# Patient Record
Sex: FEMALE | Race: WHITE | NOT HISPANIC OR LATINO | Employment: OTHER | URBAN - METROPOLITAN AREA
[De-identification: names, ages, dates, MRNs, and addresses within clinical notes are randomized per-mention and may not be internally consistent; named-entity substitution may affect disease eponyms.]

---

## 2017-01-05 ENCOUNTER — RADIANT APPOINTMENT (OUTPATIENT)
Dept: BONE DENSITY | Facility: CLINIC | Age: 77
End: 2017-01-05
Attending: INTERNAL MEDICINE
Payer: COMMERCIAL

## 2017-01-05 DIAGNOSIS — M89.9 OSTEOPATHIA: ICD-10-CM

## 2017-01-05 DIAGNOSIS — M85.80 OSTEOPENIA: ICD-10-CM

## 2017-01-05 DIAGNOSIS — Z78.0 ASYMPTOMATIC MENOPAUSAL STATE: ICD-10-CM

## 2017-01-05 PROCEDURE — 77085 DXA BONE DENSITY AXL VRT FX: CPT | Performed by: INTERNAL MEDICINE

## 2017-01-18 ENCOUNTER — TRANSFERRED RECORDS (OUTPATIENT)
Dept: HEALTH INFORMATION MANAGEMENT | Facility: CLINIC | Age: 77
End: 2017-01-18

## 2017-01-21 NOTE — PROGRESS NOTES
Quick Note:    Dear Ginger,   Your recent Dexa scan results were reviewed and thinning of the bones - osteopenia. Please continue with current treatment ie Calcium and Vitamin D, weight bearing exercise , and follow up plans as discussed.  A repeat DEXA is recommended in 2 years.    We will discuss other treatment options at your next office visit.    Stay healthy!  Dr. [facility]    ______

## 2017-01-23 ENCOUNTER — OFFICE VISIT (OUTPATIENT)
Dept: CARDIOLOGY | Facility: CLINIC | Age: 77
End: 2017-01-23
Attending: INTERNAL MEDICINE
Payer: COMMERCIAL

## 2017-01-23 VITALS
DIASTOLIC BLOOD PRESSURE: 62 MMHG | HEIGHT: 63 IN | SYSTOLIC BLOOD PRESSURE: 132 MMHG | WEIGHT: 167.8 LBS | BODY MASS INDEX: 29.73 KG/M2 | HEART RATE: 70 BPM

## 2017-01-23 DIAGNOSIS — I70.1 RENAL ARTERY STENOSIS (H): ICD-10-CM

## 2017-01-23 DIAGNOSIS — I10 BENIGN ESSENTIAL HYPERTENSION: Primary | ICD-10-CM

## 2017-01-23 DIAGNOSIS — E78.5 HYPERLIPIDEMIA LDL GOAL <100: ICD-10-CM

## 2017-01-23 PROCEDURE — 99215 OFFICE O/P EST HI 40 MIN: CPT | Performed by: INTERNAL MEDICINE

## 2017-01-23 RX ORDER — CLONIDINE HYDROCHLORIDE 0.1 MG/1
0.1 TABLET ORAL 2 TIMES DAILY
COMMUNITY
End: 2017-04-14

## 2017-01-23 NOTE — Clinical Note
2017      Vaibhav Maldonado MD [facility]    [address], [address]   [place]  [address]       RE: Ginger Abreu   MRN: [id number]   : 1940       Dear Dr. Maldonado:      I  had the pleasure to follow up with your patient, Ms. Ginger Abreu, in the Cardiovascular Medicine Clinic.  As you recall, she is a 76-year-old female with a longstanding history of hypertension.  She has a tremendous amount of allergies and adverse reactions to conventional hypertensive medications; please see list for additional details.      The patient has had fluctuating and difficult to control blood pressure more recently.  She has a blood pressure diary which has shown fluctuation in her blood pressures, oftentimes it can go to the 150s to 160s without any warning.  She has been on a multidrug regimen and is on second and third line agents as a result of her adverse reactions.  She has never had any chest pain, PND or orthopnea.  Her kidney function is stable.  She reports no lower extremity edema.  Due to her difficult to control blood pressure more recently, a CTA of her renal arteries was ordered.  It was commented upon that she has single renal arteries to both kidneys, the proximal portion of the left renal artery is heavily calcified with a significant noncalcified plaque seen in the proximal portion.  The right renal artery is somewhat obscured by heavy calcific plaque; however, the reader commented that there is also a noncalcified plaque at this level suggesting a moderate or moderate to severe area of narrowing.  She does not have any additional significant findings in her iliofemoral arteries.  Due to the nature of her difficult to control blood pressure, she is sent to the Peripheral Vascular Clinic for evaluation for renal angiography and PTA and stenting if needed.      PAST MEDICAL HISTORY:   1.  A CT as noted above with the suggestion of severe bilateral  renal artery stenosis.   2.  Hypertension, longstanding for many decades with more recent difficult to control.   3.  Hyperlipidemia.   4.  Obesity.   5.  History of pulmonary embolism, remotely.   6.  History of deep venous thrombosis.   7.  Chronic kidney disease.   8.  Diabetes mellitus.      ALLERGIES:  Please see extensive list as noted.      SOCIAL HISTORY:  The patient is currently retired, he used to work at [employer].  He is a nonsmoker, rare alcohol intake.      FAMILY HISTORY:  Negative for premature coronary artery disease.      REVIEW OF SYSTEMS:  A 10-point review of systems negative otherwise as specified in the HPI.      PHYSICAL EXAMINATION:   VITAL SIGNS:  Blood pressure is 132/62, heart rate is 70, weight 167 pounds.   GENERAL:  The patient is alert, oriented, in no apparent distress.   HEENT:  Oropharynx clear, no sinus tenderness.   NECK:  Unable to assess JVP due to fullness of neck, supple.   CARDIOVASCULAR:  Distant heart tones, normal S1, S2, no murmurs, gallops or rubs.   LUNGS:  Coarse but clear.   ABDOMEN:  Soft, nontender, nondistended.   EXTREMITIES:  No edema, warm to touch.      ASSESSMENT:   1.  Hypertension with labile blood pressure recordings at home as well as office-based numbers.   2.  Recent CTA with a suggestion of moderate-to-severe bilateral renal artery stenosis.   3.  Longstanding hypertension with multiple allergies.  Please see chart for additional details.   4.  Hyperlipidemia.   5.  Diabetes mellitus.   6.  Chronic renal insufficiency.      RECOMMENDATIONS:  We have gone over her recent CTA and blood pressure recordings.  We have discussed the nature of potential procedure, namely peripheral angiography and PTCA and stenting of the renal arteries if needed.  She, after a lengthy discussion, is agreeable to have this performed.  We have discussed the risks, benefits and potential complications and she is willing to proceed.  The next peripheral day that we have as an  outpatient is 02/21.  We will schedule her for this at 1:00 p.m.  We will give her gentle hydration prior to the procedure and carefully assess her renal arteries.      It was a pleasure seeing this patient on your behalf.  If you have any additional questions or concerns, please do not hesitate to contact me.      Sincerely,      Mariah Elizalde MD      cc:   Steven Fonseca MD  [facility] ([facility]3]   [address] [facility] [id number]

## 2017-01-23 NOTE — PROGRESS NOTES
2017      Vaibhav Maldonado MD [facility] [place]    [address], [address]   [place]  [address]       RE: Ginger Abreu   MRN: [id number]   : 1940       Dear Dr. Maldonado:      I  had the pleasure to follow up with your patient, Ms. Ginger Abreu, in the Cardiovascular Medicine Clinic.  As you recall, she is a 76-year-old female with a longstanding history of hypertension.  She has a tremendous amount of allergies and adverse reactions to conventional hypertensive medications; please see list for additional details.      The patient has had fluctuating and difficult to control blood pressure more recently.  She has a blood pressure diary which has shown fluctuation in her blood pressures, oftentimes it can go to the 150s to 160s without any warning.  She has been on a multidrug regimen and is on second and third line agents as a result of her adverse reactions.  She has never had any chest pain, PND or orthopnea.  Her kidney function is stable.  She reports no lower extremity edema.  Due to her difficult to control blood pressure more recently, a CTA of her renal arteries was ordered.  It was commented upon that she has single renal arteries to both kidneys, the proximal portion of the left renal artery is heavily calcified with a significant noncalcified plaque seen in the proximal portion.  The right renal artery is somewhat obscured by heavy calcific plaque; however, the reader commented that there is also a noncalcified plaque at this level suggesting a moderate or moderate to severe area of narrowing.  She does not have any additional significant findings in her iliofemoral arteries.  Due to the nature of her difficult to control blood pressure, she is sent to the Peripheral Vascular Clinic for evaluation for renal angiography and PTA and stenting if needed.      PAST MEDICAL HISTORY:   1.  A CT as noted above with the suggestion of severe bilateral  renal artery stenosis.   2.  Hypertension, longstanding for many decades with more recent difficult to control.   3.  Hyperlipidemia.   4.  Obesity.   5.  History of pulmonary embolism, remotely.   6.  History of deep venous thrombosis.   7.  Chronic kidney disease.   8.  Diabetes mellitus.      ALLERGIES:  Please see extensive list as noted.      SOCIAL HISTORY:  The patient is currently retired, he used to work at [employer].  He is a nonsmoker, rare alcohol intake.      FAMILY HISTORY:  Negative for premature coronary artery disease.      REVIEW OF SYSTEMS:  A 10-point review of systems negative otherwise as specified in the HPI.      PHYSICAL EXAMINATION:   VITAL SIGNS:  Blood pressure is 132/62, heart rate is 70, weight 167 pounds.   GENERAL:  The patient is alert, oriented, in no apparent distress.   HEENT:  Oropharynx clear, no sinus tenderness.   NECK:  Unable to assess JVP due to fullness of neck, supple.   CARDIOVASCULAR:  Distant heart tones, normal S1, S2, no murmurs, gallops or rubs.   LUNGS:  Coarse but clear.   ABDOMEN:  Soft, nontender, nondistended.   EXTREMITIES:  No edema, warm to touch.      ASSESSMENT:   1.  Hypertension with labile blood pressure recordings at home as well as office-based numbers.   2.  Recent CTA with a suggestion of moderate-to-severe bilateral renal artery stenosis.   3.  Longstanding hypertension with multiple allergies.  Please see chart for additional details.   4.  Hyperlipidemia.   5.  Diabetes mellitus.   6.  Chronic renal insufficiency.      RECOMMENDATIONS:  We have gone over her recent CTA and blood pressure recordings.  We have discussed the nature of potential procedure, namely peripheral angiography and PTCA and stenting of the renal arteries if needed.  She, after a lengthy discussion, is agreeable to have this performed.  We have discussed the risks, benefits and potential complications and she is willing to proceed.  The next peripheral day that we have as an  outpatient is .  We will schedule her for this at 1:00 p.m.  We will give her gentle hydration prior to the procedure and carefully assess her renal arteries.      It was a pleasure seeing this patient on your behalf.  If you have any additional questions or concerns, please do not hesitate to contact me.      Sincerely,      MD ERNESTO Reno MD             D: 2017 13:17   T: 2017 14:02   MT: MONA      Name:     SARAY MÉNDEZ   MRN:      [id number]        Account:      [id number]   :      1940           Service Date: 2017      Document: [id number]

## 2017-01-24 NOTE — PROGRESS NOTES
Quick Note:    Dear Ginger,   Your recent Dexa scan results were reviewed and thinning of the bones - osteopenia. Treatment with a bone building agent like Fosamax in addition to vitamin D and calcium supplementation is recommended. Please let me know how you would like to proceed. Please continue with current treatment ie Calcium and Vitamin D, weight bearing exercise, and follow up plans as discussed.  A repeat DEXA is recommended in 2 years.      Stay healthy!  Dr. [facility]    ______

## 2017-01-27 DIAGNOSIS — E78.5 HYPERLIPIDEMIA LDL GOAL <100: Primary | ICD-10-CM

## 2017-01-27 NOTE — TELEPHONE ENCOUNTER
fenofibrate 160 MG tablet       Last Written Prescription Date: 09/13/16  Last Fill Quantity: 90 tab, # refills: 2    Last Office Visit with [facility], [facility] or [facility] prescribing provider:  12/20/16   Future Office Visit:    Next 5 appointments (look out 90 days)     Feb 03, 2017  2:45 PM   Return Visit with Vaibhav Maldonado MD   [facility] [place] ([facility])    [address]  [facility] [address]   [phone]            Feb 13, 2017  9:15 AM   Lab visit with [facility]   [facility]    [address] [address]   [phone]            Feb 20, 2017  1:30 PM   MyChart Long with Steven Fonseca MD   [facility] ([facility])    [address] [address]   [phone]            Feb 28, 2017 10:10 AM   Return Visit with RAMY Pacheco CNP   [facility] [place] ([facility])    [address]  [facility] [address]   [phone]            Apr 14, 2017  1:30 PM   PHYSICAL with Steven Fonseca MD   [facility] ([facility])    [address] [address]   [phone]                  CHOLESTEROL   Date Value Ref Range Status   12/15/2016 159 <200 mg/dL Final     HDL CHOLESTEROL   Date Value Ref Range Status   12/15/2016 55 >49 mg/dL Final     LDL CHOLESTEROL CALCULATED   Date Value Ref Range Status   12/15/2016 72 <100 mg/dL Final     Comment:     Desirable:       <100 mg/dl     TRIGLYCERIDES   Date Value Ref Range Status   12/15/2016 160* <150 mg/dL Final     Comment:     Borderline high:  150-199 mg/dl   High:             200-499 mg/dl   Very high:       >499 mg/dl   Fasting specimen       CHOLESTEROL/HDL RATIO   Date Value Ref Range Status   04/03/2015 4.2 0.0  - 5.0 Final     ALT   Date Value Ref Range Status   12/15/2016 28 0 - 50 U/L Final

## 2017-01-29 DIAGNOSIS — I10 BENIGN ESSENTIAL HYPERTENSION: Primary | ICD-10-CM

## 2017-01-30 RX ORDER — FENOFIBRATE 160 MG/1
160 TABLET ORAL DAILY
Qty: 90 TABLET | Refills: 2 | Status: SHIPPED | OUTPATIENT
Start: 2017-01-30 | End: 2017-04-14

## 2017-01-30 NOTE — TELEPHONE ENCOUNTER
olmesartan (BENICAR) 40 MG tablet      Last Written Prescription Date: 11/28/16  Last Fill Quantity: 30 tab, # refills: 1  Last Office Visit with [facility], [facility] or [facility] prescribing provider: 01/23/2017   Next 5 appointments (look out 90 days)     Feb 03, 2017  2:45 PM   Return Visit with Vaibhav Maldonado MD   [facility] [place] ([facility])    [address]  [facility] [address]   [phone]            Feb 13, 2017  9:15 AM   Lab visit with [facility]   [facility] ([facility])    [address] [address]   [phone]            Feb 20, 2017  1:30 PM   MyChart Long with Steven Fonseca MD   [facility] ([facility])    [address] [address]   [phone]            Feb 28, 2017 10:10 AM   Return Visit with RAMY Pacheco CNP   [facility] [place] ([facility])    [address]  [facility] [address]   [phone]            Apr 14, 2017  1:30 PM   PHYSICAL with Steven Fonseca MD   [facility] ([facility])    [address] [address]   [phone]                   POTASSIUM   Date Value Ref Range Status   12/15/2016 4.3 3.4 - 5.3 mmol/L Final     CREATININE   Date Value Ref Range Status   12/15/2016 1.12* 0.52 - 1.04 mg/dL Final     BP Readings from Last 3 Encounters:   01/23/17 132/62   12/20/16 122/70   12/14/16 126/70

## 2017-01-31 RX ORDER — OLMESARTAN MEDOXOMIL 40 MG/1
40 TABLET ORAL DAILY
Qty: 30 TABLET | Refills: 1 | Status: SHIPPED | OUTPATIENT
Start: 2017-01-31 | End: 2017-02-20

## 2017-01-31 NOTE — TELEPHONE ENCOUNTER
Routing refill request to provider for review/approval because:  Drug interaction warning/allergy

## 2017-02-02 ENCOUNTER — TELEPHONE (OUTPATIENT)
Dept: CARDIOLOGY | Facility: CLINIC | Age: 77
End: 2017-02-02

## 2017-02-02 NOTE — TELEPHONE ENCOUNTER
Called pt with recommendations from Dr. Maldonado. Apt for tomorrow cancelled. Pt agrees with plan. [place] RN

## 2017-02-02 NOTE — TELEPHONE ENCOUNTER
Called pt re: upcoming apt with Dr. Maldonado. Pt advised she does not need to keep apt to see Dr. Maldonado as she saw Dr. Elizalde to review renal CT results & is scheduled for peripheral angio later this month. Pt states she did want to discuss her terazosin medication. Pt states she had a terrible bout of diarrhea lasting for weeks, she lost 7 lbs and finally resolved after she stopped the terazosin. Pt states her SBP is running 130-140. Will message Dr. Maldonado to review. [place] RN

## 2017-02-02 NOTE — TELEPHONE ENCOUNTER
I would stay off the terazosin and see what BP does post renal artery intervention which I believe is coming up later this month. Thx

## 2017-02-13 DIAGNOSIS — N18.30 CHRONIC KIDNEY DISEASE, STAGE III (MODERATE) (H): ICD-10-CM

## 2017-02-13 DIAGNOSIS — I15.0 RENOVASCULAR HYPERTENSION: ICD-10-CM

## 2017-02-13 DIAGNOSIS — E55.9 VITAMIN D DEFICIENCY: ICD-10-CM

## 2017-02-13 LAB
ANION GAP SERPL CALCULATED.3IONS-SCNC: 7 MMOL/L (ref 3–14)
BUN SERPL-MCNC: 29 MG/DL (ref 7–30)
CALCIUM SERPL-MCNC: 9.3 MG/DL (ref 8.5–10.1)
CHLORIDE SERPL-SCNC: 106 MMOL/L (ref 94–109)
CO2 SERPL-SCNC: 28 MMOL/L (ref 20–32)
CREAT SERPL-MCNC: 1.19 MG/DL (ref 0.52–1.04)
DEPRECATED CALCIDIOL+CALCIFEROL SERPL-MC: 48 UG/L (ref 20–75)
GFR SERPL CREATININE-BSD FRML MDRD: 44 ML/MIN/1.7M2
GLUCOSE SERPL-MCNC: 122 MG/DL (ref 70–99)
POTASSIUM SERPL-SCNC: 4 MMOL/L (ref 3.4–5.3)
PTH-INTACT SERPL-MCNC: 39 PG/ML (ref 12–72)
SODIUM SERPL-SCNC: 141 MMOL/L (ref 133–144)

## 2017-02-13 PROCEDURE — 36415 COLL VENOUS BLD VENIPUNCTURE: CPT | Performed by: INTERNAL MEDICINE

## 2017-02-13 PROCEDURE — 83970 ASSAY OF PARATHORMONE: CPT | Performed by: INTERNAL MEDICINE

## 2017-02-13 PROCEDURE — 82306 VITAMIN D 25 HYDROXY: CPT | Performed by: INTERNAL MEDICINE

## 2017-02-13 PROCEDURE — 80048 BASIC METABOLIC PNL TOTAL CA: CPT | Performed by: INTERNAL MEDICINE

## 2017-02-17 ENCOUNTER — TELEPHONE (OUTPATIENT)
Dept: CARDIOLOGY | Facility: CLINIC | Age: 77
End: 2017-02-17

## 2017-02-17 NOTE — TELEPHONE ENCOUNTER
Patient calling to discuss plan for coronary angiogram - patient concerned at she is scheduled for a later procedure and she is a diabetic.. The patient wondering if she would be able to get an earlier time slot..   In review of the schedules - I was able to get the patient moved to the morning on 2/27/2017 to the first case in the morning.  There were no other questions or concerns at this time. Lena Keene

## 2017-02-20 ENCOUNTER — OFFICE VISIT (OUTPATIENT)
Dept: INTERNAL MEDICINE | Facility: CLINIC | Age: 77
End: 2017-02-20
Payer: COMMERCIAL

## 2017-02-20 VITALS
WEIGHT: 167 LBS | HEIGHT: 63 IN | BODY MASS INDEX: 29.59 KG/M2 | OXYGEN SATURATION: 98 % | RESPIRATION RATE: 16 BRPM | HEART RATE: 61 BPM | SYSTOLIC BLOOD PRESSURE: 112 MMHG | DIASTOLIC BLOOD PRESSURE: 60 MMHG | TEMPERATURE: 98.2 F

## 2017-02-20 DIAGNOSIS — E11.9 TYPE 2 DIABETES MELLITUS WITHOUT COMPLICATION, WITHOUT LONG-TERM CURRENT USE OF INSULIN (H): ICD-10-CM

## 2017-02-20 DIAGNOSIS — N18.30 CHRONIC KIDNEY DISEASE, STAGE III (MODERATE) (H): ICD-10-CM

## 2017-02-20 DIAGNOSIS — F41.9 ANXIETY: ICD-10-CM

## 2017-02-20 DIAGNOSIS — M48.061 SPINAL STENOSIS, LUMBAR REGION, WITHOUT NEUROGENIC CLAUDICATION: ICD-10-CM

## 2017-02-20 DIAGNOSIS — M85.80 OSTEOPENIA: ICD-10-CM

## 2017-02-20 DIAGNOSIS — E55.9 VITAMIN D DEFICIENCY: ICD-10-CM

## 2017-02-20 DIAGNOSIS — E78.5 HYPERLIPIDEMIA WITH TARGET LDL LESS THAN 100: ICD-10-CM

## 2017-02-20 DIAGNOSIS — I10 BENIGN ESSENTIAL HYPERTENSION: Primary | ICD-10-CM

## 2017-02-20 LAB
CREAT UR-MCNC: 138 MG/DL
MAGNESIUM SERPL-MCNC: 2 MG/DL (ref 1.6–2.3)
MICROALBUMIN UR-MCNC: 13 MG/L
MICROALBUMIN/CREAT UR: 9.35 MG/G CR (ref 0–25)
PHOSPHATE SERPL-MCNC: 2.9 MG/DL (ref 2.5–4.5)

## 2017-02-20 PROCEDURE — 82043 UR ALBUMIN QUANTITATIVE: CPT | Performed by: INTERNAL MEDICINE

## 2017-02-20 PROCEDURE — 83735 ASSAY OF MAGNESIUM: CPT | Performed by: INTERNAL MEDICINE

## 2017-02-20 PROCEDURE — 36415 COLL VENOUS BLD VENIPUNCTURE: CPT | Performed by: INTERNAL MEDICINE

## 2017-02-20 PROCEDURE — 84100 ASSAY OF PHOSPHORUS: CPT | Performed by: INTERNAL MEDICINE

## 2017-02-20 PROCEDURE — 99215 OFFICE O/P EST HI 40 MIN: CPT | Performed by: INTERNAL MEDICINE

## 2017-02-20 RX ORDER — ALPRAZOLAM 0.25 MG
0.25 TABLET ORAL
Qty: 30 TABLET | Refills: 0 | Status: SHIPPED | OUTPATIENT
Start: 2017-02-20 | End: 2017-05-04

## 2017-02-20 RX ORDER — OLMESARTAN MEDOXOMIL 40 MG/1
40 TABLET ORAL DAILY
Qty: 30 TABLET | Refills: 1 | Status: SHIPPED | OUTPATIENT
Start: 2017-02-20 | End: 2018-02-02

## 2017-02-20 RX ORDER — GLIMEPIRIDE 2 MG/1
2 TABLET ORAL
Qty: 90 TABLET | Refills: 0 | Status: SHIPPED | OUTPATIENT
Start: 2017-02-20 | End: 2017-04-14

## 2017-02-20 RX ORDER — OLMESARTAN MEDOXOMIL 40 MG/1
40 TABLET ORAL DAILY
Qty: 30 TABLET | Refills: 1
Start: 2017-02-20 | End: 2017-02-20

## 2017-02-20 NOTE — PATIENT INSTRUCTIONS
** FOLLOW UP PLAN**:    PLEASE SCHEDULE  FASTING LABS WITH OFFICE VISIT 3 MONTHS FROM TODAY TO FOLLOW UP ON  Diabetes, high blood pressure, cholesterol, to evaluate kidney function after angiogram and to review other test results     BE SURE TO SCHEDULE YOUR LAB DRAW APPOINTMENT FOR AT LEAST 1 WEEK BEFORE YOUR NEXT VISIT    I would recommend that you start Prolia which is a subcutaneous injection used to treat osteopenia as based on your bone density scan results there has been some significant worsening of your bone density  Please see information regarding Prolia and its side effect profile below      YOU MAY CONTACT THE CLINIC IF ANY QUESTIONS OR CONCERNS -[phone] OR [facility]           PROLIA  Risk Evaluation and Mitigation Strategy Best Practice Advisory    In May 2015, the FDA required an update to the PROLIA  (denosumab) REMS (Risk Evaluation and Mitigation Strategy) to inform both providers and patients about potential serious risks related to PROLIA .    These potential serious risks include:    Hypocalcemia    Osteonecrosis of the jaw    Atypical femoral fractures    Serious Infections    Dermatologic reactions    To ensure compliance with these requirements [employer] has developed a workflow for those patients who will receive PROLIA  at clinic.  This workflow includes insurance verification, patient scheduling, patient education, and documentation. Registered Nurses in the clinic should have completed eLearning related to the REMS and the clinic workflow.  Refer to them to initiate this process.  This workflow does not need to be executed if the patient is to receive PROLIA  injection at [facility].       The  has put together a Patient Education Toolkit.  Copies of these handouts may be available your clinic. Patients must receive the Patient Brochure and Medication Guide when receiving injection at clinic.  These will be attached to the injection ordered from  [employer] to the clinic. Links to handouts:  Patient Counseling Chart for Healthcare Providers  Patient Brochure  Prescribing Information  Medication Guide    If you are having trouble accessing the above links, these documents can be found at: http://[email or web address]    The role of healthcare provider includes reviewing patient education materials with the patient, advising patients to seek medical attention if they have signs or symptoms of one of the serious risks, and provide patients a copy of the Patient Brochure and Medication Guide when receiving their injection.      Due to the risk of hypocalcemia the Prescribing Information for PROLIA  recommends that calcium and mineral levels (magnesium and phosphorus) be checked within 14 days of injection for those predisposed to hypocalcemia.

## 2017-02-20 NOTE — PROGRESS NOTES
SUBJECTIVE:                                                    Ginger Abreu is a 76 year old female who presents to clinic today for the following health issues:    Diabetes Follow-up    Patient is checking blood sugars: once daily.  Results are as follows:         am - 100-125    Diabetic concerns: None     Symptoms of hypoglycemia (low blood sugar): shaky, weak, blurred vision     Paresthesias (numbness or burning in feet) or sores: Yes not sure if from back surgery     Date of last diabetic eye exam: Nov 2016     Hypertension Follow-up      Outpatient blood pressures are being checked at home.  Results are 130/80.    Low Salt Diet: not monitoring salt       Amount of exercise or physical activity: 1 day/week for an average of 15-30 minutes    Problems taking medications regularly: No    Medication side effects: none  Diet: regular (no restrictions)    Other significant issues as outlined and addressed in the plan section of this note.  Ginger's most recent test results are as noted and addressed in the plan section of this note, and are significant for findings consistent with:   STABLE CKD - likely as a result of renal artery stenosis    Which could certainly account for a lot of the symptoms that she has been experiencing with regards to difficulty with blood pressure control.    Of note Ginger has a renal angiogram set up for a week from today.    Problem list and histories reviewed & adjusted, as indicated.  Additional history: as documented    Labs reviewed in [facility]  Problem list, Medication list, Allergies, and Medical/Social/Surgical histories reviewed in [facility] and updated as appropriate.    ROS:  14 point ROS negative except as above. She does have some pain in her legs which is chronic and as a result of lumbar stenosis and previous back surgery for which she requires a roller walker. She is requesting for a DME prescription today.      OBJECTIVE:                                                   "  /60 (BP Location: Right arm, Patient Position: Chair, Cuff Size: Adult Regular)  Pulse 61  Temp 98.2  F (36.8  C) (Oral)  Resp 16  Ht 5' 3\" (1.6 m)  Wt 167 lb (75.8 kg)  SpO2 98%  BMI 29.58 kg/m2  Body mass index is 29.58 kg/(m^2).  GENERAL: healthy, alert and no distress  EYES: Eyes grossly normal to inspection, PERRL and conjunctivae and sclerae normal  NECK: no adenopathy, no asymmetry, masses, or scars and thyroid normal to palpation  RESP: lungs clear to auscultation - no rales, rhonchi or wheezes  CV: regular rate and rhythm, normal S1 S2, no S3 or S4, no murmur, click or rub, no peripheral edema and peripheral pulses strong  ABDOMEN: soft, nontender, no hepatosplenomegaly, no masses and bowel sounds normal  MS: no gross musculoskeletal defects noted, no edema  NEURO: Normal strength and tone, mentation intact and speech normal  PSYCH: mentation appears normal, affect normal/bright    Diagnostic Test Results:  Results for orders placed or performed in visit on 02/13/17   Vitamin D Deficiency   Result Value Ref Range    Vitamin D Deficiency screening 48 20 - 75 ug/L   Basic metabolic panel   Result Value Ref Range    Sodium 141 133 - 144 mmol/L    Potassium 4.0 3.4 - 5.3 mmol/L    Chloride 106 94 - 109 mmol/L    Carbon Dioxide 28 20 - 32 mmol/L    Anion Gap 7 3 - 14 mmol/L    Glucose 122 (H) 70 - 99 mg/dL    Urea Nitrogen 29 7 - 30 mg/dL    Creatinine 1.19 (H) 0.52 - 1.04 mg/dL    GFR Estimate 44 (L) >60 mL/min/1.7m2    GFR Estimate If Black 53 (L) >60 mL/min/1.7m2    Calcium 9.3 8.5 - 10.1 mg/dL   Parathyroid Hormone Intact   Result Value Ref Range    Parathyroid Hormone Intact 39 12 - 72 pg/mL        ASSESSMENT/PLAN:                                                    Hyperlipidemia; controlled   Plan:  No changes in the patient's current treatment plan    Hypertension; controlled   Associated with the following complications:    CKD and Vascular Disease   Plan:  No changes in the patient's current " treatment plan    Chronic Kidney Disease Stage 3 , stable     Associated with the following complications: None     Plan:  Current treatment plan is appropriate, no change in therapy              DIAGNOSIS/PLAN:     ICD-10-CM    1. Benign essential hypertension I10 olmesartan (BENICAR) 40 MG tablet     DISCONTINUED: olmesartan (BENICAR) 40 MG tablet   2. Type 2 diabetes mellitus without complication, without long-term current use of insulin (H) E11.9 glimepiride (AMARYL) 2 MG tablet   3. Vitamin D deficiency E55.9 cholecalciferol (VITAMIN D3) 24439 UNITS capsule   4. Anxiety F41.9 ALPRAZolam (XANAX) 0.25 MG tablet    USES SPARINGLY DURING STRESSFUL SITUATIONS   5. Chronic kidney disease, stage III (moderate) N18.3    6. Spinal stenosis, lumbar region, without neurogenic claudication M48.06 order for DME    With significant lower extremity weakness which is stable       SIGNIFICANT ISSUES RE The primary encounter diagnosis was Benign essential hypertension. Diagnoses of Type 2 diabetes mellitus without complication, without long-term current use of insulin (H), Vitamin D deficiency, Anxiety, Chronic kidney disease, stage III (moderate), and Spinal stenosis, lumbar region, without neurogenic claudication were also pertinent to this visit. AS NOTED AND ADDRESSED ABOVE   MEDS AND AND LABS AS ORDERED TO ADDRESS PREVIOUS AND CURRENT ABNORMAL INDICES    SEE PATIENT INSTRUCTION SECTION FOR FOLLOW UP PLAN    Ginger IS TO CONTINUE OTHER TREATMENT REGIMEN/PLANS EXCEPT AS INDICATED    COMPLIANCE WITH MEDICATIONS DIET AND EXERCISE PLANS ENCOURAGED    DISCONTINUED MEDS:  Medications Discontinued During This Encounter   Medication Reason     nitrofurantoin (MACRODANTIN) 50 MG capsule      cholecalciferol (VITAMIN D3) 62631 UNITS capsule Reorder     olmesartan (BENICAR) 40 MG tablet Reorder     terazosin (HYTRIN) 2 MG capsule Discontinued by another Health Care Provider     fluticasone-salmeterol (ADVAIR DISKUS) 250-50 MCG/DOSE  diskus inhaler      Doxylamine Succinate, Sleep, (SLEEP AID PO)      olmesartan (BENICAR) 40 MG tablet Reorder     glimepiride (AMARYL) 2 MG tablet Reorder     ALPRAZolam (XANAX) 0.25 MG tablet Reorder       CURRENT MED LIST WITH CHANGES AS NOTED BELOW:  Current Outpatient Prescriptions   Medication Sig Dispense Refill     cholecalciferol (VITAMIN D3) 86962 UNITS capsule Take 1 capsule (50,000 Units) by mouth every 14 days ONE CAP 3 X A MONTH ON THE 1ST, 10TH AND 20TH OF EACH MONTH, FOR VITAMIN D DEFICIENCY MUST TAKE WITH WITH FAT CONTAINING MEAL 40 capsule 0     olmesartan (BENICAR) 40 MG tablet Take 1 tablet (40 mg) by mouth daily INDICATION:TO LOWER BLOOD PRESSURE AND TO HELP PRESERVE KIDNEY FUNCTION 30 tablet 1     glimepiride (AMARYL) 2 MG tablet Take 1 tablet (2 mg) by mouth every morning (before breakfast) INDICATION: TO TREAT DIABETES 90 tablet 0     ALPRAZolam (XANAX) 0.25 MG tablet Take 1 tablet (0.25 mg) by mouth nightly as needed (FOR SEVERE ANXIETY ONLY) 30 tablet 0     order for DME Equipment being ordered: Wheeled walker with basket and seat attachment 1 Units 0     fenofibrate 160 MG tablet Take 1 tablet (160 mg) by mouth daily INDICATION: TO LOWER CHOLESTEROL 90 tablet 2     cloNIDine (CATAPRES) 0.1 MG tablet Take 0.1 mg by mouth 2 times daily       butalbital-acetaminophen-caffeine (FIORICET/ESGIC) -40 MG per tablet Take 1 tablet by mouth every 6 hours as needed for pain 30 tablet 0     triamterene-hydrochlorothiazide (MAXZIDE-25) 37.5-25 MG per tablet Take 1 tablet by mouth every morning INDICATION:TO LOWER BLOOD PRESSURE AND CONTROL EDEMA 30 tablet PRN     nitroglycerin (NITROSTAT) 0.4 MG SL tablet Place 1 tablet (0.4 mg) under the tongue every 5 minutes as needed for chest pain 25 tablet 0     ascorbic acid (VITAMIN C) 1000 MG TABS Take 1 tablet (1,000 mg) by mouth daily VITAMIN C REPLACEMENT 100 tablet 3     pravastatin (PRAVACHOL) 80 MG tablet Take 0.5 tablets (40 mg) by mouth every  evening INDICATION: TO LOWER CHOLESTEROL AND TO HELP  PREVENT HEART DISEASE 45 tablet PRN     cyabnocobalamin (VITAMIN B-12) 2500 MCG sublingual tablet Take 2,500 mcg by mouth daily INDICATION: FOR VITAMIN B12 SUPPLEMENTATION - TO IMPROVE MEMORY, BALANCE, SLEEP AND MOOD, DIRECTIONS: PLEASE PLACE UNDER THE TONGUE TO DISSOLVE 250 tablet 3     fluocinonide (LIDEX) 0.05 % external solution Apply to scalp QD-BID as needed 60 mL 3     esomeprazole (NEXIUM) 40 MG capsule Take 1 capsule (40 mg) by mouth every morning (before breakfast) TAKE  30'-60' BEFORE 1ST MEAL 180 capsule 2     furosemide (LASIX) 20 MG tablet Take 1 tablet (20 mg) by mouth as needed INDICATION: TO HELP LOWER BLOOD PRESSURE AND TO TREAT EDEMA 30 tablet PRN     calcium-vitamin D-vitamin K (VIACTIV) 500-500-40 MG-UNT-MCG CHEW Take 1 tablet by mouth 2 times daily (with meals) INDICATION: FOR BONE AND BREAST HEALTH 100 tablet PRN     melatonin 5 MG CAPS Take by mouth At Bedtime       BETA BLOCKER NOT PRESCRIBED, INTENTIONAL, Beta Blocker not prescribed intentionally due to Intolerance  0     albuterol (PROAIR HFA, PROVENTIL HFA, VENTOLIN HFA) 108 (90 BASE) MCG/ACT inhaler Inhale 2 puffs into the lungs every 6 hours INDICATION: RESCUE INHALER FOR SHORTNESS OF BREATH, CHEST TIGHTNESS AND COUGH 3 Inhaler PRN     ASPIRIN NOT PRESCRIBED, INTENTIONAL, continuous prn. Antiplatelet medication not prescribed intentionally due to Current anticoagulant therapy (warfarin/enoxaparin) 0 each 0     Lactobacillus (ACIDOPHILUS PO) Take  by mouth.       [DISCONTINUED] olmesartan (BENICAR) 40 MG tablet Take 1 tablet (40 mg) by mouth daily INDICATION:TO LOWER BLOOD PRESSURE AND TO HELP PRESERVE KIDNEY FUNCTION 30 tablet 1     [DISCONTINUED] olmesartan (BENICAR) 40 MG tablet Take 1 tablet (40 mg) by mouth daily 30 tablet 1     [DISCONTINUED] glimepiride (AMARYL) 2 MG tablet Take 1 tablet (2 mg) by mouth every morning (before breakfast) INDICATION: TO TREAT DIABETES 90 tablet 0      [DISCONTINUED] ALPRAZolam (XANAX) 0.25 MG tablet Take 1 tablet (0.25 mg) by mouth nightly as needed (FOR SEVERE ANXIETY ONLY) 10 tablet 0     [DISCONTINUED] fluticasone (FLOVENT HFA) 110 MCG/ACT inhaler Inhale 2 puffs into the lungs 2 times daily rinse mouth after each use 3 Inhaler 3         Office visit time > 40 mins, greater than 50% of which was spent obtaining history, reviewing medications, counseling re compliance with treatment plan, need for Prolia injection to treat severe osteopenia, discussion of treatment, follow up plans, and coordination of care. Continue weightbearing exercises and calcium and vitamin D supplementation is recommended.      Patient Instructions     ** FOLLOW UP PLAN**:    PLEASE SCHEDULE  FASTING LABS WITH OFFICE VISIT 3 MONTHS FROM TODAY TO FOLLOW UP ON  Diabetes, high blood pressure, cholesterol, to evaluate kidney function after angiogram and to review other test results     BE SURE TO SCHEDULE YOUR LAB DRAW APPOINTMENT FOR AT LEAST 1 WEEK BEFORE YOUR NEXT VISIT    I would recommend that you start Prolia which is a subcutaneous injection used to treat osteopenia as based on your bone density scan results there has been some significant worsening of your bone density  Please see information regarding Prolia and its side effect profile below      YOU MAY CONTACT THE CLINIC IF ANY QUESTIONS OR CONCERNS -[phone] OR VIA flatev           PROLIA  Risk Evaluation and Mitigation Strategy Best Practice Advisory    In May 2015, the FDA required an update to the PROLIA  (denosumab) REMS (Risk Evaluation and Mitigation Strategy) to inform both providers and patients about potential serious risks related to PROLIA .    These potential serious risks include:    Hypocalcemia    Osteonecrosis of the jaw    Atypical femoral fractures    Serious Infections    Dermatologic reactions    To ensure compliance with these requirements Leonor has developed a workflow for those patients who  will receive PROLIA  at clinic.  This workflow includes insurance verification, patient scheduling, patient education, and documentation. Registered Nurses in the clinic should have completed eLearning related to the REMS and the clinic workflow.  Refer to them to initiate this process.  This workflow does not need to be executed if the patient is to receive PROLIA  injection at [facility].       The  has put together a Patient Education Toolkit.  Copies of these handouts may be available your clinic. Patients must receive the Patient Brochure and Medication Guide when receiving injection at clinic.  These will be attached to the injection ordered from [employer] to the clinic. Links to handouts:  Patient Counseling Chart for Healthcare Providers  Patient Brochure  Prescribing Information  Medication Guide    If you are having trouble accessing the above links, these documents can be found at: [email or web address]    The role of healthcare provider includes reviewing patient education materials with the patient, advising patients to seek medical attention if they have signs or symptoms of one of the serious risks, and provide patients a copy of the Patient Brochure and Medication Guide when receiving their injection.      Due to the risk of hypocalcemia the Prescribing Information for PROLIA  recommends that calcium and mineral levels (magnesium and phosphorus) be checked within 14 days of injection for those predisposed to hypocalcemia.          Steven Fonseca MD  [facility]          DIAGNOSIS/PLAN:   No diagnosis found.    SIGNIFICANT ISSUES RE There were no encounter diagnoses. AS NOTED AND ADDRESSED ABOVE   MEDS AND AND LABS AS ORDERED TO ADDRESS PREVIOUS AND CURRENT ABNORMAL INDICES    SEE PATIENT INSTRUCTION SECTION FOR FOLLOW UP PLAN    Ginger IS TO CONTINUE OTHER TREATMENT  REGIMEN/PLANS EXCEPT AS INDICATED    COMPLIANCE WITH MEDICATIONS DIET AND EXERCISE PLANS ENCOURAGED    DISCONTINUED MEDS:  Medications Discontinued During This Encounter   Medication Reason     nitrofurantoin (MACRODANTIN) 50 MG capsule        CURRENT MED LIST WITH CHANGES AS NOTED BELOW:  Current Outpatient Prescriptions   Medication Sig Dispense Refill     olmesartan (BENICAR) 40 MG tablet Take 1 tablet (40 mg) by mouth daily 30 tablet 1     fenofibrate 160 MG tablet Take 1 tablet (160 mg) by mouth daily INDICATION: TO LOWER CHOLESTEROL 90 tablet 2     cloNIDine (CATAPRES) 0.1 MG tablet Take 0.1 mg by mouth 2 times daily       butalbital-acetaminophen-caffeine (FIORICET/ESGIC) -40 MG per tablet Take 1 tablet by mouth every 6 hours as needed for pain 30 tablet 0     cholecalciferol (VITAMIN D3) 26078 UNITS capsule Take 1 capsule (50,000 Units) by mouth every 14 days SIG: TAKE 1 CAP TWICE A WEEK FOR 2 WEEKS, THEN ONE CAP EVERY OTHER WEEK, INDICATION: TO TREAT VITAMIN D DEFICIENCY (1ST AND 15TH OF EACH MONTH), MUST TAKE WITH WITH FAT CONTAINING MEAL 40 capsule 0     terazosin (HYTRIN) 2 MG capsule Take 1 capsule (2 mg) by mouth At Bedtime INDICATION: TO TREAT HYPERTENSION 30 capsule 11     glimepiride (AMARYL) 2 MG tablet Take 1 tablet (2 mg) by mouth every morning (before breakfast) INDICATION: TO TREAT DIABETES 90 tablet 0     triamterene-hydrochlorothiazide (MAXZIDE-25) 37.5-25 MG per tablet Take 1 tablet by mouth every morning INDICATION:TO LOWER BLOOD PRESSURE AND CONTROL EDEMA 30 tablet PRN     ALPRAZolam (XANAX) 0.25 MG tablet Take 1 tablet (0.25 mg) by mouth nightly as needed (FOR SEVERE ANXIETY ONLY) 10 tablet 0     nitroglycerin (NITROSTAT) 0.4 MG SL tablet Place 1 tablet (0.4 mg) under the tongue every 5 minutes as needed for chest pain 25 tablet 0     fluticasone-salmeterol (ADVAIR DISKUS) 250-50 MCG/DOSE diskus inhaler Inhale 1 puff into the lungs 2 times daily 60 Inhaler prn     ascorbic acid  (VITAMIN C) 1000 MG TABS Take 1 tablet (1,000 mg) by mouth daily VITAMIN C REPLACEMENT 100 tablet 3     pravastatin (PRAVACHOL) 80 MG tablet Take 0.5 tablets (40 mg) by mouth every evening INDICATION: TO LOWER CHOLESTEROL AND TO HELP  PREVENT HEART DISEASE 45 tablet PRN     cyabnocobalamin (VITAMIN B-12) 2500 MCG sublingual tablet Take 2,500 mcg by mouth daily INDICATION: FOR VITAMIN B12 SUPPLEMENTATION - TO IMPROVE MEMORY, BALANCE, SLEEP AND MOOD, DIRECTIONS: PLEASE PLACE UNDER THE TONGUE TO DISSOLVE 250 tablet 3     fluocinonide (LIDEX) 0.05 % external solution Apply to scalp QD-BID as needed 60 mL 3     esomeprazole (NEXIUM) 40 MG capsule Take 1 capsule (40 mg) by mouth every morning (before breakfast) TAKE  30'-60' BEFORE 1ST MEAL 180 capsule 2     furosemide (LASIX) 20 MG tablet Take 1 tablet (20 mg) by mouth as needed INDICATION: TO HELP LOWER BLOOD PRESSURE AND TO TREAT EDEMA 30 tablet PRN     calcium-vitamin D-vitamin K (VIACTIV) 500-500-40 MG-UNT-MCG CHEW Take 1 tablet by mouth 2 times daily (with meals) INDICATION: FOR BONE AND BREAST HEALTH 100 tablet PRN     melatonin 5 MG CAPS Take by mouth At Bedtime       BETA BLOCKER NOT PRESCRIBED, INTENTIONAL, Beta Blocker not prescribed intentionally due to Intolerance  0     albuterol (PROAIR HFA, PROVENTIL HFA, VENTOLIN HFA) 108 (90 BASE) MCG/ACT inhaler Inhale 2 puffs into the lungs every 6 hours INDICATION: RESCUE INHALER FOR SHORTNESS OF BREATH, CHEST TIGHTNESS AND COUGH 3 Inhaler PRN     [DISCONTINUED] fluticasone (FLOVENT HFA) 110 MCG/ACT inhaler Inhale 2 puffs into the lungs 2 times daily rinse mouth after each use 3 Inhaler 3     Doxylamine Succinate, Sleep, (SLEEP AID PO) Take  by mouth At Bedtime.       ASPIRIN NOT PRESCRIBED, INTENTIONAL, continuous prn. Antiplatelet medication not prescribed intentionally due to Current anticoagulant therapy (warfarin/enoxaparin) 0 each 0     Lactobacillus (ACIDOPHILUS PO) Take  by mouth.           Office visit time >  40 mins, greater than 50% of which was spent obtaining history, reviewing medications, counseling re compliance with treatment plan, discussion of treatment, follow up plans, and coordination of care.

## 2017-02-20 NOTE — MR AVS SNAPSHOT
After Visit Summary   2/20/2017    Ginger Abreu    MRN: [id number]           Patient Information     Date Of Birth          1940        Visit Information        Provider Department      2/20/2017 1:30 PM Steven Fonseca MD [facility]        Today's Diagnoses     Benign essential hypertension    -  1    Type 2 diabetes mellitus without complication, without long-term current use of insulin (H)        Vitamin D deficiency        Anxiety        Chronic kidney disease, stage III (moderate)        Spinal stenosis, lumbar region, without neurogenic claudication        Osteopenia        Hyperlipidemia with target LDL less than 100          Care Instructions      ** FOLLOW UP PLAN**:    PLEASE SCHEDULE  FASTING LABS WITH OFFICE VISIT 3 MONTHS FROM TODAY TO FOLLOW UP ON  Diabetes, high blood pressure, cholesterol, to evaluate kidney function after angiogram and to review other test results     BE SURE TO SCHEDULE YOUR LAB DRAW APPOINTMENT FOR AT LEAST 1 WEEK BEFORE YOUR NEXT VISIT    I would recommend that you start Prolia which is a subcutaneous injection used to treat osteopenia as based on your bone density scan results there has been some significant worsening of your bone density  Please see information regarding Prolia and its side effect profile below      YOU MAY CONTACT THE CLINIC IF ANY QUESTIONS OR CONCERNS -[phone] OR VIA [facility]           PROLIA  Risk Evaluation and Mitigation Strategy Best Practice Advisory    In May 2015, the FDA required an update to the PROLIA  (denosumab) REMS (Risk Evaluation and Mitigation Strategy) to inform both providers and patients about potential serious risks related to PROLIA .    These potential serious risks include:    Hypocalcemia    Osteonecrosis of the jaw    Atypical femoral fractures    Serious Infections    Dermatologic reactions    To ensure compliance with these requirements [place] has developed a workflow for  those patients who will receive PROLIA  at clinic.  This workflow includes insurance verification, patient scheduling, patient education, and documentation. Registered Nurses in the clinic should have completed eLearning related to the REMS and the clinic workflow.  Refer to them to initiate this process.  This workflow does not need to be executed if the patient is to receive PROLIA  injection at [facility].       The  has put together a Patient Education Toolkit.  Copies of these handouts may be available your clinic. Patients must receive the Patient Brochure and Medication Guide when receiving injection at clinic.  These will be attached to the injection ordered from [employer] to the clinic. Links to handouts:  Patient Counseling Chart for Healthcare Providers  Patient Brochure  Prescribing Information  Medication Guide    If you are having trouble accessing the above links, these documents can be found at: http://[email or web address]    The role of healthcare provider includes reviewing patient education materials with the patient, advising patients to seek medical attention if they have signs or symptoms of one of the serious risks, and provide patients a copy of the Patient Brochure and Medication Guide when receiving their injection.      Due to the risk of hypocalcemia the Prescribing Information for PROLIA  recommends that calcium and mineral levels (magnesium and phosphorus) be checked within 14 days of injection for those predisposed to hypocalcemia.            Follow-ups after your visit        Your next 10 appointments already scheduled     Feb 27, 2017  8:30 AM CST   Peripheral Angiogram with SHCVR2   [facility] ([facility])    [address]  [place] [address]   [phone]            Mar 06, 2017  1:50 PM CST   Return Visit with Kassandra Schulz  RAMY Nichols CNP   [facility] [place] ([facility])    [id number]  [place] [address] [phone]            Apr 07, 2017  8:30 AM CDT   [facility]   [facility]    [address] [id number] [phone]           Patient must bring picture ID.  Patient should be prepared to give a urine specimen  Please do not eat 10-12 hours before your appointment if you are coming in fasting for labs on lipids, cholesterol, or glucose (sugar).  Pregnant women should follow their Care Team instructions. Water with medications is okay. Do not drink coffee or other fluids.   If you have concerns about taking  your medications, please ask at office or if scheduling via [employer], send a message by clicking on Secure Messaging, Message Your Care Team.            Apr 14, 2017  1:30 PM CDT   PHYSICAL with Steven Fonseca MD   [facility]    [address] [place] [id number] [phone]              Future tests that were ordered for you today     Open Future Orders        Priority Expected Expires Ordered    Vitamin D Deficiency Routine 4/20/2017 8/20/2017 2/20/2017    CBC with platelets Routine 4/20/2017 8/20/2017 2/20/2017    Comprehensive metabolic panel Routine 4/20/2017 8/20/2017 2/20/2017    Lipid panel reflex to direct LDL Routine 4/20/2017 8/20/2017 2/20/2017    Hemoglobin A1c Routine 4/20/2017 8/20/2017 2/20/2017            Who to contact     If you have questions or need follow up information about today's clinic visit or your schedule please contact [facility] directly at [phone].  Normal or non-critical lab and imaging results will be communicated to you by MyChart, letter or phone within 4 business days after the clinic has received the results. If you do  "not hear from us within 7 days, please contact the clinic through [employer] or phone. If you have a critical or abnormal lab result, we will notify you by phone as soon as possible.  Submit refill requests through [employer] or call your pharmacy and they will forward the refill request to us. Please allow 3 business days for your refill to be completed.          Additional Information About Your Visit        [employer] Information     [employer] gives you secure access to your electronic health record. If you see a primary care provider, you can also send messages to your care team and make appointments. If you have questions, please call your primary care clinic.  If you do not have a primary care provider, please call [phone] and they will assist you.        Care EveryWhere ID     This is your Care EveryWhere ID. This could be used by other organizations to access your [facility] medical records  [id number]        Your Vitals Were     Pulse Temperature Respirations Height Pulse Oximetry BMI (Body Mass Index)    61 98.2  F (36.8  C) (Oral) 16 5' 3\" (1.6 m) 98% 29.58 kg/m2       Blood Pressure from Last 3 Encounters:   02/20/17 112/60   01/23/17 132/62   12/20/16 122/70    Weight from Last 3 Encounters:   02/20/17 167 lb (75.8 kg)   01/23/17 167 lb 12.8 oz (76.1 kg)   12/20/16 174 lb 4.8 oz (79.1 kg)              We Performed the Following     Magnesium     Microalbumin quantitative random urine     Phosphorus          Today's Medication Changes          These changes are accurate as of: 2/20/17  2:47 PM.  If you have any questions, ask your nurse or doctor.               Start taking these medicines.        Dose/Directions    denosumab 60 MG/ML Soln injection   Commonly known as:  PROLIA   Used for:  Osteopenia   Started by:  Steven Fonseca MD        Dose:  60 mg   Inject 1 mL (60 mg) Subcutaneous once for 1 dose   Quantity:  1 mL   Refills:  0       olmesartan 40 MG tablet   Commonly known as:  BENICAR   Used for: "  Benign essential hypertension   Started by:  Steven Fonseca MD        Dose:  40 mg   Take 1 tablet (40 mg) by mouth daily INDICATION:TO LOWER BLOOD PRESSURE AND TO HELP PRESERVE KIDNEY FUNCTION   Quantity:  30 tablet   Refills:  1       order for DME   Used for:  Spinal stenosis, lumbar region, without neurogenic claudication   Started by:  Steven Fonseca MD        Equipment being ordered: Wheeled walker with basket and seat attachment   Quantity:  1 Units   Refills:  0         These medicines have changed or have updated prescriptions.        Dose/Directions    cholecalciferol 48372 UNITS capsule   Commonly known as:  VITAMIN D3   This may have changed:  additional instructions   Used for:  Vitamin D deficiency   Changed by:  Steven Fonseca MD        Dose:  1 capsule   Take 1 capsule (50,000 Units) by mouth every 14 days ONE CAP 3 X A MONTH ON THE 1ST, 10TH AND 20TH OF EACH MONTH, FOR VITAMIN D DEFICIENCY MUST TAKE WITH WITH FAT CONTAINING MEAL   Quantity:  40 capsule   Refills:  0         Stop taking these medicines if you haven't already. Please contact your care team if you have questions.     fluticasone-salmeterol 250-50 MCG/DOSE diskus inhaler   Commonly known as:  ADVAIR DISKUS   Stopped by:  Steven Fonseca MD           nitrofurantoin 50 MG capsule   Commonly known as:  MACRODANTIN   Stopped by:  Steven Fonseca MD           SLEEP AID PO   Stopped by:  Steven Fonseca MD                Where to get your medicines      These medications were sent to [facility] [address] [place] [id number]     Phone:  [phone]     glimepiride 2 MG tablet    olmesartan 40 MG tablet         Some of these will need a paper prescription and others can be bought over the counter.  Ask your nurse if you have questions.     Bring a paper prescription for each of these medications     ALPRAZolam 0.25 MG tablet    denosumab 60 MG/ML  Soln injection    order for DME       You don't need a prescription for these medications     cholecalciferol 85948 UNITS capsule                Primary Care Provider Office Phone # Fax #    Steven Fonseca [phone]       [facility] [address]  [facility] [address]        Thank you!     Thank you for choosing [facility]  for your care. Our goal is always to provide you with excellent care. Hearing back from our patients is one way we can continue to improve our services. Please take a few minutes to complete the written survey that you may receive in the mail after your visit with us. Thank you!             Your Updated Medication List - Protect others around you: Learn how to safely use, store and throw away your medicines at www.disposemymeds.org.          This list is accurate as of: 2/20/17  2:47 PM.  Always use your most recent med list.                   Brand Name Dispense Instructions for use    ACIDOPHILUS PO      Take  by mouth.       albuterol 108 (90 BASE) MCG/ACT Inhaler    PROAIR HFA/PROVENTIL HFA/VENTOLIN HFA    3 Inhaler    Inhale 2 puffs into the lungs every 6 hours INDICATION: RESCUE INHALER FOR SHORTNESS OF BREATH, CHEST TIGHTNESS AND COUGH       ALPRAZolam 0.25 MG tablet    XANAX    30 tablet    Take 1 tablet (0.25 mg) by mouth nightly as needed (FOR SEVERE ANXIETY ONLY)       ascorbic acid 1000 MG Tabs    vitamin C    100 tablet    Take 1 tablet (1,000 mg) by mouth daily VITAMIN C REPLACEMENT       ASPIRIN NOT PRESCRIBED    INTENTIONAL    0 each    continuous prn. Antiplatelet medication not prescribed intentionally due to Current anticoagulant therapy (warfarin/enoxaparin)       BETA BLOCKER NOT PRESCRIBED (INTENTIONAL)      Beta Blocker not prescribed intentionally due to Intolerance       butalbital-acetaminophen-caffeine -40 MG per tablet    FIORICET/ESGIC    30 tablet    Take 1 tablet by mouth every 6 hours as needed for  pain       calcium-vitamin D-vitamin K 500-500-40 MG-UNT-MCG Chew    VIACTIV    100 tablet    Take 1 tablet by mouth 2 times daily (with meals) INDICATION: FOR BONE AND BREAST HEALTH       cholecalciferol 38006 UNITS capsule    VITAMIN D3    40 capsule    Take 1 capsule (50,000 Units) by mouth every 14 days ONE CAP 3 X A MONTH ON THE 1ST, 10TH AND 20TH OF EACH MONTH, FOR VITAMIN D DEFICIENCY MUST TAKE WITH WITH FAT CONTAINING MEAL       cloNIDine 0.1 MG tablet    CATAPRES     Take 0.1 mg by mouth 2 times daily       cyabnocobalamin 2500 MCG sublingual tablet    VITAMIN B-12    250 tablet    Take 2,500 mcg by mouth daily INDICATION: FOR VITAMIN B12 SUPPLEMENTATION - TO IMPROVE MEMORY, BALANCE, SLEEP AND MOOD, DIRECTIONS: PLEASE PLACE UNDER THE TONGUE TO DISSOLVE       denosumab 60 MG/ML Soln injection    PROLIA    1 mL    Inject 1 mL (60 mg) Subcutaneous once for 1 dose       esomeprazole 40 MG CR capsule    nexIUM    180 capsule    Take 1 capsule (40 mg) by mouth every morning (before breakfast) TAKE  30'-60' BEFORE 1ST MEAL       fenofibrate 160 MG tablet     90 tablet    Take 1 tablet (160 mg) by mouth daily INDICATION: TO LOWER CHOLESTEROL       fluocinonide 0.05 % solution    LIDEX    60 mL    Apply to scalp QD-BID as needed       furosemide 20 MG tablet    LASIX    30 tablet    Take 1 tablet (20 mg) by mouth as needed INDICATION: TO HELP LOWER BLOOD PRESSURE AND TO TREAT EDEMA       glimepiride 2 MG tablet    AMARYL    90 tablet    Take 1 tablet (2 mg) by mouth every morning (before breakfast) INDICATION: TO TREAT DIABETES       melatonin 5 MG Caps      Take by mouth At Bedtime       nitroglycerin 0.4 MG sublingual tablet    NITROSTAT    25 tablet    Place 1 tablet (0.4 mg) under the tongue every 5 minutes as needed for chest pain       olmesartan 40 MG tablet    BENICAR    30 tablet    Take 1 tablet (40 mg) by mouth daily INDICATION:TO LOWER BLOOD PRESSURE AND TO HELP PRESERVE KIDNEY FUNCTION       order for  DME     1 Units    Equipment being ordered: Wheeled walker with basket and seat attachment       pravastatin 80 MG tablet    PRAVACHOL    45 tablet    Take 0.5 tablets (40 mg) by mouth every evening INDICATION: TO LOWER CHOLESTEROL AND TO HELP  PREVENT HEART DISEASE       triamterene-hydrochlorothiazide 37.5-25 MG per tablet    MAXZIDE-25    30 tablet    Take 1 tablet by mouth every morning INDICATION:TO LOWER BLOOD PRESSURE AND CONTROL EDEMA

## 2017-02-20 NOTE — TELEPHONE ENCOUNTER
Reviewed with Dr. Elizalde the change in schedule..  The patient was last seen 1/23/2017 - therefore switching the procedure to 2/27/17 puts us outside the 30 day rupal..  Reviewed this is Dr. Elizalde who informed me that he would see the patient before the procedure and update his note from 1/23/2017. . There were no other questions or concerns at this time. [place] Keene

## 2017-02-21 NOTE — PROGRESS NOTES
Dear Ginger,   Your recent test results were reviewed and are within acceptable limits. Please continue with other treatment, and follow up plans as discussed and do not hesitate to contact me with any questions or concerns.  Stay healthy!    Regards,  Dr. Marian Solo

## 2017-02-23 ENCOUNTER — TELEPHONE (OUTPATIENT)
Dept: INTERNAL MEDICINE | Facility: CLINIC | Age: 77
End: 2017-02-23

## 2017-02-23 NOTE — TELEPHONE ENCOUNTER
Prior authorization    Medication name prolia  Insurance medica  Insurance ID number [id number]  Prior authorization faxed through cover my meds

## 2017-02-24 DIAGNOSIS — I10 HTN (HYPERTENSION): Primary | ICD-10-CM

## 2017-02-24 RX ORDER — LIDOCAINE 40 MG/G
CREAM TOPICAL
Status: CANCELLED | OUTPATIENT
Start: 2017-02-24

## 2017-02-24 RX ORDER — POTASSIUM CHLORIDE 1500 MG/1
20 TABLET, EXTENDED RELEASE ORAL
Status: CANCELLED | OUTPATIENT
Start: 2017-02-24

## 2017-02-24 RX ORDER — SODIUM CHLORIDE 9 MG/ML
INJECTION, SOLUTION INTRAVENOUS CONTINUOUS
Status: CANCELLED | OUTPATIENT
Start: 2017-02-24

## 2017-02-24 NOTE — NURSING NOTE
Spoke with the patient regarding Monday's peripheral angiogram,  Confirmed arrival time, transportation and 24 hour care following the procedure.  No history of contrast dye allergies  The patient is going to take 325 mg ASA, the night before and the morning of.  The patient does not take any coumadin, pradaxa, xaralto, eliquis or savaysa.  The patient is not taking Metformin (glucophage, fortamet, glumetza, riomet).  The patient is taking glimepiride - and reports she is going to hold this the morning of the procedure.   The patient does not take insulin.  The patient does take lasix and triamterene-hydrochlorothiazide and will hold these the morning of the procedure.  The patient agreed to be NPO after midnight and will take morning medications with small sips of water.  Orders placed in epic. L[place] Keene

## 2017-02-27 ENCOUNTER — HOSPITAL ENCOUNTER (OUTPATIENT)
Facility: CLINIC | Age: 77
Discharge: HOME | End: 2017-02-27
Attending: INTERNAL MEDICINE | Admitting: INTERNAL MEDICINE
Payer: MEDICARE

## 2017-02-27 ENCOUNTER — APPOINTMENT (OUTPATIENT)
Dept: CARDIOLOGY | Facility: CLINIC | Age: 77
End: 2017-02-27
Attending: INTERNAL MEDICINE
Payer: MEDICARE

## 2017-02-27 VITALS
SYSTOLIC BLOOD PRESSURE: 157 MMHG | RESPIRATION RATE: 16 BRPM | WEIGHT: 165.8 LBS | OXYGEN SATURATION: 96 % | HEART RATE: 62 BPM | TEMPERATURE: 98.3 F | HEIGHT: 63 IN | BODY MASS INDEX: 29.38 KG/M2 | DIASTOLIC BLOOD PRESSURE: 70 MMHG

## 2017-02-27 DIAGNOSIS — I10 BENIGN ESSENTIAL HYPERTENSION: ICD-10-CM

## 2017-02-27 LAB
ANION GAP SERPL CALCULATED.3IONS-SCNC: 7 MMOL/L (ref 3–14)
BUN SERPL-MCNC: 31 MG/DL (ref 7–30)
CALCIUM SERPL-MCNC: 9.2 MG/DL (ref 8.5–10.1)
CHLORIDE SERPL-SCNC: 108 MMOL/L (ref 94–109)
CO2 SERPL-SCNC: 27 MMOL/L (ref 20–32)
CREAT SERPL-MCNC: 1.25 MG/DL (ref 0.52–1.04)
ERYTHROCYTE [DISTWIDTH] IN BLOOD BY AUTOMATED COUNT: 13.4 % (ref 10–15)
GFR SERPL CREATININE-BSD FRML MDRD: 42 ML/MIN/1.7M2
GLUCOSE SERPL-MCNC: 132 MG/DL (ref 70–99)
HCT VFR BLD AUTO: 34.8 % (ref 35–47)
HGB BLD-MCNC: 11.6 G/DL (ref 11.7–15.7)
INR PPP: 0.92 (ref 0.86–1.14)
KCT BLD-ACNC: 164 SEC (ref 105–167)
KCT BLD-ACNC: 224 SEC (ref 105–167)
KCT BLD-ACNC: 235 SEC (ref 105–167)
MCH RBC QN AUTO: 31.3 PG (ref 26.5–33)
MCHC RBC AUTO-ENTMCNC: 33.3 G/DL (ref 31.5–36.5)
MCV RBC AUTO: 94 FL (ref 78–100)
PLATELET # BLD AUTO: 184 10E9/L (ref 150–450)
POTASSIUM SERPL-SCNC: 4 MMOL/L (ref 3.4–5.3)
RBC # BLD AUTO: 3.71 10E12/L (ref 3.8–5.2)
SODIUM SERPL-SCNC: 142 MMOL/L (ref 133–144)
WBC # BLD AUTO: 6.4 10E9/L (ref 4–11)

## 2017-02-27 PROCEDURE — 85610 PROTHROMBIN TIME: CPT | Performed by: INTERNAL MEDICINE

## 2017-02-27 PROCEDURE — 27210787 ZZH MANIFOLD CR2

## 2017-02-27 PROCEDURE — 27210895 ZZH CATH CR8

## 2017-02-27 PROCEDURE — C1769 GUIDE WIRE: HCPCS

## 2017-02-27 PROCEDURE — 75625 CONTRAST EXAM ABDOMINL AORTA: CPT

## 2017-02-27 PROCEDURE — 85027 COMPLETE CBC AUTOMATED: CPT | Performed by: INTERNAL MEDICINE

## 2017-02-27 PROCEDURE — 27210998 ZZH ACCESS HEART CATH CR6

## 2017-02-27 PROCEDURE — A9270 NON-COVERED ITEM OR SERVICE: HCPCS | Mod: GY | Performed by: INTERNAL MEDICINE

## 2017-02-27 PROCEDURE — 37236 OPEN/PERQ PLACE STENT 1ST: CPT

## 2017-02-27 PROCEDURE — 27210742 ZZH CATH CR1

## 2017-02-27 PROCEDURE — 85347 COAGULATION TIME ACTIVATED: CPT

## 2017-02-27 PROCEDURE — 36415 COLL VENOUS BLD VENIPUNCTURE: CPT

## 2017-02-27 PROCEDURE — 27210759 ZZH DEVICE INFLATION CR6

## 2017-02-27 PROCEDURE — 25000125 ZZHC RX 250: Performed by: INTERNAL MEDICINE

## 2017-02-27 PROCEDURE — C1725 CATH, TRANSLUMIN NON-LASER: HCPCS

## 2017-02-27 PROCEDURE — 25000132 ZZH RX MED GY IP 250 OP 250 PS 637: Mod: GY | Performed by: INTERNAL MEDICINE

## 2017-02-27 PROCEDURE — 40000065 ZZH STATISTIC EKG NON-CHARGEABLE

## 2017-02-27 PROCEDURE — 37246 TRLUML BALO ANGIOP 1ST ART: CPT

## 2017-02-27 PROCEDURE — 27210946 ZZH KIT HC TOTES DISP CR8

## 2017-02-27 PROCEDURE — 37236 OPEN/PERQ PLACE STENT 1ST: CPT | Performed by: INTERNAL MEDICINE

## 2017-02-27 PROCEDURE — 75625 CONTRAST EXAM ABDOMINL AORTA: CPT | Mod: 26 | Performed by: INTERNAL MEDICINE

## 2017-02-27 PROCEDURE — 93010 ELECTROCARDIOGRAM REPORT: CPT | Performed by: INTERNAL MEDICINE

## 2017-02-27 PROCEDURE — 99152 MOD SED SAME PHYS/QHP 5/>YRS: CPT | Performed by: INTERNAL MEDICINE

## 2017-02-27 PROCEDURE — C1876 STENT, NON-COA/NON-COV W/DEL: HCPCS

## 2017-02-27 PROCEDURE — 93005 ELECTROCARDIOGRAM TRACING: CPT

## 2017-02-27 PROCEDURE — 99152 MOD SED SAME PHYS/QHP 5/>YRS: CPT

## 2017-02-27 PROCEDURE — 27210910 ZZH NEEDLE CR6

## 2017-02-27 PROCEDURE — 80048 BASIC METABOLIC PNL TOTAL CA: CPT | Performed by: INTERNAL MEDICINE

## 2017-02-27 PROCEDURE — 99153 MOD SED SAME PHYS/QHP EA: CPT | Performed by: INTERNAL MEDICINE

## 2017-02-27 PROCEDURE — 27211089 ZZH KIT ACIST INJECTOR CR3

## 2017-02-27 PROCEDURE — 99153 MOD SED SAME PHYS/QHP EA: CPT

## 2017-02-27 PROCEDURE — 40000853 ZZH STATISTIC ANGIOGRAM, STENT, VERTEBRO PLASTY

## 2017-02-27 PROCEDURE — 25000128 H RX IP 250 OP 636: Performed by: INTERNAL MEDICINE

## 2017-02-27 RX ORDER — FUROSEMIDE 10 MG/ML
20-100 INJECTION INTRAMUSCULAR; INTRAVENOUS
Status: DISCONTINUED | OUTPATIENT
Start: 2017-02-27 | End: 2017-02-27 | Stop reason: HOSPADM

## 2017-02-27 RX ORDER — NITROGLYCERIN 5 MG/ML
100-500 VIAL (ML) INTRAVENOUS
Status: DISCONTINUED | OUTPATIENT
Start: 2017-02-27 | End: 2017-02-27 | Stop reason: HOSPADM

## 2017-02-27 RX ORDER — DEXTROSE MONOHYDRATE 25 G/50ML
12.5-5 INJECTION, SOLUTION INTRAVENOUS EVERY 30 MIN PRN
Status: DISCONTINUED | OUTPATIENT
Start: 2017-02-27 | End: 2017-02-27 | Stop reason: HOSPADM

## 2017-02-27 RX ORDER — EPTIFIBATIDE 2 MG/ML
180 INJECTION, SOLUTION INTRAVENOUS EVERY 10 MIN PRN
Status: DISCONTINUED | OUTPATIENT
Start: 2017-02-27 | End: 2017-02-27 | Stop reason: HOSPADM

## 2017-02-27 RX ORDER — SODIUM CHLORIDE 9 MG/ML
INJECTION, SOLUTION INTRAVENOUS CONTINUOUS
Status: DISCONTINUED | OUTPATIENT
Start: 2017-02-27 | End: 2017-02-27 | Stop reason: HOSPADM

## 2017-02-27 RX ORDER — PROTAMINE SULFATE 10 MG/ML
1-5 INJECTION, SOLUTION INTRAVENOUS
Status: DISCONTINUED | OUTPATIENT
Start: 2017-02-27 | End: 2017-02-27 | Stop reason: HOSPADM

## 2017-02-27 RX ORDER — PROTAMINE SULFATE 10 MG/ML
25-100 INJECTION, SOLUTION INTRAVENOUS EVERY 5 MIN PRN
Status: DISCONTINUED | OUTPATIENT
Start: 2017-02-27 | End: 2017-02-27 | Stop reason: HOSPADM

## 2017-02-27 RX ORDER — NITROGLYCERIN 0.4 MG/1
0.4 TABLET SUBLINGUAL EVERY 5 MIN PRN
Status: DISCONTINUED | OUTPATIENT
Start: 2017-02-27 | End: 2017-02-27 | Stop reason: HOSPADM

## 2017-02-27 RX ORDER — ASPIRIN 81 MG/1
81 TABLET ORAL DAILY
Status: DISCONTINUED | OUTPATIENT
Start: 2017-02-27 | End: 2017-02-27 | Stop reason: HOSPADM

## 2017-02-27 RX ORDER — EPTIFIBATIDE 2 MG/ML
2 INJECTION, SOLUTION INTRAVENOUS CONTINUOUS PRN
Status: DISCONTINUED | OUTPATIENT
Start: 2017-02-27 | End: 2017-02-27 | Stop reason: HOSPADM

## 2017-02-27 RX ORDER — HEPARIN SODIUM 1000 [USP'U]/ML
1000-10000 INJECTION, SOLUTION INTRAVENOUS; SUBCUTANEOUS EVERY 5 MIN PRN
Status: DISCONTINUED | OUTPATIENT
Start: 2017-02-27 | End: 2017-02-27 | Stop reason: HOSPADM

## 2017-02-27 RX ORDER — DOBUTAMINE HYDROCHLORIDE 200 MG/100ML
2-20 INJECTION INTRAVENOUS CONTINUOUS PRN
Status: DISCONTINUED | OUTPATIENT
Start: 2017-02-27 | End: 2017-02-27 | Stop reason: HOSPADM

## 2017-02-27 RX ORDER — VERAPAMIL HYDROCHLORIDE 2.5 MG/ML
1-2.5 INJECTION, SOLUTION INTRAVENOUS
Status: DISCONTINUED | OUTPATIENT
Start: 2017-02-27 | End: 2017-02-27 | Stop reason: HOSPADM

## 2017-02-27 RX ORDER — METHYLPREDNISOLONE SODIUM SUCCINATE 125 MG/2ML
125 INJECTION, POWDER, LYOPHILIZED, FOR SOLUTION INTRAMUSCULAR; INTRAVENOUS
Status: DISCONTINUED | OUTPATIENT
Start: 2017-02-27 | End: 2017-02-27 | Stop reason: HOSPADM

## 2017-02-27 RX ORDER — SODIUM NITROPRUSSIDE 25 MG/ML
100-200 INJECTION INTRAVENOUS
Status: DISCONTINUED | OUTPATIENT
Start: 2017-02-27 | End: 2017-02-27 | Stop reason: HOSPADM

## 2017-02-27 RX ORDER — NIFEDIPINE 10 MG/1
10 CAPSULE ORAL
Status: DISCONTINUED | OUTPATIENT
Start: 2017-02-27 | End: 2017-02-27 | Stop reason: HOSPADM

## 2017-02-27 RX ORDER — ONDANSETRON 2 MG/ML
4 INJECTION INTRAMUSCULAR; INTRAVENOUS EVERY 4 HOURS PRN
Status: DISCONTINUED | OUTPATIENT
Start: 2017-02-27 | End: 2017-02-27 | Stop reason: HOSPADM

## 2017-02-27 RX ORDER — FENTANYL CITRATE 50 UG/ML
25-50 INJECTION, SOLUTION INTRAMUSCULAR; INTRAVENOUS
Status: DISCONTINUED | OUTPATIENT
Start: 2017-02-27 | End: 2017-02-27 | Stop reason: HOSPADM

## 2017-02-27 RX ORDER — BUPIVACAINE HYDROCHLORIDE 2.5 MG/ML
1-10 INJECTION, SOLUTION EPIDURAL; INFILTRATION; INTRACAUDAL
Status: DISCONTINUED | OUTPATIENT
Start: 2017-02-27 | End: 2017-02-27 | Stop reason: HOSPADM

## 2017-02-27 RX ORDER — POTASSIUM CHLORIDE 29.8 MG/ML
20 INJECTION INTRAVENOUS
Status: DISCONTINUED | OUTPATIENT
Start: 2017-02-27 | End: 2017-02-27 | Stop reason: HOSPADM

## 2017-02-27 RX ORDER — PHENYLEPHRINE HCL IN 0.9% NACL 1 MG/10 ML
20-100 SYRINGE (ML) INTRAVENOUS
Status: DISCONTINUED | OUTPATIENT
Start: 2017-02-27 | End: 2017-02-27 | Stop reason: HOSPADM

## 2017-02-27 RX ORDER — NITROGLYCERIN 20 MG/100ML
.07-2 INJECTION INTRAVENOUS CONTINUOUS PRN
Status: DISCONTINUED | OUTPATIENT
Start: 2017-02-27 | End: 2017-02-27 | Stop reason: HOSPADM

## 2017-02-27 RX ORDER — POTASSIUM CHLORIDE 1500 MG/1
20 TABLET, EXTENDED RELEASE ORAL
Status: DISCONTINUED | OUTPATIENT
Start: 2017-02-27 | End: 2017-02-27 | Stop reason: HOSPADM

## 2017-02-27 RX ORDER — LIDOCAINE 40 MG/G
CREAM TOPICAL
Status: DISCONTINUED | OUTPATIENT
Start: 2017-02-27 | End: 2017-02-27 | Stop reason: HOSPADM

## 2017-02-27 RX ORDER — FLUMAZENIL 0.1 MG/ML
0.2 INJECTION, SOLUTION INTRAVENOUS
Status: DISCONTINUED | OUTPATIENT
Start: 2017-02-27 | End: 2017-02-27 | Stop reason: HOSPADM

## 2017-02-27 RX ORDER — MORPHINE SULFATE 2 MG/ML
1-2 INJECTION, SOLUTION INTRAMUSCULAR; INTRAVENOUS EVERY 5 MIN PRN
Status: DISCONTINUED | OUTPATIENT
Start: 2017-02-27 | End: 2017-02-27 | Stop reason: HOSPADM

## 2017-02-27 RX ORDER — CLOPIDOGREL BISULFATE 75 MG/1
75 TABLET ORAL
Status: DISCONTINUED | OUTPATIENT
Start: 2017-02-27 | End: 2017-02-27 | Stop reason: HOSPADM

## 2017-02-27 RX ORDER — LIDOCAINE HYDROCHLORIDE 10 MG/ML
30 INJECTION, SOLUTION EPIDURAL; INFILTRATION; INTRACAUDAL; PERINEURAL
Status: DISCONTINUED | OUTPATIENT
Start: 2017-02-27 | End: 2017-02-27 | Stop reason: HOSPADM

## 2017-02-27 RX ORDER — IOPAMIDOL 755 MG/ML
100 INJECTION, SOLUTION INTRAVASCULAR ONCE
Status: DISCONTINUED | OUTPATIENT
Start: 2017-02-27 | End: 2017-02-27 | Stop reason: HOSPADM

## 2017-02-27 RX ORDER — LIDOCAINE HYDROCHLORIDE 10 MG/ML
1-10 INJECTION, SOLUTION EPIDURAL; INFILTRATION; INTRACAUDAL; PERINEURAL
Status: COMPLETED | OUTPATIENT
Start: 2017-02-27 | End: 2017-02-27

## 2017-02-27 RX ORDER — SODIUM CHLORIDE 9 MG/ML
INJECTION, SOLUTION INTRAVENOUS CONTINUOUS
Status: ACTIVE | OUTPATIENT
Start: 2017-02-27 | End: 2017-02-27

## 2017-02-27 RX ORDER — NICARDIPINE HYDROCHLORIDE 2.5 MG/ML
100 INJECTION INTRAVENOUS
Status: DISCONTINUED | OUTPATIENT
Start: 2017-02-27 | End: 2017-02-27 | Stop reason: HOSPADM

## 2017-02-27 RX ORDER — CLOPIDOGREL BISULFATE 75 MG/1
75 TABLET ORAL DAILY
Qty: 30 TABLET | Refills: 0 | Status: SHIPPED | OUTPATIENT
Start: 2017-02-28 | End: 2017-04-03

## 2017-02-27 RX ORDER — HYDRALAZINE HYDROCHLORIDE 20 MG/ML
10-20 INJECTION INTRAMUSCULAR; INTRAVENOUS
Status: DISCONTINUED | OUTPATIENT
Start: 2017-02-27 | End: 2017-02-27 | Stop reason: HOSPADM

## 2017-02-27 RX ORDER — CLOPIDOGREL BISULFATE 75 MG/1
75 TABLET ORAL ONCE
Status: DISCONTINUED | OUTPATIENT
Start: 2017-02-28 | End: 2017-02-27 | Stop reason: HOSPADM

## 2017-02-27 RX ORDER — NALOXONE HYDROCHLORIDE 0.4 MG/ML
.1-.4 INJECTION, SOLUTION INTRAMUSCULAR; INTRAVENOUS; SUBCUTANEOUS
Status: DISCONTINUED | OUTPATIENT
Start: 2017-02-27 | End: 2017-02-27 | Stop reason: HOSPADM

## 2017-02-27 RX ORDER — NALOXONE HYDROCHLORIDE 0.4 MG/ML
.2-.4 INJECTION, SOLUTION INTRAMUSCULAR; INTRAVENOUS; SUBCUTANEOUS
Status: DISCONTINUED | OUTPATIENT
Start: 2017-02-27 | End: 2017-02-27 | Stop reason: HOSPADM

## 2017-02-27 RX ORDER — PROMETHAZINE HYDROCHLORIDE 25 MG/ML
6.25-25 INJECTION, SOLUTION INTRAMUSCULAR; INTRAVENOUS EVERY 4 HOURS PRN
Status: DISCONTINUED | OUTPATIENT
Start: 2017-02-27 | End: 2017-02-27 | Stop reason: HOSPADM

## 2017-02-27 RX ORDER — ASPIRIN 325 MG
325 TABLET ORAL
Status: DISCONTINUED | OUTPATIENT
Start: 2017-02-27 | End: 2017-02-27 | Stop reason: HOSPADM

## 2017-02-27 RX ORDER — ASPIRIN 81 MG/1
81-324 TABLET, CHEWABLE ORAL
Status: DISCONTINUED | OUTPATIENT
Start: 2017-02-27 | End: 2017-02-27 | Stop reason: HOSPADM

## 2017-02-27 RX ORDER — DOPAMINE HYDROCHLORIDE 160 MG/100ML
2-20 INJECTION, SOLUTION INTRAVENOUS CONTINUOUS PRN
Status: DISCONTINUED | OUTPATIENT
Start: 2017-02-27 | End: 2017-02-27 | Stop reason: HOSPADM

## 2017-02-27 RX ORDER — NALOXONE HYDROCHLORIDE 0.4 MG/ML
0.4 INJECTION, SOLUTION INTRAMUSCULAR; INTRAVENOUS; SUBCUTANEOUS EVERY 5 MIN PRN
Status: DISCONTINUED | OUTPATIENT
Start: 2017-02-27 | End: 2017-02-27 | Stop reason: HOSPADM

## 2017-02-27 RX ORDER — PRASUGREL 10 MG/1
10-60 TABLET, FILM COATED ORAL
Status: DISCONTINUED | OUTPATIENT
Start: 2017-02-27 | End: 2017-02-27 | Stop reason: HOSPADM

## 2017-02-27 RX ORDER — LORAZEPAM 2 MG/ML
.5-2 INJECTION INTRAMUSCULAR EVERY 4 HOURS PRN
Status: DISCONTINUED | OUTPATIENT
Start: 2017-02-27 | End: 2017-02-27 | Stop reason: HOSPADM

## 2017-02-27 RX ORDER — ADENOSINE 3 MG/ML
12-12000 INJECTION, SOLUTION INTRAVENOUS
Status: DISCONTINUED | OUTPATIENT
Start: 2017-02-27 | End: 2017-02-27 | Stop reason: HOSPADM

## 2017-02-27 RX ORDER — CLOPIDOGREL BISULFATE 75 MG/1
300-600 TABLET ORAL
Status: COMPLETED | OUTPATIENT
Start: 2017-02-27 | End: 2017-02-27

## 2017-02-27 RX ORDER — DIPHENHYDRAMINE HYDROCHLORIDE 50 MG/ML
25-50 INJECTION INTRAMUSCULAR; INTRAVENOUS
Status: DISCONTINUED | OUTPATIENT
Start: 2017-02-27 | End: 2017-02-27 | Stop reason: HOSPADM

## 2017-02-27 RX ORDER — HYDROCODONE BITARTRATE AND ACETAMINOPHEN 5; 325 MG/1; MG/1
1-2 TABLET ORAL EVERY 4 HOURS PRN
Status: DISCONTINUED | OUTPATIENT
Start: 2017-02-27 | End: 2017-02-27 | Stop reason: HOSPADM

## 2017-02-27 RX ORDER — EPTIFIBATIDE 2 MG/ML
1 INJECTION, SOLUTION INTRAVENOUS CONTINUOUS PRN
Status: DISCONTINUED | OUTPATIENT
Start: 2017-02-27 | End: 2017-02-27 | Stop reason: HOSPADM

## 2017-02-27 RX ORDER — ENALAPRILAT 1.25 MG/ML
1.25-2.5 INJECTION INTRAVENOUS
Status: DISCONTINUED | OUTPATIENT
Start: 2017-02-27 | End: 2017-02-27 | Stop reason: HOSPADM

## 2017-02-27 RX ORDER — ACETAMINOPHEN 325 MG/1
325-650 TABLET ORAL EVERY 4 HOURS PRN
Status: DISCONTINUED | OUTPATIENT
Start: 2017-02-27 | End: 2017-02-27 | Stop reason: HOSPADM

## 2017-02-27 RX ORDER — POTASSIUM CHLORIDE 7.45 MG/ML
10 INJECTION INTRAVENOUS
Status: DISCONTINUED | OUTPATIENT
Start: 2017-02-27 | End: 2017-02-27 | Stop reason: HOSPADM

## 2017-02-27 RX ORDER — NITROGLYCERIN 5 MG/ML
100-200 VIAL (ML) INTRAVENOUS
Status: DISCONTINUED | OUTPATIENT
Start: 2017-02-27 | End: 2017-02-27 | Stop reason: HOSPADM

## 2017-02-27 RX ADMIN — MIDAZOLAM HYDROCHLORIDE 1 MG: 1 INJECTION, SOLUTION INTRAMUSCULAR; INTRAVENOUS at 12:00

## 2017-02-27 RX ADMIN — Medication 6000 UNITS: at 08:56

## 2017-02-27 RX ADMIN — FENTANYL CITRATE 50 MCG: 50 INJECTION, SOLUTION INTRAMUSCULAR; INTRAVENOUS at 08:44

## 2017-02-27 RX ADMIN — FENTANYL CITRATE 50 MCG: 50 INJECTION, SOLUTION INTRAMUSCULAR; INTRAVENOUS at 12:00

## 2017-02-27 RX ADMIN — FENTANYL CITRATE 25 MCG: 50 INJECTION, SOLUTION INTRAMUSCULAR; INTRAVENOUS at 08:57

## 2017-02-27 RX ADMIN — MIDAZOLAM HYDROCHLORIDE 1 MG: 1 INJECTION, SOLUTION INTRAMUSCULAR; INTRAVENOUS at 08:44

## 2017-02-27 RX ADMIN — SODIUM CHLORIDE: 9 INJECTION, SOLUTION INTRAVENOUS at 12:00

## 2017-02-27 RX ADMIN — LIDOCAINE HYDROCHLORIDE 80 MG: 10 INJECTION, SOLUTION EPIDURAL; INFILTRATION; INTRACAUDAL; PERINEURAL at 08:49

## 2017-02-27 RX ADMIN — MIDAZOLAM HYDROCHLORIDE 0.5 MG: 1 INJECTION, SOLUTION INTRAMUSCULAR; INTRAVENOUS at 08:58

## 2017-02-27 RX ADMIN — CLOPIDOGREL BISULFATE 600 MG: 75 TABLET ORAL at 09:16

## 2017-02-27 RX ADMIN — Medication 2000 UNITS: at 09:07

## 2017-02-27 NOTE — PROGRESS NOTES
Patient had a stent placed to the left renal artery by Dr Elizalde. Returned to [facility] for [facility]. ACT drawn, sheath was pulled, right groin site monitored. Remains clean, dry, intact, pulse to right foot remains as upon admission. Vital signs and O2 sats have remained stable throughout recovery period. Patient tolerated regular diet and oral fluids, IV fluids infusing. Patient has voided on bedpan several times. Plavix Rx filled and given to patient.

## 2017-02-27 NOTE — PROGRESS NOTES
1530 Gauze drsg CDI to right groin puncture site. No oozing or hematoma noted. Area soft & flat. Pt denies pain. Activity restrictions while on bedrest reinforced with pt. Verbal understanding received from pt. No family present at this time. Pt's  will pick her up at discharge.  1705 Discharge teaching & instructions given to pt w/ verbal understanding received. All questions & concerns addressed. Plavix RX given to pt by day nurse.  1750 OOB - steady on feet. Ambulated in halls to bathroom with good suma. Vdg adeq. No change in puncture site assessment with activity.  1815 Ready for discharge. Awaiting husbands arrival.  1830 Pt discharged per w/c to private vehicle. All personal belongings sent w/ pt.

## 2017-02-27 NOTE — IP AVS SNAPSHOT
[place] [address]    [place] [address]    Phone:  [phone]                                       After Visit Summary   2/27/2017    Ginger Abreu    MRN: [id number]           After Visit Summary Signature Page     I have received my discharge instructions, and my questions have been answered. I have discussed any challenges I see with this plan with the nurse or doctor.    ..........................................................................................................................................  Patient/Patient Representative Signature      ..........................................................................................................................................  Patient Representative Print Name and Relationship to Patient    ..................................................               ................................................  Date                                            Time    ..........................................................................................................................................  Reviewed by Signature/Title    ...................................................              ..............................................  Date                                                            Time

## 2017-02-27 NOTE — IP AVS SNAPSHOT
MRN:[id number]                      After Visit Summary   2/27/2017    Ginger Abreu    MRN: [id number]           Visit Information        Department      2/27/2017  6:32 AM [facility]          Review of your medicines      UNREVIEWED medicines. Ask your doctor about these medicines        Dose / Directions    ACIDOPHILUS PO        Take  by mouth.   Refills:  0       albuterol 108 (90 BASE) MCG/ACT Inhaler   Commonly known as:  PROAIR HFA/PROVENTIL HFA/VENTOLIN HFA   Used for:  Mild persistent asthma        Dose:  2 puff   Inhale 2 puffs into the lungs every 6 hours INDICATION: RESCUE INHALER FOR SHORTNESS OF BREATH, CHEST TIGHTNESS AND COUGH   Quantity:  3 Inhaler   Refills:  PRN       ALPRAZolam 0.25 MG tablet   Commonly known as:  XANAX   Used for:  Anxiety        Dose:  0.25 mg   Take 1 tablet (0.25 mg) by mouth nightly as needed (FOR SEVERE ANXIETY ONLY)   Quantity:  30 tablet   Refills:  0       ascorbic acid 1000 MG Tabs   Commonly known as:  vitamin C   Used for:  Scurvy        Dose:  1000 mg   Take 1 tablet (1,000 mg) by mouth daily VITAMIN C REPLACEMENT   Quantity:  100 tablet   Refills:  3       ASPIRIN NOT PRESCRIBED   Commonly known as:  INTENTIONAL        continuous prn. Antiplatelet medication not prescribed intentionally due to Current anticoagulant therapy (warfarin/enoxaparin)   Quantity:  0 each   Refills:  0       BETA BLOCKER NOT PRESCRIBED (INTENTIONAL)   Used for:  Type 2 diabetes, HbA1c goal < 7% (H), Hypertension goal BP (blood pressure) < 130/80        Beta Blocker not prescribed intentionally due to Intolerance   Refills:  0       butalbital-acetaminophen-caffeine -40 MG per tablet   Commonly known as:  FIORICET/ESGIC   Used for:  Migraine with aura and without status migrainosus, not intractable        Dose:  1 tablet   Take 1 tablet by mouth every 6 hours as needed for pain   Quantity:  30 tablet   Refills:  0       calcium-vitamin  D-vitamin K 500-500-40 MG-UNT-MCG Chew   Commonly known as:  VIACTIV   Used for:  Vitamin D deficiency        Dose:  1 tablet   Take 1 tablet by mouth 2 times daily (with meals) INDICATION: FOR BONE AND BREAST HEALTH   Quantity:  100 tablet   Refills:  PRN       cholecalciferol 91659 UNITS capsule   Commonly known as:  VITAMIN D3   Used for:  Vitamin D deficiency        Dose:  1 capsule   Take 1 capsule (50,000 Units) by mouth every 14 days ONE CAP 3 X A MONTH ON THE 1ST, 10TH AND 20TH OF EACH MONTH, FOR VITAMIN D DEFICIENCY MUST TAKE WITH WITH FAT CONTAINING MEAL   Quantity:  40 capsule   Refills:  0       cloNIDine 0.1 MG tablet   Commonly known as:  CATAPRES        Dose:  0.1 mg   Take 0.1 mg by mouth 2 times daily   Refills:  0       cyabnocobalamin 2500 MCG sublingual tablet   Commonly known as:  VITAMIN B-12   Used for:  Vitamin B12 deficiency (non anemic)        Dose:  2500 mcg   Take 2,500 mcg by mouth daily INDICATION: FOR VITAMIN B12 SUPPLEMENTATION - TO IMPROVE MEMORY, BALANCE, SLEEP AND MOOD, DIRECTIONS: PLEASE PLACE UNDER THE TONGUE TO DISSOLVE   Quantity:  250 tablet   Refills:  3       denosumab 60 MG/ML Soln injection   Commonly known as:  PROLIA   Used for:  Osteopenia        Dose:  60 mg   Inject 1 mL (60 mg) Subcutaneous once for 1 dose   Quantity:  1 mL   Refills:  0       esomeprazole 40 MG CR capsule   Commonly known as:  nexIUM   Used for:  Gastroesophageal reflux disease without esophagitis        Dose:  40 mg   Take 1 capsule (40 mg) by mouth every morning (before breakfast) TAKE  30'-60' BEFORE 1ST MEAL   Quantity:  180 capsule   Refills:  2       fenofibrate 160 MG tablet   Used for:  Hyperlipidemia LDL goal <100        Dose:  160 mg   Take 1 tablet (160 mg) by mouth daily INDICATION: TO LOWER CHOLESTEROL   Quantity:  90 tablet   Refills:  2       fluocinonide 0.05 % solution   Commonly known as:  LIDEX   Used for:  Scalp itch        Apply to scalp QD-BID as needed   Quantity:  60 mL    Refills:  3       furosemide 20 MG tablet   Commonly known as:  LASIX   Used for:  Benign essential hypertension        Dose:  20 mg   Take 1 tablet (20 mg) by mouth as needed INDICATION: TO HELP LOWER BLOOD PRESSURE AND TO TREAT EDEMA   Quantity:  30 tablet   Refills:  PRN       glimepiride 2 MG tablet   Commonly known as:  AMARYL   Used for:  Type 2 diabetes mellitus without complication, without long-term current use of insulin (H)        Dose:  2 mg   Take 1 tablet (2 mg) by mouth every morning (before breakfast) INDICATION: TO TREAT DIABETES   Quantity:  90 tablet   Refills:  0       melatonin 5 MG Caps        Take by mouth At Bedtime   Refills:  0       nitroglycerin 0.4 MG sublingual tablet   Commonly known as:  NITROSTAT   Used for:  Exertional dyspnea        Dose:  0.4 mg   Place 1 tablet (0.4 mg) under the tongue every 5 minutes as needed for chest pain   Quantity:  25 tablet   Refills:  0       olmesartan 40 MG tablet   Commonly known as:  BENICAR   Used for:  Benign essential hypertension        Dose:  40 mg   Take 1 tablet (40 mg) by mouth daily INDICATION:TO LOWER BLOOD PRESSURE AND TO HELP PRESERVE KIDNEY FUNCTION   Quantity:  30 tablet   Refills:  1       pravastatin 80 MG tablet   Commonly known as:  PRAVACHOL   Used for:  Hyperlipidemia LDL goal <100        Dose:  40 mg   Take 0.5 tablets (40 mg) by mouth every evening INDICATION: TO LOWER CHOLESTEROL AND TO HELP  PREVENT HEART DISEASE   Quantity:  45 tablet   Refills:  PRN       triamterene-hydrochlorothiazide 37.5-25 MG per tablet   Commonly known as:  MAXZIDE-25   Used for:  Benign essential hypertension        Dose:  1 tablet   Take 1 tablet by mouth every morning INDICATION:TO LOWER BLOOD PRESSURE AND CONTROL EDEMA   Quantity:  30 tablet   Refills:  PRN         START taking        Dose / Directions    clopidogrel 75 MG tablet   Commonly known as:  PLAVIX   Used for:  Benign essential hypertension        Dose:  75 mg   Start taking on:   2/28/2017   Take 1 tablet (75 mg) by mouth daily   Quantity:  30 tablet   Refills:  0         CONTINUE these medicines which have NOT CHANGED        Dose / Directions    order for DME   Used for:  Spinal stenosis, lumbar region, without neurogenic claudication        Equipment being ordered: Wheeled walker with basket and seat attachment   Quantity:  1 Units   Refills:  0            Where to get your medicines      Some of these will need a paper prescription and others can be bought over the counter. Ask your nurse if you have questions.     Bring a paper prescription for each of these medications     clopidogrel 75 MG tablet               Prescriptions were sent or printed at these locations (1 Prescription)                   Other Prescriptions                Printed at Department/Unit printer (1 of 1)         clopidogrel (PLAVIX) 75 MG tablet                 Protect others around you: Learn how to safely use, store and throw away your medicines at www.disposemymeds.org.         Follow-ups after your visit        Your next 10 appointments already scheduled     Mar 06, 2017  1:50 PM CST   Return Visit with RAMY Pacheco CNP   [facility] [place] ([facility])    [address] [place] [id number] [phone]            Apr 07, 2017  8:30 AM CDT   [facility]    [address] [address] [phone]           Patient must bring picture ID.  Patient should be prepared to give a urine specimen  Please do not eat 10-12 hours before your appointment if you are coming in fasting for labs on lipids, cholesterol, or glucose (sugar).  Pregnant women should follow their Care Team instructions. Water with medications is okay. Do not drink coffee or other fluids.   If you have concerns about taking  your medications, please ask at office or if  scheduling via AOL, send a message by clicking on Secure Messaging, Message Your Care Team.            Apr 07, 2017  9:00 AM CDT   Return Visit with Karla Novoa PA-C   [facility]    [address] [place] [address]   [phone]            Apr 14, 2017  1:30 PM CDT   PHYSICAL with Steven Fonseca MD   [facility]    [address] [place] [address]   [phone]            May 15, 2017  9:15 AM CDT   Lab visit with [facility]   [facility]    [address] [place] [address]   [phone]           Please do not eat 10-12 hours before your appointment if you are coming in fasting for labs on lipids, cholesterol, or glucose (sugar). Does not apply to pregnant women.  Water with medications is okay. Do not drink coffee or other fluids.  If you have concerns about taking  your medications, please send a message by clicking on Secure Messaging, Message Your Care Team.            May 22, 2017  1:30 PM CDT   MyChart Long with Steven Fonseca MD   [facility]    [address] [place] [address]   [phone]               Care Instructions        After Care Instructions     Discharge Instructions - Follow up with [facility] Nurse Practitioner        Follow up with [facility] Nurse Practitioner at [facility] of patient preference in 7-10 days.            Discharge Instructions - IF on Metformin (Glucophage or Glucovance) or Metformin containing medications       IF on Metformin (Glucophage or Glucovance) or Metformin containing medications , schedule a Basic Metabolic Panel at [facility] or Primary Clinic in 48 - 72 hours post procedure and PRIOR TO resuming the Metformin or Metformin containing  medications.  Hold Metformin (Glucophage or Glucovance) or Metformin containing medications until after the Basic Metabolic Panel on the 2nd or 3rd day following the procedure.  May resume after blood draw is complete.                  Further instructions from your care team       Renal Angiogram Discharge Instructions     After you go home:      Have an adult stay with you until tomorrow.    Drink extra fluids for 2 days.    You may resume your normal diet.    No smoking       For 24 hours - due to the sedation you received:    Relax and take it easy.    Do NOT make any important or legal decisions.    Do NOT drive or operate machines at home or at work.    Do NOT drink alcohol.    Care of Groin Puncture Site:      For the first 24 hrs - check the puncture site every 1-2 hours while awake.    For 2 days, when you cough, sneeze, laugh or move your bowels, hold your hand over the puncture site and press firmly.    Remove the bandaid after 24 hours. If there is minor oozing, apply another bandaid and remove it after 12 hours.    It is normal to have a small bruise or pea size lump at the site.    You may shower tomorrow. Do NOT take a bath, or use a hot tub or pool for at least 3 days. Do NOT scrub the site. Do not use lotion or powder near the puncture site.     Activity:            For 2 days:    No stooping or squatting    Do NOT do any heavy activity such as exercise, lifting, or straining.     No housework, yard work or any activity that make you sweat    Do NOT lift more than 10 pounds    Bleeding:      If you start bleeding from the site in your groin, lie down flat and press firmly on/above the site for 10 minutes.     Once bleeding stops, lay flat for 2 hours.     [facility] as soon as you can.       Call 911 right away if you have heavy bleeding or bleeding that does not stop.      Medicines:      You are taking antiplatelet medications - Plavix - do not stop taking it until you talk to  "your provider.       Take your medications, including blood thinners, unless your provider tells you not to.      If you have stopped any other medicines, check with your provider about when to restart them.    Follow Up Appointments:      Follow up with [facility] Nurse Practitioner at [facility] of patient preference in 7-10 days.      Call the clinic if:      You have increased pain or a large or growing hard lump around the site.    The site is red, swollen, hot or tender.    Blood or fluid is draining from the site.    You have chills or a fever greater than 101 F (38 C).    Your leg turns feels numb, cool or changes color.    You have hives, a rash or unusual itching.    New pain in the back or belly that you cannot control with Tylenol.    Any questions or concerns.    Other Instructions:      If you received a stent - carry your stent card with you at all times.      If you have questions or your original symptoms do not improve, call:         [facility] [place]:    [phone] [facility] (7 days a week)             Additional Information About Your Visit        [employer] Information     [employer] gives you secure access to your electronic health record. If you see a primary care provider, you can also send messages to your care team and make appointments. If you have questions, please call your primary care clinic.  If you do not have a primary care provider, please call [phone] and they will assist you.        Care EveryWhere ID     This is your Care EveryWhere ID. This could be used by other organizations to access your [facility] medical records  [id number]        Your Vitals Were     Blood Pressure Pulse Temperature Respirations Height Weight    157/70 (BP Location: Left arm) 62 98.3  F (36.8  C) (Oral) 16 1.6 m (5' 3\") 75.2 kg (165 lb 12.8 oz)    Pulse Oximetry BMI (Body Mass Index)                96% 29.37 kg/m2           Primary Care Provider Office Phone # Fax #    " Steven Fonseca [phone]      Thank you!     Thank you for choosing [facility] for your care. Our goal is always to provide you with excellent care. Hearing back from our patients is one way we can continue to improve our services. Please take a few minutes to complete the written survey that you may receive in the mail after you visit with us. Thank you!             Medication List: This is a list of all your medications and when to take them. Check marks below indicate your daily home schedule. Keep this list as a reference.      Medications           Morning Afternoon Evening Bedtime As Needed    ACIDOPHILUS PO   Take  by mouth.                                albuterol 108 (90 BASE) MCG/ACT Inhaler   Commonly known as:  PROAIR HFA/PROVENTIL HFA/VENTOLIN HFA   Inhale 2 puffs into the lungs every 6 hours INDICATION: RESCUE INHALER FOR SHORTNESS OF BREATH, CHEST TIGHTNESS AND COUGH                                ALPRAZolam 0.25 MG tablet   Commonly known as:  XANAX   Take 1 tablet (0.25 mg) by mouth nightly as needed (FOR SEVERE ANXIETY ONLY)                                ascorbic acid 1000 MG Tabs   Commonly known as:  vitamin C   Take 1 tablet (1,000 mg) by mouth daily VITAMIN C REPLACEMENT                                ASPIRIN NOT PRESCRIBED   Commonly known as:  INTENTIONAL   continuous prn. Antiplatelet medication not prescribed intentionally due to Current anticoagulant therapy (warfarin/enoxaparin)                                BETA BLOCKER NOT PRESCRIBED (INTENTIONAL)   Beta Blocker not prescribed intentionally due to Intolerance                                butalbital-acetaminophen-caffeine -40 MG per tablet   Commonly known as:  FIORICET/ESGIC   Take 1 tablet by mouth every 6 hours as needed for pain                                calcium-vitamin D-vitamin K 500-500-40 MG-UNT-MCG Chew   Commonly known as:  VIACTIV   Take 1 tablet by mouth 2 times daily (with meals)  INDICATION: FOR BONE AND BREAST HEALTH                                cholecalciferol 74350 UNITS capsule   Commonly known as:  VITAMIN D3   Take 1 capsule (50,000 Units) by mouth every 14 days ONE CAP 3 X A MONTH ON THE 1ST, 10TH AND 20TH OF EACH MONTH, FOR VITAMIN D DEFICIENCY MUST TAKE WITH WITH FAT CONTAINING MEAL                                cloNIDine 0.1 MG tablet   Commonly known as:  CATAPRES   Take 0.1 mg by mouth 2 times daily                                clopidogrel 75 MG tablet   Commonly known as:  PLAVIX   Take 1 tablet (75 mg) by mouth daily   Start taking on:  2/28/2017   Last time this was given:  600 mg on 2/27/2017  9:16 AM                                cyabnocobalamin 2500 MCG sublingual tablet   Commonly known as:  VITAMIN B-12   Take 2,500 mcg by mouth daily INDICATION: FOR VITAMIN B12 SUPPLEMENTATION - TO IMPROVE MEMORY, BALANCE, SLEEP AND MOOD, DIRECTIONS: PLEASE PLACE UNDER THE TONGUE TO DISSOLVE                                denosumab 60 MG/ML Soln injection   Commonly known as:  PROLIA   Inject 1 mL (60 mg) Subcutaneous once for 1 dose                                esomeprazole 40 MG CR capsule   Commonly known as:  nexIUM   Take 1 capsule (40 mg) by mouth every morning (before breakfast) TAKE  30'-60' BEFORE 1ST MEAL                                fenofibrate 160 MG tablet   Take 1 tablet (160 mg) by mouth daily INDICATION: TO LOWER CHOLESTEROL                                fluocinonide 0.05 % solution   Commonly known as:  LIDEX   Apply to scalp QD-BID as needed                                furosemide 20 MG tablet   Commonly known as:  LASIX   Take 1 tablet (20 mg) by mouth as needed INDICATION: TO HELP LOWER BLOOD PRESSURE AND TO TREAT EDEMA                                glimepiride 2 MG tablet   Commonly known as:  AMARYL   Take 1 tablet (2 mg) by mouth every morning (before breakfast) INDICATION: TO TREAT DIABETES                                melatonin 5 MG Caps   Take by  mouth At Bedtime                                nitroglycerin 0.4 MG sublingual tablet   Commonly known as:  NITROSTAT   Place 1 tablet (0.4 mg) under the tongue every 5 minutes as needed for chest pain                                olmesartan 40 MG tablet   Commonly known as:  BENICAR   Take 1 tablet (40 mg) by mouth daily INDICATION:TO LOWER BLOOD PRESSURE AND TO HELP PRESERVE KIDNEY FUNCTION                                order for DME   Equipment being ordered: Wheeled walker with basket and seat attachment                                pravastatin 80 MG tablet   Commonly known as:  PRAVACHOL   Take 0.5 tablets (40 mg) by mouth every evening INDICATION: TO LOWER CHOLESTEROL AND TO HELP  PREVENT HEART DISEASE                                triamterene-hydrochlorothiazide 37.5-25 MG per tablet   Commonly known as:  MAXZIDE-25   Take 1 tablet by mouth every morning INDICATION:TO LOWER BLOOD PRESSURE AND CONTROL EDEMA

## 2017-02-27 NOTE — DISCHARGE INSTRUCTIONS
Renal Angiogram Discharge Instructions     After you go home:      Have an adult stay with you until tomorrow.    Drink extra fluids for 2 days.    You may resume your normal diet.    No smoking       For 24 hours - due to the sedation you received:    Relax and take it easy.    Do NOT make any important or legal decisions.    Do NOT drive or operate machines at home or at work.    Do NOT drink alcohol.    Care of Groin Puncture Site:      For the first 24 hrs - check the puncture site every 1-2 hours while awake.    For 2 days, when you cough, sneeze, laugh or move your bowels, hold your hand over the puncture site and press firmly.    Remove the bandaid after 24 hours. If there is minor oozing, apply another bandaid and remove it after 12 hours.    It is normal to have a small bruise or pea size lump at the site.    You may shower tomorrow. Do NOT take a bath, or use a hot tub or pool for at least 3 days. Do NOT scrub the site. Do not use lotion or powder near the puncture site.     Activity:            For 2 days:    No stooping or squatting    Do NOT do any heavy activity such as exercise, lifting, or straining.     No housework, yard work or any activity that make you sweat    Do NOT lift more than 10 pounds    Bleeding:      If you start bleeding from the site in your groin, lie down flat and press firmly on/above the site for 10 minutes.     Once bleeding stops, lay flat for 2 hours.     [facility] the [facility] as soon as you can.       Call 911 right away if you have heavy bleeding or bleeding that does not stop.      Medicines:      You are taking antiplatelet medications - Plavix - do not stop taking it until you talk to your provider.       Take your medications, including blood thinners, unless your provider tells you not to.      If you have stopped any other medicines, check with your provider about when to restart them.    Follow Up Appointments:      Follow up with [facility] Heart Nurse  Practitioner at [facility] of patient preference in 7-10 days.      Call the clinic if:      You have increased pain or a large or growing hard lump around the site.    The site is red, swollen, hot or tender.    Blood or fluid is draining from the site.    You have chills or a fever greater than 101 F (38 C).    Your leg turns feels numb, cool or changes color.    You have hives, a rash or unusual itching.    New pain in the back or belly that you cannot control with Tylenol.    Any questions or concerns.    Other Instructions:      If you received a stent - carry your stent card with you at all times.      If you have questions or your original symptoms do not improve, call:         [facility] at [place]:    [phone] [facility] (7 days a week)

## 2017-03-01 LAB — INTERPRETATION ECG - MUSE: NORMAL

## 2017-03-06 ENCOUNTER — OFFICE VISIT (OUTPATIENT)
Dept: CARDIOLOGY | Facility: CLINIC | Age: 77
End: 2017-03-06
Attending: INTERNAL MEDICINE
Payer: COMMERCIAL

## 2017-03-06 VITALS
HEIGHT: 63 IN | SYSTOLIC BLOOD PRESSURE: 138 MMHG | DIASTOLIC BLOOD PRESSURE: 70 MMHG | WEIGHT: 165 LBS | HEART RATE: 70 BPM | BODY MASS INDEX: 29.23 KG/M2

## 2017-03-06 DIAGNOSIS — E78.5 HYPERLIPIDEMIA LDL GOAL <100: ICD-10-CM

## 2017-03-06 DIAGNOSIS — I10 BENIGN ESSENTIAL HYPERTENSION: ICD-10-CM

## 2017-03-06 DIAGNOSIS — R06.09 EXERTIONAL DYSPNEA: ICD-10-CM

## 2017-03-06 LAB
ANION GAP SERPL CALCULATED.3IONS-SCNC: 11.9 MMOL/L (ref 6–17)
BUN SERPL-MCNC: 34 MG/DL (ref 7–30)
CALCIUM SERPL-MCNC: 9.6 MG/DL (ref 8.5–10.5)
CHLORIDE SERPL-SCNC: 106 MMOL/L (ref 98–107)
CO2 SERPL-SCNC: 26 MMOL/L (ref 23–29)
CREAT SERPL-MCNC: 1.52 MG/DL (ref 0.7–1.3)
GFR SERPL CREATININE-BSD FRML MDRD: 33 ML/MIN/1.7M2
GLUCOSE SERPL-MCNC: 84 MG/DL (ref 70–105)
POTASSIUM SERPL-SCNC: 3.9 MMOL/L (ref 3.5–5.1)
SODIUM SERPL-SCNC: 140 MMOL/L (ref 136–145)

## 2017-03-06 PROCEDURE — 80048 BASIC METABOLIC PNL TOTAL CA: CPT | Performed by: NURSE PRACTITIONER

## 2017-03-06 PROCEDURE — 99214 OFFICE O/P EST MOD 30 MIN: CPT | Performed by: NURSE PRACTITIONER

## 2017-03-06 PROCEDURE — 36415 COLL VENOUS BLD VENIPUNCTURE: CPT | Performed by: NURSE PRACTITIONER

## 2017-03-06 RX ORDER — NIFEDIPINE 30 MG
30 TABLET, EXTENDED RELEASE ORAL DAILY
Qty: 30 TABLET | Refills: 11 | Status: SHIPPED | OUTPATIENT
Start: 2017-03-06 | End: 2017-04-14

## 2017-03-06 NOTE — LETTER
3/6/2017    Steven Fonseca MD  [facility]   [address]  [facility] [address]    RE: Ginger Abreu       Dear Colleague,    I had the pleasure of seeing Ginger Abreu in the [facility].    Cardiology Clinic Progress Note  Ginger Abreu MRN# [id number]   YOB: 1940 Age: 76 year old     Reason for visit: Follow up renal artery stenting and labile hypertension           Assessment and Plan:     1. Hypertension, labile    No significant changes in her blood pressures since renal artery stenting    Continues to have a BP spike in the afternoon up to 160 mmHg    Continue Benicar 40 mg daily, clonidine 0.1 mg daily with a p.r.n. 0.1 mg, Maxzide 37.5-25 milligrams daily, Lasix 20 mg daily    Restart nifedipine 30 mg daily    Return to clinic in 1 month    Follow up with Dr. Maldonado in 3 months    2. Bilateral renal artery stenosis, left greater than right    Status post PTA and stenting to the left renal artery for severe stenosis    Left renal artery ultrasound in 1 month and return to clinic for office visit    3.         CKD, Stage III    BMP today showed creatine was 1.52, BUN 34 and GFR 33 post angiography    Repeat BMP in 1 month with return office visit         History of Presenting Illness:    Ginger Abreu is a very pleasant 76 year old patient of Dr. Edwin Ram who saw Dr. Elizalde for renal artery stenosis. She presents today for a follow up post left renal artery stenting for severe stenosis.  She has a past medical history is significant for long-standing hypertension with multiple allergies and adverse reactions listed to antihypertensive medications.  She also has a history of hyperlipidemia, diabetes and chronic renal insufficiency.    The patient was referred to Dr. Elizalde after she underwent a CTA of her renal arteries in the setting of long-standing difficult to control hypertension.  The CTA showed single renal artery to  both kidneys, the proximal portion of the left renal artery was heavily calcified with significant noncalcified plaque in the proximal portion.  The right renal artery was somewhat obscured by heavy calcific plaque; however it was commented on that there was also noncalcified plaque at that level suggesting moderate to moderate to severe narrowing.    She underwent an abdominal aortogram with bilateral renal angiography with successful PTA and stent of the ostial to proximal left renal artery with the placement of a 6.0 x 15 mm Herculink stent on 2/27/2017 with Dr. Elizalde.  She was discharged home on dual antiplatelet therapy with aspirin 81 mg daily and Plavix 75 mg daily for one month. She had a basic metabolic panel the day of the procedure showing a sodium of 142, potassium 4.0, BUN 31, creatinine 1.25 and GFR 42.    Today in clinic her blood pressure was 138/70.  She states that her blood pressure values at home have been unchanged since the procedure.  Her systolic blood pressure traditionally runs between 130 and  140 mmHg. It can spike to 160 mmHg in the afternoon at that time she takes an extra 0.1 mg of clonidine.  She was previously on nifedipine 30 mg daily and states she had excellent blood pressure control on this medication.  She did discontinue it as she thought it was causing her diarrhea.  She now believes that the diarrhea was caused by an antibiotic that she was on for recurrent UTIs. She chest pain, shortness of breath, PND, orthopnea, presyncope, edema, heart racing, or palpitations.          This note was completed in part using Dragon voice recognition software. Although reviewed after completion, some word and grammatical errors may occur       Review of Systems:   Review of Systems:  Skin:  Negative     Eyes:  Negative    ENT:  Negative    Respiratory:  Negative    Cardiovascular:  Negative    Gastroenterology: Positive for heartburn (pt. taking zantac)  Genitourinary:  not assessed   "  Musculoskeletal:  Positive for back pain;nocturnal cramping  Neurologic:  Positive for headaches;migraine headaches;numbness or tingling of feet  Psychiatric:  Negative    Heme/Lymph/Imm:  Positive for allergies  Endocrine:  Positive for diabetes              Physical Exam:     Vitals: /70  Pulse 70  Ht 1.6 m (5' 3\")  Wt 74.8 kg (165 lb)  BMI 29.23 kg/m2  Constitutional:  cooperative, alert and oriented, well developed, well nourished, in no acute distress overweight      Skin:  warm and dry to the touch, no apparent skin lesions or masses noted        Head:  normocephalic, no masses or lesions        Eyes:  pupils equal and round        ENT:  no pallor or cyanosis, dentition good        Neck:           Chest:  clear to auscultation        Cardiac: regular rhythm;normal S1 and S2   S4;S3              Abdomen:  abdomen soft        Vascular:                                   right femoral access site clean dry and intact with small lump at the site and mild ecchymosis    Extremities and Back:  no edema        Neurological:  no gross motor deficits;affect appropriate                 Medications:     Current Outpatient Prescriptions   Medication Sig Dispense Refill     NIFEdipine ER (ADALAT CC) 30 MG TB24 Take 1 tablet (30 mg) by mouth daily 30 tablet 11     clopidogrel (PLAVIX) 75 MG tablet Take 1 tablet (75 mg) by mouth daily 30 tablet 0     cholecalciferol (VITAMIN D3) 80425 UNITS capsule Take 1 capsule (50,000 Units) by mouth every 14 days ONE CAP 3 X A MONTH ON THE 1ST, 10TH AND 20TH OF EACH MONTH, FOR VITAMIN D DEFICIENCY MUST TAKE WITH WITH FAT CONTAINING MEAL 40 capsule 0     olmesartan (BENICAR) 40 MG tablet Take 1 tablet (40 mg) by mouth daily INDICATION:TO LOWER BLOOD PRESSURE AND TO HELP PRESERVE KIDNEY FUNCTION 30 tablet 1     glimepiride (AMARYL) 2 MG tablet Take 1 tablet (2 mg) by mouth every morning (before breakfast) INDICATION: TO TREAT DIABETES 90 tablet 0     order for DME Equipment being " ordered: Wheeled walker with basket and seat attachment 1 Units 0     fenofibrate 160 MG tablet Take 1 tablet (160 mg) by mouth daily INDICATION: TO LOWER CHOLESTEROL 90 tablet 2     cloNIDine (CATAPRES) 0.1 MG tablet Take 0.1 mg by mouth 2 times daily       butalbital-acetaminophen-caffeine (FIORICET/ESGIC) -40 MG per tablet Take 1 tablet by mouth every 6 hours as needed for pain 30 tablet 0     triamterene-hydrochlorothiazide (MAXZIDE-25) 37.5-25 MG per tablet Take 1 tablet by mouth every morning INDICATION:TO LOWER BLOOD PRESSURE AND CONTROL EDEMA 30 tablet PRN     nitroglycerin (NITROSTAT) 0.4 MG SL tablet Place 1 tablet (0.4 mg) under the tongue every 5 minutes as needed for chest pain 25 tablet 0     pravastatin (PRAVACHOL) 80 MG tablet Take 0.5 tablets (40 mg) by mouth every evening INDICATION: TO LOWER CHOLESTEROL AND TO HELP  PREVENT HEART DISEASE 45 tablet PRN     cyabnocobalamin (VITAMIN B-12) 2500 MCG sublingual tablet Take 2,500 mcg by mouth daily INDICATION: FOR VITAMIN B12 SUPPLEMENTATION - TO IMPROVE MEMORY, BALANCE, SLEEP AND MOOD, DIRECTIONS: PLEASE PLACE UNDER THE TONGUE TO DISSOLVE 250 tablet 3     fluocinonide (LIDEX) 0.05 % external solution Apply to scalp QD-BID as needed 60 mL 3     esomeprazole (NEXIUM) 40 MG capsule Take 1 capsule (40 mg) by mouth every morning (before breakfast) TAKE  30'-60' BEFORE 1ST MEAL 180 capsule 2     furosemide (LASIX) 20 MG tablet Take 1 tablet (20 mg) by mouth as needed INDICATION: TO HELP LOWER BLOOD PRESSURE AND TO TREAT EDEMA 30 tablet PRN     calcium-vitamin D-vitamin K (VIACTIV) 500-500-40 MG-UNT-MCG CHEW Take 1 tablet by mouth 2 times daily (with meals) INDICATION: FOR BONE AND BREAST HEALTH 100 tablet PRN     melatonin 5 MG CAPS Take by mouth At Bedtime       Lactobacillus (ACIDOPHILUS PO) Take  by mouth.       ALPRAZolam (XANAX) 0.25 MG tablet Take 1 tablet (0.25 mg) by mouth nightly as needed (FOR SEVERE ANXIETY ONLY) 30 tablet 0     denosumab  (PROLIA) 60 MG/ML SOLN injection Inject 1 mL (60 mg) Subcutaneous once for 1 dose 1 mL 0     ascorbic acid (VITAMIN C) 1000 MG TABS Take 1 tablet (1,000 mg) by mouth daily VITAMIN C REPLACEMENT 100 tablet 3     BETA BLOCKER NOT PRESCRIBED, INTENTIONAL, Beta Blocker not prescribed intentionally due to Intolerance (Patient not taking: Reported on 3/6/2017)  0     albuterol (PROAIR HFA, PROVENTIL HFA, VENTOLIN HFA) 108 (90 BASE) MCG/ACT inhaler Inhale 2 puffs into the lungs every 6 hours INDICATION: RESCUE INHALER FOR SHORTNESS OF BREATH, CHEST TIGHTNESS AND COUGH 3 Inhaler PRN     [DISCONTINUED] fluticasone (FLOVENT HFA) 110 MCG/ACT inhaler Inhale 2 puffs into the lungs 2 times daily rinse mouth after each use 3 Inhaler 3     ASPIRIN NOT PRESCRIBED, INTENTIONAL, continuous prn Reported on 3/6/2017 0 each 0           Family History   Problem Relation Age of Onset     HEART DISEASE Father 25      in a fire      HEART DISEASE Paternal Grandfather        Social History     Social History     Marital status:      Spouse name: N/A     Number of children: N/A     Years of education: N/A     Occupational History     Not on file.     Social History Main Topics     Smoking status: Never Smoker     Smokeless tobacco: Never Used     Alcohol use No      Comment: very rare     Drug use: No     Sexual activity: Yes     Other Topics Concern     Caffeine Concern No     soda 1 time per day     Special Diet No     Exercise No     limited due to back pain      Social History Narrative            Past Medical History:     Past Medical History   Diagnosis Date     Acute peptic ulcer, unspecified site, without mention of hemorrhage, perforation, or obstruction      Allergic rhinitis, cause unspecified      Anxiety      Basal cell carcinoma      Chest pain      DVT (deep venous thrombosis) (H)      Esophageal reflux      Generalized osteoarthrosis, unspecified site      Herpes zoster      Hyperlipidaemia      Lumbago      Migraine  with aura, without mention of intractable migraine without mention of status migrainosus      Mild persistent asthma      MITRAL VALVE PROLAPSE      MVP (mitral valve prolapse)      Myocarditis (H)      Osteoarthritis      OSTEOPENIA      Osteopenia      Other and unspecified hyperlipidemia      Other specified disorders of bladder      Palpitations      PE (pulmonary embolism)      Pneumonia, organism unspecified      Pulmonary embolism (H)      Shortness of breath      Spinal stenosis      Spinal stenosis, lumbar region, without neurogenic claudication      Squamous cell carcinoma (H)      Thyroid nodule      Thyroid nodule      Type II or unspecified type diabetes mellitus without mention of complication, not stated as uncontrolled      Unspecified essential hypertension      Unspecified vitamin D deficiency               Past Surgical History:     Past Surgical History   Procedure Laterality Date     C nonspecific procedure  approx 1970's     hyst/bso     C nonspecific procedure  approx 1970's     gb     C nonspecific procedure  approx 1980's     cystocele repair     C nonspecific procedure  approx 1980's     endoscopic sinus      C nonspecific procedure       epidurals x 7     C nonspecific procedure       nl colonoscopy 1/2006     Nonspecific procedure  2011     Bilateral sphenoidotomy with removal of polypoid tissue.     Right frontal sinusotomy.   Bilateral total ethmoidectomy.  Bilateral maxillary antrostomy with removal of polypoid tissue.       C nonspecific procedure  2011     L2-L3 foramenotomies     C nonspecific procedure  2012     lumbar fusion     Orthopedic surgery       lumbar fusion              Allergies:   Codeine; Atenolol; Diltiazem; Labetalol; Lipitor [atorvastatin calcium]; Nifedipine; Sulfa drugs; Zocor [simvastatin]; Advair diskus; Aleve [naproxen]; Amlodipine besylate; Bystolic [nebivolol hcl]; Calcium carbonate; Crestor [rosuvastatin calcium]; Diovan [valsartan]; Hydralazine;  Hydrochlorothiazide; Imdur [isosorbide mononitrate]; Kcl; Lisinopril; Lovastatin; Metformin; Metoprolol; Niacin; Omnicef; Pravachol [hmg-coa-r inhibitors]; Prilosec otc [omeprazole magnesium]; and Zetia [ezetimibe]       Data:   All laboratory data reviewed:    LAST CHOLESTEROL:  Lab Results   Component Value Date    CHOL 159 12/15/2016     Lab Results   Component Value Date    HDL 55 12/15/2016     Lab Results   Component Value Date    LDL 72 12/15/2016     Lab Results   Component Value Date    TRIG 160 12/15/2016     Lab Results   Component Value Date    CHOLHDLRATIO 4.2 04/03/2015       LAST BMP:  Lab Results   Component Value Date     02/27/2017      Lab Results   Component Value Date    POTASSIUM 4.0 02/27/2017     Lab Results   Component Value Date    CHLORIDE 108 02/27/2017     Lab Results   Component Value Date    ROGE 9.2 02/27/2017     Lab Results   Component Value Date    CO2 27 02/27/2017     Lab Results   Component Value Date    BUN 31 02/27/2017    BUN 28.2 12/12/2012     Lab Results   Component Value Date    CR 1.25 02/27/2017     Lab Results   Component Value Date     02/27/2017       LAST CBC:  Lab Results   Component Value Date    WBC 6.4 02/27/2017     Lab Results   Component Value Date    RBC 3.71 02/27/2017     Lab Results   Component Value Date    HGB 11.6 02/27/2017     Lab Results   Component Value Date    HCT 34.8 02/27/2017     Lab Results   Component Value Date    MCV 94 02/27/2017     Lab Results   Component Value Date    MCH 31.3 02/27/2017     Lab Results   Component Value Date    MCHC 33.3 02/27/2017     Lab Results   Component Value Date    RDW 13.4 02/27/2017     Lab Results   Component Value Date     02/27/2017     Thank you for allowing me to participate in the care of your patient.    Sincerely,     RAMY RUIZ [facility]

## 2017-03-06 NOTE — PROGRESS NOTES
Cardiology Clinic Progress Note  Ginger Abreu MRN# [id number]   YOB: 1940 Age: 76 year old     Reason for visit: Follow up renal artery stenting and labile hypertension           Assessment and Plan:     1. Hypertension, labile    No significant changes in her blood pressures since renal artery stenting    Continues to have a BP spike in the afternoon up to 160 mmHg    Continue Benicar 40 mg daily, clonidine 0.1 mg daily with a p.r.n. 0.1 mg, Maxzide 37.5-25 milligrams daily, Lasix 20 mg daily    Restart nifedipine 30 mg daily    Return to clinic in 1 month    Follow up with Dr. Maldonado in 3 months    2. Bilateral renal artery stenosis, left greater than right    Status post PTA and stenting to the left renal artery for severe stenosis    Left renal artery ultrasound in 1 month and return to clinic for office visit    3.         CKD, Stage III    BMP today showed creatine was 1.52, BUN 34 and GFR 33 post angiography    Repeat BMP in 1 month with return office visit         History of Presenting Illness:    Ginger Abreu is a very pleasant 76 year old patient of Dr. Edwin Ram who saw Dr. Elizalde for renal artery stenosis. She presents today for a follow up post left renal artery stenting for severe stenosis.  She has a past medical history is significant for long-standing hypertension with multiple allergies and adverse reactions listed to antihypertensive medications.  She also has a history of hyperlipidemia, diabetes and chronic renal insufficiency.    The patient was referred to Dr. Elizalde after she underwent a CTA of her renal arteries in the setting of long-standing difficult to control hypertension.  The CTA showed single renal artery to both kidneys, the proximal portion of the left renal artery was heavily calcified with significant noncalcified plaque in the proximal portion.  The right renal artery was somewhat obscured by heavy calcific plaque; however it was commented on  that there was also noncalcified plaque at that level suggesting moderate to moderate to severe narrowing.    She underwent an abdominal aortogram with bilateral renal angiography with successful PTA and stent of the ostial to proximal left renal artery with the placement of a 6.0 x 15 mm Herculink stent on 2/27/2017 with Dr. Elizalde.  She was discharged home on dual antiplatelet therapy with aspirin 81 mg daily and Plavix 75 mg daily for one month. She had a basic metabolic panel the day of the procedure showing a sodium of 142, potassium 4.0, BUN 31, creatinine 1.25 and GFR 42.    Today in clinic her blood pressure was 138/70.  She states that her blood pressure values at home have been unchanged since the procedure.  Her systolic blood pressure traditionally runs between 130 and  140 mmHg. It can spike to 160 mmHg in the afternoon at that time she takes an extra 0.1 mg of clonidine.  She was previously on nifedipine 30 mg daily and states she had excellent blood pressure control on this medication.  She did discontinue it as she thought it was causing her diarrhea.  She now believes that the diarrhea was caused by an antibiotic that she was on for recurrent UTIs. She chest pain, shortness of breath, PND, orthopnea, presyncope, edema, heart racing, or palpitations.          This note was completed in part using Dragon voice recognition software. Although reviewed after completion, some word and grammatical errors may occur       Review of Systems:   Review of Systems:  Skin:  Negative     Eyes:  Negative    ENT:  Negative    Respiratory:  Negative    Cardiovascular:  Negative    Gastroenterology: Positive for heartburn (pt. taking zantac)  Genitourinary:  not assessed    Musculoskeletal:  Positive for back pain;nocturnal cramping  Neurologic:  Positive for headaches;migraine headaches;numbness or tingling of feet  Psychiatric:  Negative    Heme/Lymph/Imm:  Positive for allergies  Endocrine:  Positive for diabetes    "           Physical Exam:     Vitals: /70  Pulse 70  Ht 1.6 m (5' 3\")  Wt 74.8 kg (165 lb)  BMI 29.23 kg/m2  Constitutional:  cooperative, alert and oriented, well developed, well nourished, in no acute distress overweight      Skin:  warm and dry to the touch, no apparent skin lesions or masses noted        Head:  normocephalic, no masses or lesions        Eyes:  pupils equal and round        ENT:  no pallor or cyanosis, dentition good        Neck:           Chest:  clear to auscultation        Cardiac: regular rhythm;normal S1 and S2   S4;S3              Abdomen:  abdomen soft        Vascular:                                   right femoral access site clean dry and intact with small lump at the site and mild ecchymosis    Extremities and Back:  no edema        Neurological:  no gross motor deficits;affect appropriate                 Medications:     Current Outpatient Prescriptions   Medication Sig Dispense Refill     NIFEdipine ER (ADALAT CC) 30 MG TB24 Take 1 tablet (30 mg) by mouth daily 30 tablet 11     clopidogrel (PLAVIX) 75 MG tablet Take 1 tablet (75 mg) by mouth daily 30 tablet 0     cholecalciferol (VITAMIN D3) 00343 UNITS capsule Take 1 capsule (50,000 Units) by mouth every 14 days ONE CAP 3 X A MONTH ON THE 1ST, 10TH AND 20TH OF EACH MONTH, FOR VITAMIN D DEFICIENCY MUST TAKE WITH WITH FAT CONTAINING MEAL 40 capsule 0     olmesartan (BENICAR) 40 MG tablet Take 1 tablet (40 mg) by mouth daily INDICATION:TO LOWER BLOOD PRESSURE AND TO HELP PRESERVE KIDNEY FUNCTION 30 tablet 1     glimepiride (AMARYL) 2 MG tablet Take 1 tablet (2 mg) by mouth every morning (before breakfast) INDICATION: TO TREAT DIABETES 90 tablet 0     order for DME Equipment being ordered: Wheeled walker with basket and seat attachment 1 Units 0     fenofibrate 160 MG tablet Take 1 tablet (160 mg) by mouth daily INDICATION: TO LOWER CHOLESTEROL 90 tablet 2     cloNIDine (CATAPRES) 0.1 MG tablet Take 0.1 mg by mouth 2 times daily   "     butalbital-acetaminophen-caffeine (FIORICET/ESGIC) -40 MG per tablet Take 1 tablet by mouth every 6 hours as needed for pain 30 tablet 0     triamterene-hydrochlorothiazide (MAXZIDE-25) 37.5-25 MG per tablet Take 1 tablet by mouth every morning INDICATION:TO LOWER BLOOD PRESSURE AND CONTROL EDEMA 30 tablet PRN     nitroglycerin (NITROSTAT) 0.4 MG SL tablet Place 1 tablet (0.4 mg) under the tongue every 5 minutes as needed for chest pain 25 tablet 0     pravastatin (PRAVACHOL) 80 MG tablet Take 0.5 tablets (40 mg) by mouth every evening INDICATION: TO LOWER CHOLESTEROL AND TO HELP  PREVENT HEART DISEASE 45 tablet PRN     cyabnocobalamin (VITAMIN B-12) 2500 MCG sublingual tablet Take 2,500 mcg by mouth daily INDICATION: FOR VITAMIN B12 SUPPLEMENTATION - TO IMPROVE MEMORY, BALANCE, SLEEP AND MOOD, DIRECTIONS: PLEASE PLACE UNDER THE TONGUE TO DISSOLVE 250 tablet 3     fluocinonide (LIDEX) 0.05 % external solution Apply to scalp QD-BID as needed 60 mL 3     esomeprazole (NEXIUM) 40 MG capsule Take 1 capsule (40 mg) by mouth every morning (before breakfast) TAKE  30'-60' BEFORE 1ST MEAL 180 capsule 2     furosemide (LASIX) 20 MG tablet Take 1 tablet (20 mg) by mouth as needed INDICATION: TO HELP LOWER BLOOD PRESSURE AND TO TREAT EDEMA 30 tablet PRN     calcium-vitamin D-vitamin K (VIACTIV) 500-500-40 MG-UNT-MCG CHEW Take 1 tablet by mouth 2 times daily (with meals) INDICATION: FOR BONE AND BREAST HEALTH 100 tablet PRN     melatonin 5 MG CAPS Take by mouth At Bedtime       Lactobacillus (ACIDOPHILUS PO) Take  by mouth.       ALPRAZolam (XANAX) 0.25 MG tablet Take 1 tablet (0.25 mg) by mouth nightly as needed (FOR SEVERE ANXIETY ONLY) 30 tablet 0     denosumab (PROLIA) 60 MG/ML SOLN injection Inject 1 mL (60 mg) Subcutaneous once for 1 dose 1 mL 0     ascorbic acid (VITAMIN C) 1000 MG TABS Take 1 tablet (1,000 mg) by mouth daily VITAMIN C REPLACEMENT 100 tablet 3     BETA BLOCKER NOT PRESCRIBED, INTENTIONAL, Beta  Blocker not prescribed intentionally due to Intolerance (Patient not taking: Reported on 3/6/2017)  0     albuterol (PROAIR HFA, PROVENTIL HFA, VENTOLIN HFA) 108 (90 BASE) MCG/ACT inhaler Inhale 2 puffs into the lungs every 6 hours INDICATION: RESCUE INHALER FOR SHORTNESS OF BREATH, CHEST TIGHTNESS AND COUGH 3 Inhaler PRN     [DISCONTINUED] fluticasone (FLOVENT HFA) 110 MCG/ACT inhaler Inhale 2 puffs into the lungs 2 times daily rinse mouth after each use 3 Inhaler 3     ASPIRIN NOT PRESCRIBED, INTENTIONAL, continuous prn Reported on 3/6/2017 0 each 0           Family History   Problem Relation Age of Onset     HEART DISEASE Father 25      in a fire      HEART DISEASE Paternal Grandfather        Social History     Social History     Marital status:      Spouse name: N/A     Number of children: N/A     Years of education: N/A     Occupational History     Not on file.     Social History Main Topics     Smoking status: Never Smoker     Smokeless tobacco: Never Used     Alcohol use No      Comment: very rare     Drug use: No     Sexual activity: Yes     Other Topics Concern     Caffeine Concern No     soda 1 time per day     Special Diet No     Exercise No     limited due to back pain      Social History Narrative            Past Medical History:     Past Medical History   Diagnosis Date     Acute peptic ulcer, unspecified site, without mention of hemorrhage, perforation, or obstruction      Allergic rhinitis, cause unspecified      Anxiety      Basal cell carcinoma      Chest pain      DVT (deep venous thrombosis) (H)      Esophageal reflux      Generalized osteoarthrosis, unspecified site      Herpes zoster      Hyperlipidaemia      Lumbago      Migraine with aura, without mention of intractable migraine without mention of status migrainosus      Mild persistent asthma      MITRAL VALVE PROLAPSE      MVP (mitral valve prolapse)      Myocarditis (H)      Osteoarthritis      OSTEOPENIA      Osteopenia       Other and unspecified hyperlipidemia      Other specified disorders of bladder      Palpitations      PE (pulmonary embolism)      Pneumonia, organism unspecified      Pulmonary embolism (H)      Shortness of breath      Spinal stenosis      Spinal stenosis, lumbar region, without neurogenic claudication      Squamous cell carcinoma (H)      Thyroid nodule      Thyroid nodule      Type II or unspecified type diabetes mellitus without mention of complication, not stated as uncontrolled      Unspecified essential hypertension      Unspecified vitamin D deficiency               Past Surgical History:     Past Surgical History   Procedure Laterality Date     C nonspecific procedure  approx 1970's     hyst/bso     C nonspecific procedure  approx 1970's     gb     C nonspecific procedure  approx 1980's     cystocele repair     C nonspecific procedure  approx 1980's     endoscopic sinus      C nonspecific procedure       epidurals x 7     C nonspecific procedure       nl colonoscopy 1/2006     Nonspecific procedure  2011     Bilateral sphenoidotomy with removal of polypoid tissue.     Right frontal sinusotomy.   Bilateral total ethmoidectomy.  Bilateral maxillary antrostomy with removal of polypoid tissue.       C nonspecific procedure  2011     L2-L3 foramenotomies     C nonspecific procedure  2012     lumbar fusion     Orthopedic surgery       lumbar fusion              Allergies:   Codeine; Atenolol; Diltiazem; Labetalol; Lipitor [atorvastatin calcium]; Nifedipine; Sulfa drugs; Zocor [simvastatin]; Advair diskus; Aleve [naproxen]; Amlodipine besylate; Bystolic [nebivolol hcl]; Calcium carbonate; Crestor [rosuvastatin calcium]; Diovan [valsartan]; Hydralazine; Hydrochlorothiazide; Imdur [isosorbide mononitrate]; Kcl; Lisinopril; Lovastatin; Metformin; Metoprolol; Niacin; Omnicef; Pravachol [hmg-coa-r inhibitors]; Prilosec otc [omeprazole magnesium]; and Zetia [ezetimibe]       Data:   All laboratory data  reviewed:    LAST CHOLESTEROL:  Lab Results   Component Value Date    CHOL 159 12/15/2016     Lab Results   Component Value Date    HDL 55 12/15/2016     Lab Results   Component Value Date    LDL 72 12/15/2016     Lab Results   Component Value Date    TRIG 160 12/15/2016     Lab Results   Component Value Date    CHOLHDLRATIO 4.2 04/03/2015       LAST BMP:  Lab Results   Component Value Date     02/27/2017      Lab Results   Component Value Date    POTASSIUM 4.0 02/27/2017     Lab Results   Component Value Date    CHLORIDE 108 02/27/2017     Lab Results   Component Value Date    ROGE 9.2 02/27/2017     Lab Results   Component Value Date    CO2 27 02/27/2017     Lab Results   Component Value Date    BUN 31 02/27/2017    BUN 28.2 12/12/2012     Lab Results   Component Value Date    CR 1.25 02/27/2017     Lab Results   Component Value Date     02/27/2017       LAST CBC:  Lab Results   Component Value Date    WBC 6.4 02/27/2017     Lab Results   Component Value Date    RBC 3.71 02/27/2017     Lab Results   Component Value Date    HGB 11.6 02/27/2017     Lab Results   Component Value Date    HCT 34.8 02/27/2017     Lab Results   Component Value Date    MCV 94 02/27/2017     Lab Results   Component Value Date    MCH 31.3 02/27/2017     Lab Results   Component Value Date    MCHC 33.3 02/27/2017     Lab Results   Component Value Date    RDW 13.4 02/27/2017     Lab Results   Component Value Date     02/27/2017         RAMY RUIZ, CNP

## 2017-03-06 NOTE — PATIENT INSTRUCTIONS
Lab work today  Restart Nifedipine at 30 mg daily  Follow up with Dr. Edwin Ram in 3 months or sooner if needed for BP management

## 2017-03-06 NOTE — MR AVS SNAPSHOT
After Visit Summary   3/6/2017    Ginger Abreu    MRN: [id number]           Patient Information     Date Of Birth          1940        Visit Information        Provider Department      3/6/2017 1:50 PM Kassandra Nichols APRN CNP [facility] [place]        Today's Diagnoses     Benign essential hypertension        Hyperlipidemia LDL goal <100        Exertional dyspnea          Care Instructions    Lab work today  Restart Nifedipine at 30 mg daily  Follow up with Dr. Edwin Ram in 3 months or sooner if needed for BP management          Follow-ups after your visit        Additional Services     Follow-Up with Cardiologist                 Your next 10 appointments already scheduled     Apr 07, 2017  8:30 AM CDT   LAB with [facility]   [facility]    [address] [address]   [phone]           Patient must bring picture ID.  Patient should be prepared to give a urine specimen  Please do not eat 10-12 hours before your appointment if you are coming in fasting for labs on lipids, cholesterol, or glucose (sugar).  Pregnant women should follow their Care Team instructions. Water with medications is okay. Do not drink coffee or other fluids.   If you have concerns about taking  your medications, please ask at office or if scheduling via Weekend-a-gogot, send a message by clicking on Secure Messaging, Message Your Care Team.            Apr 07, 2017  9:00 AM CDT   [facility] Karla [facility]-C   [facility]    [address] [address]   [phone]            Apr 14, 2017  1:30 PM CDT   PHYSICAL with Steven Fonseca MD   [facility]    [address] [address]   [phone]            May 15, 2017  9:15 AM  CDT   Lab visit with [facility]   [facility]    [address] [id number] [phone]           Please do not eat 10-12 hours before your appointment if you are coming in fasting for labs on lipids, cholesterol, or glucose (sugar). Does not apply to pregnant women.  Water with medications is okay. Do not drink coffee or other fluids.  If you have concerns about taking  your medications, please send a message by clicking on Secure Messaging, Message Your Care Team.            May 22, 2017  1:30 PM CDT   MyChart Long with Steven Fonseca MD   [facility]    [address] [id number] [phone]              Future tests that were ordered for you today     Open Future Orders        Priority Expected Expires Ordered    Basic metabolic panel Routine 3/6/2017 3/1/2018 3/6/2017    Follow-Up with Cardiologist Routine 6/4/2017 3/6/2018 3/6/2017            Who to contact     If you have questions or need follow up information about today's clinic visit or your schedule please contact [facility] AT [place] directly at [phone].  Normal or non-critical lab and imaging results will be communicated to you by MyChart, letter or phone within 4 business days after the clinic has received the results. If you do not hear from us within 7 days, please contact the clinic through [employer] In[employer] or phone. If you have a critical or abnormal lab result, we will notify you by phone as soon as possible.  Submit refill requests through Tasted Menu or call your pharmacy and they will forward the refill request to us. Please allow 3 business days for your refill to be completed.          Additional Information About Your Visit        [employer] Monitoring Information     Tasted Menu gives you secure access to your electronic health record. If you see a primary care  "provider, you can also send messages to your care team and make appointments. If you have questions, please call your primary care clinic.  If you do not have a primary care provider, please call [phone] and they will assist you.        Care EveryWhere ID     This is your Care EveryWhere ID. This could be used by other organizations to access your [place] medical records  [id number]        Your Vitals Were     Pulse Height BMI (Body Mass Index)             70 1.6 m (5' 3\") 29.23 kg/m2          Blood Pressure from Last 3 Encounters:   03/06/17 138/70   02/27/17 157/70   02/20/17 112/60    Weight from Last 3 Encounters:   03/06/17 74.8 kg (165 lb)   02/27/17 75.2 kg (165 lb 12.8 oz)   02/20/17 75.8 kg (167 lb)              We Performed the Following     Follow-Up with Cardiac Advanced Practice Provider          Today's Medication Changes          These changes are accurate as of: 3/6/17  2:14 PM.  If you have any questions, ask your nurse or doctor.               Start taking these medicines.        Dose/Directions    NIFEdipine ER 30 MG Tb24   Commonly known as:  ADALAT CC   Used for:  Benign essential hypertension   Started by:  Kassandra Nichols APRN CNP        Dose:  30 mg   Take 1 tablet (30 mg) by mouth daily   Quantity:  30 tablet   Refills:  11            Where to get your medicines      These medications were sent to [facility] [address] [place] [address]     Phone:  [phone]     NIFEdipine ER 30 MG Tb24                Primary Care Provider Office Phone # Fax #    Steven Fonseca [phone]       [facility] [address] [facility] [address]        Thank you!     Thank you for choosing [facility] AT [place]  for your care. Our goal is always to provide you with excellent care. Hearing back from our patients is one way we can continue to improve our services. " Please take a few minutes to complete the written survey that you may receive in the mail after your visit with us. Thank you!             Your Updated Medication List - Protect others around you: Learn how to safely use, store and throw away your medicines at www.disposemymeds.org.          This list is accurate as of: 3/6/17  2:14 PM.  Always use your most recent med list.                   Brand Name Dispense Instructions for use    ACIDOPHILUS PO      Take  by mouth.       albuterol 108 (90 BASE) MCG/ACT Inhaler    PROAIR HFA/PROVENTIL HFA/VENTOLIN HFA    3 Inhaler    Inhale 2 puffs into the lungs every 6 hours INDICATION: RESCUE INHALER FOR SHORTNESS OF BREATH, CHEST TIGHTNESS AND COUGH       ALPRAZolam 0.25 MG tablet    XANAX    30 tablet    Take 1 tablet (0.25 mg) by mouth nightly as needed (FOR SEVERE ANXIETY ONLY)       ascorbic acid 1000 MG Tabs    vitamin C    100 tablet    Take 1 tablet (1,000 mg) by mouth daily VITAMIN C REPLACEMENT       ASPIRIN NOT PRESCRIBED    INTENTIONAL    0 each    continuous prn Reported on 3/6/2017       BETA BLOCKER NOT PRESCRIBED (INTENTIONAL)      Beta Blocker not prescribed intentionally due to Intolerance       butalbital-acetaminophen-caffeine -40 MG per tablet    FIORICET/ESGIC    30 tablet    Take 1 tablet by mouth every 6 hours as needed for pain       calcium-vitamin D-vitamin K 500-500-40 MG-UNT-MCG Chew    VIACTIV    100 tablet    Take 1 tablet by mouth 2 times daily (with meals) INDICATION: FOR BONE AND BREAST HEALTH       cholecalciferol 59200 UNITS capsule    VITAMIN D3    40 capsule    Take 1 capsule (50,000 Units) by mouth every 14 days ONE CAP 3 X A MONTH ON THE 1ST, 10TH AND 20TH OF EACH MONTH, FOR VITAMIN D DEFICIENCY MUST TAKE WITH WITH FAT CONTAINING MEAL       cloNIDine 0.1 MG tablet    CATAPRES     Take 0.1 mg by mouth 2 times daily       clopidogrel 75 MG tablet    PLAVIX    30 tablet    Take 1 tablet (75 mg) by mouth daily       cyabnocobalamin  2500 MCG sublingual tablet    VITAMIN B-12    250 tablet    Take 2,500 mcg by mouth daily INDICATION: FOR VITAMIN B12 SUPPLEMENTATION - TO IMPROVE MEMORY, BALANCE, SLEEP AND MOOD, DIRECTIONS: PLEASE PLACE UNDER THE TONGUE TO DISSOLVE       denosumab 60 MG/ML Soln injection    PROLIA    1 mL    Inject 1 mL (60 mg) Subcutaneous once for 1 dose       esomeprazole 40 MG CR capsule    nexIUM    180 capsule    Take 1 capsule (40 mg) by mouth every morning (before breakfast) TAKE  30'-60' BEFORE 1ST MEAL       fenofibrate 160 MG tablet     90 tablet    Take 1 tablet (160 mg) by mouth daily INDICATION: TO LOWER CHOLESTEROL       fluocinonide 0.05 % solution    LIDEX    60 mL    Apply to scalp QD-BID as needed       furosemide 20 MG tablet    LASIX    30 tablet    Take 1 tablet (20 mg) by mouth as needed INDICATION: TO HELP LOWER BLOOD PRESSURE AND TO TREAT EDEMA       glimepiride 2 MG tablet    AMARYL    90 tablet    Take 1 tablet (2 mg) by mouth every morning (before breakfast) INDICATION: TO TREAT DIABETES       melatonin 5 MG Caps      Take by mouth At Bedtime       NIFEdipine ER 30 MG Tb24    ADALAT CC    30 tablet    Take 1 tablet (30 mg) by mouth daily       nitroglycerin 0.4 MG sublingual tablet    NITROSTAT    25 tablet    Place 1 tablet (0.4 mg) under the tongue every 5 minutes as needed for chest pain       olmesartan 40 MG tablet    BENICAR    30 tablet    Take 1 tablet (40 mg) by mouth daily INDICATION:TO LOWER BLOOD PRESSURE AND TO HELP PRESERVE KIDNEY FUNCTION       order for DME     1 Units    Equipment being ordered: Wheeled walker with basket and seat attachment       pravastatin 80 MG tablet    PRAVACHOL    45 tablet    Take 0.5 tablets (40 mg) by mouth every evening INDICATION: TO LOWER CHOLESTEROL AND TO HELP  PREVENT HEART DISEASE       triamterene-hydrochlorothiazide 37.5-25 MG per tablet    MAXZIDE-25    30 tablet    Take 1 tablet by mouth every morning INDICATION:TO LOWER BLOOD PRESSURE AND CONTROL  EDEMA

## 2017-03-07 ENCOUNTER — TELEPHONE (OUTPATIENT)
Dept: CARDIOLOGY | Facility: CLINIC | Age: 77
End: 2017-03-07

## 2017-03-07 DIAGNOSIS — I10 ESSENTIAL HYPERTENSION, BENIGN: Primary | ICD-10-CM

## 2017-03-07 NOTE — TELEPHONE ENCOUNTER
"Called patient to inform her Kassandra reviewed the BMP \"Decrease in kidney function post the angiogram and renal artery stent likely due to the contrast. Please have her get a left renal artery US for stent surveillance, repeat the BMP and see me in the office in 1 month. Thanks Kassandra\"  Spoke with patient.  She stated she understood and had no questions.  Orders placed and transferred to scheduling to make apts.   "

## 2017-03-07 NOTE — TELEPHONE ENCOUNTER
PA denied.  Reason given:      Medication is not covered with DX        To appeal will need letter faxed too:      [phone]

## 2017-03-14 ENCOUNTER — HOSPITAL ENCOUNTER (OUTPATIENT)
Dept: ULTRASOUND IMAGING | Facility: CLINIC | Age: 77
Discharge: HOME | End: 2017-03-14
Attending: NURSE PRACTITIONER | Admitting: NURSE PRACTITIONER
Payer: MEDICARE

## 2017-03-14 DIAGNOSIS — I10 ESSENTIAL HYPERTENSION, BENIGN: ICD-10-CM

## 2017-03-14 PROCEDURE — 76770 US EXAM ABDO BACK WALL COMP: CPT | Mod: 26 | Performed by: INTERNAL MEDICINE

## 2017-03-14 PROCEDURE — 93975 VASCULAR STUDY: CPT | Mod: TC

## 2017-03-14 PROCEDURE — 93975 VASCULAR STUDY: CPT | Mod: 26 | Performed by: INTERNAL MEDICINE

## 2017-03-29 NOTE — TELEPHONE ENCOUNTER
These are acceptable dx's. It does not tell us what drugs are covered for M85.80. Would you like us to call pt to have her ask her ins company to see what medications are covered for dx code?

## 2017-04-03 ENCOUNTER — OFFICE VISIT (OUTPATIENT)
Dept: CARDIOLOGY | Facility: CLINIC | Age: 77
End: 2017-04-03
Attending: NURSE PRACTITIONER
Payer: COMMERCIAL

## 2017-04-03 VITALS
DIASTOLIC BLOOD PRESSURE: 68 MMHG | WEIGHT: 167 LBS | HEIGHT: 63 IN | HEART RATE: 68 BPM | BODY MASS INDEX: 29.59 KG/M2 | SYSTOLIC BLOOD PRESSURE: 138 MMHG

## 2017-04-03 DIAGNOSIS — I10 ESSENTIAL HYPERTENSION, BENIGN: ICD-10-CM

## 2017-04-03 DIAGNOSIS — I10 BENIGN ESSENTIAL HYPERTENSION: ICD-10-CM

## 2017-04-03 LAB
ANION GAP SERPL CALCULATED.3IONS-SCNC: 12.1 MMOL/L (ref 6–17)
BUN SERPL-MCNC: 32 MG/DL (ref 7–30)
CALCIUM SERPL-MCNC: 9.8 MG/DL (ref 8.5–10.5)
CHLORIDE SERPL-SCNC: 107 MMOL/L (ref 98–107)
CO2 SERPL-SCNC: 27 MMOL/L (ref 23–29)
CREAT SERPL-MCNC: 1.25 MG/DL (ref 0.7–1.3)
GFR SERPL CREATININE-BSD FRML MDRD: 42 ML/MIN/1.7M2
GLUCOSE SERPL-MCNC: 82 MG/DL (ref 70–105)
POTASSIUM SERPL-SCNC: 4.1 MMOL/L (ref 3.5–5.1)
SODIUM SERPL-SCNC: 142 MMOL/L (ref 136–145)

## 2017-04-03 PROCEDURE — 99213 OFFICE O/P EST LOW 20 MIN: CPT | Performed by: NURSE PRACTITIONER

## 2017-04-03 PROCEDURE — 80048 BASIC METABOLIC PNL TOTAL CA: CPT | Performed by: NURSE PRACTITIONER

## 2017-04-03 PROCEDURE — 36415 COLL VENOUS BLD VENIPUNCTURE: CPT | Performed by: NURSE PRACTITIONER

## 2017-04-03 NOTE — LETTER
4/3/2017    Steven Fonseca MD  [facility]   [address]  [facility] [address]    RE: Ginger Abreu       Dear Colleague,    I had the pleasure of seeing Ginger Abreu in the [facility].    Cardiology Clinic Progress Note  Ginger Abreu MRN# [id number]   YOB: 1940 Age: 76 year old     Reason for visit: Follow up renal artery stenting and labile hypertension           Assessment and Plan:     1. Hypertension, labile    No further BP spikes in the afternoon since starting on Nifedipine 30 mg daily    SBP is stable at 130-140 mmHg    Continue Nifedipine 30 mg daily, Benicar 40 mg daily, clonidine 0.1 mg daily with a p.r.n. 0.1 mg, Maxzide 37.5-25 milligrams daily, Lasix 20 mg daily    Follow up with Dr. Maldonado in 2 months    2. Bilateral renal artery stenosis, left greater than right    Status post PTA and stenting to the left renal artery for severe stenosis    Patent stent on renal artery ultrasound    OK to discontinue Plavix    3.   CKD, Stage III    BMP today showed creatine was 1.25, BUN 32 and GFR 42         History of Presenting Illness:    Ginger Abreu is a very pleasant 76 year old patient of Dr. Edwin Ram who saw Dr. Elizalde for renal artery stenosis. She presents today for a follow up post left renal artery stenting for severe stenosis and to review are renal artery ultrasound.  She has a past medical history is significant for long-standing hypertension with multiple allergies and adverse reactions listed to antihypertensive medications.  She also has a history of hyperlipidemia, diabetes and chronic renal insufficiency.    The patient was referred to Dr. Elizalde after she underwent a CTA of her renal arteries in the setting of long-standing difficult to control hypertension.  The CTA showed single renal artery to both kidneys, the proximal portion of the left renal artery was heavily calcified with significant  "noncalcified plaque in the proximal portion.  The right renal artery was somewhat obscured by heavy calcific plaque; however it was commented on that there was also noncalcified plaque at that level suggesting moderate to moderate to severe narrowing.    She underwent an abdominal aortogram with bilateral renal angiography with successful PTA and stent of the ostial to proximal left renal artery with the placement of a 6.0 x 15 mm Herculink stent on 2/27/2017 with Dr. Elizalde.  She was discharged home on dual antiplatelet therapy with aspirin 81 mg daily and Plavix 75 mg daily for one month.     Today in clinic her blood pressure was 138/68.  She states that since restarting the nifedipine that her systolic blood pressure runs between 130 and 140 mmHg. She no longer has the afternoon BP spikes up to 160 mmHg. She has not had recurrent diarrhea. She continues to deny chest pain, shortness of breath, PND, orthopnea, presyncope, edema, heart racing, or palpitations.          This note was completed in part using Dragon voice recognition software. Although reviewed after completion, some word and grammatical errors may occur       Review of Systems:   Review of Systems:  Skin:  Negative     Eyes:  Negative    ENT:  Negative    Respiratory:  Negative    Cardiovascular:    Positive for;fatigue  Gastroenterology: Negative  (pt. taking zantac)  Genitourinary:  Positive for urinary frequency  Musculoskeletal:  Positive for back pain;nocturnal cramping  Neurologic:  Positive for headaches;migraine headaches;numbness or tingling of feet  Psychiatric:  Positive for sleep disturbances;anxiety  Heme/Lymph/Imm:  Positive for allergies;easy bruising  Endocrine:  Positive for diabetes              Physical Exam:     Vitals: /68  Pulse 68  Ht 1.6 m (5' 2.99\")  Wt 75.8 kg (167 lb)  BMI 29.59 kg/m2  Constitutional:  cooperative, alert and oriented, well developed, well nourished, in no acute distress overweight      Skin:  " warm and dry to the touch, no apparent skin lesions or masses noted        Head:  normocephalic, no masses or lesions        Eyes:  pupils equal and round        ENT:  no pallor or cyanosis, dentition good        Neck:           Chest:  clear to auscultation        Cardiac: regular rhythm;normal S1 and S2   S4;S3              Abdomen:  abdomen soft        Extremities and Back:  no edema        Neurological:  no gross motor deficits;affect appropriate                 Medications:     Current Outpatient Prescriptions   Medication Sig Dispense Refill     NIFEdipine ER (ADALAT CC) 30 MG TB24 Take 1 tablet (30 mg) by mouth daily 30 tablet 11     cholecalciferol (VITAMIN D3) 87630 UNITS capsule Take 1 capsule (50,000 Units) by mouth every 14 days ONE CAP 3 X A MONTH ON THE 1ST, 10TH AND 20TH OF EACH MONTH, FOR VITAMIN D DEFICIENCY MUST TAKE WITH WITH FAT CONTAINING MEAL 40 capsule 0     olmesartan (BENICAR) 40 MG tablet Take 1 tablet (40 mg) by mouth daily INDICATION:TO LOWER BLOOD PRESSURE AND TO HELP PRESERVE KIDNEY FUNCTION 30 tablet 1     glimepiride (AMARYL) 2 MG tablet Take 1 tablet (2 mg) by mouth every morning (before breakfast) INDICATION: TO TREAT DIABETES 90 tablet 0     ALPRAZolam (XANAX) 0.25 MG tablet Take 1 tablet (0.25 mg) by mouth nightly as needed (FOR SEVERE ANXIETY ONLY) 30 tablet 0     order for DME Equipment being ordered: Wheeled walker with basket and seat attachment 1 Units 0     fenofibrate 160 MG tablet Take 1 tablet (160 mg) by mouth daily INDICATION: TO LOWER CHOLESTEROL 90 tablet 2     cloNIDine (CATAPRES) 0.1 MG tablet Take 0.1 mg by mouth 2 times daily       butalbital-acetaminophen-caffeine (FIORICET/ESGIC) -40 MG per tablet Take 1 tablet by mouth every 6 hours as needed for pain 30 tablet 0     triamterene-hydrochlorothiazide (MAXZIDE-25) 37.5-25 MG per tablet Take 1 tablet by mouth every morning INDICATION:TO LOWER BLOOD PRESSURE AND CONTROL EDEMA 30 tablet PRN     nitroglycerin  (NITROSTAT) 0.4 MG SL tablet Place 1 tablet (0.4 mg) under the tongue every 5 minutes as needed for chest pain 25 tablet 0     ascorbic acid (VITAMIN C) 1000 MG TABS Take 1 tablet (1,000 mg) by mouth daily VITAMIN C REPLACEMENT 100 tablet 3     pravastatin (PRAVACHOL) 80 MG tablet Take 0.5 tablets (40 mg) by mouth every evening INDICATION: TO LOWER CHOLESTEROL AND TO HELP  PREVENT HEART DISEASE 45 tablet PRN     cyabnocobalamin (VITAMIN B-12) 2500 MCG sublingual tablet Take 2,500 mcg by mouth daily INDICATION: FOR VITAMIN B12 SUPPLEMENTATION - TO IMPROVE MEMORY, BALANCE, SLEEP AND MOOD, DIRECTIONS: PLEASE PLACE UNDER THE TONGUE TO DISSOLVE 250 tablet 3     fluocinonide (LIDEX) 0.05 % external solution Apply to scalp QD-BID as needed 60 mL 3     esomeprazole (NEXIUM) 40 MG capsule Take 1 capsule (40 mg) by mouth every morning (before breakfast) TAKE  30'-60' BEFORE 1ST MEAL 180 capsule 2     furosemide (LASIX) 20 MG tablet Take 1 tablet (20 mg) by mouth as needed INDICATION: TO HELP LOWER BLOOD PRESSURE AND TO TREAT EDEMA 30 tablet PRN     calcium-vitamin D-vitamin K (VIACTIV) 500-500-40 MG-UNT-MCG CHEW Take 1 tablet by mouth 2 times daily (with meals) INDICATION: FOR BONE AND BREAST HEALTH 100 tablet PRN     melatonin 5 MG CAPS Take by mouth At Bedtime       BETA BLOCKER NOT PRESCRIBED, INTENTIONAL, Beta Blocker not prescribed intentionally due to Intolerance  0     ASPIRIN NOT PRESCRIBED, INTENTIONAL, continuous prn Reported on 3/6/2017 0 each 0     Lactobacillus (ACIDOPHILUS PO) Take  by mouth.       denosumab (PROLIA) 60 MG/ML SOLN injection Inject 1 mL (60 mg) Subcutaneous once for 1 dose 1 mL 0     albuterol (PROAIR HFA, PROVENTIL HFA, VENTOLIN HFA) 108 (90 BASE) MCG/ACT inhaler Inhale 2 puffs into the lungs every 6 hours INDICATION: RESCUE INHALER FOR SHORTNESS OF BREATH, CHEST TIGHTNESS AND COUGH 3 Inhaler PRN     [DISCONTINUED] fluticasone (FLOVENT HFA) 110 MCG/ACT inhaler Inhale 2 puffs into the lungs 2  times daily rinse mouth after each use 3 Inhaler 3           Family History   Problem Relation Age of Onset     HEART DISEASE Father 25      in a fire      HEART DISEASE Paternal Grandfather        Social History     Social History     Marital status:      Spouse name: N/A     Number of children: N/A     Years of education: N/A     Occupational History     Not on file.     Social History Main Topics     Smoking status: Never Smoker     Smokeless tobacco: Never Used     Alcohol use No      Comment: very rare     Drug use: No     Sexual activity: Yes     Other Topics Concern     Caffeine Concern No     soda 1 time per day     Special Diet No     Exercise No     limited due to back pain      Social History Narrative            Past Medical History:     Past Medical History:   Diagnosis Date     Acute peptic ulcer, unspecified site, without mention of hemorrhage, perforation, or obstruction      Allergic rhinitis, cause unspecified      Anxiety      Basal cell carcinoma      Chest pain      DVT (deep venous thrombosis) (H)      Esophageal reflux      Generalized osteoarthrosis, unspecified site      Herpes zoster      Hyperlipidaemia      Lumbago      Migraine with aura, without mention of intractable migraine without mention of status migrainosus      Mild persistent asthma      MITRAL VALVE PROLAPSE      MVP (mitral valve prolapse)      Myocarditis (H)      Osteoarthritis      OSTEOPENIA      Osteopenia      Other and unspecified hyperlipidemia      Other specified disorders of bladder      Palpitations      PE (pulmonary embolism)      Pneumonia, organism unspecified      Pulmonary embolism (H)      Shortness of breath      Spinal stenosis      Spinal stenosis, lumbar region, without neurogenic claudication      Squamous cell carcinoma (H)      Thyroid nodule      Thyroid nodule      Type II or unspecified type diabetes mellitus without mention of complication, not stated as uncontrolled      Unspecified  essential hypertension      Unspecified vitamin D deficiency               Past Surgical History:     Past Surgical History:   Procedure Laterality Date     C NONSPECIFIC PROCEDURE  approx 1970's    hyst/bso     C NONSPECIFIC PROCEDURE  approx 1970's    gb     C NONSPECIFIC PROCEDURE  approx 1980's    cystocele repair     C NONSPECIFIC PROCEDURE  approx 1980's    endoscopic sinus      C NONSPECIFIC PROCEDURE      epidurals x 7     C NONSPECIFIC PROCEDURE      nl colonoscopy 1/2006     C NONSPECIFIC PROCEDURE  2011    L2-L3 foramenotomies     C NONSPECIFIC PROCEDURE  2012    lumbar fusion     NONSPECIFIC PROCEDURE  2011    Bilateral sphenoidotomy with removal of polypoid tissue.     Right frontal sinusotomy.   Bilateral total ethmoidectomy.  Bilateral maxillary antrostomy with removal of polypoid tissue.       ORTHOPEDIC SURGERY      lumbar fusion              Allergies:   Codeine; Atenolol; Diltiazem; Labetalol; Lipitor [atorvastatin calcium]; Nifedipine; Sulfa drugs; Zocor [simvastatin]; Advair diskus; Aleve [naproxen]; Amlodipine besylate; Bystolic [nebivolol hcl]; Calcium carbonate; Crestor [rosuvastatin calcium]; Diovan [valsartan]; Hydralazine; Hydrochlorothiazide; Imdur [isosorbide mononitrate]; Kcl; Lisinopril; Lovastatin; Metformin; Metoprolol; Niacin; Omnicef; Pravachol [hmg-coa-r inhibitors]; Prilosec otc [omeprazole magnesium]; and Zetia [ezetimibe]       Data:   All laboratory data reviewed:    Thank you for allowing me to participate in the care of your patient.    Sincerely,     RAMY RUIZ [facility]

## 2017-04-03 NOTE — PATIENT INSTRUCTIONS
Potassium and kidney function are stable  Continue current medications  Follow up with Dr. Maldonado this summer

## 2017-04-03 NOTE — PROGRESS NOTES
Cardiology Clinic Progress Note  Ginger Abreu MRN# [id number]   YOB: 1940 Age: 76 year old     Reason for visit: Follow up renal artery stenting and labile hypertension           Assessment and Plan:     1. Hypertension, labile    No further BP spikes in the afternoon since starting on Nifedipine 30 mg daily    SBP is stable at 130-140 mmHg    Continue Nifedipine 30 mg daily, Benicar 40 mg daily, clonidine 0.1 mg daily with a p.r.n. 0.1 mg, Maxzide 37.5-25 milligrams daily, Lasix 20 mg daily    Follow up with Dr. Maldonado in 2 months    2. Bilateral renal artery stenosis, left greater than right    Status post PTA and stenting to the left renal artery for severe stenosis    Patent stent on renal artery ultrasound    OK to discontinue Plavix    3.   CKD, Stage III    BMP today showed creatine was 1.25, BUN 32 and GFR 42         History of Presenting Illness:    Ginger Abreu is a very pleasant 76 year old patient of Dr. Edwin Ram who saw Dr. Elizalde for renal artery stenosis. She presents today for a follow up post left renal artery stenting for severe stenosis and to review are renal artery ultrasound.  She has a past medical history is significant for long-standing hypertension with multiple allergies and adverse reactions listed to antihypertensive medications.  She also has a history of hyperlipidemia, diabetes and chronic renal insufficiency.    The patient was referred to Dr. Elizalde after she underwent a CTA of her renal arteries in the setting of long-standing difficult to control hypertension.  The CTA showed single renal artery to both kidneys, the proximal portion of the left renal artery was heavily calcified with significant noncalcified plaque in the proximal portion.  The right renal artery was somewhat obscured by heavy calcific plaque; however it was commented on that there was also noncalcified plaque at that level suggesting moderate to moderate to severe  "narrowing.    She underwent an abdominal aortogram with bilateral renal angiography with successful PTA and stent of the ostial to proximal left renal artery with the placement of a 6.0 x 15 mm Herculink stent on 2/27/2017 with Dr. Elizalde.  She was discharged home on dual antiplatelet therapy with aspirin 81 mg daily and Plavix 75 mg daily for one month.     Today in clinic her blood pressure was 138/68.  She states that since restarting the nifedipine that her systolic blood pressure runs between 130 and 140 mmHg. She no longer has the afternoon BP spikes up to 160 mmHg. She has not had recurrent diarrhea. She continues to deny chest pain, shortness of breath, PND, orthopnea, presyncope, edema, heart racing, or palpitations.          This note was completed in part using Dragon voice recognition software. Although reviewed after completion, some word and grammatical errors may occur       Review of Systems:   Review of Systems:  Skin:  Negative     Eyes:  Negative    ENT:  Negative    Respiratory:  Negative    Cardiovascular:    Positive for;fatigue  Gastroenterology: Negative  (pt. taking zantac)  Genitourinary:  Positive for urinary frequency  Musculoskeletal:  Positive for back pain;nocturnal cramping  Neurologic:  Positive for headaches;migraine headaches;numbness or tingling of feet  Psychiatric:  Positive for sleep disturbances;anxiety  Heme/Lymph/Imm:  Positive for allergies;easy bruising  Endocrine:  Positive for diabetes              Physical Exam:     Vitals: /68  Pulse 68  Ht 1.6 m (5' 2.99\")  Wt 75.8 kg (167 lb)  BMI 29.59 kg/m2  Constitutional:  cooperative, alert and oriented, well developed, well nourished, in no acute distress overweight      Skin:  warm and dry to the touch, no apparent skin lesions or masses noted        Head:  normocephalic, no masses or lesions        Eyes:  pupils equal and round        ENT:  no pallor or cyanosis, dentition good        Neck:           Chest:  clear " to auscultation        Cardiac: regular rhythm;normal S1 and S2   S4;S3              Abdomen:  abdomen soft        Extremities and Back:  no edema        Neurological:  no gross motor deficits;affect appropriate                 Medications:     Current Outpatient Prescriptions   Medication Sig Dispense Refill     NIFEdipine ER (ADALAT CC) 30 MG TB24 Take 1 tablet (30 mg) by mouth daily 30 tablet 11     cholecalciferol (VITAMIN D3) 38881 UNITS capsule Take 1 capsule (50,000 Units) by mouth every 14 days ONE CAP 3 X A MONTH ON THE 1ST, 10TH AND 20TH OF EACH MONTH, FOR VITAMIN D DEFICIENCY MUST TAKE WITH WITH FAT CONTAINING MEAL 40 capsule 0     olmesartan (BENICAR) 40 MG tablet Take 1 tablet (40 mg) by mouth daily INDICATION:TO LOWER BLOOD PRESSURE AND TO HELP PRESERVE KIDNEY FUNCTION 30 tablet 1     glimepiride (AMARYL) 2 MG tablet Take 1 tablet (2 mg) by mouth every morning (before breakfast) INDICATION: TO TREAT DIABETES 90 tablet 0     ALPRAZolam (XANAX) 0.25 MG tablet Take 1 tablet (0.25 mg) by mouth nightly as needed (FOR SEVERE ANXIETY ONLY) 30 tablet 0     order for DME Equipment being ordered: Wheeled walker with basket and seat attachment 1 Units 0     fenofibrate 160 MG tablet Take 1 tablet (160 mg) by mouth daily INDICATION: TO LOWER CHOLESTEROL 90 tablet 2     cloNIDine (CATAPRES) 0.1 MG tablet Take 0.1 mg by mouth 2 times daily       butalbital-acetaminophen-caffeine (FIORICET/ESGIC) -40 MG per tablet Take 1 tablet by mouth every 6 hours as needed for pain 30 tablet 0     triamterene-hydrochlorothiazide (MAXZIDE-25) 37.5-25 MG per tablet Take 1 tablet by mouth every morning INDICATION:TO LOWER BLOOD PRESSURE AND CONTROL EDEMA 30 tablet PRN     nitroglycerin (NITROSTAT) 0.4 MG SL tablet Place 1 tablet (0.4 mg) under the tongue every 5 minutes as needed for chest pain 25 tablet 0     ascorbic acid (VITAMIN C) 1000 MG TABS Take 1 tablet (1,000 mg) by mouth daily VITAMIN C REPLACEMENT 100 tablet 3      pravastatin (PRAVACHOL) 80 MG tablet Take 0.5 tablets (40 mg) by mouth every evening INDICATION: TO LOWER CHOLESTEROL AND TO HELP  PREVENT HEART DISEASE 45 tablet PRN     cyabnocobalamin (VITAMIN B-12) 2500 MCG sublingual tablet Take 2,500 mcg by mouth daily INDICATION: FOR VITAMIN B12 SUPPLEMENTATION - TO IMPROVE MEMORY, BALANCE, SLEEP AND MOOD, DIRECTIONS: PLEASE PLACE UNDER THE TONGUE TO DISSOLVE 250 tablet 3     fluocinonide (LIDEX) 0.05 % external solution Apply to scalp QD-BID as needed 60 mL 3     esomeprazole (NEXIUM) 40 MG capsule Take 1 capsule (40 mg) by mouth every morning (before breakfast) TAKE  30'-60' BEFORE 1ST MEAL 180 capsule 2     furosemide (LASIX) 20 MG tablet Take 1 tablet (20 mg) by mouth as needed INDICATION: TO HELP LOWER BLOOD PRESSURE AND TO TREAT EDEMA 30 tablet PRN     calcium-vitamin D-vitamin K (VIACTIV) 500-500-40 MG-UNT-MCG CHEW Take 1 tablet by mouth 2 times daily (with meals) INDICATION: FOR BONE AND BREAST HEALTH 100 tablet PRN     melatonin 5 MG CAPS Take by mouth At Bedtime       BETA BLOCKER NOT PRESCRIBED, INTENTIONAL, Beta Blocker not prescribed intentionally due to Intolerance  0     ASPIRIN NOT PRESCRIBED, INTENTIONAL, continuous prn Reported on 3/6/2017 0 each 0     Lactobacillus (ACIDOPHILUS PO) Take  by mouth.       denosumab (PROLIA) 60 MG/ML SOLN injection Inject 1 mL (60 mg) Subcutaneous once for 1 dose 1 mL 0     albuterol (PROAIR HFA, PROVENTIL HFA, VENTOLIN HFA) 108 (90 BASE) MCG/ACT inhaler Inhale 2 puffs into the lungs every 6 hours INDICATION: RESCUE INHALER FOR SHORTNESS OF BREATH, CHEST TIGHTNESS AND COUGH 3 Inhaler PRN     [DISCONTINUED] fluticasone (FLOVENT HFA) 110 MCG/ACT inhaler Inhale 2 puffs into the lungs 2 times daily rinse mouth after each use 3 Inhaler 3           Family History   Problem Relation Age of Onset     HEART DISEASE Father 25      in a fire      HEART DISEASE Paternal Grandfather        Social History     Social History     Marital  status:      Spouse name: N/A     Number of children: N/A     Years of education: N/A     Occupational History     Not on file.     Social History Main Topics     Smoking status: Never Smoker     Smokeless tobacco: Never Used     Alcohol use No      Comment: very rare     Drug use: No     Sexual activity: Yes     Other Topics Concern     Caffeine Concern No     soda 1 time per day     Special Diet No     Exercise No     limited due to back pain      Social History Narrative            Past Medical History:     Past Medical History:   Diagnosis Date     Acute peptic ulcer, unspecified site, without mention of hemorrhage, perforation, or obstruction      Allergic rhinitis, cause unspecified      Anxiety      Basal cell carcinoma      Chest pain      DVT (deep venous thrombosis) (H)      Esophageal reflux      Generalized osteoarthrosis, unspecified site      Herpes zoster      Hyperlipidaemia      Lumbago      Migraine with aura, without mention of intractable migraine without mention of status migrainosus      Mild persistent asthma      MITRAL VALVE PROLAPSE      MVP (mitral valve prolapse)      Myocarditis (H)      Osteoarthritis      OSTEOPENIA      Osteopenia      Other and unspecified hyperlipidemia      Other specified disorders of bladder      Palpitations      PE (pulmonary embolism)      Pneumonia, organism unspecified      Pulmonary embolism (H)      Shortness of breath      Spinal stenosis      Spinal stenosis, lumbar region, without neurogenic claudication      Squamous cell carcinoma (H)      Thyroid nodule      Thyroid nodule      Type II or unspecified type diabetes mellitus without mention of complication, not stated as uncontrolled      Unspecified essential hypertension      Unspecified vitamin D deficiency               Past Surgical History:     Past Surgical History:   Procedure Laterality Date     C NONSPECIFIC PROCEDURE  approx 1970's    hyst/bso     C NONSPECIFIC PROCEDURE  approx  1970's    gb     C NONSPECIFIC PROCEDURE  approx 1980's    cystocele repair     C NONSPECIFIC PROCEDURE  approx 1980's    endoscopic sinus      C NONSPECIFIC PROCEDURE      epidurals x 7     C NONSPECIFIC PROCEDURE      nl colonoscopy 1/2006     C NONSPECIFIC PROCEDURE  2011    L2-L3 foramenotomies     C NONSPECIFIC PROCEDURE  2012    lumbar fusion     NONSPECIFIC PROCEDURE  2011    Bilateral sphenoidotomy with removal of polypoid tissue.     Right frontal sinusotomy.   Bilateral total ethmoidectomy.  Bilateral maxillary antrostomy with removal of polypoid tissue.       ORTHOPEDIC SURGERY      lumbar fusion              Allergies:   Codeine; Atenolol; Diltiazem; Labetalol; Lipitor [atorvastatin calcium]; Nifedipine; Sulfa drugs; Zocor [simvastatin]; Advair diskus; Aleve [naproxen]; Amlodipine besylate; Bystolic [nebivolol hcl]; Calcium carbonate; Crestor [rosuvastatin calcium]; Diovan [valsartan]; Hydralazine; Hydrochlorothiazide; Imdur [isosorbide mononitrate]; Kcl; Lisinopril; Lovastatin; Metformin; Metoprolol; Niacin; Omnicef; Pravachol [hmg-coa-r inhibitors]; Prilosec otc [omeprazole magnesium]; and Zetia [ezetimibe]       Data:   All laboratory data reviewed:    DOM LOBATO, RAMY, CNP

## 2017-04-07 ENCOUNTER — OFFICE VISIT (OUTPATIENT)
Dept: DERMATOLOGY | Facility: CLINIC | Age: 77
End: 2017-04-07
Payer: COMMERCIAL

## 2017-04-07 VITALS — SYSTOLIC BLOOD PRESSURE: 138 MMHG | OXYGEN SATURATION: 100 % | HEART RATE: 73 BPM | DIASTOLIC BLOOD PRESSURE: 72 MMHG

## 2017-04-07 DIAGNOSIS — L81.4 LENTIGO: ICD-10-CM

## 2017-04-07 DIAGNOSIS — I10 BENIGN ESSENTIAL HYPERTENSION: ICD-10-CM

## 2017-04-07 DIAGNOSIS — E11.9 TYPE 2 DIABETES MELLITUS WITHOUT COMPLICATION, WITHOUT LONG-TERM CURRENT USE OF INSULIN (H): ICD-10-CM

## 2017-04-07 DIAGNOSIS — D22.9 NEVUS: Primary | ICD-10-CM

## 2017-04-07 DIAGNOSIS — D18.00 ANGIOMA: ICD-10-CM

## 2017-04-07 DIAGNOSIS — Z85.828 HISTORY OF SKIN CANCER: ICD-10-CM

## 2017-04-07 DIAGNOSIS — E55.9 VITAMIN D DEFICIENCY: ICD-10-CM

## 2017-04-07 DIAGNOSIS — L82.1 SEBORRHEIC KERATOSIS: ICD-10-CM

## 2017-04-07 DIAGNOSIS — E78.5 HYPERLIPIDEMIA WITH TARGET LDL LESS THAN 100: ICD-10-CM

## 2017-04-07 LAB
ALBUMIN SERPL-MCNC: 3.5 G/DL (ref 3.4–5)
ALP SERPL-CCNC: 52 U/L (ref 40–150)
ALT SERPL W P-5'-P-CCNC: 33 U/L (ref 0–50)
ANION GAP SERPL CALCULATED.3IONS-SCNC: 8 MMOL/L (ref 3–14)
AST SERPL W P-5'-P-CCNC: 16 U/L (ref 0–45)
BILIRUB SERPL-MCNC: 0.3 MG/DL (ref 0.2–1.3)
BUN SERPL-MCNC: 27 MG/DL (ref 7–30)
CALCIUM SERPL-MCNC: 9.2 MG/DL (ref 8.5–10.1)
CHLORIDE SERPL-SCNC: 107 MMOL/L (ref 94–109)
CHOLEST SERPL-MCNC: 157 MG/DL
CO2 SERPL-SCNC: 27 MMOL/L (ref 20–32)
CREAT SERPL-MCNC: 1.13 MG/DL (ref 0.52–1.04)
ERYTHROCYTE [DISTWIDTH] IN BLOOD BY AUTOMATED COUNT: 13.2 % (ref 10–15)
GFR SERPL CREATININE-BSD FRML MDRD: 47 ML/MIN/1.7M2
GLUCOSE SERPL-MCNC: 108 MG/DL (ref 70–99)
HBA1C MFR BLD: 5.8 % (ref 4.3–6)
HCT VFR BLD AUTO: 37 % (ref 35–47)
HDLC SERPL-MCNC: 54 MG/DL
HGB BLD-MCNC: 11.8 G/DL (ref 11.7–15.7)
LDLC SERPL CALC-MCNC: 62 MG/DL
MCH RBC QN AUTO: 31.1 PG (ref 26.5–33)
MCHC RBC AUTO-ENTMCNC: 31.9 G/DL (ref 31.5–36.5)
MCV RBC AUTO: 97 FL (ref 78–100)
NONHDLC SERPL-MCNC: 103 MG/DL
PLATELET # BLD AUTO: 209 10E9/L (ref 150–450)
POTASSIUM SERPL-SCNC: 4.2 MMOL/L (ref 3.4–5.3)
PROT SERPL-MCNC: 6.8 G/DL (ref 6.8–8.8)
RBC # BLD AUTO: 3.8 10E12/L (ref 3.8–5.2)
SODIUM SERPL-SCNC: 142 MMOL/L (ref 133–144)
TRIGL SERPL-MCNC: 204 MG/DL
WBC # BLD AUTO: 7.1 10E9/L (ref 4–11)

## 2017-04-07 PROCEDURE — 85027 COMPLETE CBC AUTOMATED: CPT | Performed by: INTERNAL MEDICINE

## 2017-04-07 PROCEDURE — 83036 HEMOGLOBIN GLYCOSYLATED A1C: CPT | Performed by: INTERNAL MEDICINE

## 2017-04-07 PROCEDURE — 99214 OFFICE O/P EST MOD 30 MIN: CPT | Performed by: PHYSICIAN ASSISTANT

## 2017-04-07 PROCEDURE — 36415 COLL VENOUS BLD VENIPUNCTURE: CPT | Performed by: INTERNAL MEDICINE

## 2017-04-07 PROCEDURE — 82306 VITAMIN D 25 HYDROXY: CPT | Performed by: INTERNAL MEDICINE

## 2017-04-07 PROCEDURE — 80061 LIPID PANEL: CPT | Performed by: INTERNAL MEDICINE

## 2017-04-07 PROCEDURE — 80053 COMPREHEN METABOLIC PANEL: CPT | Performed by: INTERNAL MEDICINE

## 2017-04-07 NOTE — NURSING NOTE
"Initial /72  Pulse 73  SpO2 100% Estimated body mass index is 29.59 kg/(m^2) as calculated from the following:    Height as of 4/3/17: 1.6 m (5' 2.99\").    Weight as of 4/3/17: 75.8 kg (167 lb). .      "

## 2017-04-07 NOTE — PATIENT INSTRUCTIONS
Proper skin care from Dr. Quiñonez- Dermatology     Eliminate harsh soaps, i.e. Dial, Zest, [place] Spring;   Use mild soaps such as Cetaphil or Dove Sensitive Skin   Avoid hot or cold showers   After showering, lightly dry off.    Aggressive use of a moisturizer (including Vanicream, Cetaphil, Aquafor or Cerave)   We recommend using a tub that needs to be scooped out, not a pump. This has more of an oil base. It will hold moisture in your skin much better than a water base moisturizer. The ones recommended are non- pore clogging.       If you have any questions call [phone] and follow the prompts to Dr. Quiñonez's office.

## 2017-04-07 NOTE — PROGRESS NOTES
HPI:   Ginger Abreu is a 76 year old female who presents for Full skin cancer screening.  chief complaint  Last Skin Exam: 10/06/16      1st Baseline: no  Personal HX of Skin Cancer: YES. 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  See patient intake sheet.  ROS of the following were done and are negative: Constitutional, Eyes, Ears, Nose,   Mouth, Throat, Cardiovascular, Respiratory, GI, Genitourinary, Musculoskeletal,   Psychiatric, Endocrine, Allergic/Immunologic.    PHYSICAL EXAM:   Weight: 167 lb BP: 138/72  Skin exam performed as follows: Type 2 skin. Mood appropriate  Alert and Oriented X 3. Well developed, well nourished in no distress.  General appearance: Normal  Head including face: Normal  Eyes: conjunctiva and lids: Normal  Mouth: Lips, teeth, gums: Normal  Neck: Normal  Chest-breast/axillae: Normal  Back: Normal  Spleen and liver: Normal  Cardiovascular: Exam of peripheral vascular system by observation for swelling, varicosities, edema: Normal  Genitalia: groin, buttocks: Normal  Extremities: digits/nails (clubbing): Normal  Eccrine and Apocrine glands: Normal  Right upper extremity: Normal  Left upper extremity: Normal  Right lower extremity: Normal  Left lower extremity: Normal  Skin: Scalp and body hair: See below    Pt deferred exam of breasts, groin, buttocks: No,     Other physical findings:  1. Multiple pigmented macules on extremities and trunk  2. Multiple pigmented macules on face, trunk and extremities  3. Multiple vascular papules on trunk, arms and legs  4. Multiple scattered keratotic plaques       Except as noted above, no other signs of skin  cancer or melanoma.     ASSESSMENT/PLAN:   Benign Full skin cancer screening today.   Advised on monthly self exams and 1 year  Patient Education: Appropriate brochures given.    Multiple benign appearing nevi on arms, legs and trunk. Discussed ABCDEs of melanoma and sunscreen.   Multiple lentigos on arms, legs and trunk. Advised benign, no treatment needed.  Multiple scattered angiomas. Advised benign, no treatment needed.   Seborrheic keratosis on arms, legs and trunk. Advised benign, no treatment needed.  History of BCC on nose and on left hand - continue Q 6-12 month FSE      Follow-up: yearly FSE/PRN sooner     1.) Patient was asked about new and changing moles. YES  2.) Patient received a complete physical skin examination: YES  3.) Patient was counseled to perform a monthly self skin examination: YES  Scribed By: Karla Novoa MS, PAAngyC

## 2017-04-07 NOTE — MR AVS SNAPSHOT
After Visit Summary   4/7/2017    Ginger Abreu    MRN: [id number]           Patient Information     Date Of Birth          1940        Visit Information        Provider Department      4/7/2017 9:00 AM Karla Novoa PA-C [facility]        Care Instructions    Proper skin care from Dr. Quiñonez- Dermatology     Eliminate harsh soaps, i.e. Dial, Zest, Guyanese Spring;   Use mild soaps such as Cetaphil or Dove Sensitive Skin   Avoid hot or cold showers   After showering, lightly dry off.    Aggressive use of a moisturizer (including Vanicream, Cetaphil, Aquafor or Cerave)   We recommend using a tub that needs to be scooped out, not a pump. This has more of an oil base. It will hold moisture in your skin much better than a water base moisturizer. The ones recommended are non- pore clogging.       If you have any questions call [phone] and follow the prompts to Dr. Quiñonez's office.           Follow-ups after your visit        Your next 10 appointments already scheduled     Apr 14, 2017  1:30 PM CDT   PHYSICAL with Steven Fonseca MD   [facility]    [address] [place] [id number] [phone]              Who to contact     If you have questions or need follow up information about today's clinic visit or your schedule please contact [facility] directly at [phone].  Normal or non-critical lab and imaging results will be communicated to you by MyChart, letter or phone within 4 business days after the clinic has received the results. If you do not hear from us within 7 days, please contact the clinic through [employer] or phone. If you have a critical or abnormal lab result, we will notify you by phone as soon as possible.  Submit refill requests through [employer] or call your pharmacy and they will forward the refill request to us. Please allow 3 business days  for your refill to be completed.          Additional Information About Your Visit        [employer] Information     [employer] gives you secure access to your electronic health record. If you see a primary care provider, you can also send messages to your care team and make appointments. If you have questions, please call your primary care clinic.  If you do not have a primary care provider, please call [phone] and they will assist you.        Care EveryWhere ID     This is your Care EveryWhere ID. This could be used by other organizations to access your [facility] medical records  [id number]        Your Vitals Were     Pulse Pulse Oximetry                73 100%           Blood Pressure from Last 3 Encounters:   04/07/17 138/72   04/03/17 138/68   03/06/17 138/70    Weight from Last 3 Encounters:   04/03/17 75.8 kg (167 lb)   03/06/17 74.8 kg (165 lb)   02/27/17 75.2 kg (165 lb 12.8 oz)              Today, you had the following     No orders found for display       Primary Care Provider Office Phone # Fax #    Steven Fonseca [phone]       [facility] [address] [facility] [address]        Thank you!     Thank you for choosing [facility]  for your care. Our goal is always to provide you with excellent care. Hearing back from our patients is one way we can continue to improve our services. Please take a few minutes to complete the written survey that you may receive in the mail after your visit with us. Thank you!             Your Updated Medication List - Protect others around you: Learn how to safely use, store and throw away your medicines at www.disposemymeds.org.          This list is accurate as of: 4/7/17  9:18 AM.  Always use your most recent med list.                   Brand Name Dispense Instructions for use    ACIDOPHILUS PO      Take  by mouth.       albuterol 108 (90 BASE) MCG/ACT Inhaler    PROAIR HFA/PROVENTIL HFA/VENTOLIN HFA    3 Inhaler     Inhale 2 puffs into the lungs every 6 hours INDICATION: RESCUE INHALER FOR SHORTNESS OF BREATH, CHEST TIGHTNESS AND COUGH       ALPRAZolam 0.25 MG tablet    XANAX    30 tablet    Take 1 tablet (0.25 mg) by mouth nightly as needed (FOR SEVERE ANXIETY ONLY)       ascorbic acid 1000 MG Tabs    vitamin C    100 tablet    Take 1 tablet (1,000 mg) by mouth daily VITAMIN C REPLACEMENT       ASPIRIN NOT PRESCRIBED    INTENTIONAL    0 each    continuous prn Reported on 3/6/2017       BETA BLOCKER NOT PRESCRIBED (INTENTIONAL)      Beta Blocker not prescribed intentionally due to Intolerance       butalbital-acetaminophen-caffeine -40 MG per tablet    FIORICET/ESGIC    30 tablet    Take 1 tablet by mouth every 6 hours as needed for pain       calcium-vitamin D-vitamin K 500-500-40 MG-UNT-MCG Chew    VIACTIV    100 tablet    Take 1 tablet by mouth 2 times daily (with meals) INDICATION: FOR BONE AND BREAST HEALTH       cholecalciferol 23090 UNITS capsule    VITAMIN D3    40 capsule    Take 1 capsule (50,000 Units) by mouth every 14 days ONE CAP 3 X A MONTH ON THE 1ST, 10TH AND 20TH OF EACH MONTH, FOR VITAMIN D DEFICIENCY MUST TAKE WITH WITH FAT CONTAINING MEAL       cloNIDine 0.1 MG tablet    CATAPRES     Take 0.1 mg by mouth 2 times daily       cyabnocobalamin 2500 MCG sublingual tablet    VITAMIN B-12    250 tablet    Take 2,500 mcg by mouth daily INDICATION: FOR VITAMIN B12 SUPPLEMENTATION - TO IMPROVE MEMORY, BALANCE, SLEEP AND MOOD, DIRECTIONS: PLEASE PLACE UNDER THE TONGUE TO DISSOLVE       denosumab 60 MG/ML Soln injection    PROLIA    1 mL    Inject 1 mL (60 mg) Subcutaneous once for 1 dose       esomeprazole 40 MG CR capsule    nexIUM    180 capsule    Take 1 capsule (40 mg) by mouth every morning (before breakfast) TAKE  30'-60' BEFORE 1ST MEAL       fenofibrate 160 MG tablet     90 tablet    Take 1 tablet (160 mg) by mouth daily INDICATION: TO LOWER CHOLESTEROL       fluocinonide 0.05 % solution    LIDEX     60 mL    Apply to scalp QD-BID as needed       furosemide 20 MG tablet    LASIX    30 tablet    Take 1 tablet (20 mg) by mouth as needed INDICATION: TO HELP LOWER BLOOD PRESSURE AND TO TREAT EDEMA       glimepiride 2 MG tablet    AMARYL    90 tablet    Take 1 tablet (2 mg) by mouth every morning (before breakfast) INDICATION: TO TREAT DIABETES       melatonin 5 MG Caps      Take by mouth At Bedtime       NIFEdipine ER 30 MG Tb24    ADALAT CC    30 tablet    Take 1 tablet (30 mg) by mouth daily       nitroglycerin 0.4 MG sublingual tablet    NITROSTAT    25 tablet    Place 1 tablet (0.4 mg) under the tongue every 5 minutes as needed for chest pain       olmesartan 40 MG tablet    BENICAR    30 tablet    Take 1 tablet (40 mg) by mouth daily INDICATION:TO LOWER BLOOD PRESSURE AND TO HELP PRESERVE KIDNEY FUNCTION       order for DME     1 Units    Equipment being ordered: Wheeled walker with basket and seat attachment       pravastatin 80 MG tablet    PRAVACHOL    45 tablet    Take 0.5 tablets (40 mg) by mouth every evening INDICATION: TO LOWER CHOLESTEROL AND TO HELP  PREVENT HEART DISEASE       triamterene-hydrochlorothiazide 37.5-25 MG per tablet    MAXZIDE-25    30 tablet    Take 1 tablet by mouth every morning INDICATION:TO LOWER BLOOD PRESSURE AND CONTROL EDEMA

## 2017-04-09 LAB — DEPRECATED CALCIDIOL+CALCIFEROL SERPL-MC: 42 UG/L (ref 20–75)

## 2017-04-14 ENCOUNTER — OFFICE VISIT (OUTPATIENT)
Dept: INTERNAL MEDICINE | Facility: CLINIC | Age: 77
End: 2017-04-14
Payer: COMMERCIAL

## 2017-04-14 ENCOUNTER — TELEPHONE (OUTPATIENT)
Dept: INTERNAL MEDICINE | Facility: CLINIC | Age: 77
End: 2017-04-14

## 2017-04-14 VITALS
OXYGEN SATURATION: 96 % | HEART RATE: 64 BPM | TEMPERATURE: 98.2 F | WEIGHT: 165.5 LBS | SYSTOLIC BLOOD PRESSURE: 132 MMHG | BODY MASS INDEX: 29.32 KG/M2 | DIASTOLIC BLOOD PRESSURE: 74 MMHG

## 2017-04-14 DIAGNOSIS — I10 BENIGN ESSENTIAL HYPERTENSION: ICD-10-CM

## 2017-04-14 DIAGNOSIS — J30.2 SEASONAL ALLERGIC RHINITIS, UNSPECIFIED ALLERGIC RHINITIS TRIGGER: ICD-10-CM

## 2017-04-14 DIAGNOSIS — I10 ESSENTIAL HYPERTENSION, BENIGN: Primary | ICD-10-CM

## 2017-04-14 DIAGNOSIS — E11.9 TYPE 2 DIABETES MELLITUS WITHOUT COMPLICATION, WITHOUT LONG-TERM CURRENT USE OF INSULIN (H): ICD-10-CM

## 2017-04-14 DIAGNOSIS — E55.9 VITAMIN D DEFICIENCY: ICD-10-CM

## 2017-04-14 DIAGNOSIS — I70.1 LEFT RENAL ARTERY STENOSIS (H): ICD-10-CM

## 2017-04-14 DIAGNOSIS — G43.109 MIGRAINE WITH AURA AND WITHOUT STATUS MIGRAINOSUS, NOT INTRACTABLE: ICD-10-CM

## 2017-04-14 DIAGNOSIS — E78.5 HYPERLIPIDEMIA LDL GOAL <100: ICD-10-CM

## 2017-04-14 PROCEDURE — 99215 OFFICE O/P EST HI 40 MIN: CPT | Performed by: INTERNAL MEDICINE

## 2017-04-14 RX ORDER — GLIMEPIRIDE 2 MG/1
2 TABLET ORAL
Qty: 90 TABLET | Refills: 0 | Status: SHIPPED | OUTPATIENT
Start: 2017-04-14 | End: 2017-11-28

## 2017-04-14 RX ORDER — CLONIDINE HYDROCHLORIDE 0.3 MG/1
0.3 TABLET ORAL 2 TIMES DAILY
Qty: 180 TABLET | Refills: 3 | Status: SHIPPED | OUTPATIENT
Start: 2017-04-14 | End: 2017-04-20

## 2017-04-14 RX ORDER — BUTALBITAL, ACETAMINOPHEN AND CAFFEINE 50; 325; 40 MG/1; MG/1; MG/1
1 TABLET ORAL EVERY 6 HOURS PRN
Qty: 40 TABLET | Refills: 0 | Status: SHIPPED | OUTPATIENT
Start: 2017-04-14 | End: 2017-07-02

## 2017-04-14 RX ORDER — FENOFIBRATE 160 MG/1
160 TABLET ORAL DAILY
Qty: 90 TABLET | Refills: 2 | Status: SHIPPED | OUTPATIENT
Start: 2017-04-14 | End: 2017-08-03

## 2017-04-14 RX ORDER — FUROSEMIDE 20 MG
20 TABLET ORAL PRN
Qty: 30 TABLET | Status: SHIPPED | OUTPATIENT
Start: 2017-04-14 | End: 2017-10-10

## 2017-04-14 RX ORDER — TRIAMTERENE/HYDROCHLOROTHIAZID 37.5-25 MG
1 TABLET ORAL EVERY MORNING
Qty: 30 TABLET | Status: SHIPPED | OUTPATIENT
Start: 2017-04-14 | End: 2017-10-10

## 2017-04-14 RX ORDER — FLUTICASONE PROPIONATE 50 MCG
2 SPRAY, SUSPENSION (ML) NASAL AT BEDTIME
Qty: 16 G | Status: SHIPPED | OUTPATIENT
Start: 2017-04-14 | End: 2018-02-02

## 2017-04-14 NOTE — PROGRESS NOTES
SUBJECTIVE:                                                    Ginger Abreu is a 76 year old female who presents to clinic today for the following health issues:        Diabetes Follow-up    Patient is checking blood sugars: once daily.  Results are as follows:         am - 110    Diabetic concerns: None     Symptoms of hypoglycemia (low blood sugar): blurred vision, sweaty     Paresthesias (numbness or burning in feet) or sores: Yes but had back surgery     Date of last diabetic eye exam: 01/07     Hyperlipidemia Follow-Up      Rate your low fat/cholesterol diet?: not monitoring fat    Taking statin?  Yes, no muscle aches from statin    Other lipid medications/supplements?:  Fenofibrate, without side effects     Hypertension Follow-up      Outpatient blood pressures are being checked at home.  Results are am's: 130/70 and pm's 150/75-80.    Low Salt Diet: no added salt         Amount of exercise or physical activity: None    Problems taking medications regularly: No    Medication side effects: none    Diet: no added salt    Chronic Kidney Disease Follow-up      Current NSAID use?  No      ALLERGIES:  Duration of complaint: over the past few weeks   Description:   Nasal congestion: YES  Sneezing: YES  Red, itchy eyes: YES  Progression of Symptoms:  Seasonal started with change in weather  Accompanying Signs & Symptoms:  Cough: no   Wheezing: no   Rash: no   Sinus/facial pain: no   History:   Is it seasonal: in the spring   History of Asthma: YES  Has allergy testing been done: no   Precipitating factors: None  Alleviating factors: None  Therapies Tried and outcome: none    Problem list and histories reviewed & adjusted, as indicated.  Additional history: as documented    Labs reviewed in [facility]  Recent labs are as noted and addressed in the plan section of this note.  Reviewed and updated as needed this visit by clinical staff  Tobacco  Allergies  Meds  Soc Hx      Reviewed and updated as needed this visit  by Provider         ROS:  14 point ROS negative except as above      OBJECTIVE:                                                    /74 (BP Location: Right arm, Patient Position: Chair, Cuff Size: Adult Regular)  Pulse 64  Temp 98.2  F (36.8  C) (Oral)  Wt 165 lb 8 oz (75.1 kg)  SpO2 96%  BMI 29.32 kg/m2  Body mass index is 29.32 kg/(m^2).  GENERAL: healthy, alert and no distress  NECK: no adenopathy, no asymmetry, masses, or scars and thyroid normal to palpation  RESP: lungs clear to auscultation - no rales, rhonchi or wheezes  CV: regular rate and rhythm, normal S1 S2, no S3 or S4, no murmur, click or rub, no peripheral edema and peripheral pulses strong  MS: no gross musculoskeletal defects noted, no edema    Diagnostic Test Results:  Results for orders placed or performed in visit on 04/07/17   Vitamin D Deficiency   Result Value Ref Range    Vitamin D Deficiency screening 42 20 - 75 ug/L   CBC with platelets   Result Value Ref Range    WBC 7.1 4.0 - 11.0 10e9/L    RBC Count 3.80 3.8 - 5.2 10e12/L    Hemoglobin 11.8 11.7 - 15.7 g/dL    Hematocrit 37.0 35.0 - 47.0 %    MCV 97 78 - 100 fl    MCH 31.1 26.5 - 33.0 pg    MCHC 31.9 31.5 - 36.5 g/dL    RDW 13.2 10.0 - 15.0 %    Platelet Count 209 150 - 450 10e9/L   Comprehensive metabolic panel   Result Value Ref Range    Sodium 142 133 - 144 mmol/L    Potassium 4.2 3.4 - 5.3 mmol/L    Chloride 107 94 - 109 mmol/L    Carbon Dioxide 27 20 - 32 mmol/L    Anion Gap 8 3 - 14 mmol/L    Glucose 108 (H) 70 - 99 mg/dL    Urea Nitrogen 27 7 - 30 mg/dL    Creatinine 1.13 (H) 0.52 - 1.04 mg/dL    GFR Estimate 47 (L) >60 mL/min/1.7m2    GFR Estimate If Black 57 (L) >60 mL/min/1.7m2    Calcium 9.2 8.5 - 10.1 mg/dL    Bilirubin Total 0.3 0.2 - 1.3 mg/dL    Albumin 3.5 3.4 - 5.0 g/dL    Protein Total 6.8 6.8 - 8.8 g/dL    Alkaline Phosphatase 52 40 - 150 U/L    ALT 33 0 - 50 U/L    AST 16 0 - 45 U/L   Lipid panel reflex to direct LDL   Result Value Ref Range    Cholesterol  157 <200 mg/dL    Triglycerides 204 (H) <150 mg/dL    HDL Cholesterol 54 >49 mg/dL    LDL Cholesterol Calculated 62 <100 mg/dL    Non HDL Cholesterol 103 <130 mg/dL   Hemoglobin A1c   Result Value Ref Range    Hemoglobin A1C 5.8 4.3 - 6.0 %        ASSESSMENT/PLAN:                                                    Diabetes Type II, A1c Controlled, non-insulin dependent   Associated with the following complications    Renal Complications:  CKD    Ophthalmologic Complications: None    Neurologic Complications: Peripheral autonomic neuropathy    Peripheral Vascular Complications:  None    Other: None   Plan:  No changes in the patient's current treatment plan    Hyperlipidemia; slightly worsened   Plan:  No changes in the patient's current treatment plan    Hypertension; improved   Associated with the following complications:    CKD and Diabetes   Plan:  The following changes are made -I will increase the dose of clonidine to 0.3 mg twice daily for better  Blood pressure control and recommend that Ginger take Benicar in the morning and the 2 doses of clonidine in  The afternoon and evening respectively    Chronic Kidney Disease Stage 3 , improved     Associated with the following complications: None     Plan:  Current treatment plan is appropriate, no change in therapy          DIAGNOSIS/PLAN:     ICD-10-CM    1. Essential hypertension, benign I10 cloNIDine (CATAPRES) 0.3 MG tablet   2. Benign essential hypertension I10 furosemide (LASIX) 20 MG tablet     triamterene-hydrochlorothiazide (MAXZIDE-25) 37.5-25 MG per tablet   3. Vitamin D deficiency E55.9 cholecalciferol (VITAMIN D3) 07340 UNITS capsule     Vitamin D Deficiency   4. Type 2 diabetes mellitus without complication, without long-term current use of insulin (H) E11.9 glimepiride (AMARYL) 2 MG tablet   5. Hyperlipidemia LDL goal <100 E78.5 fenofibrate 160 MG tablet   6. Seasonal allergic rhinitis, unspecified allergic rhinitis trigger J30.2 fluticasone  (FLONASE) 50 MCG/ACT spray   7. Migraine with aura and without status migrainosus, not intractable G43.109 butalbital-acetaminophen-caffeine (FIORICET/ESGIC) -40 MG per tablet       SIGNIFICANT ISSUES RE The primary encounter diagnosis was Essential hypertension, benign. Diagnoses of Benign essential hypertension, Vitamin D deficiency, Type 2 diabetes mellitus without complication, without long-term current use of insulin (H), Hyperlipidemia LDL goal <100, Seasonal allergic rhinitis, unspecified allergic rhinitis trigger, and Migraine with aura and without status migrainosus, not intractable were also pertinent to this visit. AS NOTED AND ADDRESSED ABOVE   MEDS AND AND LABS AS ORDERED TO ADDRESS PREVIOUS AND CURRENT ABNORMAL INDICES    SEE PATIENT INSTRUCTION SECTION FOR FOLLOW UP PLAN    Ginger IS TO CONTINUE OTHER TREATMENT REGIMEN/PLANS EXCEPT AS INDICATED    COMPLIANCE WITH MEDICATIONS DIET AND EXERCISE PLANS ENCOURAGED    DISCONTINUED MEDS:  Medications Discontinued During This Encounter   Medication Reason     NIFEdipine ER (ADALAT CC) 30 MG TB24      cloNIDine (CATAPRES) 0.1 MG tablet Reorder     furosemide (LASIX) 20 MG tablet Reorder     triamterene-hydrochlorothiazide (MAXZIDE-25) 37.5-25 MG per tablet Reorder     cholecalciferol (VITAMIN D3) 15191 UNITS capsule Reorder     glimepiride (AMARYL) 2 MG tablet Reorder     fenofibrate 160 MG tablet Reorder     butalbital-acetaminophen-caffeine (FIORICET/ESGIC) -40 MG per tablet Reorder       CURRENT MED LIST WITH CHANGES AS NOTED BELOW:  Current Outpatient Prescriptions   Medication Sig Dispense Refill     cloNIDine (CATAPRES) 0.3 MG tablet Take 1 tablet (0.3 mg) by mouth 2 times daily INDICATION: TO LOWER BLOOD PRESSURE 180 tablet 3     furosemide (LASIX) 20 MG tablet Take 1 tablet (20 mg) by mouth as needed INDICATION: TO HELP LOWER BLOOD PRESSURE AND TO TREAT EDEMA 30 tablet PRN     triamterene-hydrochlorothiazide (MAXZIDE-25) 37.5-25 MG per  tablet Take 1 tablet by mouth every morning INDICATION:TO LOWER BLOOD PRESSURE AND CONTROL EDEMA 30 tablet PRN     cholecalciferol (VITAMIN D3) 08974 UNITS capsule ONE CAP 3 X A MONTH ON THE 1ST, 10TH AND 20TH OF EACH MONTH, FOR VITAMIN D DEFICIENCY MUST TAKE WITH WITH FAT CONTAINING MEAL, TAKE ONCE A WEEK FOR 1 MONTH 40 capsule 0     glimepiride (AMARYL) 2 MG tablet Take 1 tablet (2 mg) by mouth every morning (before breakfast) INDICATION: TO TREAT DIABETES 90 tablet 0     fenofibrate 160 MG tablet Take 1 tablet (160 mg) by mouth daily INDICATION: TO LOWER CHOLESTEROL 90 tablet 2     fluticasone (FLONASE) 50 MCG/ACT spray Spray 2 sprays in nostril At Bedtime INDICATION: TO CONTROL NASAL ALLERGY SYMPTOMS, DO NOT BLOW NOSE FOR 30 MINUTES AFTER USING 16 g PRN     butalbital-acetaminophen-caffeine (FIORICET/ESGIC) -40 MG per tablet Take 1 tablet by mouth every 6 hours as needed for pain 40 tablet 0     olmesartan (BENICAR) 40 MG tablet Take 1 tablet (40 mg) by mouth daily INDICATION:TO LOWER BLOOD PRESSURE AND TO HELP PRESERVE KIDNEY FUNCTION 30 tablet 1     ALPRAZolam (XANAX) 0.25 MG tablet Take 1 tablet (0.25 mg) by mouth nightly as needed (FOR SEVERE ANXIETY ONLY) 30 tablet 0     nitroglycerin (NITROSTAT) 0.4 MG SL tablet Place 1 tablet (0.4 mg) under the tongue every 5 minutes as needed for chest pain 25 tablet 0     ascorbic acid (VITAMIN C) 1000 MG TABS Take 1 tablet (1,000 mg) by mouth daily VITAMIN C REPLACEMENT 100 tablet 3     pravastatin (PRAVACHOL) 80 MG tablet Take 0.5 tablets (40 mg) by mouth every evening INDICATION: TO LOWER CHOLESTEROL AND TO HELP  PREVENT HEART DISEASE 45 tablet PRN     cyabnocobalamin (VITAMIN B-12) 2500 MCG sublingual tablet Take 2,500 mcg by mouth daily INDICATION: FOR VITAMIN B12 SUPPLEMENTATION - TO IMPROVE MEMORY, BALANCE, SLEEP AND MOOD, DIRECTIONS: PLEASE PLACE UNDER THE TONGUE TO DISSOLVE 250 tablet 3     fluocinonide (LIDEX) 0.05 % external solution Apply to scalp  QD-BID as needed 60 mL 3     esomeprazole (NEXIUM) 40 MG capsule Take 1 capsule (40 mg) by mouth every morning (before breakfast) TAKE  30'-60' BEFORE 1ST MEAL 180 capsule 2     calcium-vitamin D-vitamin K (VIACTIV) 500-500-40 MG-UNT-MCG CHEW Take 1 tablet by mouth 2 times daily (with meals) INDICATION: FOR BONE AND BREAST HEALTH 100 tablet PRN     melatonin 5 MG CAPS Take by mouth At Bedtime       BETA BLOCKER NOT PRESCRIBED, INTENTIONAL, Beta Blocker not prescribed intentionally due to Intolerance  0     ASPIRIN NOT PRESCRIBED, INTENTIONAL, continuous prn Reported on 3/6/2017 0 each 0     Lactobacillus (ACIDOPHILUS PO) Take  by mouth.       order for DME Equipment being ordered: Wheeled walker with basket and seat attachment (Patient not taking: Reported on 4/14/2017) 1 Units 0     denosumab (PROLIA) 60 MG/ML SOLN injection Inject 1 mL (60 mg) Subcutaneous once for 1 dose 1 mL 0     [DISCONTINUED] glimepiride (AMARYL) 2 MG tablet Take 1 tablet (2 mg) by mouth every morning (before breakfast) INDICATION: TO TREAT DIABETES 90 tablet 0     [DISCONTINUED] fenofibrate 160 MG tablet Take 1 tablet (160 mg) by mouth daily INDICATION: TO LOWER CHOLESTEROL 90 tablet 2     [DISCONTINUED] cloNIDine (CATAPRES) 0.1 MG tablet Take 0.1 mg by mouth 2 times daily       [DISCONTINUED] butalbital-acetaminophen-caffeine (FIORICET/ESGIC) -40 MG per tablet Take 1 tablet by mouth every 6 hours as needed for pain 30 tablet 0     [DISCONTINUED] triamterene-hydrochlorothiazide (MAXZIDE-25) 37.5-25 MG per tablet Take 1 tablet by mouth every morning INDICATION:TO LOWER BLOOD PRESSURE AND CONTROL EDEMA 30 tablet PRN     [DISCONTINUED] furosemide (LASIX) 20 MG tablet Take 1 tablet (20 mg) by mouth as needed INDICATION: TO HELP LOWER BLOOD PRESSURE AND TO TREAT EDEMA 30 tablet PRN     albuterol (PROAIR HFA, PROVENTIL HFA, VENTOLIN HFA) 108 (90 BASE) MCG/ACT inhaler Inhale 2 puffs into the lungs every 6 hours INDICATION: RESCUE INHALER FOR  SHORTNESS OF BREATH, CHEST TIGHTNESS AND COUGH 3 Inhaler PRN     [DISCONTINUED] fluticasone (FLOVENT HFA) 110 MCG/ACT inhaler Inhale 2 puffs into the lungs 2 times daily rinse mouth after each use 3 Inhaler 3         Office visit time > 40 mins, greater than 50% of which was spent obtaining history, reviewing medications, counseling re compliance with treatment plan, discussion of treatment, follow up plans, and coordination of care.           Patient Instructions   ** FOLLOW UP PLAN**:    PLEASE SCHEDULE LABS WITH E-VISIT 2 MONTHS FROM TODAY TO FOLLOW UP ON  VITAMIN D LEVEL    BE SURE TO SCHEDULE YOUR LAB DRAW APPOINTMENT FOR AT LEAST 1 WEEK BEFORE YOUR E-VISIT    I WILL ALSO SEE YOU IN ABOUT 6  MONTHS FOR FOLLOW-UP OF DIABETES, CHOLESTEROL, KIDNEYS, AND BLOOD PRESSURE    YOU MAY CONTACT THE CLINIC IF ANY QUESTIONS OR CONCERNS -[phone] OR VIA ULYSSES Fonseca MD  [facility]

## 2017-04-14 NOTE — TELEPHONE ENCOUNTER
form to complete from [facility] annual cert for prescipt shoes for diabetics in your red folder n station

## 2017-04-14 NOTE — MR AVS SNAPSHOT
After Visit Summary   4/14/2017    Ginger Abreu    MRN: [id number]           Patient Information     Date Of Birth          1940        Visit Information        Provider Department      4/14/2017 1:30 PM Steven Fonseca MD [facility]        Today's Diagnoses     Essential hypertension, benign    -  1    Left renal artery stenosis (H)        Type 2 diabetes mellitus without complication, without long-term current use of insulin (H)        Benign essential hypertension        Vitamin D deficiency        Hyperlipidemia LDL goal <100        Seasonal allergic rhinitis, unspecified allergic rhinitis trigger        Migraine with aura and without status migrainosus, not intractable          Care Instructions    ** FOLLOW UP PLAN**:    PLEASE SCHEDULE LABS WITH E-VISIT 2 MONTHS FROM TODAY TO FOLLOW UP ON  VITAMIN D LEVEL    BE SURE TO SCHEDULE YOUR LAB DRAW APPOINTMENT FOR AT LEAST 1 WEEK BEFORE YOUR E-VISIT    I WILL ALSO SEE YOU IN ABOUT 6  MONTHS FOR FOLLOW-UP OF DIABETES, CHOLESTEROL, KIDNEYS, AND BLOOD PRESSURE    YOU MAY CONTACT THE CLINIC IF ANY QUESTIONS OR CONCERNS -[phone] OR VIA [place]    And They Called Me Doctor: Blind Now I See.     I will NOT go back to the ER! That's what she said to me while narrating what she termed a horrific experience in the emergency room.They looked at me like I was a drug addict. I was trying to tell them that I was in a lot of pain and that I only wanted someone who would see me and give me a plan to make the pain go away.  The doctor was so cold! He gave me pain medication, but it was almost as if he just wanted to get me out of his emergency room. My friend's words hurt my heart because I, unfortunately, could identify with her ... I was post op after abdominal surgery. Writhing in pain because pain medication that the nurse had given me three hours earlier had not touched even one tenth of the pain that was trying to  "separate my cells each from the other. My body jerking in morbid rhythm to the arpeggio notes that pain was playing on my body, I finally I gave in and pushed the button. she stuck her head in the door. What can I do for you? What can you do for me? Look at me! I am in a lot of pain may I please have a pain pill?Your last pain pill was barely three and a half hours ago you're not due for another one for another forty-five minutes  forty-five minutes I squeaked.  I could not believe my ears. This woman had eyes (at least I hoped she did) and she could see that I was in agony! Yes,\" she said, \"and I'm sorry that you cannot have a pill until then.\" In spite of the pain that I was in, I experienced an aha moment. So this is sometimes how they feel when they come to see me. 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 They, therefore, handled me the way they were accustomed to treating the stereotypical me based on what they saw a desperate black woman who was probably drug seeking and using the excuse of postoperative pain to get her usual fix of narcotics, and she as the nurse on duty was not about to be my drug dealer, and so my next fix would have to wait until its scheduled time. This nurse in no way connected with my pain or discomfort, she did not see me but rather saw who she thought I was and did and therefore was not, and could never have been empathic to my pain even to the slightest little bit that would have induced her to try to ease it.  As she was about to walk back to the nurse's station, I said to her ma'am you will need to get me a pain consult NOW because I cannot wait for another forty-five minutes to be let out of this [facility] of agony. I don't know why she did what I asked her to do, but Iâ  d like to think that she was persuaded by the guttural groans and moans that were emitting from " my room which was very close to the nurse's station.The pain team came into the room about an hour later, and by that time she had allowed me to have my one fix as the forty-five minutes had elapsed. Collette Fonseca, I understand we're having some trouble with pain control.  And just like that the pendulum swung. The pain physician who I must say was wonderful a compassionate soul well suited to his calling and vocation.  He listened to me and treated me based on my need at that time. 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 My nurse who previously had been antagonistic almost to the point of hostility in spite of the fact that I had just had major surgery all of a sudden became my bosom friend calling me Honey and Ne.  So what brought out the female Ji Rodriguez to my Mame the Pooh out of her? It was that magic word Doctor.' It made my addicted, desperate, drug seeking history go away into [facility]. This is a true narrative but again is not the point of my story. The point of my story is that I CHANGED! That experience changed me forever because I had been the patient. I had walked in my patient's shoes. I was now able to relate to my patients pain because I HAD FELT IT!   Needless to say I came away from that experience a grown woman and a better physician. I certainly cannot say that I am perfect and that I am perfectly attuned to every patient that I see but, I have had the opportunity to have the mud washed away from my eyes and so I use it to â my patients.  I have been in a position in which I was not extended a soothing hand, and so I use it to touch my patients. I have been in a position when even though healing was not extended to me from that nurse; I call on that experience to treat my patients. My dissatisfaction with that experience I  had with that nurse was not borne out of the fact that she did not give me my pain pill but rather because she did not show me empathy. Because she allowed herself to not see me because of the stereotype. I remember seeing the movie â  The Doctor a long time ago. In this movie Michel Koroma who I think is an extraordinary actor portrayed an arrogant egotistical physician who was diagnosed with cancer and had to be treated in the same hospital where he was an Attending doctor, and had to be subjected to the same indignities that his patients suffered and through that experience was transformed. I believe that that hospital admission was my The Doctorâ experience and I can tell you I was transformed. I looked at my friend and I said you may not go back to the emergency room but we have to find a doctor who you, and hopefully we will.        Follow-ups after your visit        Future tests that were ordered for you today     Open Future Orders        Priority Expected Expires Ordered    Vitamin D Deficiency Routine 4/14/2017 10/12/2017 4/14/2017            Who to contact     If you have questions or need follow up information about today's clinic visit or your schedule please contact [facility] directly at [phone].  Normal or non-critical lab and imaging results will be communicated to you by MyChart, letter or phone within 4 business days after the clinic has received the results. If you do not hear from us within 7 days, please contact the clinic through [facility] or phone. If you have a critical or abnormal lab result, we will notify you by phone as soon as possible.  Submit refill requests through agreement24 [email or web address] or call your pharmacy and they will forward the refill request to us. Please allow 3 business days for your refill to be completed.          Additional Information About Your Visit        [email or web address] Information     agreement24 [email or web address] gives you secure access to your electronic health record. If you see  a primary care provider, you can also send messages to your care team and make appointments. If you have questions, please call your primary care clinic.  If you do not have a primary care provider, please call [phone] and they will assist you.        Care EveryWhere ID     This is your Care EveryWhere ID. This could be used by other organizations to access your [place] medical records  [id number]        Your Vitals Were     Pulse Temperature Pulse Oximetry BMI (Body Mass Index)          64 98.2  F (36.8  C) (Oral) 96% 29.32 kg/m2         Blood Pressure from Last 3 Encounters:   04/14/17 132/74   04/07/17 138/72   04/03/17 138/68    Weight from Last 3 Encounters:   04/14/17 165 lb 8 oz (75.1 kg)   04/03/17 167 lb (75.8 kg)   03/06/17 165 lb (74.8 kg)                 Today's Medication Changes          These changes are accurate as of: 4/14/17  2:43 PM.  If you have any questions, ask your nurse or doctor.               Start taking these medicines.        Dose/Directions    fluticasone 50 MCG/ACT spray   Commonly known as:  FLONASE   Used for:  Seasonal allergic rhinitis, unspecified allergic rhinitis trigger   Started by:  Steven Fonseca MD        Dose:  2 spray   Spray 2 sprays in nostril At Bedtime INDICATION: TO CONTROL NASAL ALLERGY SYMPTOMS, DO NOT BLOW NOSE FOR 30 MINUTES AFTER USING   Quantity:  16 g   Refills:  PRN         These medicines have changed or have updated prescriptions.        Dose/Directions    cholecalciferol 70585 UNITS capsule   Commonly known as:  VITAMIN D3   This may have changed:    - how much to take  - how to take this  - when to take this  - additional instructions   Used for:  Vitamin D deficiency   Changed by:  Steven Fonseca MD        ONE CAP 3 X A MONTH ON THE 1ST, 10TH AND 20TH OF EACH MONTH, FOR VITAMIN D DEFICIENCY MUST TAKE WITH WITH FAT CONTAINING MEAL, TAKE ONCE A WEEK FOR 1 MONTH   Quantity:  40 capsule   Refills:  0       cloNIDine 0.3 MG tablet    Commonly known as:  CATAPRES   This may have changed:    - medication strength  - how much to take  - additional instructions   Used for:  Essential hypertension, benign   Changed by:  Steven Fonseca MD        Dose:  0.3 mg   Take 1 tablet (0.3 mg) by mouth 2 times daily INDICATION: TO LOWER BLOOD PRESSURE   Quantity:  180 tablet   Refills:  3         Stop taking these medicines if you haven't already. Please contact your care team if you have questions.     NIFEdipine ER 30 MG Tb24   Commonly known as:  ADALAT CC   Stopped by:  Steven Fonseca MD                Where to get your medicines      These medications were sent to [facility] [address] [place] [address]  [facility] [address]     Phone:  [phone]     cloNIDine 0.3 MG tablet    fenofibrate 160 MG tablet    furosemide 20 MG tablet    glimepiride 2 MG tablet    triamterene-hydrochlorothiazide 37.5-25 MG per tablet         Some of these will need a paper prescription and others can be bought over the counter.  Ask your nurse if you have questions.     Bring a paper prescription for each of these medications     butalbital-acetaminophen-caffeine -40 MG per tablet    fluticasone 50 MCG/ACT spray       You don't need a prescription for these medications     cholecalciferol 91530 UNITS capsule                Primary Care Provider Office Phone # Fax #    Steven Fonseca [phone]       [facility] [address]  [facility] [address]        Thank you!     Thank you for choosing [facility]  for your care. Our goal is always to provide you with excellent care. Hearing back from our patients is one way we can continue to improve our services. Please take a few minutes to complete the written survey that you may receive in the mail after your visit with us. Thank you!             Your Updated Medication List - Protect others around you: Learn  how to safely use, store and throw away your medicines at www.disposemymeds.org.          This list is accurate as of: 4/14/17  2:43 PM.  Always use your most recent med list.                   Brand Name Dispense Instructions for use    ACIDOPHILUS PO      Take  by mouth.       albuterol 108 (90 BASE) MCG/ACT Inhaler    PROAIR HFA/PROVENTIL HFA/VENTOLIN HFA    3 Inhaler    Inhale 2 puffs into the lungs every 6 hours INDICATION: RESCUE INHALER FOR SHORTNESS OF BREATH, CHEST TIGHTNESS AND COUGH       ALPRAZolam 0.25 MG tablet    XANAX    30 tablet    Take 1 tablet (0.25 mg) by mouth nightly as needed (FOR SEVERE ANXIETY ONLY)       ascorbic acid 1000 MG Tabs    vitamin C    100 tablet    Take 1 tablet (1,000 mg) by mouth daily VITAMIN C REPLACEMENT       ASPIRIN NOT PRESCRIBED    INTENTIONAL    0 each    continuous prn Reported on 3/6/2017       BETA BLOCKER NOT PRESCRIBED (INTENTIONAL)      Beta Blocker not prescribed intentionally due to Intolerance       butalbital-acetaminophen-caffeine -40 MG per tablet    FIORICET/ESGIC    40 tablet    Take 1 tablet by mouth every 6 hours as needed for pain       calcium-vitamin D-vitamin K 500-500-40 MG-UNT-MCG Chew    VIACTIV    100 tablet    Take 1 tablet by mouth 2 times daily (with meals) INDICATION: FOR BONE AND BREAST HEALTH       cholecalciferol 67504 UNITS capsule    VITAMIN D3    40 capsule    ONE CAP 3 X A MONTH ON THE 1ST, 10TH AND 20TH OF EACH MONTH, FOR VITAMIN D DEFICIENCY MUST TAKE WITH WITH FAT CONTAINING MEAL, TAKE ONCE A WEEK FOR 1 MONTH       cloNIDine 0.3 MG tablet    CATAPRES    180 tablet    Take 1 tablet (0.3 mg) by mouth 2 times daily INDICATION: TO LOWER BLOOD PRESSURE       cyabnocobalamin 2500 MCG sublingual tablet    VITAMIN B-12    250 tablet    Take 2,500 mcg by mouth daily INDICATION: FOR VITAMIN B12 SUPPLEMENTATION - TO IMPROVE MEMORY, BALANCE, SLEEP AND MOOD, DIRECTIONS: PLEASE PLACE UNDER THE TONGUE TO DISSOLVE       denosumab 60 MG/ML  Soln injection    PROLIA    1 mL    Inject 1 mL (60 mg) Subcutaneous once for 1 dose       esomeprazole 40 MG CR capsule    nexIUM    180 capsule    Take 1 capsule (40 mg) by mouth every morning (before breakfast) TAKE  30'-60' BEFORE 1ST MEAL       fenofibrate 160 MG tablet     90 tablet    Take 1 tablet (160 mg) by mouth daily INDICATION: TO LOWER CHOLESTEROL       fluocinonide 0.05 % solution    LIDEX    60 mL    Apply to scalp QD-BID as needed       fluticasone 50 MCG/ACT spray    FLONASE    16 g    Spray 2 sprays in nostril At Bedtime INDICATION: TO CONTROL NASAL ALLERGY SYMPTOMS, DO NOT BLOW NOSE FOR 30 MINUTES AFTER USING       furosemide 20 MG tablet    LASIX    30 tablet    Take 1 tablet (20 mg) by mouth as needed INDICATION: TO HELP LOWER BLOOD PRESSURE AND TO TREAT EDEMA       glimepiride 2 MG tablet    AMARYL    90 tablet    Take 1 tablet (2 mg) by mouth every morning (before breakfast) INDICATION: TO TREAT DIABETES       melatonin 5 MG Caps      Take by mouth At Bedtime       nitroglycerin 0.4 MG sublingual tablet    NITROSTAT    25 tablet    Place 1 tablet (0.4 mg) under the tongue every 5 minutes as needed for chest pain       olmesartan 40 MG tablet    BENICAR    30 tablet    Take 1 tablet (40 mg) by mouth daily INDICATION:TO LOWER BLOOD PRESSURE AND TO HELP PRESERVE KIDNEY FUNCTION       order for [facility]     1 Units    Equipment being ordered: Wheeled walker with basket and seat attachment       pravastatin 80 MG tablet    PRAVACHOL    45 tablet    Take 0.5 tablets (40 mg) by mouth every evening INDICATION: TO LOWER CHOLESTEROL AND TO HELP  PREVENT HEART DISEASE       triamterene-hydrochlorothiazide 37.5-25 MG per tablet    MAXZIDE-25    30 tablet    Take 1 tablet by mouth every morning INDICATION:TO LOWER BLOOD PRESSURE AND CONTROL EDEMA

## 2017-04-14 NOTE — PATIENT INSTRUCTIONS
** FOLLOW UP PLAN**:    PLEASE SCHEDULE LABS WITH E-VISIT 2 MONTHS FROM TODAY TO FOLLOW UP ON  VITAMIN D LEVEL    BE SURE TO SCHEDULE YOUR LAB DRAW APPOINTMENT FOR AT LEAST 1 WEEK BEFORE YOUR E-VISIT    I WILL ALSO SEE YOU IN ABOUT 6  MONTHS FOR FOLLOW-UP OF DIABETES, CHOLESTEROL, KIDNEYS, AND BLOOD PRESSURE    YOU MAY CONTACT THE CLINIC IF ANY QUESTIONS OR CONCERNS -[phone] OR VIA [employer]

## 2017-04-14 NOTE — NURSING NOTE
"Chief Complaint   Patient presents with     Results       Initial /74 (BP Location: Right arm, Patient Position: Chair, Cuff Size: Adult Regular)  Pulse 64  Temp 98.2  F (36.8  C) (Oral)  Wt 165 lb 8 oz (75.1 kg)  SpO2 96%  BMI 29.32 kg/m2 Estimated body mass index is 29.32 kg/(m^2) as calculated from the following:    Height as of 4/3/17: 5' 2.99\" (1.6 m).    Weight as of this encounter: 165 lb 8 oz (75.1 kg).  Medication Reconciliation: complete    "

## 2017-04-15 ASSESSMENT — ASTHMA QUESTIONNAIRES: ACT_TOTALSCORE: 25

## 2017-04-17 ENCOUNTER — MEDICAL CORRESPONDENCE (OUTPATIENT)
Dept: HEALTH INFORMATION MANAGEMENT | Facility: CLINIC | Age: 77
End: 2017-04-17

## 2017-04-20 ENCOUNTER — TELEPHONE (OUTPATIENT)
Dept: NURSING | Facility: CLINIC | Age: 77
End: 2017-04-20

## 2017-05-03 NOTE — TELEPHONE ENCOUNTER
Form was not located in MD's office, at [facility] or in faxed folder. Called [place] to have us refax form

## 2017-05-04 DIAGNOSIS — F41.9 ANXIETY: ICD-10-CM

## 2017-05-04 NOTE — TELEPHONE ENCOUNTER
Controlled Substance Refill Request for xanax 0.25mg  Problem List Complete:  Yes    Last Written Prescription Date: 02/20/17  Last Fill Quantity: 30,   # refills: 0    Last Office Visit with [facility] primary care provider: 04/14/17    Clinic visit frequency required: Q 3 months     Future Office visit: 10/10/17  Next 5 appointments (look out 90 days)     Jun 09, 2017  9:30 AM CDT   Lab visit with [facility]   [facility]    [address] [address] [phone]                  Controlled substance agreement on file: No.     Processing:  Fax Rx to [address] FAX: [phone] pharmacy   checked in past 6 months?  No, route to RN

## 2017-05-05 RX ORDER — ALPRAZOLAM 0.25 MG
0.25 TABLET ORAL
Qty: 30 TABLET | Refills: 0 | Status: SHIPPED | OUTPATIENT
Start: 2017-05-05 | End: 2017-07-02

## 2017-05-11 ENCOUNTER — TRANSFERRED RECORDS (OUTPATIENT)
Dept: HEALTH INFORMATION MANAGEMENT | Facility: CLINIC | Age: 77
End: 2017-05-11

## 2017-05-22 DIAGNOSIS — I10 BENIGN ESSENTIAL HYPERTENSION: ICD-10-CM

## 2017-05-22 RX ORDER — OLMESARTAN MEDOXOMIL 40 MG/1
TABLET ORAL
Qty: 30 TABLET | Refills: 0 | Status: SHIPPED | OUTPATIENT
Start: 2017-05-22 | End: 2017-06-23

## 2017-05-22 NOTE — TELEPHONE ENCOUNTER
Routing refill request to provider for review/approval because:  Labs out of range:  Creatinine

## 2017-05-22 NOTE — TELEPHONE ENCOUNTER
Benicar      Last Written Prescription Date: 2/20/17  Last Fill Quantity: 30, # refills: 1  Last Office Visit with [facility] or [facility] prescribing provider: 4/14/17  Next 5 appointments (look out 90 days)     Jun 09, 2017  9:30 AM CDT   Lab visit with [facility]   [facility]    [address] [id number] [phone]            Aug 03, 2017  9:45 AM CDT   Return Visit with Vaibhav Maldonado MD   [facility] [place] ([facility])    [address] [place] [address] [address]  [facility] [id number] [phone]                   Potassium   Date Value Ref Range Status   04/07/2017 4.2 3.4 - 5.3 mmol/L Final     Creatinine   Date Value Ref Range Status   04/07/2017 1.13 (H) 0.52 - 1.04 mg/dL Final     BP Readings from Last 3 Encounters:   04/14/17 132/74   04/07/17 138/72   04/03/17 138/68

## 2017-06-09 DIAGNOSIS — E55.9 VITAMIN D DEFICIENCY: ICD-10-CM

## 2017-06-09 LAB — DEPRECATED CALCIDIOL+CALCIFEROL SERPL-MC: 54 UG/L (ref 20–75)

## 2017-06-09 PROCEDURE — 36415 COLL VENOUS BLD VENIPUNCTURE: CPT | Performed by: INTERNAL MEDICINE

## 2017-06-09 PROCEDURE — 82306 VITAMIN D 25 HYDROXY: CPT | Performed by: INTERNAL MEDICINE

## 2017-06-23 DIAGNOSIS — I10 BENIGN ESSENTIAL HYPERTENSION: ICD-10-CM

## 2017-06-23 DIAGNOSIS — E78.5 HYPERLIPIDEMIA LDL GOAL <100: ICD-10-CM

## 2017-06-23 RX ORDER — PRAVASTATIN SODIUM 80 MG/1
TABLET ORAL
Qty: 45 TABLET | Refills: 2 | Status: SHIPPED | OUTPATIENT
Start: 2017-06-23 | End: 2017-08-20

## 2017-06-23 RX ORDER — OLMESARTAN MEDOXOMIL 40 MG/1
TABLET ORAL
Qty: 90 TABLET | Refills: 2 | Status: SHIPPED | OUTPATIENT
Start: 2017-06-23 | End: 2017-08-20

## 2017-07-02 DIAGNOSIS — F41.9 ANXIETY: ICD-10-CM

## 2017-07-02 DIAGNOSIS — G43.109 MIGRAINE WITH AURA AND WITHOUT STATUS MIGRAINOSUS, NOT INTRACTABLE: ICD-10-CM

## 2017-07-06 NOTE — TELEPHONE ENCOUNTER
fioricet      Last Written Prescription Date:  4/14/17  Last Fill Quantity: 40,   # refills: 0  Last Office Visit with [facility], [facility] or [facility] prescribing provider: 4/14/17  Future Office visit:    Next 5 appointments (look out 90 days)     Aug 03, 2017  9:45 AM CDT   Return Visit with Vaibhav Maldonado MD   [facility] ([facility])    [address] [place] [address]   [phone]                   Routing refill request to provider for review/approval because:  Drug not on the [facility], [facility] or [facility] refill protocol or controlled substance      xanax      Last Written Prescription Date:  5/5/17  Last Fill Quantity: 30,   # refills: 0  Last Office Visit with [facility], [facility] or [facility] prescribing provider: 4/14/17  Future Office visit:    Next 5 appointments (look out 90 days)     Aug 03, 2017  9:45 AM CDT   Return Visit with Vaibhav Maldonado MD   [facility] ([facility])    [address] [place], [place] [id number]  [facility] [address]   [phone]                   Routing refill request to provider for review/approval because:  Drug not on the [facility], [facility] or [facility] refill protocol or controlled substance

## 2017-07-07 RX ORDER — BUTALBITAL, ACETAMINOPHEN AND CAFFEINE 50; 325; 40 MG/1; MG/1; MG/1
1 TABLET ORAL EVERY 6 HOURS PRN
Qty: 30 TABLET | Refills: 0 | Status: SHIPPED | OUTPATIENT
Start: 2017-07-07 | End: 2017-08-30

## 2017-07-07 RX ORDER — ALPRAZOLAM 0.25 MG
TABLET ORAL
Qty: 30 TABLET | Refills: 0 | Status: SHIPPED | OUTPATIENT
Start: 2017-07-07 | End: 2017-09-22

## 2017-07-07 NOTE — TELEPHONE ENCOUNTER
.RX filled in Dr. Fonseca's abscence, please direct future refills to her.       Hand signed RX faxed to the specific pharmacy by myself.

## 2017-08-03 ENCOUNTER — OFFICE VISIT (OUTPATIENT)
Dept: CARDIOLOGY | Facility: CLINIC | Age: 77
End: 2017-08-03
Attending: NURSE PRACTITIONER
Payer: COMMERCIAL

## 2017-08-03 VITALS
WEIGHT: 164 LBS | BODY MASS INDEX: 30.18 KG/M2 | HEART RATE: 60 BPM | DIASTOLIC BLOOD PRESSURE: 67 MMHG | HEIGHT: 62 IN | SYSTOLIC BLOOD PRESSURE: 121 MMHG

## 2017-08-03 DIAGNOSIS — R06.09 EXERTIONAL DYSPNEA: ICD-10-CM

## 2017-08-03 DIAGNOSIS — I70.1 RENAL ARTERY STENOSIS (H): Primary | ICD-10-CM

## 2017-08-03 DIAGNOSIS — I10 BENIGN ESSENTIAL HYPERTENSION: ICD-10-CM

## 2017-08-03 DIAGNOSIS — E78.5 HYPERLIPIDEMIA LDL GOAL <100: ICD-10-CM

## 2017-08-03 PROCEDURE — 99214 OFFICE O/P EST MOD 30 MIN: CPT | Performed by: INTERNAL MEDICINE

## 2017-08-03 RX ORDER — ASPIRIN 81 MG/1
81 TABLET ORAL DAILY
COMMUNITY
Start: 2017-08-03 | End: 2019-07-12

## 2017-08-03 NOTE — PROGRESS NOTES
HPI and Plan:   See dictation    Orders Placed This Encounter   Procedures     Basic metabolic panel     Follow-Up with Cardiac Advanced Practice Provider     Follow-Up with Cardiologist       Orders Placed This Encounter   Medications     aspirin EC 81 MG EC tablet     Sig: Take 1 tablet (81 mg) by mouth daily       Medications Discontinued During This Encounter   Medication Reason     fenofibrate 160 MG tablet          Encounter Diagnoses   Name Primary?     Benign essential hypertension      Hyperlipidemia LDL goal <100      Exertional dyspnea      Renal artery stenosis (H) Yes       CURRENT MEDICATIONS:  Current Outpatient Prescriptions   Medication Sig Dispense Refill     aspirin EC 81 MG EC tablet Take 1 tablet (81 mg) by mouth daily       ALPRAZolam (XANAX) 0.25 MG tablet TAKE ONE TABLET BY MOUTH ONCE NIGHTLY AS NEEDED FOR SEVERE ANXIETY ONLY. 30 tablet 0     butalbital-acetaminophen-caffeine (FIORICET/ESGIC) -40 MG per tablet Take 1 tablet by mouth every 6 hours as needed FOR SEVERE MIGRAINE HEADACHE ONLY 30 tablet 0     olmesartan (BENICAR) 40 MG tablet TAKE ONE TABLET BY MOUTH ONCE DAILY 90 tablet 2     pravastatin (PRAVACHOL) 80 MG tablet TAKE ONE-HALF TABLET BY MOUTH IN THE EVENING TO LOWER CHOLESTEROL AND TO HELP PREVENT HEART DISEASE 45 tablet 2     cloNIDine (CATAPRES) 0.1 MG tablet Take 2 tablets (0.2 mg) by mouth 3 times daily (with meals) INDICATION: TO LOWER BLOOD PRESSURE 540 tablet 3     furosemide (LASIX) 20 MG tablet Take 1 tablet (20 mg) by mouth as needed INDICATION: TO HELP LOWER BLOOD PRESSURE AND TO TREAT EDEMA 30 tablet PRN     triamterene-hydrochlorothiazide (MAXZIDE-25) 37.5-25 MG per tablet Take 1 tablet by mouth every morning INDICATION:TO LOWER BLOOD PRESSURE AND CONTROL EDEMA 30 tablet PRN     cholecalciferol (VITAMIN D3) 91382 UNITS capsule ONE CAP 3 X A MONTH ON THE 1ST, 10TH AND 20TH OF EACH MONTH, FOR VITAMIN D DEFICIENCY MUST TAKE WITH WITH FAT CONTAINING MEAL, TAKE ONCE  A WEEK FOR 1 MONTH 40 capsule 0     glimepiride (AMARYL) 2 MG tablet Take 1 tablet (2 mg) by mouth every morning (before breakfast) INDICATION: TO TREAT DIABETES 90 tablet 0     fluticasone (FLONASE) 50 MCG/ACT spray Spray 2 sprays in nostril At Bedtime INDICATION: TO CONTROL NASAL ALLERGY SYMPTOMS, DO NOT BLOW NOSE FOR 30 MINUTES AFTER USING 16 g PRN     olmesartan (BENICAR) 40 MG tablet Take 1 tablet (40 mg) by mouth daily INDICATION:TO LOWER BLOOD PRESSURE AND TO HELP PRESERVE KIDNEY FUNCTION 30 tablet 1     order for DME Equipment being ordered: Wheeled walker with basket and seat attachment 1 Units 0     nitroglycerin (NITROSTAT) 0.4 MG SL tablet Place 1 tablet (0.4 mg) under the tongue every 5 minutes as needed for chest pain 25 tablet 0     ascorbic acid (VITAMIN C) 1000 MG TABS Take 1 tablet (1,000 mg) by mouth daily VITAMIN C REPLACEMENT 100 tablet 3     pravastatin (PRAVACHOL) 80 MG tablet Take 0.5 tablets (40 mg) by mouth every evening INDICATION: TO LOWER CHOLESTEROL AND TO HELP  PREVENT HEART DISEASE 45 tablet PRN     cyabnocobalamin (VITAMIN B-12) 2500 MCG sublingual tablet Take 2,500 mcg by mouth daily INDICATION: FOR VITAMIN B12 SUPPLEMENTATION - TO IMPROVE MEMORY, BALANCE, SLEEP AND MOOD, DIRECTIONS: PLEASE PLACE UNDER THE TONGUE TO DISSOLVE 250 tablet 3     esomeprazole (NEXIUM) 40 MG capsule Take 1 capsule (40 mg) by mouth every morning (before breakfast) TAKE  30'-60' BEFORE 1ST MEAL 180 capsule 2     calcium-vitamin D-vitamin K (VIACTIV) 500-500-40 MG-UNT-MCG CHEW Take 1 tablet by mouth 2 times daily (with meals) INDICATION: FOR BONE AND BREAST HEALTH 100 tablet PRN     melatonin 5 MG CAPS Take by mouth At Bedtime       BETA BLOCKER NOT PRESCRIBED, INTENTIONAL, Beta Blocker not prescribed intentionally due to Intolerance  0     ASPIRIN NOT PRESCRIBED, INTENTIONAL, continuous prn Reported on 3/6/2017 0 each 0     Lactobacillus (ACIDOPHILUS PO) Take  by mouth.       denosumab (PROLIA) 60 MG/ML  SOLN injection Inject 1 mL (60 mg) Subcutaneous once for 1 dose 1 mL 0     fluocinonide (LIDEX) 0.05 % external solution Apply to scalp QD-BID as needed (Patient not taking: Reported on 8/3/2017) 60 mL 3     albuterol (PROAIR HFA, PROVENTIL HFA, VENTOLIN HFA) 108 (90 BASE) MCG/ACT inhaler Inhale 2 puffs into the lungs every 6 hours INDICATION: RESCUE INHALER FOR SHORTNESS OF BREATH, CHEST TIGHTNESS AND COUGH 3 Inhaler PRN     [DISCONTINUED] fluticasone (FLOVENT HFA) 110 MCG/ACT inhaler Inhale 2 puffs into the lungs 2 times daily rinse mouth after each use 3 Inhaler 3       ALLERGIES     Allergies   Allergen Reactions     Codeine Difficulty breathing     Chest gets tight & difficulty breathing      Atenolol Other (See Comments)     Sob w/ exertion - asthma symptoms     Diltiazem Other (See Comments)     edema - fluid retention in legs       Labetalol Other (See Comments) and Itching     Sob w/ exertion - asthma symptoms  itching     Lipitor [Atorvastatin Calcium] Other (See Comments)     Muscle weakness     Nifedipine Other (See Comments)     Sob w/ exertion - asthma symptoms, HAs       Sulfa Drugs Hives     hives     Zocor [Simvastatin] Other (See Comments)     Muscle weakness     Advair Diskus Other (See Comments)     hoarseness with 500/50, not the 250/50     Aleve [Naproxen] Other (See Comments)     Raises BP     Amlodipine Besylate Fatigue and Diarrhea     Bystolic [Nebivolol Hcl] Other (See Comments)     headaches     Calcium Carbonate Diarrhea     Crestor [Rosuvastatin Calcium] Muscle Pain (Myalgia)     Diovan [Valsartan] GI Disturbance     gas       Hydralazine Other (See Comments) and Nausea     Increases pulse     Hydrochlorothiazide Other (See Comments)     increasing glucose     Imdur [Isosorbide Mononitrate] Other (See Comments)     headaches     Kcl GI Disturbance     gas      Lisinopril Cough     Hoarse voice     Lovastatin Muscle Pain (Myalgia)     Metformin Diarrhea            Metoprolol Other (See  Comments)     Sob w/ exertion - asthma symptoms       Niacin Other (See Comments)     hyperglycemia     Omnicef Diarrhea     Pravachol [Hmg-Coa-R Inhibitors] Muscle Pain (Myalgia)     Prilosec Otc [Omeprazole Magnesium] Diarrhea     diarrhea       Zetia [Ezetimibe] Muscle Pain (Myalgia)     weakness       PAST MEDICAL HISTORY:  Past Medical History:   Diagnosis Date     Acute peptic ulcer, unspecified site, without mention of hemorrhage, perforation, or obstruction      Allergic rhinitis, cause unspecified      Anxiety      Basal cell carcinoma      Chest pain      DVT (deep venous thrombosis) (H)      Esophageal reflux      Generalized osteoarthrosis, unspecified site      Herpes zoster      Hyperlipidaemia      Lumbago      Migraine with aura, without mention of intractable migraine without mention of status migrainosus      Mild persistent asthma      MITRAL VALVE PROLAPSE      MVP (mitral valve prolapse)      Myocarditis (H)      Osteoarthritis      OSTEOPENIA      Osteopenia      Other and unspecified hyperlipidemia      Other specified disorders of bladder      Palpitations      PE (pulmonary embolism)      Pneumonia, organism      Pulmonary embolism (H)      Shortness of breath      Spinal stenosis      Spinal stenosis, lumbar region, without neurogenic claudication      Squamous cell carcinoma      Thyroid nodule      Thyroid nodule      Type II or unspecified type diabetes mellitus without mention of complication, not stated as uncontrolled      Unspecified essential hypertension      Unspecified vitamin D deficiency        PAST SURGICAL HISTORY:  Past Surgical History:   Procedure Laterality Date     C NONSPECIFIC PROCEDURE  approx 1970's    hyst/bso     C NONSPECIFIC PROCEDURE  approx 1970's    gb     C NONSPECIFIC PROCEDURE  approx 1980's    cystocele repair     C NONSPECIFIC PROCEDURE  approx 1980's    endoscopic sinus      C NONSPECIFIC PROCEDURE      epidurals x 7     C NONSPECIFIC PROCEDURE      nl  "colonoscopy 2006     C NONSPECIFIC PROCEDURE      L2-L3 foramenotomies     C NONSPECIFIC PROCEDURE      lumbar fusion     NONSPECIFIC PROCEDURE      Bilateral sphenoidotomy with removal of polypoid tissue.     Right frontal sinusotomy.   Bilateral total ethmoidectomy.  Bilateral maxillary antrostomy with removal of polypoid tissue.       ORTHOPEDIC SURGERY      lumbar fusion       FAMILY HISTORY:  Family History   Problem Relation Age of Onset     HEART DISEASE Father 25      in a fire      HEART DISEASE Paternal Grandfather        SOCIAL HISTORY:  Social History     Social History     Marital status:      Spouse name: N/A     Number of children: N/A     Years of education: N/A     Social History Main Topics     Smoking status: Never Smoker     Smokeless tobacco: Never Used     Alcohol use No      Comment: very rare     Drug use: No     Sexual activity: Yes     Other Topics Concern     Caffeine Concern No     soda 1 time per day     Special Diet No     Exercise No     limited due to back pain      Social History Narrative       Review of Systems:  Skin:  Negative       Eyes:  Negative      ENT:  Negative      Respiratory:  Negative       Cardiovascular:    Positive for;fatigue    Gastroenterology: Negative      Genitourinary:  not assessed      Musculoskeletal:  Positive for back pain;nocturnal cramping    Neurologic:  Positive for migraine headaches;headaches;numbness or tingling of feet    Psychiatric:  Positive for anxiety;excessive stress    Heme/Lymph/Imm:  Positive for allergies    Endocrine:  Positive for diabetes      Physical Exam:  Vitals: /67  Pulse 60  Ht 1.575 m (5' 2\")  Wt 74.4 kg (164 lb)  BMI 30 kg/m2    Constitutional:  cooperative, alert and oriented, well developed, well nourished, in no acute distress overweight      Skin:  warm and dry to the touch, no apparent skin lesions or masses noted        Head:  normocephalic, no masses or lesions        Eyes:  pupils " equal and round        ENT:  no pallor or cyanosis, dentition good        Neck:  carotid pulses are full and equal bilaterally, JVP normal, no carotid bruit, no thyromegaly        Chest:  clear to auscultation          Cardiac: regular rhythm;normal S1 and S2   S4;S3              Abdomen:  abdomen soft        Vascular: pulses full and equal, no bruits auscultated                                   right femoral access site clean dry and intact with small lump at the site and mild ecchymosis    Extremities and Back:  no edema              Neurological:  no gross motor deficits;affect appropriate  (Uses a walker)            CC  Kassandra Nichols, APRN CNP  [facility]  [id number]  [place] [id number]

## 2017-08-03 NOTE — LETTER
August 3, 2017     Steven Fonseca MD   [facility]   [address]  [address]      RE: Ginger Abreu   MRN: [id number]   : 1940      Dear Dr. Fonseca:      It is my pleasure to see your patient, Ginger Abreu, in followup.  She is a 76-year-old female patient who I saw in the past for shortness of breath.  The shortness of breath did not appear to have any cardiac cause after extensive investigation.  The other significant issue was hypertension with spiking blood pressures. Remember, we did perform CT angiography of the renal arteries was suggested severe bilateral renal artery stenosis. The patient then underwent invasive angiography of both renal arteries. The left renal artery was severely stenosed. Right renal artery was moderately stenosed. The left renal artery was stented. However, this did not make a significant difference to her blood pressure. She did have nifedipine added which appear to work very well for her. However she developed some side effect of that medication which she cannot remember and is off the medication. She is now on clonidine one tablet in the morning 2 tablets in the afternoon and 2 tablets in the evening. The clonidine tablets are 0.1 mg. Her blood pressure is well controlled typically with this. However, she would get an occasional spike at nighttime. Today her blood pressure is 121/67. She has a significant amount of stress in her life and that she's taking care of her elderly mother and also her sister who had multiple orthopedic surgeries performed. Last lipids were in April and they look excellent with an LDL of 62 HDL 54 and triglycerides of 204th total cholesterol 157. He feels that she's getting side effects from the fenofibrate. As you know favorites have not been shown to improve cardiac outcomes on recent trials. So I will stop that medication. They can increase the side effects of statins. The patient does have mild renal  insufficiency. In April her creatinine was 1.13 with a BUN of 27 and GFR of 47.        IMPRESSION:   1.  Blood pressure overall appears to be better controlled.  2. Status post stenting of the left renal artery for severe renal artery stenosis.  3. Excellent lipid profile. But she may be getting side effects from fenofibrate.     PLAN:     1. She will stop the fenofibrate as she does appear to be getting side effects from that medication.  2. She will continue all present antihypertensive medications.  3. Patient has been asked to try to exercise more and cut down carbohydrate intake.  4. I will never follow up with one of our nurse practitioners in 6 months time and I will see her again myself in years time.    As always she's been told to contact me if she has any questions or any concerns.     It has been a pleasure to be involved in the care of this very nice patient.      Sincerely,      Vaibhav Maldonado MD [facility]         VAIBHAV ORTEGA MD, [facility]                  Name:     SARAY MÉNDEZ   MRN:      [id number]        Account:      [id number]   :      1940           Service Date: 2017         HPI and Plan:   See dictation    Orders Placed This Encounter   Procedures     Basic metabolic panel     Follow-Up with Cardiac Advanced Practice Provider     Follow-Up with Cardiologist       Orders Placed This Encounter   Medications     aspirin EC 81 MG EC tablet     Sig: Take 1 tablet (81 mg) by mouth daily       Medications Discontinued During This Encounter   Medication Reason     fenofibrate 160 MG tablet          Encounter Diagnoses   Name Primary?     Benign essential hypertension      Hyperlipidemia LDL goal <100      Exertional dyspnea      Renal artery stenosis (H) Yes       CURRENT MEDICATIONS:  Current Outpatient Prescriptions   Medication Sig Dispense Refill     aspirin EC 81 MG EC tablet Take 1 tablet (81 mg) by mouth daily       ALPRAZolam (XANAX) 0.25 MG tablet TAKE ONE TABLET  BY MOUTH ONCE NIGHTLY AS NEEDED FOR SEVERE ANXIETY ONLY. 30 tablet 0     butalbital-acetaminophen-caffeine (FIORICET/ESGIC) -40 MG per tablet Take 1 tablet by mouth every 6 hours as needed FOR SEVERE MIGRAINE HEADACHE ONLY 30 tablet 0     olmesartan (BENICAR) 40 MG tablet TAKE ONE TABLET BY MOUTH ONCE DAILY 90 tablet 2     pravastatin (PRAVACHOL) 80 MG tablet TAKE ONE-HALF TABLET BY MOUTH IN THE EVENING TO LOWER CHOLESTEROL AND TO HELP PREVENT HEART DISEASE 45 tablet 2     cloNIDine (CATAPRES) 0.1 MG tablet Take 2 tablets (0.2 mg) by mouth 3 times daily (with meals) INDICATION: TO LOWER BLOOD PRESSURE 540 tablet 3     furosemide (LASIX) 20 MG tablet Take 1 tablet (20 mg) by mouth as needed INDICATION: TO HELP LOWER BLOOD PRESSURE AND TO TREAT EDEMA 30 tablet PRN     triamterene-hydrochlorothiazide (MAXZIDE-25) 37.5-25 MG per tablet Take 1 tablet by mouth every morning INDICATION:TO LOWER BLOOD PRESSURE AND CONTROL EDEMA 30 tablet PRN     cholecalciferol (VITAMIN D3) 61492 UNITS capsule ONE CAP 3 X A MONTH ON THE 1ST, 10TH AND 20TH OF EACH MONTH, FOR VITAMIN D DEFICIENCY MUST TAKE WITH WITH FAT CONTAINING MEAL, TAKE ONCE A WEEK FOR 1 MONTH 40 capsule 0     glimepiride (AMARYL) 2 MG tablet Take 1 tablet (2 mg) by mouth every morning (before breakfast) INDICATION: TO TREAT DIABETES 90 tablet 0     fluticasone (FLONASE) 50 MCG/ACT spray Spray 2 sprays in nostril At Bedtime INDICATION: TO CONTROL NASAL ALLERGY SYMPTOMS, DO NOT BLOW NOSE FOR 30 MINUTES AFTER USING 16 g PRN     olmesartan (BENICAR) 40 MG tablet Take 1 tablet (40 mg) by mouth daily INDICATION:TO LOWER BLOOD PRESSURE AND TO HELP PRESERVE KIDNEY FUNCTION 30 tablet 1     order for DME Equipment being ordered: Wheeled walker with basket and seat attachment 1 Units 0     nitroglycerin (NITROSTAT) 0.4 MG SL tablet Place 1 tablet (0.4 mg) under the tongue every 5 minutes as needed for chest pain 25 tablet 0     ascorbic acid (VITAMIN C) 1000 MG TABS Take 1  tablet (1,000 mg) by mouth daily VITAMIN C REPLACEMENT 100 tablet 3     pravastatin (PRAVACHOL) 80 MG tablet Take 0.5 tablets (40 mg) by mouth every evening INDICATION: TO LOWER CHOLESTEROL AND TO HELP  PREVENT HEART DISEASE 45 tablet PRN     cyabnocobalamin (VITAMIN B-12) 2500 MCG sublingual tablet Take 2,500 mcg by mouth daily INDICATION: FOR VITAMIN B12 SUPPLEMENTATION - TO IMPROVE MEMORY, BALANCE, SLEEP AND MOOD, DIRECTIONS: PLEASE PLACE UNDER THE TONGUE TO DISSOLVE 250 tablet 3     esomeprazole (NEXIUM) 40 MG capsule Take 1 capsule (40 mg) by mouth every morning (before breakfast) TAKE  30'-60' BEFORE 1ST MEAL 180 capsule 2     calcium-vitamin D-vitamin K (VIACTIV) 500-500-40 MG-UNT-MCG CHEW Take 1 tablet by mouth 2 times daily (with meals) INDICATION: FOR BONE AND BREAST HEALTH 100 tablet PRN     melatonin 5 MG CAPS Take by mouth At Bedtime       BETA BLOCKER NOT PRESCRIBED, INTENTIONAL, Beta Blocker not prescribed intentionally due to Intolerance  0     ASPIRIN NOT PRESCRIBED, INTENTIONAL, continuous prn Reported on 3/6/2017 0 each 0     Lactobacillus (ACIDOPHILUS PO) Take  by mouth.       denosumab (PROLIA) 60 MG/ML SOLN injection Inject 1 mL (60 mg) Subcutaneous once for 1 dose 1 mL 0     fluocinonide (LIDEX) 0.05 % external solution Apply to scalp QD-BID as needed (Patient not taking: Reported on 8/3/2017) 60 mL 3     albuterol (PROAIR HFA, PROVENTIL HFA, VENTOLIN HFA) 108 (90 BASE) MCG/ACT inhaler Inhale 2 puffs into the lungs every 6 hours INDICATION: RESCUE INHALER FOR SHORTNESS OF BREATH, CHEST TIGHTNESS AND COUGH 3 Inhaler PRN     [DISCONTINUED] fluticasone (FLOVENT HFA) 110 MCG/ACT inhaler Inhale 2 puffs into the lungs 2 times daily rinse mouth after each use 3 Inhaler 3       ALLERGIES     Allergies   Allergen Reactions     Codeine Difficulty breathing     Chest gets tight & difficulty breathing      Atenolol Other (See Comments)     Sob w/ exertion - asthma symptoms     Diltiazem Other (See  Comments)     edema - fluid retention in legs       Labetalol Other (See Comments) and Itching     Sob w/ exertion - asthma symptoms  itching     Lipitor [Atorvastatin Calcium] Other (See Comments)     Muscle weakness     Nifedipine Other (See Comments)     Sob w/ exertion - asthma symptoms, HAs       Sulfa Drugs Hives     hives     Zocor [Simvastatin] Other (See Comments)     Muscle weakness     Advair Diskus Other (See Comments)     hoarseness with 500/50, not the 250/50     Aleve [Naproxen] Other (See Comments)     Raises BP     Amlodipine Besylate Fatigue and Diarrhea     Bystolic [Nebivolol Hcl] Other (See Comments)     headaches     Calcium Carbonate Diarrhea     Crestor [Rosuvastatin Calcium] Muscle Pain (Myalgia)     Diovan [Valsartan] GI Disturbance     gas       Hydralazine Other (See Comments) and Nausea     Increases pulse     Hydrochlorothiazide Other (See Comments)     increasing glucose     Imdur [Isosorbide Mononitrate] Other (See Comments)     headaches     Kcl GI Disturbance     gas      Lisinopril Cough     Hoarse voice     Lovastatin Muscle Pain (Myalgia)     Metformin Diarrhea            Metoprolol Other (See Comments)     Sob w/ exertion - asthma symptoms       Niacin Other (See Comments)     hyperglycemia     Omnicef Diarrhea     Pravachol [Hmg-Coa-R Inhibitors] Muscle Pain (Myalgia)     Prilosec Otc [Omeprazole Magnesium] Diarrhea     diarrhea       Zetia [Ezetimibe] Muscle Pain (Myalgia)     weakness       PAST MEDICAL HISTORY:  Past Medical History:   Diagnosis Date     Acute peptic ulcer, unspecified site, without mention of hemorrhage, perforation, or obstruction      Allergic rhinitis, cause unspecified      Anxiety      Basal cell carcinoma      Chest pain      DVT (deep venous thrombosis) (H)      Esophageal reflux      Generalized osteoarthrosis, unspecified site      Herpes zoster      Hyperlipidaemia      Lumbago      Migraine with aura, without mention of intractable migraine  without mention of status migrainosus      Mild persistent asthma      MITRAL VALVE PROLAPSE      MVP (mitral valve prolapse)      Myocarditis (H)      Osteoarthritis      OSTEOPENIA      Osteopenia      Other and unspecified hyperlipidemia      Other specified disorders of bladder      Palpitations      PE (pulmonary embolism)      Pneumonia, organism      Pulmonary embolism (H)      Shortness of breath      Spinal stenosis      Spinal stenosis, lumbar region, without neurogenic claudication      Squamous cell carcinoma      Thyroid nodule      Thyroid nodule      Type II or unspecified type diabetes mellitus without mention of complication, not stated as uncontrolled      Unspecified essential hypertension      Unspecified vitamin D deficiency        PAST SURGICAL HISTORY:  Past Surgical History:   Procedure Laterality Date     C NONSPECIFIC PROCEDURE  approx     hyst/bso     C NONSPECIFIC PROCEDURE  approx     gb     C NONSPECIFIC PROCEDURE  approx     cystocele repair     C NONSPECIFIC PROCEDURE  approx     endoscopic sinus      C NONSPECIFIC PROCEDURE      epidurals x 7     C NONSPECIFIC PROCEDURE      nl colonoscopy 2006     C NONSPECIFIC PROCEDURE      L2-L3 foramenotomies     C NONSPECIFIC PROCEDURE  2012    lumbar fusion     NONSPECIFIC PROCEDURE      Bilateral sphenoidotomy with removal of polypoid tissue.     Right frontal sinusotomy.   Bilateral total ethmoidectomy.  Bilateral maxillary antrostomy with removal of polypoid tissue.       ORTHOPEDIC SURGERY      lumbar fusion       FAMILY HISTORY:  Family History   Problem Relation Age of Onset     HEART DISEASE Father 25      in a fire      HEART DISEASE Paternal Grandfather        SOCIAL HISTORY:  Social History     Social History     Marital status:      Spouse name: N/A     Number of children: N/A     Years of education: N/A     Social History Main Topics     Smoking status: Never Smoker     Smokeless  "tobacco: Never Used     Alcohol use No      Comment: very rare     Drug use: No     Sexual activity: Yes     Other Topics Concern     Caffeine Concern No     soda 1 time per day     Special Diet No     Exercise No     limited due to back pain      Social History Narrative       Review of Systems:  Skin:  Negative       Eyes:  Negative      ENT:  Negative      Respiratory:  Negative       Cardiovascular:    Positive for;fatigue    Gastroenterology: Negative      Genitourinary:  not assessed      Musculoskeletal:  Positive for back pain;nocturnal cramping    Neurologic:  Positive for migraine headaches;headaches;numbness or tingling of feet    Psychiatric:  Positive for anxiety;excessive stress    Heme/Lymph/Imm:  Positive for allergies    Endocrine:  Positive for diabetes      Physical Exam:  Vitals: /67  Pulse 60  Ht 1.575 m (5' 2\")  Wt 74.4 kg (164 lb)  BMI 30 kg/m2    Constitutional:  cooperative, alert and oriented, well developed, well nourished, in no acute distress overweight      Skin:  warm and dry to the touch, no apparent skin lesions or masses noted        Head:  normocephalic, no masses or lesions        Eyes:  pupils equal and round        ENT:  no pallor or cyanosis, dentition good        Neck:  carotid pulses are full and equal bilaterally, JVP normal, no carotid bruit, no thyromegaly        Chest:  clear to auscultation          Cardiac: regular rhythm;normal S1 and S2   S4;S3              Abdomen:  abdomen soft        Vascular: pulses full and equal, no bruits auscultated                                   right femoral access site clean dry and intact with small lump at the site and mild ecchymosis    Extremities and Back:  no edema              Neurological:  no gross motor deficits;affect appropriate  (Uses a walker)      Thank you for allowing me to participate in the care of your patient.    Sincerely,     Vaibhav Maldonado MD    [facility]  "

## 2017-08-03 NOTE — MR AVS SNAPSHOT
After Visit Summary   8/3/2017    Ginger Abreu    MRN: [id number]           Patient Information     Date Of Birth          1940        Visit Information        Provider Department      8/3/2017 9:45 AM Vaibhav Maldonado MD [facility]        Today's Diagnoses     Renal artery stenosis (H)    -  1    Benign essential hypertension        Hyperlipidemia LDL goal <100        Exertional dyspnea           Follow-ups after your visit        Additional Services     Follow-Up with Cardiac Advanced Practice Provider           Follow-Up with Cardiologist                 Your next 10 appointments already scheduled     Oct 10, 2017  1:30 PM CDT   [place] with Steven Fonseca MD   [facility] ([facility])    [address] [place] [id number] [phone]              Future tests that were ordered for you today     Open Future Orders        Priority Expected Expires Ordered    Basic metabolic panel Routine 8/3/2018 8/4/2018 8/3/2017    Follow-Up with Cardiologist Routine 8/3/2018 8/4/2018 8/3/2017    Follow-Up with Cardiac Advanced Practice Provider Routine 1/30/2018 8/3/2018 8/3/2017            Who to contact     If you have questions or need follow up information about today's clinic visit or your schedule please contact [facility] directly at [phone].  Normal or non-critical lab and imaging results will be communicated to you by MyChart, letter or phone within 4 business days after the clinic has received the results. If you do not hear from us within 7 days, please contact the clinic through MyChart or phone. If you have a critical or abnormal lab result, we will notify you by phone as soon as possible.  Submit refill requests through [employer] or call your pharmacy and they will forward the refill request to us. Please allow 3 business  "days for your refill to be completed.          Additional Information About Your Visit        MyChart Information     [employer] gives you secure access to your electronic health record. If you see a primary care provider, you can also send messages to your care team and make appointments. If you have questions, please call your primary care clinic.  If you do not have a primary care provider, please call [phone] and they will assist you.        Care EveryWhere ID     This is your Care EveryWhere ID. This could be used by other organizations to access your [place] medical records  [id number]        Your Vitals Were     Pulse Height BMI (Body Mass Index)             60 1.575 m (5' 2\") 30 kg/m2          Blood Pressure from Last 3 Encounters:   08/03/17 121/67   04/14/17 132/74   04/07/17 138/72    Weight from Last 3 Encounters:   08/03/17 74.4 kg (164 lb)   04/14/17 75.1 kg (165 lb 8 oz)   04/03/17 75.8 kg (167 lb)              We Performed the Following     Follow-Up with Cardiologist          Today's Medication Changes          These changes are accurate as of: 8/3/17 10:22 AM.  If you have any questions, ask your nurse or doctor.               Start taking these medicines.        Dose/Directions    aspirin EC 81 MG EC tablet   Used for:  Renal artery stenosis (H)   Started by:  Vaibhav Maldonado MD        Dose:  81 mg   Take 1 tablet (81 mg) by mouth daily   Refills:  0         Stop taking these medicines if you haven't already. Please contact your care team if you have questions.     fenofibrate 160 MG tablet   Stopped by:  Vaibhav Maldonado MD                Where to get your medicines      Some of these will need a paper prescription and others can be bought over the counter.  Ask your nurse if you have questions.     You don't need a prescription for these medications     aspirin EC 81 MG EC tablet                Primary Care Provider Office Phone # Fax #    Steven Fonseca MD " [phone]       [facility] [address] [facility] [address]        Equal Access to Services     LIANA RIZO : Hadii aad ku hadgirish Caba, waniurkada kylievikkiha, praveenata kadidida an, juan luis bergeron laDenzelwong crane. So [facility] [phone].    ATENCIÓN: Si habla español, tiene a decker disposición servicios gratuitos de asistencia lingüística. Llame al [phone].    We comply with applicable federal civil rights laws and [place] laws. We do not discriminate on the basis of race, color, national origin, age, disability sex, sexual orientation or gender identity.            Thank you!     Thank you for choosing [facility]  for your care. Our goal is always to provide you with excellent care. Hearing back from our patients is one way we can continue to improve our services. Please take a few minutes to complete the written survey that you may receive in the mail after your visit with us. Thank you!             Your Updated Medication List - Protect others around you: Learn how to safely use, store and throw away your medicines at www.disposemymeds.org.          This list is accurate as of: 8/3/17 10:22 AM.  Always use your most recent med list.                   Brand Name Dispense Instructions for use Diagnosis    ACIDOPHILUS PO      Take  by mouth.        albuterol 108 (90 BASE) MCG/ACT Inhaler    PROAIR HFA/PROVENTIL HFA/VENTOLIN HFA    3 Inhaler    Inhale 2 puffs into the lungs every 6 hours INDICATION: RESCUE INHALER FOR SHORTNESS OF BREATH, CHEST TIGHTNESS AND COUGH    Mild persistent asthma       ALPRAZolam 0.25 MG tablet    XANAX    30 tablet    TAKE ONE TABLET BY MOUTH ONCE NIGHTLY AS NEEDED FOR SEVERE ANXIETY ONLY.    Anxiety       ascorbic acid 1000 MG Tabs    vitamin C    100 tablet    Take 1 tablet (1,000 mg) by mouth daily VITAMIN C REPLACEMENT    [employer]       aspirin EC 81 MG EC tablet      Take 1 tablet (81 mg) by mouth  daily    Renal artery stenosis (H)       ASPIRIN NOT PRESCRIBED    INTENTIONAL    0 each    continuous prn Reported on 3/6/2017        BETA BLOCKER NOT PRESCRIBED (INTENTIONAL)      Beta Blocker not prescribed intentionally due to Intolerance    Type 2 diabetes, HbA1c goal < 7% (H), Hypertension goal BP (blood pressure) < 130/80       butalbital-acetaminophen-caffeine -40 MG per tablet    FIORICET/ESGIC    30 tablet    Take 1 tablet by mouth every 6 hours as needed FOR SEVERE MIGRAINE HEADACHE ONLY    Migraine with aura and without status migrainosus, not intractable       calcium-vitamin D-vitamin K 500-500-40 MG-UNT-MCG Chew    VIACTIV    100 tablet    Take 1 tablet by mouth 2 times daily (with meals) INDICATION: FOR BONE AND BREAST HEALTH    Vitamin D deficiency       cholecalciferol 47289 UNITS capsule    VITAMIN D3    40 capsule    ONE CAP 3 X A MONTH ON THE 1ST, 10TH AND 20TH OF EACH MONTH, FOR VITAMIN D DEFICIENCY MUST TAKE WITH WITH FAT CONTAINING MEAL, TAKE ONCE A WEEK FOR 1 MONTH    Vitamin D deficiency       cloNIDine 0.1 MG tablet    CATAPRES    540 tablet    Take 2 tablets (0.2 mg) by mouth 3 times daily (with meals) INDICATION: TO LOWER BLOOD PRESSURE    Essential hypertension, benign       cyabnocobalamin 2500 MCG sublingual tablet    VITAMIN B-12    250 tablet    Take 2,500 mcg by mouth daily INDICATION: FOR VITAMIN B12 SUPPLEMENTATION - TO IMPROVE MEMORY, BALANCE, SLEEP AND MOOD, DIRECTIONS: PLEASE PLACE UNDER THE TONGUE TO DISSOLVE    Vitamin B12 deficiency (non anemic)       denosumab 60 MG/ML Soln injection    PROLIA    1 mL    Inject 1 mL (60 mg) Subcutaneous once for 1 dose    Osteopenia       esomeprazole 40 MG CR capsule    nexIUM    180 capsule    Take 1 capsule (40 mg) by mouth every morning (before breakfast) TAKE  30'-60' BEFORE 1ST MEAL    Gastroesophageal reflux disease without esophagitis       fluocinonide 0.05 % solution    LIDEX    60 mL    Apply to scalp QD-BID as needed     Scalp itch       fluticasone 50 MCG/ACT spray    FLONASE    16 g    Spray 2 sprays in nostril At Bedtime INDICATION: TO CONTROL NASAL ALLERGY SYMPTOMS, DO NOT BLOW NOSE FOR 30 MINUTES AFTER USING    Seasonal allergic rhinitis, unspecified allergic rhinitis trigger       furosemide 20 MG tablet    LASIX    30 tablet    Take 1 tablet (20 mg) by mouth as needed INDICATION: TO HELP LOWER BLOOD PRESSURE AND TO TREAT EDEMA    Benign essential hypertension       glimepiride 2 MG tablet    AMARYL    90 tablet    Take 1 tablet (2 mg) by mouth every morning (before breakfast) INDICATION: TO TREAT DIABETES    Type 2 diabetes mellitus without complication, without long-term current use of insulin (H)       melatonin 5 MG Caps      Take by mouth At Bedtime        nitroGLYcerin 0.4 MG sublingual tablet    NITROSTAT    25 tablet    Place 1 tablet (0.4 mg) under the tongue every 5 minutes as needed for chest pain    Exertional dyspnea       * olmesartan 40 MG tablet    BENICAR    30 tablet    Take 1 tablet (40 mg) by mouth daily INDICATION:TO LOWER BLOOD PRESSURE AND TO HELP PRESERVE KIDNEY FUNCTION    Benign essential hypertension       * olmesartan 40 MG tablet    BENICAR    90 tablet    TAKE ONE TABLET BY MOUTH ONCE DAILY    Benign essential hypertension       [place] for [facility]     1 Units    Equipment being ordered: Wheeled walker with basket and seat attachment    Spinal stenosis, lumbar region, without neurogenic claudication       * pravastatin 80 MG tablet    PRAVACHOL    45 tablet    Take 0.5 tablets (40 mg) by mouth every evening INDICATION: TO LOWER CHOLESTEROL AND TO HELP  PREVENT HEART DISEASE    Hyperlipidemia LDL goal <100       * pravastatin 80 MG tablet    PRAVACHOL    45 tablet    TAKE ONE-HALF TABLET BY MOUTH IN THE EVENING TO LOWER CHOLESTEROL AND TO HELP PREVENT HEART DISEASE    Hyperlipidemia LDL goal <100       triamterene-hydrochlorothiazide 37.5-25 MG per tablet    MAXZIDE-25    30 tablet    Take 1 tablet by  mouth every morning INDICATION:TO LOWER BLOOD PRESSURE AND CONTROL EDEMA    Benign essential hypertension       * Notice:  This list has 4 medication(s) that are the same as other medications prescribed for you. Read the directions carefully, and ask your doctor or other care provider to review them with you.

## 2017-08-03 NOTE — PROGRESS NOTES
August 3, 2017     Steven Fonseca MD   [facility]   [address]  [address]      RE: Ginger Abreu   MRN: [id number]   : 1940      Dear Dr. Fonseca:      It is my pleasure to see your patient, Ginger Abreu, in followup.  She is a 76-year-old female patient who I saw in the past for shortness of breath.  The shortness of breath did not appear to have any cardiac cause after extensive investigation.  The other significant issue was hypertension with spiking blood pressures. Remember, we did perform CT angiography of the renal arteries was suggested severe bilateral renal artery stenosis. The patient then underwent invasive angiography of both renal arteries. The left renal artery was severely stenosed. Right renal artery was moderately stenosed. The left renal artery was stented. However, this did not make a significant difference to her blood pressure. She did have nifedipine added which appear to work very well for her. However she developed some side effect of that medication which she cannot remember and is off the medication. She is now on clonidine one tablet in the morning 2 tablets in the afternoon and 2 tablets in the evening. The clonidine tablets are 0.1 mg. Her blood pressure is well controlled typically with this. However, she would get an occasional spike at nighttime. Today her blood pressure is 121/67. She has a significant amount of stress in her life and that she's taking care of her elderly mother and also her sister who had multiple orthopedic surgeries performed. Last lipids were in April and they look excellent with an LDL of 62 HDL 54 and triglycerides of 204th total cholesterol 157. He feels that she's getting side effects from the fenofibrate. As you know favorites have not been shown to improve cardiac outcomes on recent trials. So I will stop that medication. They can increase the side effects of statins. The patient does have mild renal  insufficiency. In April her creatinine was 1.13 with a BUN of 27 and GFR of 47.          IMPRESSION:   1.  Blood pressure overall appears to be better controlled.  2. Status post stenting of the left renal artery for severe renal artery stenosis.  3. Excellent lipid profile. But she may be getting side effects from fenofibrate.     PLAN:     1. She will stop the fenofibrate as she does appear to be getting side effects from that medication.  2. She will continue all present antihypertensive medications.  3. Patient has been asked to try to exercise more and cut down carbohydrate intake.  4. I will never follow up with one of our nurse practitioners in 6 months time and I will see her again myself in years time.    As always she's been told to contact me if she has any questions or any concerns.     It has been a pleasure to be involved in the care of this very nice patient.      Sincerely,      Vaibhav Maldonado MD [facility]         VAIBHAV ORTEGA MD, [facility]                  Name:     SARAY MÉNDEZ   MRN:      [id number]        Account:      [id number]   :      1940           Service Date: 2017

## 2017-08-20 ENCOUNTER — OFFICE VISIT (OUTPATIENT)
Dept: URGENT CARE | Facility: URGENT CARE | Age: 77
End: 2017-08-20
Payer: COMMERCIAL

## 2017-08-20 VITALS
SYSTOLIC BLOOD PRESSURE: 110 MMHG | TEMPERATURE: 98.8 F | HEART RATE: 59 BPM | BODY MASS INDEX: 29.7 KG/M2 | OXYGEN SATURATION: 96 % | DIASTOLIC BLOOD PRESSURE: 50 MMHG | WEIGHT: 162.4 LBS

## 2017-08-20 DIAGNOSIS — N39.0 URINARY TRACT INFECTION WITHOUT HEMATURIA, SITE UNSPECIFIED: Primary | ICD-10-CM

## 2017-08-20 DIAGNOSIS — R30.0 DYSURIA: ICD-10-CM

## 2017-08-20 DIAGNOSIS — R82.90 NONSPECIFIC FINDING ON EXAMINATION OF URINE: ICD-10-CM

## 2017-08-20 LAB
ALBUMIN UR-MCNC: NEGATIVE MG/DL
APPEARANCE UR: CLEAR
BACTERIA #/AREA URNS HPF: ABNORMAL /HPF
BILIRUB UR QL STRIP: NEGATIVE
COLOR UR AUTO: YELLOW
GLUCOSE UR STRIP-MCNC: NEGATIVE MG/DL
HGB UR QL STRIP: NEGATIVE
KETONES UR STRIP-MCNC: NEGATIVE MG/DL
LEUKOCYTE ESTERASE UR QL STRIP: ABNORMAL
NITRATE UR QL: NEGATIVE
PH UR STRIP: 6 PH (ref 5–7)
RBC #/AREA URNS AUTO: ABNORMAL /HPF
SOURCE: ABNORMAL
SP GR UR STRIP: 1.02 (ref 1–1.03)
UROBILINOGEN UR STRIP-ACNC: 0.2 EU/DL (ref 0.2–1)
WBC #/AREA URNS AUTO: ABNORMAL /HPF

## 2017-08-20 PROCEDURE — 87086 URINE CULTURE/COLONY COUNT: CPT | Performed by: FAMILY MEDICINE

## 2017-08-20 PROCEDURE — 99213 OFFICE O/P EST LOW 20 MIN: CPT | Performed by: FAMILY MEDICINE

## 2017-08-20 PROCEDURE — 87186 SC STD MICRODIL/AGAR DIL: CPT | Performed by: FAMILY MEDICINE

## 2017-08-20 PROCEDURE — 81001 URINALYSIS AUTO W/SCOPE: CPT | Performed by: FAMILY MEDICINE

## 2017-08-20 PROCEDURE — 87088 URINE BACTERIA CULTURE: CPT | Performed by: FAMILY MEDICINE

## 2017-08-20 RX ORDER — CEFDINIR 300 MG/1
300 CAPSULE ORAL 2 TIMES DAILY
Qty: 10 CAPSULE | Refills: 0 | Status: SHIPPED | OUTPATIENT
Start: 2017-08-20 | End: 2017-08-20

## 2017-08-20 RX ORDER — CEFDINIR 300 MG/1
300 CAPSULE ORAL 2 TIMES DAILY
Qty: 10 CAPSULE | Refills: 0 | Status: SHIPPED | OUTPATIENT
Start: 2017-08-20 | End: 2017-08-25

## 2017-08-20 NOTE — PATIENT INSTRUCTIONS
"   * BLADDER INFECTION,Female (Adult)    A bladder infection (\"cystitis\" or \"UTI\") usually causes a constant urge to urinate and a burning when passing urine. Urine may be cloudy, smelly or dark. There may be pain in the lower abdomen. A bladder infection occurs when bacteria from the vaginal area enter the bladder opening (urethra). This can occur from sexual intercourse, wearing tight clothing, dehydration and other factors.  HOME CARE:  1. Drink lots of fluids (at least 6-8 glasses a day, unless you must restrict fluids for other medical reasons). This will force the medicine into your urinary system and flush the bacteria out of your body. Cranberry juice has been shown to help clear out the bacteria.  2. Avoid sexual intercourse until your symptoms are gone.  3. A bladder infection is treated with antibiotics. You may also be given Pyridium (generic = phenazopyridine) to reduce the burning sensation. This medicine will cause your urine to become a bright orange color. The orange urine may stain clothing. You may wear a pad or panty-liner to protect clothing.  PREVENTING FUTURE INFECTIONS:  1. Always wipe from front to back after a bowel movement.  2. Keep the genital area clean and dry.  3. Drink plenty of fluids each day to avoid dehydration.  4. Urinate right after intercourse to flush out the bladder.  5. Wear cotton underwear and cotton-lined panty hose; avoid tight-fitting pants.  6. If you are on birth control pills and are having frequent bladder infections, discuss with your doctor.  FOLLOW UP: Return to this facility or see your doctor if ALL symptoms are not gone after three days of treatment.  GET PROMPT MEDICAL ATTENTION if any of the following occur:    Fever over 101 F (38.3 C)    No improvement by the third day of treatment    Increasing back or abdominal pain    Repeated vomiting; unable to keep medicine down    Weakness, dizziness or fainting    Vaginal discharge    Pain, redness or swelling in " the labia (outer vaginal area)    5910-8183 [employer], [address] [place] [address], [place] [address]. All rights reserved. This information is not intended as a substitute for professional medical care. Always follow your healthcare professional's instructions.

## 2017-08-20 NOTE — MR AVS SNAPSHOT
"              After Visit Summary   8/20/2017    Ginger Abreu    MRN: [id number]           Patient Information     Date Of Birth          1940        Visit Information        Provider Department      8/20/2017 9:55 AM Yonny Hidalgo DO [facility]        Today's Diagnoses     Urinary tract infection without hematuria, site unspecified    -  1    Dysuria        Nonspecific finding on examination of urine          Care Instructions       * BLADDER INFECTION,Female (Adult)    A bladder infection (\"cystitis\" or \"UTI\") usually causes a constant urge to urinate and a burning when passing urine. Urine may be cloudy, smelly or dark. There may be pain in the lower abdomen. A bladder infection occurs when bacteria from the vaginal area enter the bladder opening (urethra). This can occur from sexual intercourse, wearing tight clothing, dehydration and other factors.  HOME CARE:  1. Drink lots of fluids (at least 6-8 glasses a day, unless you must restrict fluids for other medical reasons). This will force the medicine into your urinary system and flush the bacteria out of your body. Cranberry juice has been shown to help clear out the bacteria.  2. Avoid sexual intercourse until your symptoms are gone.  3. A bladder infection is treated with antibiotics. You may also be given Pyridium (generic = phenazopyridine) to reduce the burning sensation. This medicine will cause your urine to become a bright orange color. The orange urine may stain clothing. You may wear a pad or panty-liner to protect clothing.  PREVENTING FUTURE INFECTIONS:  1. Always wipe from front to back after a bowel movement.  2. Keep the genital area clean and dry.  3. Drink plenty of fluids each day to avoid dehydration.  4. Urinate right after intercourse to flush out the bladder.  5. Wear cotton underwear and cotton-lined panty hose; avoid tight-fitting pants.  6. If you are on birth control pills and are having " frequent bladder infections, discuss with your doctor.  FOLLOW UP: Return to this facility or see your doctor if ALL symptoms are not gone after three days of treatment.  GET PROMPT MEDICAL ATTENTION if any of the following occur:    Fever over 101 F (38.3 C)    No improvement by the third day of treatment    Increasing back or abdominal pain    Repeated vomiting; unable to keep medicine down    Weakness, dizziness or fainting    Vaginal discharge    Pain, redness or swelling in the labia (outer vaginal area)    5209-9863 [employer], [address] [place] [address], [place] [address]. All rights reserved. This information is not intended as a substitute for professional medical care. Always follow your healthcare professional's instructions.            Follow-ups after your visit        Your next 10 appointments already scheduled     Oct 10, 2017  1:30 PM CDT   MyChart Long with Steven Fonseca MD   [facility]    [address] [id number] [phone]              Who to contact     If you have questions or need follow up information about today's clinic visit or your schedule please contact [facility] directly at [phone].  Normal or non-critical lab and imaging results will be communicated to you by MyChart, letter or phone within 4 business days after the clinic has received the results. If you do not hear from us within 7 days, please contact the clinic through MyChart or phone. If you have a critical or abnormal lab result, we will notify you by phone as soon as possible.  Submit refill requests through [employer] or call your pharmacy and they will forward the refill request to us. Please allow 3 business days for your refill to be completed.          Additional Information About Your Visit        MyChart Information     [employer] gives you secure access to your electronic health record. If  you see a primary care provider, you can also send messages to your care team and make appointments. If you have questions, please call your primary care clinic.  If you do not have a primary care provider, please call [phone] and they will assist you.        Care EveryWhere ID     This is your Care EveryWhere ID. This could be used by other organizations to access your [facility] medical records  [id number]        Your Vitals Were     Pulse Temperature Pulse Oximetry BMI (Body Mass Index)          59 98.8  F (37.1  C) 96% 29.7 kg/m2         Blood Pressure from Last 3 Encounters:   08/20/17 110/50   08/03/17 121/67   04/14/17 132/74    Weight from Last 3 Encounters:   08/20/17 162 lb 6.4 oz (73.7 kg)   08/03/17 164 lb (74.4 kg)   04/14/17 165 lb 8 oz (75.1 kg)              We Performed the Following     UA with Microscopic reflex to Culture     Urine Culture Aerobic Bacterial          Today's Medication Changes          These changes are accurate as of: 8/20/17 10:48 AM.  If you have any questions, ask your nurse or doctor.               Start taking these medicines.        Dose/Directions    cefdinir 300 MG capsule   Commonly known as:  OMNICEF   Used for:  Urinary tract infection without hematuria, site unspecified   Started by:  Yonny Hidalgo DO        Dose:  300 mg   Take 1 capsule (300 mg) by mouth 2 times daily for 5 days   Quantity:  10 capsule   Refills:  0            Where to get your medicines      These medications were sent to [facility] [id number] [place] [address] [address] AT [place] [address]  [facility] [address], [facility] [address]    Hours:  24-hours Phone:  [phone]     cefdinir 300 MG capsule                Primary Care Provider Office Phone # Fax #    Steven Fonseca [phone]       [address] [facility] [address]        Equal Access to Services     LIANA RIZO AH: Mag Caba, rodríguez millan, qachemata christian  juan luis nolan kirsten erwinaan ah. So [facility] [phone].    ATENCIÓN: Si cali waggoner, tiene a decker disposición servicios gratuitos de asistencia lingüística. Joe al [phone].    We comply with applicable federal civil rights laws and [place] laws. We do not discriminate on the basis of race, color, national origin, age, disability sex, sexual orientation or gender identity.            Thank you!     Thank you for choosing [facility]  for your care. Our goal is always to provide you with excellent care. Hearing back from our patients is one way we can continue to improve our services. Please take a few minutes to complete the written survey that you may receive in the mail after your visit with us. Thank you!             Your Updated Medication List - Protect others around you: Learn how to safely use, store and throw away your medicines at www.disposemymeds.org.          This list is accurate as of: 8/20/17 10:48 AM.  Always use your most recent med list.                   Brand Name Dispense Instructions for use Diagnosis    ACIDOPHILUS PO      Take  by mouth.        albuterol 108 (90 BASE) MCG/ACT Inhaler    PROAIR HFA/PROVENTIL HFA/VENTOLIN HFA    3 Inhaler    Inhale 2 puffs into the lungs every 6 hours INDICATION: RESCUE INHALER FOR SHORTNESS OF BREATH, CHEST TIGHTNESS AND COUGH    Mild persistent asthma       ALPRAZolam 0.25 MG tablet    XANAX    30 tablet    TAKE ONE TABLET BY MOUTH ONCE NIGHTLY AS NEEDED FOR SEVERE ANXIETY ONLY.    Anxiety       ascorbic acid 1000 MG Tabs    vitamin C    100 tablet    Take 1 tablet (1,000 mg) by mouth daily VITAMIN C REPLACEMENT    Scurvy       aspirin EC 81 MG EC tablet      Take 1 tablet (81 mg) by mouth daily    Renal artery stenosis (H)       ASPIRIN NOT PRESCRIBED    INTENTIONAL    0 each    continuous prn Reported on 3/6/2017        BETA BLOCKER NOT PRESCRIBED (INTENTIONAL)      Beta Blocker not prescribed  intentionally due to Intolerance    Type 2 diabetes, HbA1c goal < 7% (H), Hypertension goal BP (blood pressure) < 130/80       butalbital-acetaminophen-caffeine -40 MG per tablet    FIORICET/ESGIC    30 tablet    Take 1 tablet by mouth every 6 hours as needed FOR SEVERE MIGRAINE HEADACHE ONLY    Migraine with aura and without status migrainosus, not intractable       calcium-vitamin D-vitamin K 500-500-40 MG-UNT-MCG Chew    VIACTIV    100 tablet    Take 1 tablet by mouth 2 times daily (with meals) INDICATION: FOR BONE AND BREAST HEALTH    Vitamin D deficiency       cefdinir 300 MG capsule    OMNICEF    10 capsule    Take 1 capsule (300 mg) by mouth 2 times daily for 5 days    Urinary tract infection without hematuria, site unspecified       cholecalciferol 80328 UNITS capsule    VITAMIN D3    40 capsule    ONE CAP 3 X A MONTH ON THE 1ST, 10TH AND 20TH OF EACH MONTH, FOR VITAMIN D DEFICIENCY MUST TAKE WITH WITH FAT CONTAINING MEAL, TAKE ONCE A WEEK FOR 1 MONTH    Vitamin D deficiency       cloNIDine 0.1 MG tablet    CATAPRES    540 tablet    Take 2 tablets (0.2 mg) by mouth 3 times daily (with meals) INDICATION: TO LOWER BLOOD PRESSURE    Essential hypertension, benign       cyabnocobalamin 2500 MCG sublingual tablet    VITAMIN B-12    250 tablet    Take 2,500 mcg by mouth daily INDICATION: FOR VITAMIN B12 SUPPLEMENTATION - TO IMPROVE MEMORY, BALANCE, SLEEP AND MOOD, DIRECTIONS: PLEASE PLACE UNDER THE TONGUE TO DISSOLVE    Vitamin B12 deficiency (non anemic)       denosumab 60 MG/ML Soln injection    PROLIA    1 mL    Inject 1 mL (60 mg) Subcutaneous once for 1 dose    Osteopenia       esomeprazole 40 MG CR capsule    nexIUM    180 capsule    Take 1 capsule (40 mg) by mouth every morning (before breakfast) TAKE  30'-60' BEFORE 1ST MEAL    Gastroesophageal reflux disease without esophagitis       fluocinonide 0.05 % solution    LIDEX    60 mL    Apply to scalp QD-BID as needed    Scalp itch       fluticasone 50  MCG/ACT spray    FLONASE    16 g    Spray 2 sprays in nostril At Bedtime INDICATION: TO CONTROL NASAL ALLERGY SYMPTOMS, DO NOT BLOW NOSE FOR 30 MINUTES AFTER USING    Seasonal allergic rhinitis, unspecified allergic rhinitis trigger       furosemide 20 MG tablet    LASIX    30 tablet    Take 1 tablet (20 mg) by mouth as needed INDICATION: TO HELP LOWER BLOOD PRESSURE AND TO TREAT EDEMA    Benign essential hypertension       glimepiride 2 MG tablet    AMARYL    90 tablet    Take 1 tablet (2 mg) by mouth every morning (before breakfast) INDICATION: TO TREAT DIABETES    Type 2 diabetes mellitus without complication, without long-term current use of insulin (H)       melatonin 5 MG Caps      Take by mouth At Bedtime        nitroGLYcerin 0.4 MG sublingual tablet    NITROSTAT    25 tablet    Place 1 tablet (0.4 mg) under the tongue every 5 minutes as needed for chest pain    Exertional dyspnea       olmesartan 40 MG tablet    BENICAR    30 tablet    Take 1 tablet (40 mg) by mouth daily INDICATION:TO LOWER BLOOD PRESSURE AND TO HELP PRESERVE KIDNEY FUNCTION    Benign essential hypertension       order for [facility]     1 Units    Equipment being ordered: Wheeled walker with basket and seat attachment    Spinal stenosis, lumbar region, without neurogenic claudication       pravastatin 80 MG tablet    PRAVACHOL    45 tablet    Take 0.5 tablets (40 mg) by mouth every evening INDICATION: TO LOWER CHOLESTEROL AND TO HELP  PREVENT HEART DISEASE    Hyperlipidemia LDL goal <100       triamterene-hydrochlorothiazide 37.5-25 MG per tablet    MAXZIDE-25    30 tablet    Take 1 tablet by mouth every morning INDICATION:TO LOWER BLOOD PRESSURE AND CONTROL EDEMA    Benign essential hypertension

## 2017-08-20 NOTE — NURSING NOTE
"Chief Complaint   Patient presents with     UTI     Pt c/o possible UTI X 10 days.     Urgent Care       Initial /50  Pulse 59  Temp 98.8  F (37.1  C)  Wt 162 lb 6.4 oz (73.7 kg)  SpO2 96%  BMI 29.7 kg/m2 Estimated body mass index is 29.7 kg/(m^2) as calculated from the following:    Height as of 8/3/17: 5' 2\" (1.575 m).    Weight as of this encounter: 162 lb 6.4 oz (73.7 kg).  Medication Reconciliation: complete    "

## 2017-08-20 NOTE — PROGRESS NOTES
SUBJECTIVE: Ginger Abreu is a 77 year old female who  presents today for a possible UTI.   Symptoms of dysuria and frequency have been going on for 2day(s).    Hematuria no.  sudden onsetand moderate.    There is no history of fever, chills, nausea or vomiting.   This patient does  have a history of urinary tract infections.   Patient denies long duration and flank pain    Past Medical History:   Diagnosis Date     Acute peptic ulcer, unspecified site, without mention of hemorrhage, perforation, or obstruction      Allergic rhinitis, cause unspecified      Anxiety      Basal cell carcinoma      Chest pain      DVT (deep venous thrombosis) (H)      Esophageal reflux      Generalized osteoarthrosis, unspecified site      Herpes zoster      Hyperlipidaemia      Lumbago      Migraine with aura, without mention of intractable migraine without mention of status migrainosus      Mild persistent asthma      MITRAL VALVE PROLAPSE      MVP (mitral valve prolapse)      Myocarditis (H)      Osteoarthritis      OSTEOPENIA      Osteopenia      Other and unspecified hyperlipidemia      Other specified disorders of bladder      Palpitations      PE (pulmonary embolism)      Pneumonia, organism      Pulmonary embolism (H)      Shortness of breath      Spinal stenosis      Spinal stenosis, lumbar region, without neurogenic claudication      Squamous cell carcinoma      Thyroid nodule      Thyroid nodule      Type II or unspecified type diabetes mellitus without mention of complication, not stated as uncontrolled      Unspecified essential hypertension      Unspecified vitamin D deficiency      Allergies   Allergen Reactions     Codeine Difficulty breathing     Chest gets tight & difficulty breathing      Atenolol Other (See Comments)     Sob w/ exertion - asthma symptoms     Diltiazem Other (See Comments)     edema - fluid retention in legs       Labetalol Other (See Comments) and Itching     Sob w/ exertion - asthma  symptoms  itching     Lipitor [Atorvastatin Calcium] Other (See Comments)     Muscle weakness     Nifedipine Other (See Comments)     Sob w/ exertion - asthma symptoms, HAs       Sulfa Drugs Hives     hives     Zocor [Simvastatin] Other (See Comments)     Muscle weakness     Advair Diskus Other (See Comments)     hoarseness with 500/50, not the 250/50     Aleve [Naproxen] Other (See Comments)     Raises BP     Amlodipine Besylate Fatigue and Diarrhea     Bystolic [Nebivolol Hcl] Other (See Comments)     headaches     Calcium Carbonate Diarrhea     Crestor [Rosuvastatin Calcium] Muscle Pain (Myalgia)     Diovan [Valsartan] GI Disturbance     gas       Hydralazine Other (See Comments) and Nausea     Increases pulse     Hydrochlorothiazide Other (See Comments)     increasing glucose     Imdur [Isosorbide Mononitrate] Other (See Comments)     headaches     Kcl GI Disturbance     gas      Lisinopril Cough     Hoarse voice     Lovastatin Muscle Pain (Myalgia)     Metformin Diarrhea            Metoprolol Other (See Comments)     Sob w/ exertion - asthma symptoms       Niacin Other (See Comments)     hyperglycemia     Omnicef Diarrhea     Pravachol [Hmg-Coa-R Inhibitors] Muscle Pain (Myalgia)     Prilosec Otc [Omeprazole Magnesium] Diarrhea     diarrhea       Zetia [Ezetimibe] Muscle Pain (Myalgia)     weakness     Social History   Substance Use Topics     Smoking status: Never Smoker     Smokeless tobacco: Never Used     Alcohol use No      Comment: very rare       ROS: CONSTITUTIONAL:NEGATIVE for fever, chills, change in weight    OBJECTIVE:  /50  Pulse 59  Temp 98.8  F (37.1  C)  Wt 162 lb 6.4 oz (73.7 kg)  SpO2 96%  BMI 29.7 kg/m2    No Flank/abd pain      ICD-10-CM    1. Urinary tract infection without hematuria, site unspecified N39.0 cefdinir (OMNICEF) 300 MG capsule     DISCONTINUED: cefdinir (OMNICEF) 300 MG capsule   2. Dysuria R30.0 UA with Microscopic reflex to Culture   3. Nonspecific finding on  examination of urine R82.90 Urine Culture Aerobic Bacterial       Drink plenty of fluids.  Prevention and treatment of UTI's discussed.Signs and symptoms of pyelonephritis mentioned.  Follow up with primary care physician if not improving

## 2017-08-22 LAB
BACTERIA SPEC CULT: ABNORMAL
BACTERIA SPEC CULT: ABNORMAL
SPECIMEN SOURCE: ABNORMAL

## 2017-08-23 ENCOUNTER — TELEPHONE (OUTPATIENT)
Dept: URGENT CARE | Facility: URGENT CARE | Age: 77
End: 2017-08-23

## 2017-08-23 DIAGNOSIS — N39.0 URINARY TRACT INFECTION WITHOUT HEMATURIA, SITE UNSPECIFIED: Primary | ICD-10-CM

## 2017-08-23 NOTE — TELEPHONE ENCOUNTER
Pt states symptoms of dysuria and urinary frequency has not improved with the use of Omnicef antibiotic that was prescribed on 08/20/2017. Pt is requesting a different antibiotic to be sent to [facility] phone # [phone].    Please Advise.    KINJAL Yu MA

## 2017-08-23 NOTE — TELEPHONE ENCOUNTER
Reason for Call:  Other call back    Detailed comments: uti symptoms not better    Phone Number Patient can be reached at: Cell number on file:    Telephone Information:   Mobile [phone]       Best Time: any     Can we leave a detailed message on this number? YES                                                                                                                                                 Call taken on 8/23/2017 at 3:06 PM by Rosio Little

## 2017-08-24 RX ORDER — CIPROFLOXACIN 250 MG/1
250 TABLET, FILM COATED ORAL 2 TIMES DAILY
Qty: 10 TABLET | Refills: 0
Start: 2017-08-24 | End: 2017-08-29

## 2017-08-30 DIAGNOSIS — G43.109 MIGRAINE WITH AURA AND WITHOUT STATUS MIGRAINOSUS, NOT INTRACTABLE: ICD-10-CM

## 2017-08-30 NOTE — TELEPHONE ENCOUNTER
Butalbital       Last Written Prescription Date:  7/7/17  Last Fill Quantity: 30,   # refills: 0  Last Office Visit with [facility], [facility] or [facility] prescribing provider: 4/114/17  Future Office visit:    Next 5 appointments (look out 90 days)     Oct 10, 2017  1:30 PM CDT   MyChart Long with Steven Fonseca MD   [facility]    [address] [address] [phone]                   Routing refill request to provider for review/approval because:  Drug not on the [facility], [facility] or [facility] refill protocol or controlled substance

## 2017-08-31 RX ORDER — BUTALBITAL, ACETAMINOPHEN AND CAFFEINE 50; 325; 40 MG/1; MG/1; MG/1
TABLET ORAL
Qty: 30 TABLET | Refills: 0 | Status: SHIPPED | OUTPATIENT
Start: 2017-08-31 | End: 2017-12-21

## 2017-09-01 NOTE — TELEPHONE ENCOUNTER
rx butalbital acetaminophen fioricet -40mg faxed to  [facility] [address] [place] [address] FAX: [phone]

## 2017-09-08 ENCOUNTER — MYC MEDICAL ADVICE (OUTPATIENT)
Dept: INTERNAL MEDICINE | Facility: CLINIC | Age: 77
End: 2017-09-08

## 2017-09-08 ENCOUNTER — OFFICE VISIT (OUTPATIENT)
Dept: URGENT CARE | Facility: URGENT CARE | Age: 77
End: 2017-09-08
Payer: COMMERCIAL

## 2017-09-08 VITALS
DIASTOLIC BLOOD PRESSURE: 74 MMHG | WEIGHT: 164 LBS | SYSTOLIC BLOOD PRESSURE: 124 MMHG | OXYGEN SATURATION: 98 % | TEMPERATURE: 98.5 F | BODY MASS INDEX: 30 KG/M2 | HEART RATE: 50 BPM

## 2017-09-08 DIAGNOSIS — R82.90 NONSPECIFIC FINDING ON EXAMINATION OF URINE: ICD-10-CM

## 2017-09-08 DIAGNOSIS — M85.80 OSTEOPENIA, UNSPECIFIED LOCATION: ICD-10-CM

## 2017-09-08 DIAGNOSIS — R30.0 DYSURIA: ICD-10-CM

## 2017-09-08 DIAGNOSIS — N39.0 URINARY TRACT INFECTION WITHOUT HEMATURIA, SITE UNSPECIFIED: Primary | ICD-10-CM

## 2017-09-08 DIAGNOSIS — R10.13 DYSPEPSIA: Primary | ICD-10-CM

## 2017-09-08 LAB
ALBUMIN UR-MCNC: NEGATIVE MG/DL
APPEARANCE UR: CLEAR
BACTERIA #/AREA URNS HPF: ABNORMAL /HPF
BILIRUB UR QL STRIP: NEGATIVE
COLOR UR AUTO: YELLOW
GLUCOSE UR STRIP-MCNC: NEGATIVE MG/DL
HGB UR QL STRIP: NEGATIVE
KETONES UR STRIP-MCNC: NEGATIVE MG/DL
LEUKOCYTE ESTERASE UR QL STRIP: NEGATIVE
NITRATE UR QL: POSITIVE
PH UR STRIP: 5 PH (ref 5–7)
RBC #/AREA URNS AUTO: ABNORMAL /HPF
SOURCE: ABNORMAL
SP GR UR STRIP: 1.01 (ref 1–1.03)
UROBILINOGEN UR STRIP-ACNC: 0.2 EU/DL (ref 0.2–1)
WBC #/AREA URNS AUTO: ABNORMAL /HPF

## 2017-09-08 PROCEDURE — 87086 URINE CULTURE/COLONY COUNT: CPT | Performed by: FAMILY MEDICINE

## 2017-09-08 PROCEDURE — 81001 URINALYSIS AUTO W/SCOPE: CPT | Performed by: PHYSICIAN ASSISTANT

## 2017-09-08 PROCEDURE — 87186 SC STD MICRODIL/AGAR DIL: CPT | Performed by: FAMILY MEDICINE

## 2017-09-08 PROCEDURE — 87088 URINE BACTERIA CULTURE: CPT | Performed by: FAMILY MEDICINE

## 2017-09-08 PROCEDURE — 99213 OFFICE O/P EST LOW 20 MIN: CPT | Performed by: FAMILY MEDICINE

## 2017-09-08 RX ORDER — CIPROFLOXACIN 250 MG/1
250 TABLET, FILM COATED ORAL 2 TIMES DAILY
Qty: 20 TABLET | Refills: 0 | Status: SHIPPED | OUTPATIENT
Start: 2017-09-08 | End: 2017-09-18

## 2017-09-08 NOTE — NURSING NOTE
"Chief Complaint   Patient presents with     Urinary Problem     recent tx for UTI,now having dysuria       Initial /74 (BP Location: Left arm, Patient Position: Chair, Cuff Size: Adult Regular)  Pulse 50  Temp 98.5  F (36.9  C) (Oral)  Wt 164 lb (74.4 kg)  SpO2 98%  BMI 30 kg/m2 Estimated body mass index is 30 kg/(m^2) as calculated from the following:    Height as of 8/3/17: 5' 2\" (1.575 m).    Weight as of this encounter: 164 lb (74.4 kg).  Medication Reconciliation: complete Paula CROOK    "

## 2017-09-08 NOTE — PROGRESS NOTES
SUBJECTIVE: Ginger Abreu is a 77 year old female who  presents today for a possible UTI.   Symptoms of dysuria and frequency have been going on forday(s).    Hematuria no.  still presentand moderate.    There is no history of fever, chills, nausea or vomiting.   This patient does have a history of urinary tract infections.   Patient denies long duration and flank pain    Past Medical History:   Diagnosis Date     Acute peptic ulcer, unspecified site, without mention of hemorrhage, perforation, or obstruction      Allergic rhinitis, cause unspecified      Anxiety      Basal cell carcinoma      Chest pain      DVT (deep venous thrombosis) (H)      Esophageal reflux      Generalized osteoarthrosis, unspecified site      Herpes zoster      Hyperlipidaemia      Lumbago      Migraine with aura, without mention of intractable migraine without mention of status migrainosus      Mild persistent asthma      MITRAL VALVE PROLAPSE      MVP (mitral valve prolapse)      Myocarditis (H)      Osteoarthritis      OSTEOPENIA      Osteopenia      Other and unspecified hyperlipidemia      Other specified disorders of bladder      Palpitations      PE (pulmonary embolism)      Pneumonia, organism      Pulmonary embolism (H)      Shortness of breath      Spinal stenosis      Spinal stenosis, lumbar region, without neurogenic claudication      Squamous cell carcinoma      Thyroid nodule      Thyroid nodule      Type II or unspecified type diabetes mellitus without mention of complication, not stated as uncontrolled      Unspecified essential hypertension      Unspecified vitamin D deficiency      Allergies   Allergen Reactions     Codeine Difficulty breathing     Chest gets tight & difficulty breathing      Atenolol Other (See Comments)     Sob w/ exertion - asthma symptoms     Diltiazem Other (See Comments)     edema - fluid retention in legs       Labetalol Other (See Comments) and Itching     Sob w/ exertion - asthma  symptoms  itching     Lipitor [Atorvastatin Calcium] Other (See Comments)     Muscle weakness     Nifedipine Other (See Comments)     Sob w/ exertion - asthma symptoms, HAs       Sulfa Drugs Hives     hives     Zocor [Simvastatin] Other (See Comments)     Muscle weakness     Advair Diskus Other (See Comments)     hoarseness with 500/50, not the 250/50     Aleve [Naproxen] Other (See Comments)     Raises BP     Amlodipine Besylate Fatigue and Diarrhea     Bystolic [Nebivolol Hcl] Other (See Comments)     headaches     Calcium Carbonate Diarrhea     Crestor [Rosuvastatin Calcium] Muscle Pain (Myalgia)     Diovan [Valsartan] GI Disturbance     gas       Hydralazine Other (See Comments) and Nausea     Increases pulse     Hydrochlorothiazide Other (See Comments)     increasing glucose     Imdur [Isosorbide Mononitrate] Other (See Comments)     headaches     Kcl GI Disturbance     gas      Lisinopril Cough     Hoarse voice     Lovastatin Muscle Pain (Myalgia)     Metformin Diarrhea            Metoprolol Other (See Comments)     Sob w/ exertion - asthma symptoms       Niacin Other (See Comments)     hyperglycemia     Omnicef Diarrhea     Pravachol [Hmg-Coa-R Inhibitors] Muscle Pain (Myalgia)     Prilosec Otc [Omeprazole Magnesium] Diarrhea     diarrhea       Zetia [Ezetimibe] Muscle Pain (Myalgia)     weakness     Social History   Substance Use Topics     Smoking status: Never Smoker     Smokeless tobacco: Never Used     Alcohol use No      Comment: very rare       ROS: CONSTITUTIONAL:NEGATIVE for fever, chills, change in weight    OBJECTIVE:  /74 (BP Location: Left arm, Patient Position: Chair, Cuff Size: Adult Regular)  Pulse 50  Temp 98.5  F (36.9  C) (Oral)  Wt 164 lb (74.4 kg)  SpO2 98%  BMI 30 kg/m2    No Flank/abd pain      ICD-10-CM    1. Urinary tract infection without hematuria, site unspecified N39.0 ciprofloxacin (CIPRO) 250 MG tablet   2. Dysuria R30.0 UA with Microscopic reflex to Culture   3.  Nonspecific finding on examination of urine R82.90 Urine Culture Aerobic Bacterial       Drink plenty of fluids.  Prevention and treatment of UTI's discussed.Signs and symptoms of pyelonephritis mentioned.  Follow up with primary care physician if not improving

## 2017-09-08 NOTE — MR AVS SNAPSHOT
"              After Visit Summary   9/8/2017    Ginger Abreu    MRN: [id number]           Patient Information     Date Of Birth          1940        Visit Information        Provider Department      9/8/2017 2:45 PM Yonny Hidalgo DO [facility]        Today's Diagnoses     Urinary tract infection without hematuria, site unspecified    -  1    Dysuria        Nonspecific finding on examination of urine          Care Instructions       * BLADDER INFECTION,Female (Adult)    A bladder infection (\"cystitis\" or \"UTI\") usually causes a constant urge to urinate and a burning when passing urine. Urine may be cloudy, smelly or dark. There may be pain in the lower abdomen. A bladder infection occurs when bacteria from the vaginal area enter the bladder opening (urethra). This can occur from sexual intercourse, wearing tight clothing, dehydration and other factors.  HOME CARE:  1. Drink lots of fluids (at least 6-8 glasses a day, unless you must restrict fluids for other medical reasons). This will force the medicine into your urinary system and flush the bacteria out of your body. Cranberry juice has been shown to help clear out the bacteria.  2. Avoid sexual intercourse until your symptoms are gone.  3. A bladder infection is treated with antibiotics. You may also be given Pyridium (generic = phenazopyridine) to reduce the burning sensation. This medicine will cause your urine to become a bright orange color. The orange urine may stain clothing. You may wear a pad or panty-liner to protect clothing.  PREVENTING FUTURE INFECTIONS:  1. Always wipe from front to back after a bowel movement.  2. Keep the genital area clean and dry.  3. Drink plenty of fluids each day to avoid dehydration.  4. Urinate right after intercourse to flush out the bladder.  5. Wear cotton underwear and cotton-lined panty hose; avoid tight-fitting pants.  6. If you are on birth control pills and are having frequent " bladder infections, discuss with your doctor.  FOLLOW UP: Return to this facility or see your doctor if ALL symptoms are not gone after three days of treatment.  GET PROMPT MEDICAL ATTENTION if any of the following occur:    Fever over 101 F (38.3 C)    No improvement by the third day of treatment    Increasing back or abdominal pain    Repeated vomiting; unable to keep medicine down    Weakness, dizziness or fainting    Vaginal discharge    Pain, redness or swelling in the labia (outer vaginal area)    [id number] [employer], [address] [place] [address], [place] [address]. All rights reserved. This information is not intended as a substitute for professional medical care. Always follow your healthcare professional's instructions.            Follow-ups after your visit        Your next 10 appointments already scheduled     Oct 10, 2017  1:30 PM CDT   MyChart Long with Steven Fonseca MD   [facility]    [address] [id number] [phone]              Who to contact     If you have questions or need follow up information about today's clinic visit or your schedule please contact [facility] directly at [phone].  Normal or non-critical lab and imaging results will be communicated to you by MyChart, letter or phone within 4 business days after the clinic has received the results. If you do not hear from us within 7 days, please contact the clinic through MyChart or phone. If you have a critical or abnormal lab result, we will notify you by phone as soon as possible.  Submit refill requests through [employer] or call your pharmacy and they will forward the refill request to us. Please allow 3 business days for your refill to be completed.          Additional Information About Your Visit        MyChart Information     [employer] gives you secure access to your electronic health record. If you see a  primary care provider, you can also send messages to your care team and make appointments. If you have questions, please call your primary care clinic.  If you do not have a primary care provider, please call [phone] and they will assist you.        Care EveryWhere ID     This is your Care EveryWhere ID. This could be used by other organizations to access your [facility] medical records  [id number]        Your Vitals Were     Pulse Temperature Pulse Oximetry BMI (Body Mass Index)          50 98.5  F (36.9  C) (Oral) 98% 30 kg/m2         Blood Pressure from Last 3 Encounters:   09/08/17 124/74   08/20/17 110/50   08/03/17 121/67    Weight from Last 3 Encounters:   09/08/17 164 lb (74.4 kg)   08/20/17 162 lb 6.4 oz (73.7 kg)   08/03/17 164 lb (74.4 kg)              We Performed the Following     UA with Microscopic reflex to Culture     Urine Culture Aerobic Bacterial          Today's Medication Changes          These changes are accurate as of: 9/8/17  3:36 PM.  If you have any questions, ask your nurse or doctor.               Start taking these medicines.        Dose/Directions    ciprofloxacin 250 MG tablet   Commonly known as:  CIPRO   Used for:  Urinary tract infection without hematuria, site unspecified   Started by:  Yonny Hidalgo DO        Dose:  250 mg   Take 1 tablet (250 mg) by mouth 2 times daily for 10 days   Quantity:  20 tablet   Refills:  0            Where to get your medicines      These medications were sent to [facility] [address] [address]     Phone:  [phone]     ciprofloxacin 250 MG tablet                Primary Care Provider Office Phone # Fax #    Steven Fonseca [phone]       [address] [place] [address]        Equal Access to Services     LIANA RIZO AH: Mag Caba, waniurkada monty, qaybta kaaljuan luis santos. So  [facility] [phone].    ATENCIÓN: Si cali waggoner, tiene a decker disposición servicios gratuitos de asistencia lingüística. Joe muñoz [phone].    We comply with applicable federal civil rights laws and [place] laws. We do not discriminate on the basis of race, color, national origin, age, disability sex, sexual orientation or gender identity.            Thank you!     Thank you for choosing [facility]  for your care. Our goal is always to provide you with excellent care. Hearing back from our patients is one way we can continue to improve our services. Please take a few minutes to complete the written survey that you may receive in the mail after your visit with us. Thank you!             Your Updated Medication List - Protect others around you: Learn how to safely use, store and throw away your medicines at www.disposemymeds.org.          This list is accurate as of: 9/8/17  3:36 PM.  Always use your most recent med list.                   Brand Name Dispense Instructions for use Diagnosis    ACIDOPHILUS PO      Take  by mouth.        albuterol 108 (90 BASE) MCG/ACT Inhaler    PROAIR HFA/PROVENTIL HFA/VENTOLIN HFA    3 Inhaler    Inhale 2 puffs into the lungs every 6 hours INDICATION: RESCUE INHALER FOR SHORTNESS OF BREATH, CHEST TIGHTNESS AND COUGH    Mild persistent asthma       ALPRAZolam 0.25 MG tablet    XANAX    30 tablet    TAKE ONE TABLET BY MOUTH ONCE NIGHTLY AS NEEDED FOR SEVERE ANXIETY ONLY.    Anxiety       ascorbic acid 1000 MG Tabs    vitamin C    100 tablet    Take 1 tablet (1,000 mg) by mouth daily VITAMIN C REPLACEMENT    Scurvy       aspirin EC 81 MG EC tablet      Take 1 tablet (81 mg) by mouth daily    Renal artery stenosis (H)       ASPIRIN NOT PRESCRIBED    INTENTIONAL    0 each    continuous prn Reported on 3/6/2017        BETA BLOCKER NOT PRESCRIBED (INTENTIONAL)      Beta Blocker not prescribed intentionally due to Intolerance    Type 2 diabetes, HbA1c goal <  7% (H), Hypertension goal BP (blood pressure) < 130/80       butalbital-acetaminophen-caffeine -40 MG per tablet    FIORICET/ESGIC    30 tablet    TAKE ONE TABLET BY MOUTH EVERY 6 HOURS AS NEEDED FOR  SEVERE  MIGRAINE  HEADACHE  ONLY    Migraine with aura and without status migrainosus, not intractable       calcium-vitamin D-vitamin K 500-500-40 MG-UNT-MCG Chew    VIACTIV    100 tablet    Take 1 tablet by mouth 2 times daily (with meals) INDICATION: FOR BONE AND BREAST HEALTH    Vitamin D deficiency       cholecalciferol 69503 UNITS capsule    VITAMIN D3    40 capsule    ONE CAP 3 X A MONTH ON THE 1ST, 10TH AND 20TH OF EACH MONTH, FOR VITAMIN D DEFICIENCY MUST TAKE WITH WITH FAT CONTAINING MEAL, TAKE ONCE A WEEK FOR 1 MONTH    Vitamin D deficiency       ciprofloxacin 250 MG tablet    CIPRO    20 tablet    Take 1 tablet (250 mg) by mouth 2 times daily for 10 days    Urinary tract infection without hematuria, site unspecified       cloNIDine 0.1 MG tablet    CATAPRES    540 tablet    Take 2 tablets (0.2 mg) by mouth 3 times daily (with meals) INDICATION: TO LOWER BLOOD PRESSURE    Essential hypertension, benign       cyabnocobalamin 2500 MCG sublingual tablet    VITAMIN B-12    250 tablet    Take 2,500 mcg by mouth daily INDICATION: FOR VITAMIN B12 SUPPLEMENTATION - TO IMPROVE MEMORY, BALANCE, SLEEP AND MOOD, DIRECTIONS: PLEASE PLACE UNDER THE TONGUE TO DISSOLVE    Vitamin B12 deficiency (non anemic)       denosumab 60 MG/ML Soln injection    PROLIA    1 mL    Inject 1 mL (60 mg) Subcutaneous once for 1 dose    Osteopenia       esomeprazole 40 MG CR capsule    nexIUM    180 capsule    Take 1 capsule (40 mg) by mouth every morning (before breakfast) TAKE  30'-60' BEFORE 1ST MEAL    Gastroesophageal reflux disease without esophagitis       fluocinonide 0.05 % solution    LIDEX    60 mL    Apply to scalp QD-BID as needed    Scalp itch       fluticasone 50 MCG/ACT spray    FLONASE    16 g    Spray 2 sprays in  nostril At Bedtime INDICATION: TO CONTROL NASAL ALLERGY SYMPTOMS, DO NOT BLOW NOSE FOR 30 MINUTES AFTER USING    Seasonal allergic rhinitis, unspecified allergic rhinitis trigger       furosemide 20 MG tablet    LASIX    30 tablet    Take 1 tablet (20 mg) by mouth as needed INDICATION: TO HELP LOWER BLOOD PRESSURE AND TO TREAT EDEMA    Benign essential hypertension       glimepiride 2 MG tablet    AMARYL    90 tablet    Take 1 tablet (2 mg) by mouth every morning (before breakfast) INDICATION: TO TREAT DIABETES    Type 2 diabetes mellitus without complication, without long-term current use of insulin (H)       melatonin 5 MG Caps      Take by mouth At Bedtime        nitroGLYcerin 0.4 MG sublingual tablet    NITROSTAT    25 tablet    Place 1 tablet (0.4 mg) under the tongue every 5 minutes as needed for chest pain    Exertional dyspnea       olmesartan 40 MG tablet    BENICAR    30 tablet    Take 1 tablet (40 mg) by mouth daily INDICATION:TO LOWER BLOOD PRESSURE AND TO HELP PRESERVE KIDNEY FUNCTION    Benign essential hypertension       order for DME     1 Units    Equipment being ordered: Wheeled walker with basket and seat attachment    Spinal stenosis, lumbar region, without neurogenic claudication       pravastatin 80 MG tablet    PRAVACHOL    45 tablet    Take 0.5 tablets (40 mg) by mouth every evening INDICATION: TO LOWER CHOLESTEROL AND TO HELP  PREVENT HEART DISEASE    Hyperlipidemia LDL goal <100       triamterene-hydrochlorothiazide 37.5-25 MG per tablet    MAXZIDE-25    30 tablet    Take 1 tablet by mouth every morning INDICATION:TO LOWER BLOOD PRESSURE AND CONTROL EDEMA    Benign essential hypertension

## 2017-09-10 DIAGNOSIS — N39.0 URINARY TRACT INFECTION WITHOUT HEMATURIA, SITE UNSPECIFIED: Primary | ICD-10-CM

## 2017-09-10 LAB
BACTERIA SPEC CULT: ABNORMAL
SPECIMEN SOURCE: ABNORMAL

## 2017-09-11 RX ORDER — CEFUROXIME AXETIL 250 MG/1
250 TABLET ORAL 2 TIMES DAILY
Qty: 14 TABLET | Refills: 0
Start: 2017-09-11 | End: 2017-09-18

## 2017-09-12 ENCOUNTER — MYC MEDICAL ADVICE (OUTPATIENT)
Dept: INTERNAL MEDICINE | Facility: CLINIC | Age: 77
End: 2017-09-12

## 2017-09-12 RX ORDER — ACETAMINOPHEN 160 MG
1 TABLET,DISINTEGRATING ORAL DAILY
Qty: 30 CAPSULE | Status: SHIPPED | OUTPATIENT
Start: 2017-09-12 | End: 2019-12-10

## 2017-09-13 ENCOUNTER — MYC MEDICAL ADVICE (OUTPATIENT)
Dept: INTERNAL MEDICINE | Facility: CLINIC | Age: 77
End: 2017-09-13

## 2017-09-13 DIAGNOSIS — R10.13 DYSPEPSIA: ICD-10-CM

## 2017-09-22 DIAGNOSIS — F41.9 ANXIETY: ICD-10-CM

## 2017-09-25 NOTE — TELEPHONE ENCOUNTER
[place] ([place]) reviewed: cannot access  due to MD not having staff on Master list     [place]:  [email or web address]

## 2017-09-25 NOTE — TELEPHONE ENCOUNTER
Controlled Substance Refill Request for alprazolam  Problem List Complete:  Yes    Last Written Prescription Date:  07/07/17  Last Fill Quantity: 30,   # refills: 0    [place] [facility] [place]: 04/14/17    Clinic visit frequency required: Q 3 months     Future Office visit: 10/10/17  Next 5 appointments (look out 90 days)     Oct 10, 2017  1:30 PM CDT   [facility]Chart Long with [facility]    [address] [place] [id number] [phone]                  Controlled substance agreement on file: No.     Processing:  Fax Rx to [facility] pharmacy     checked in past 6 months?  No, route to RN

## 2017-09-26 RX ORDER — ALPRAZOLAM 0.25 MG
TABLET ORAL
Qty: 30 TABLET | Refills: 0
Start: 2017-09-26 | End: 2017-12-21

## 2017-09-26 NOTE — TELEPHONE ENCOUNTER
As Dr Fonseca is out of office.   Refill done #30 no further refills.  Upcoming appt noted in 2017 with Dr Fonseca   Please call prescription to pharmacy       Reviewed MPM  [employer] [place] Date: 17  Query Report Page#: 1  Patient Rx History Report  LOIS SO  Search Criteria: Last Name 'Lois' and First Name 'Ginger' and  =  and Request Period =   to ' - 5 out of 5 Recipients Selected.  Fill Date Product, Str, Form Qty Days Pt ID Prescriber Written RX# N/R* Pharm **MED+  ---------- -------------------------------- ------ ---- --------- ---------- ---------- ------------ ----- --------- ------  2017 RMWVGI-ASMZTVYS-UFPN -40 30.00 8 [id number] 2017 [id number] N [id number] 00.0  2017 ALPRAZOLAM 0.25 MG TABLET 30.00 30 [id number] 2017 [id number] R [id number] 00.0  2017 STBOZZ-RARYEZYN-VEAS -40 30.00 8 [id number] 2017 [id number] N [id number] 00.0  2017 ALPRAZOLAM 0.25 MG TABLET 30.00 30 [id number] 2017 [id number] N [id number] 00.0  04/15/2017 XCONRH-ZMJHURYG-WANX -40 40.00 10 [id number] 2017 [id number] N [id number] 00.0  2017 ALPRAZOLAM 0.25 MG TABLET 30.00 30 [id number] 2017 [id number] N [id number] 00.0  2016 ALPRAZOLAM 0.25 MG TABLET 10.00 10 [id number] 2016 [id number] N [id number] 00.0  10/24/2016 BPRFEP-WKSRZWIM-HXAA -40 30.00 8 [id number] 10/21/2016 [id number] N [id number] 00.0  10/03/2016 ALPRAZOLAM 0.25 MG TABLET 10.00 10 [id number] 10/03/2016 [id number] N [id number] 00.0  *N/R N=New R=Refill  +MED Daily  Prescribers for prescriptions listed  ----------------------------------------------------------------------------------------------------------------------------------  [id number] ALICIA FONSECA MD; [facility], [address] [place] [address]

## 2017-09-28 NOTE — TELEPHONE ENCOUNTER
ALPRAZolam (XANAX) 0.25 MG tablet     Last Written Prescription Date:  9/26/17  Last Fill Quantity: 30,   # refills: 0  Last Office Visit with [facility], [facility] or [facility] prescribing provider: 4/14/17  Future Office visit:    Next 5 appointments (look out 90 days)     Oct 10, 2017  1:30 PM CDT   MyChart Long with Steven Fonseca MD   [facility]    [address] [id number] [phone]                   Routing refill request to provider for review/approval because:

## 2017-09-29 ENCOUNTER — TRANSFERRED RECORDS (OUTPATIENT)
Dept: HEALTH INFORMATION MANAGEMENT | Facility: CLINIC | Age: 77
End: 2017-09-29

## 2017-10-09 ENCOUNTER — TRANSFERRED RECORDS (OUTPATIENT)
Dept: HEALTH INFORMATION MANAGEMENT | Facility: CLINIC | Age: 77
End: 2017-10-09

## 2017-10-10 ENCOUNTER — OFFICE VISIT (OUTPATIENT)
Dept: INTERNAL MEDICINE | Facility: CLINIC | Age: 77
End: 2017-10-10
Payer: COMMERCIAL

## 2017-10-10 VITALS
BODY MASS INDEX: 29.63 KG/M2 | OXYGEN SATURATION: 98 % | SYSTOLIC BLOOD PRESSURE: 118 MMHG | HEART RATE: 54 BPM | WEIGHT: 161 LBS | TEMPERATURE: 97.6 F | HEIGHT: 62 IN | DIASTOLIC BLOOD PRESSURE: 74 MMHG

## 2017-10-10 DIAGNOSIS — K25.9 GASTRIC ULCER WITHOUT HEMORRHAGE OR PERFORATION, UNSPECIFIED CHRONICITY: ICD-10-CM

## 2017-10-10 DIAGNOSIS — I10 ESSENTIAL HYPERTENSION, BENIGN: ICD-10-CM

## 2017-10-10 DIAGNOSIS — N39.0 RECURRENT URINARY TRACT INFECTION: ICD-10-CM

## 2017-10-10 DIAGNOSIS — I10 BENIGN ESSENTIAL HYPERTENSION: ICD-10-CM

## 2017-10-10 DIAGNOSIS — M85.80 OSTEOPENIA, UNSPECIFIED LOCATION: Primary | ICD-10-CM

## 2017-10-10 DIAGNOSIS — L65.9 HAIR LOSS: Primary | ICD-10-CM

## 2017-10-10 DIAGNOSIS — J45.30 MILD PERSISTENT ASTHMA WITHOUT COMPLICATION: ICD-10-CM

## 2017-10-10 PROCEDURE — 99214 OFFICE O/P EST MOD 30 MIN: CPT | Performed by: INTERNAL MEDICINE

## 2017-10-10 RX ORDER — FUROSEMIDE 20 MG
20 TABLET ORAL PRN
Qty: 90 TABLET | Status: SHIPPED | OUTPATIENT
Start: 2017-10-10 | End: 2017-12-21

## 2017-10-10 RX ORDER — ALBUTEROL SULFATE 90 UG/1
2 AEROSOL, METERED RESPIRATORY (INHALATION) EVERY 6 HOURS
Qty: 3 INHALER | Status: SHIPPED | OUTPATIENT
Start: 2017-10-10 | End: 2017-12-21

## 2017-10-10 RX ORDER — CLONIDINE HYDROCHLORIDE 0.1 MG/1
0.2 TABLET ORAL
Qty: 540 TABLET | Refills: 3 | Status: SHIPPED | OUTPATIENT
Start: 2017-10-10 | End: 2022-08-09

## 2017-10-10 RX ORDER — ZOLEDRONIC ACID 5 MG/100ML
5 INJECTION, SOLUTION INTRAVENOUS ONCE
Status: CANCELLED
Start: 2017-10-17 | End: 2017-10-17

## 2017-10-10 RX ORDER — TRIAMTERENE/HYDROCHLOROTHIAZID 37.5-25 MG
1 TABLET ORAL EVERY MORNING
Qty: 90 TABLET | Status: SHIPPED | OUTPATIENT
Start: 2017-10-10 | End: 2021-07-09 | Stop reason: ALTCHOICE

## 2017-10-10 NOTE — PROGRESS NOTES
"  SUBJECTIVE:   Ginger Abreu is a 77 year old female who presents to clinic today for the following health issues:    Hypertension Follow-up      Outpatient blood pressures are being checked at home.  Results are 118-140.    Low Salt Diet: no added salt    Amount of exercise or physical activity: None    Problems taking medications regularly: No    Medication side effects: thinning of hair  Diet: regular (no restrictions)  Blood pressure control has been significantly improved since recent medication change.    Asthma Follow-Up    Was ACT completed today?  No      Respiratory symptoms:   Cough: No   Wheezing: No   Shortness of breath: No    Use of short- acting(rescue) inhaler: yes    Taking controlled (daily) meds as prescribed: n/a    ER/UC visits or hospital admissions since last visit: none     Recent asthma triggers that patient is dealing with: None     She also underwent recent endoscopy for evaluation of persistent reflux symptoms which were significant for findings consistent with gastritis and presence of a recently healed gastric ulcer. Indefinite treatment with PPIs and H2 blockers recommended.      Problem list and histories reviewed & adjusted, as indicated.  Additional history: as documented    Labs reviewed in EPIC    Reviewed and updated as needed this visit by clinical staffTobacco  Allergies       Reviewed and updated as needed this visit by Provider         ROS:  14 point ROS negative except as above      OBJECTIVE:     /74 (BP Location: Left arm, Cuff Size: Adult Regular)  Pulse 54  Temp 97.6  F (36.4  C) (Oral)  Ht 5' 2\" (1.575 m)  Wt 161 lb (73 kg)  SpO2 98%  BMI 29.45 kg/m2  Body mass index is 29.45 kg/(m^2).  GENERAL: healthy, alert and no distress  NECK: no adenopathy, no asymmetry, masses, or scars and thyroid normal to palpation  RESP: lungs clear to auscultation - no rales, rhonchi or wheezes  CV: regular rate and rhythm, normal S1 S2, no S3 or S4, no murmur, click or " rub, no peripheral edema and peripheral pulses strong  ABDOMEN: soft, nontender, no hepatosplenomegaly, no masses and bowel sounds normal  MS: no gross musculoskeletal defects noted, no edema  SKIN: no suspicious lesions or rashes and hair qualitythin    Diagnostic Test Results:  none     ASSESSMENT/PLAN:     Hyperlipidemia; controlled   Plan:  No changes in the patient's current treatment plan    Hypertension; controlled   Associated with the following complications:    None   Plan:  No changes in the patient's current treatment plan  DIAGNOSIS/PLAN:     ICD-10-CM    1. Hair loss L65.9 DERMATOLOGY REFERRAL   2. Essential hypertension, benign I10 cloNIDine (CATAPRES) 0.1 MG tablet   3. Benign essential hypertension I10 furosemide (LASIX) 20 MG tablet     triamterene-hydrochlorothiazide (MAXZIDE-25) 37.5-25 MG per tablet   4. Mild persistent asthma without complication J45.30 albuterol (PROAIR HFA/PROVENTIL HFA/VENTOLIN HFA) 108 (90 BASE) MCG/ACT Inhaler       SIGNIFICANT ISSUES RE The primary encounter diagnosis was Hair loss. Diagnoses of Essential hypertension, benign, Benign essential hypertension, and Mild persistent asthma without complication were also pertinent to this visit. AS NOTED AND ADDRESSED ABOVE   MEDS AND AND LABS AS ORDERED TO ADDRESS PREVIOUS AND CURRENT ABNORMAL INDICES    Ginger is doing very well with regards to blood pressure control, asthma control is also satisfactory. She will continue medications as ordered.  She will also keep a food diary with regards to GI symptoms to help determine possible triggers for reflux.     She has also had a rash of recurrent UTIs and the most recent was treated empirically with fosfomycin with excellent results. She has not had another UTI since then. This is actually another good reason for her to remain on probiotics to help maintain normal gabi in the DURAN.    For evaluation of hair loss I have also referred her to dermatology as no clear immediate cause  is identified today.    She has had recurrent UTIs over the past 1 year that has required major antibiotic therapy. She is not on UTI prophylaxis so I'm going to start her on vaginal estrogens today for UTI prophylaxis. Hopefully this will do the trick with regards to preventing UTIs. The need for good p.o. Fluid intake and regular voiding of the bladder is also emphasized.     SEE PATIENT INSTRUCTION SECTION FOR FOLLOW UP PLAN    Ginger IS TO CONTINUE OTHER TREATMENT REGIMEN/PLANS EXCEPT AS INDICATED    COMPLIANCE WITH MEDICATIONS DIET AND EXERCISE PLANS ENCOURAGED    DISCONTINUED MEDS:  Medications Discontinued During This Encounter   Medication Reason     Lactobacillus (ACIDOPHILUS PO)      denosumab (PROLIA) 60 MG/ML SOLN injection      fluocinonide (LIDEX) 0.05 % external solution      cloNIDine (CATAPRES) 0.1 MG tablet Reorder     furosemide (LASIX) 20 MG tablet Reorder     triamterene-hydrochlorothiazide (MAXZIDE-25) 37.5-25 MG per tablet Reorder     albuterol (PROAIR HFA, PROVENTIL HFA, VENTOLIN HFA) 108 (90 BASE) MCG/ACT inhaler Reorder       CURRENT MED LIST WITH CHANGES AS NOTED BELOW:  Current Outpatient Prescriptions   Medication Sig Dispense Refill     cloNIDine (CATAPRES) 0.1 MG tablet Take 2 tablets (0.2 mg) by mouth 3 times daily (with meals) INDICATION: TO LOWER BLOOD PRESSURE 540 tablet 3     furosemide (LASIX) 20 MG tablet Take 1 tablet (20 mg) by mouth as needed INDICATION: TO HELP LOWER BLOOD PRESSURE AND TO TREAT EDEMA 90 tablet PRN     triamterene-hydrochlorothiazide (MAXZIDE-25) 37.5-25 MG per tablet Take 1 tablet by mouth every morning INDICATION:TO LOWER BLOOD PRESSURE AND CONTROL EDEMA 90 tablet PRN     albuterol (PROAIR HFA/PROVENTIL HFA/VENTOLIN HFA) 108 (90 BASE) MCG/ACT Inhaler Inhale 2 puffs into the lungs every 6 hours INDICATION: RESCUE INHALER FOR SHORTNESS OF BREATH, CHEST TIGHTNESS AND COUGH 3 Inhaler PRN     ALPRAZolam (XANAX) 0.25 MG tablet TAKE ONE TABLET BY MOUTH ONCE  NIGHTLY AS NEEDED FOR SEVERE ANXIETY ONLY 30 tablet 0     LACTOBACILLUS EXTRA STRENGTH CAPS Take 1 capsule by mouth daily INDICATION: TO RESTORE GOOD INTESTINAL BACTERIA 30 capsule prn     butalbital-acetaminophen-caffeine (FIORICET/ESGIC) -40 MG per tablet TAKE ONE TABLET BY MOUTH EVERY 6 HOURS AS NEEDED FOR  SEVERE  MIGRAINE  HEADACHE  ONLY 30 tablet 0     aspirin EC 81 MG EC tablet Take 1 tablet (81 mg) by mouth daily       cholecalciferol (VITAMIN D3) 15769 UNITS capsule ONE CAP 3 X A MONTH ON THE 1ST, 10TH AND 20TH OF EACH MONTH, FOR VITAMIN D DEFICIENCY MUST TAKE WITH WITH FAT CONTAINING MEAL, TAKE ONCE A WEEK FOR 1 MONTH 40 capsule 0     glimepiride (AMARYL) 2 MG tablet Take 1 tablet (2 mg) by mouth every morning (before breakfast) INDICATION: TO TREAT DIABETES 90 tablet 0     fluticasone (FLONASE) 50 MCG/ACT spray Spray 2 sprays in nostril At Bedtime INDICATION: TO CONTROL NASAL ALLERGY SYMPTOMS, DO NOT BLOW NOSE FOR 30 MINUTES AFTER USING 16 g PRN     olmesartan (BENICAR) 40 MG tablet Take 1 tablet (40 mg) by mouth daily INDICATION:TO LOWER BLOOD PRESSURE AND TO HELP PRESERVE KIDNEY FUNCTION 30 tablet 1     order for DME Equipment being ordered: Wheeled walker with basket and seat attachment 1 Units 0     nitroglycerin (NITROSTAT) 0.4 MG SL tablet Place 1 tablet (0.4 mg) under the tongue every 5 minutes as needed for chest pain 25 tablet 0     ascorbic acid (VITAMIN C) 1000 MG TABS Take 1 tablet (1,000 mg) by mouth daily VITAMIN C REPLACEMENT 100 tablet 3     pravastatin (PRAVACHOL) 80 MG tablet Take 0.5 tablets (40 mg) by mouth every evening INDICATION: TO LOWER CHOLESTEROL AND TO HELP  PREVENT HEART DISEASE 45 tablet PRN     cyabnocobalamin (VITAMIN B-12) 2500 MCG sublingual tablet Take 2,500 mcg by mouth daily INDICATION: FOR VITAMIN B12 SUPPLEMENTATION - TO IMPROVE MEMORY, BALANCE, SLEEP AND MOOD, DIRECTIONS: PLEASE PLACE UNDER THE TONGUE TO DISSOLVE 250 tablet 3     esomeprazole (NEXIUM) 40 MG  capsule Take 1 capsule (40 mg) by mouth every morning (before breakfast) TAKE  30'-60' BEFORE 1ST MEAL 180 capsule 2     calcium-vitamin D-vitamin K (VIACTIV) 500-500-40 MG-UNT-MCG CHEW Take 1 tablet by mouth 2 times daily (with meals) INDICATION: FOR BONE AND BREAST HEALTH 100 tablet PRN     melatonin 5 MG CAPS Take by mouth At Bedtime       BETA BLOCKER NOT PRESCRIBED, INTENTIONAL, Beta Blocker not prescribed intentionally due to Intolerance  0     ASPIRIN NOT PRESCRIBED, INTENTIONAL, continuous prn Reported on 3/6/2017 0 each 0     [DISCONTINUED] cloNIDine (CATAPRES) 0.1 MG tablet Take 2 tablets (0.2 mg) by mouth 3 times daily (with meals) INDICATION: TO LOWER BLOOD PRESSURE 540 tablet 3     [DISCONTINUED] furosemide (LASIX) 20 MG tablet Take 1 tablet (20 mg) by mouth as needed INDICATION: TO HELP LOWER BLOOD PRESSURE AND TO TREAT EDEMA 30 tablet PRN     [DISCONTINUED] triamterene-hydrochlorothiazide (MAXZIDE-25) 37.5-25 MG per tablet Take 1 tablet by mouth every morning INDICATION:TO LOWER BLOOD PRESSURE AND CONTROL EDEMA 30 tablet PRN     [DISCONTINUED] albuterol (PROAIR HFA, PROVENTIL HFA, VENTOLIN HFA) 108 (90 BASE) MCG/ACT inhaler Inhale 2 puffs into the lungs every 6 hours INDICATION: RESCUE INHALER FOR SHORTNESS OF BREATH, CHEST TIGHTNESS AND COUGH 3 Inhaler PRN     [DISCONTINUED] fluticasone (FLOVENT HFA) 110 MCG/ACT inhaler Inhale 2 puffs into the lungs 2 times daily rinse mouth after each use 3 Inhaler 3                 Patient Instructions   ** FOLLOW UP PLAN**:    PLEASE SCHEDULE FASTING LABS WITH OFFICE VISIT 6 MONTHS FROM TODAY TO FOLLOW UP ON  HIGH BLOOD PRESSURE, KIDNEY FUNCTION, AND CHOLESTEROL   YOU WILL NEED TO ESTABLISH CARE WITH ANOTHER PRIMARY CARE PROVIDER    YOU MAY CONTACT THE CLINIC IF ANY QUESTIONS OR CONCERNS -[phone] OR VIA ULYSSES Fonseca MD  [facility]

## 2017-10-10 NOTE — NURSING NOTE
"Chief Complaint   Patient presents with     Hypertension       Initial /74 (BP Location: Left arm, Cuff Size: Adult Regular)  Pulse 54  Temp 97.6  F (36.4  C) (Oral)  Ht 5' 2\" (1.575 m)  Wt 161 lb (73 kg)  SpO2 98%  BMI 29.45 kg/m2 Estimated body mass index is 29.45 kg/(m^2) as calculated from the following:    Height as of this encounter: 5' 2\" (1.575 m).    Weight as of this encounter: 161 lb (73 kg).  Medication Reconciliation: complete   Liliana Haji MA      "

## 2017-10-10 NOTE — MR AVS SNAPSHOT
After Visit Summary   10/10/2017    Ginger Abreu    MRN: [id number]           Patient Information     Date Of Birth          1940        Visit Information        Provider Department      10/10/2017 1:30 PM Steven Fonseca MD [facility]        Today's Diagnoses     Hair loss    -  1    Essential hypertension, benign        Benign essential hypertension        Mild persistent asthma without complication        Recurrent urinary tract infection          Care Instructions    ** FOLLOW UP PLAN**:    PLEASE SCHEDULE FASTING LABS WITH OFFICE VISIT 6 MONTHS FROM TODAY TO FOLLOW UP ON  HIGH BLOOD PRESSURE, KIDNEY FUNCTION, AND CHOLESTEROL   YOU WILL NEED TO ESTABLISH CARE WITH ANOTHER PRIMARY CARE PROVIDER    YOU MAY CONTACT THE CLINIC IF ANY QUESTIONS OR CONCERNS -[phone] OR VIA [facility]                   Follow-ups after your visit        Additional Services     DERMATOLOGY REFERRAL       Your provider has referred you to: [facility]: [facility] [phone]   [email or web address]    Please be aware that coverage of these services is subject to the terms and limitations of your health insurance plan.  Call member services at your health plan with any benefit or coverage questions.      Please bring the following with you to your appointment:    (1) Any X-Rays, CTs or MRIs which have been performed.  Contact the facility where they were done to arrange for  prior to your scheduled appointment.  Any new CT, MRI or other procedures ordered by your specialist must be performed at a [facility] facility or coordinated by your clinic's referral office.  (2) List of current medications  (3) This referral request   (4) Any documents/labs given to you for this referral                  Who to contact     If you have questions or need follow up information about today's clinic visit or your schedule please contact  "[facility] directly at [phone].  Normal or non-critical lab and imaging results will be communicated to you by MyChart, letter or phone within 4 business days after the clinic has received the results. If you do not hear from us within 7 days, please contact the clinic through [employer] or phone. If you have a critical or abnormal lab result, we will notify you by phone as soon as possible.  Submit refill requests through [employer] or call your pharmacy and they will forward the refill request to us. Please allow 3 business days for your refill to be completed.          Additional Information About Your Visit        [employer] Information     [employer] gives you secure access to your electronic health record. If you see a primary care provider, you can also send messages to your care team and make appointments. If you have questions, please call your primary care clinic.  If you do not have a primary care provider, please call [phone] and they will assist you.        Care EveryWhere ID     This is your Care EveryWhere ID. This could be used by other organizations to access your [facility] medical records  [id number]        Your Vitals Were     Pulse Temperature Height Pulse Oximetry BMI (Body Mass Index)       54 97.6  F (36.4  C) (Oral) 5' 2\" (1.575 m) 98% 29.45 kg/m2        Blood Pressure from Last 3 Encounters:   10/10/17 118/74   09/08/17 124/74   08/20/17 110/50    Weight from Last 3 Encounters:   10/10/17 161 lb (73 kg)   09/08/17 164 lb (74.4 kg)   08/20/17 162 lb 6.4 oz (73.7 kg)              We Performed the Following     DERMATOLOGY REFERRAL          Today's Medication Changes          These changes are accurate as of: 10/10/17  3:05 PM.  If you have any questions, ask your nurse or doctor.               Start taking these medicines.        Dose/Directions    conjugated estrogens cream   Commonly known as:  PREMARIN   Used for:  Recurrent urinary tract infection   Started by:  " Steven Fonseca MD        Dose:  0.5 g   Start taking on:  10/12/2017   Place 0.5 g vaginally twice a week   Quantity:  30 g   Refills:  12         Stop taking these medicines if you haven't already. Please contact your care team if you have questions.     ACIDOPHILUS PO   Stopped by:  Steven Fonseca MD           denosumab 60 MG/ML Soln injection   Commonly known as:  PROLIA   Stopped by:  Steven Fonseca MD           fluocinonide 0.05 % solution   Commonly known as:  LIDEX   Stopped by:  Steven Fonseca MD                Where to get your medicines      These medications were sent to [facility] [address] [place] [id number]     Phone:  [phone]     albuterol 108 (90 BASE) MCG/ACT Inhaler    cloNIDine 0.1 MG tablet    conjugated estrogens cream    furosemide 20 MG tablet    triamterene-hydrochlorothiazide 37.5-25 MG per tablet                Primary Care Provider Office Phone # Fax #    Steven Fonseca [phone]       [address] [facility] [address]        Equal Access to Services     MARA RIZO AH: Hadii ed ku hadasho Soomaali, waaxda luqadaha, qaybta kaalmada adeegyada, juan ulis crane. So [facility] [phone].    ATENCIÓN: Si habla español, tiene a decker disposición servicios gratuitos de asistencia lingüística. [facility] [phone].    We comply with applicable federal civil rights laws and [place] laws. We do not discriminate on the basis of race, color, national origin, age, disability, sex, sexual orientation, or gender identity.            Thank you!     Thank you for choosing [facility]  for your care. Our goal is always to provide you with excellent care. Hearing back from our patients is one way we can continue to improve our services. Please take a few minutes to complete the written survey that you may receive in the mail after your visit  with us. Thank you!             Your Updated Medication List - Protect others around you: Learn how to safely use, store and throw away your medicines at www.disposemymeds.org.          This list is accurate as of: 10/10/17  3:05 PM.  Always use your most recent med list.                   Brand Name Dispense Instructions for use Diagnosis    albuterol 108 (90 BASE) MCG/ACT Inhaler    PROAIR HFA/PROVENTIL HFA/VENTOLIN HFA    3 Inhaler    Inhale 2 puffs into the lungs every 6 hours INDICATION: RESCUE INHALER FOR SHORTNESS OF BREATH, CHEST TIGHTNESS AND COUGH    Mild persistent asthma without complication       ALPRAZolam 0.25 MG tablet    XANAX    30 tablet    TAKE ONE TABLET BY MOUTH ONCE NIGHTLY AS NEEDED FOR SEVERE ANXIETY ONLY    Anxiety       ascorbic acid 1000 MG Tabs    vitamin C    100 tablet    Take 1 tablet (1,000 mg) by mouth daily VITAMIN C REPLACEMENT    Scurvy       aspirin EC 81 MG EC tablet      Take 1 tablet (81 mg) by mouth daily    Renal artery stenosis (H)       ASPIRIN NOT PRESCRIBED    INTENTIONAL    0 each    continuous prn Reported on 3/6/2017        BETA BLOCKER NOT PRESCRIBED (INTENTIONAL)      Beta Blocker not prescribed intentionally due to Intolerance    Type 2 diabetes, HbA1c goal < 7% (H), Hypertension goal BP (blood pressure) < 130/80       butalbital-acetaminophen-caffeine -40 MG per tablet    FIORICET/ESGIC    30 tablet    TAKE ONE TABLET BY MOUTH EVERY 6 HOURS AS NEEDED FOR  SEVERE  MIGRAINE  HEADACHE  ONLY    Migraine with aura and without status migrainosus, not intractable       calcium-vitamin D-vitamin K 500-500-40 MG-UNT-MCG Chew    VIACTIV    100 tablet    Take 1 tablet by mouth 2 times daily (with meals) INDICATION: FOR BONE AND BREAST HEALTH    Vitamin D deficiency       cholecalciferol 23455 UNITS capsule    VITAMIN D3    40 capsule    ONE CAP 3 X A MONTH ON THE 1ST, 10TH AND 20TH OF EACH MONTH, FOR VITAMIN D DEFICIENCY MUST TAKE WITH WITH FAT CONTAINING MEAL, TAKE  ONCE A WEEK FOR 1 MONTH    Vitamin D deficiency       cloNIDine 0.1 MG tablet    CATAPRES    540 tablet    Take 2 tablets (0.2 mg) by mouth 3 times daily (with meals) INDICATION: TO LOWER BLOOD PRESSURE    Essential hypertension, benign       conjugated estrogens cream   Start taking on:  10/12/2017    PREMARIN    30 g    Place 0.5 g vaginally twice a week    Recurrent urinary tract infection       cyabnocobalamin 2500 MCG sublingual tablet    VITAMIN B-12    250 tablet    Take 2,500 mcg by mouth daily INDICATION: FOR VITAMIN B12 SUPPLEMENTATION - TO IMPROVE MEMORY, BALANCE, SLEEP AND MOOD, DIRECTIONS: PLEASE PLACE UNDER THE TONGUE TO DISSOLVE    Vitamin B12 deficiency (non anemic)       esomeprazole 40 MG CR capsule    nexIUM    180 capsule    Take 1 capsule (40 mg) by mouth every morning (before breakfast) TAKE  30'-60' BEFORE 1ST MEAL    Gastroesophageal reflux disease without esophagitis       fluticasone 50 MCG/ACT spray    FLONASE    16 g    Spray 2 sprays in nostril At Bedtime INDICATION: TO CONTROL NASAL ALLERGY SYMPTOMS, DO NOT BLOW NOSE FOR 30 MINUTES AFTER USING    Seasonal allergic rhinitis, unspecified allergic rhinitis trigger       furosemide 20 MG tablet    LASIX    90 tablet    Take 1 tablet (20 mg) by mouth as needed INDICATION: TO HELP LOWER BLOOD PRESSURE AND TO TREAT EDEMA    Benign essential hypertension       glimepiride 2 MG tablet    AMARYL    90 tablet    Take 1 tablet (2 mg) by mouth every morning (before breakfast) INDICATION: TO TREAT DIABETES    Type 2 diabetes mellitus without complication, without long-term current use of insulin (H)       LACTOBACILLUS EXTRA STRENGTH Caps     30 capsule    Take 1 capsule by mouth daily INDICATION: TO RESTORE GOOD INTESTINAL BACTERIA    Dyspepsia       melatonin 5 MG Caps      Take by mouth At Bedtime        nitroGLYcerin 0.4 MG sublingual tablet    NITROSTAT    25 tablet    Place 1 tablet (0.4 mg) under the tongue every 5 minutes as needed for chest  pain    Exertional dyspnea       olmesartan 40 MG tablet    BENICAR    30 tablet    Take 1 tablet (40 mg) by mouth daily INDICATION:TO LOWER BLOOD PRESSURE AND TO HELP PRESERVE KIDNEY FUNCTION    Benign essential hypertension       order for DME     1 Units    Equipment being ordered: Wheeled walker with basket and seat attachment    Spinal stenosis, lumbar region, without neurogenic claudication       pravastatin 80 MG tablet    PRAVACHOL    45 tablet    Take 0.5 tablets (40 mg) by mouth every evening INDICATION: TO LOWER CHOLESTEROL AND TO HELP  PREVENT HEART DISEASE    Hyperlipidemia LDL goal <100       triamterene-hydrochlorothiazide 37.5-25 MG per tablet    MAXZIDE-25    90 tablet    Take 1 tablet by mouth every morning INDICATION:TO LOWER BLOOD PRESSURE AND CONTROL EDEMA    Benign essential hypertension

## 2017-10-10 NOTE — PATIENT INSTRUCTIONS
** FOLLOW UP PLAN**:    PLEASE SCHEDULE FASTING LABS WITH OFFICE VISIT 6 MONTHS FROM TODAY TO FOLLOW UP ON  HIGH BLOOD PRESSURE, KIDNEY FUNCTION, AND CHOLESTEROL   YOU WILL NEED TO ESTABLISH CARE WITH ANOTHER PRIMARY CARE PROVIDER    YOU MAY CONTACT THE CLINIC IF ANY QUESTIONS OR CONCERNS -[phone] OR VIA [facility]

## 2017-11-08 ENCOUNTER — OFFICE VISIT (OUTPATIENT)
Dept: FAMILY MEDICINE | Facility: CLINIC | Age: 77
End: 2017-11-08
Payer: COMMERCIAL

## 2017-11-08 DIAGNOSIS — L82.1 SEBORRHEIC KERATOSES: ICD-10-CM

## 2017-11-08 DIAGNOSIS — L65.9 HAIR LOSS: Primary | ICD-10-CM

## 2017-11-08 LAB
BASOPHILS # BLD AUTO: 0 10E9/L (ref 0–0.2)
BASOPHILS NFR BLD AUTO: 0.3 %
DIFFERENTIAL METHOD BLD: ABNORMAL
EOSINOPHIL # BLD AUTO: 0.5 10E9/L (ref 0–0.7)
EOSINOPHIL NFR BLD AUTO: 8.7 %
ERYTHROCYTE [DISTWIDTH] IN BLOOD BY AUTOMATED COUNT: 12.8 % (ref 10–15)
HCT VFR BLD AUTO: 39.1 % (ref 35–47)
HGB BLD-MCNC: 12.7 G/DL (ref 11.7–15.7)
LYMPHOCYTES # BLD AUTO: 1.9 10E9/L (ref 0.8–5.3)
LYMPHOCYTES NFR BLD AUTO: 31.8 %
MCH RBC QN AUTO: 31 PG (ref 26.5–33)
MCHC RBC AUTO-ENTMCNC: 32.5 G/DL (ref 31.5–36.5)
MCV RBC AUTO: 95 FL (ref 78–100)
MONOCYTES # BLD AUTO: 0.4 10E9/L (ref 0–1.3)
MONOCYTES NFR BLD AUTO: 6.1 %
NEUTROPHILS # BLD AUTO: 3.2 10E9/L (ref 1.6–8.3)
NEUTROPHILS NFR BLD AUTO: 53.1 %
PLATELET # BLD AUTO: 149 10E9/L (ref 150–450)
RBC # BLD AUTO: 4.1 10E12/L (ref 3.8–5.2)
TSH SERPL DL<=0.005 MIU/L-ACNC: 2.36 MU/L (ref 0.4–4)
WBC # BLD AUTO: 6.1 10E9/L (ref 4–11)

## 2017-11-08 PROCEDURE — 84443 ASSAY THYROID STIM HORMONE: CPT | Performed by: FAMILY MEDICINE

## 2017-11-08 PROCEDURE — 36415 COLL VENOUS BLD VENIPUNCTURE: CPT | Performed by: FAMILY MEDICINE

## 2017-11-08 PROCEDURE — 99214 OFFICE O/P EST MOD 30 MIN: CPT | Performed by: FAMILY MEDICINE

## 2017-11-08 PROCEDURE — 84425 ASSAY OF VITAMIN B-1: CPT | Mod: 90 | Performed by: FAMILY MEDICINE

## 2017-11-08 PROCEDURE — 85025 COMPLETE CBC W/AUTO DIFF WBC: CPT | Performed by: FAMILY MEDICINE

## 2017-11-08 PROCEDURE — 99000 SPECIMEN HANDLING OFFICE-LAB: CPT | Performed by: FAMILY MEDICINE

## 2017-11-08 NOTE — LETTER
11/8/2017         RE: Ginger Abreu  [address]   [facility] [id number]        Dear Colleague,    Thank you for referring your patient, Ginger Abreu, to the [facility] - PRIMARY CARE SKIN. Please see a copy of my visit note below.    [facility]    CC : Hair loss   SUBJECTIVE:                                                    Ginger Abreu is a 77 year old female who presents to clinic today because of thinning hair loss on the scalp. Hair loss on eyebrows occurred years ago.    Onset : gradually noticeable.  Symptoms include : hair thinning, scalp itchiness  Denies these symptoms : scalp pain, dandruff.    Tension hair styles : NO.  Excessive weight loss : NO.  Recent serious medical illness : chronic UTIs.    Medications : No new medications. She recalls a previous episode of hair loss; hair loss diminished with possible correlation of discontinuation of Neurontin.  Products used : 2-in-1 Pantene shampoo/conditioner.    Issue Two : Spots on chest.    Issue Three : She reports a spot underneath the left breast. The spot is associated with burning sensations and itchiness. She has used OTC Desitin. Symptoms appear to be provoked by usage of oral antibiotics.    Personal Medical History  Skin Cancer : YES - basal cell carcinoma on face and squamous cell carcinoma on hand  Eczema Psoriasis Rosacea Autoimmune   NO NO NO NO     Family Medical History  Connective tissue disease : NO  Thyroid disease : NO  Hair Loss : NO - gradually thinning hair now in 94-year-old mother  Skin Cancer : NO  Eczema Psoriasis Rosacea Autoimmune   NO NO NO NO       Patient Active Problem List   Diagnosis     Essential hypertension     Mild persistent asthma     Mitral valve disorder     Esophageal reflux     Migraine with aura     Allergic rhinitis     Disorder of bone and cartilage     Lumbago     Generalized osteoarthrosis, unspecified site     Spinal stenosis, lumbar region,  without neurogenic claudication     Dermatitis     Glossitis     Type 2 diabetes mellitus without complication (H)     Thyroid nodule     DVT (deep venous thrombosis) (H)     Pulmonary embolism (H)     Anti-cardiolipin antibody positive     Recurrent UTI     Fatigue     Hot flashes     Vitamin B12 deficiency without anemia     Immunoglobulin A deficiency (H)     Immunoglobulin G subclass deficiency (H)     Vitamin D deficiency     Vitamin B 12 deficiency     Ascorbic acid deficiency     Low iron stores     Advanced care planning/counseling discussion     HTN, goal below 140/90     Chest pain     Myocarditis (H)     Anxiety     Spinal stenosis     PE (pulmonary embolism)     Vitamin D deficiency     Personal history of venous thrombosis and embolism     Hyperlipidemia with target LDL less than 130     Sleep disorder     BCC (basal cell carcinoma), face     Peripheral neuropathy     Benign essential hypertension     Hyperlipidemia LDL goal <100     Gastroesophageal reflux disease without esophagitis     Mild persistent asthma, uncomplicated     Vitamin B12 deficiency (non anemic)     Cramp of limb     Scurvy     Chronic pain syndrome     Squamous cell cancer of skin of left hand     Migraine with aura and without status migrainosus, not intractable     Exertional dyspnea     Type 2 diabetes mellitus without complication, without long-term current use of insulin (H)     Renovascular hypertension     Chronic kidney disease, stage III (moderate)     Left renal artery stenosis (H)     Osteopenia, unspecified location     Gastric ulcer without hemorrhage or perforation, unspecified chronicity       Past Medical History:   Diagnosis Date     Acute peptic ulcer, unspecified site, without mention of hemorrhage, perforation, or obstruction      Allergic rhinitis, cause unspecified      Anxiety      Basal cell carcinoma      Chest pain      DVT (deep venous thrombosis) (H)      Esophageal reflux      Generalized osteoarthrosis,  unspecified site      Herpes zoster      Hyperlipidaemia      Lumbago      Migraine with aura, without mention of intractable migraine without mention of status migrainosus      Mild persistent asthma      MITRAL VALVE PROLAPSE      MVP (mitral valve prolapse)      Myocarditis (H)      Osteoarthritis      OSTEOPENIA      Osteopenia      Other and unspecified hyperlipidemia      Other specified disorders of bladder      Palpitations      PE (pulmonary embolism)      Pneumonia, organism unspecified(486)      Pulmonary embolism (H)      Shortness of breath      Spinal stenosis      Spinal stenosis, lumbar region, without neurogenic claudication      Squamous cell carcinoma      Thyroid nodule      Thyroid nodule      Type II or unspecified type diabetes mellitus without mention of complication, not stated as uncontrolled      Unspecified essential hypertension      Unspecified vitamin D deficiency     Past Surgical History:   Procedure Laterality Date     C NONSPECIFIC PROCEDURE  approx     hyst/bso     C NONSPECIFIC PROCEDURE  approx     gb     C NONSPECIFIC PROCEDURE  approx     cystocele repair     C NONSPECIFIC PROCEDURE  approx     endoscopic sinus      C NONSPECIFIC PROCEDURE      epidurals x 7     C NONSPECIFIC PROCEDURE      nl colonoscopy 2006     C NONSPECIFIC PROCEDURE      L2-L3 foramenotomies     C NONSPECIFIC PROCEDURE      lumbar fusion     NONSPECIFIC PROCEDURE      Bilateral sphenoidotomy with removal of polypoid tissue.     Right frontal sinusotomy.   Bilateral total ethmoidectomy.  Bilateral maxillary antrostomy with removal of polypoid tissue.       ORTHOPEDIC SURGERY      lumbar fusion      Social History   Substance Use Topics     Smoking status: Never Smoker     Smokeless tobacco: Never Used     Alcohol use No      Comment: very rare    Family History     Problem (# of Occurrences) Relation (Name,Age of Onset)    HEART DISEASE (2) Father (25):  in a fire  , Paternal Grandfather           Prescription Medications as of 11/8/2017             cloNIDine (CATAPRES) 0.1 MG tablet Take 2 tablets (0.2 mg) by mouth 3 times daily (with meals) INDICATION: TO LOWER BLOOD PRESSURE    furosemide (LASIX) 20 MG tablet Take 1 tablet (20 mg) by mouth as needed INDICATION: TO HELP LOWER BLOOD PRESSURE AND TO TREAT EDEMA    triamterene-hydrochlorothiazide (MAXZIDE-25) 37.5-25 MG per tablet Take 1 tablet by mouth every morning INDICATION:TO LOWER BLOOD PRESSURE AND CONTROL EDEMA    albuterol (PROAIR HFA/PROVENTIL HFA/VENTOLIN HFA) 108 (90 BASE) MCG/ACT Inhaler Inhale 2 puffs into the lungs every 6 hours INDICATION: RESCUE INHALER FOR SHORTNESS OF BREATH, CHEST TIGHTNESS AND COUGH    conjugated estrogens (PREMARIN) cream Place 0.5 g vaginally twice a week    ALPRAZolam (XANAX) 0.25 MG tablet TAKE ONE TABLET BY MOUTH ONCE NIGHTLY AS NEEDED FOR SEVERE ANXIETY ONLY    LACTOBACILLUS EXTRA STRENGTH CAPS Take 1 capsule by mouth daily INDICATION: TO RESTORE GOOD INTESTINAL BACTERIA    butalbital-acetaminophen-caffeine (FIORICET/ESGIC) -40 MG per tablet TAKE ONE TABLET BY MOUTH EVERY 6 HOURS AS NEEDED FOR  SEVERE  MIGRAINE  HEADACHE  ONLY    aspirin EC 81 MG EC tablet Take 1 tablet (81 mg) by mouth daily    cholecalciferol (VITAMIN D3) 46846 UNITS capsule ONE CAP 3 X A MONTH ON THE 1ST, 10TH AND 20TH OF EACH MONTH, FOR VITAMIN D DEFICIENCY MUST TAKE WITH WITH FAT CONTAINING MEAL, TAKE ONCE A WEEK FOR 1 MONTH    glimepiride (AMARYL) 2 MG tablet Take 1 tablet (2 mg) by mouth every morning (before breakfast) INDICATION: TO TREAT DIABETES    fluticasone (FLONASE) 50 MCG/ACT spray Spray 2 sprays in nostril At Bedtime INDICATION: TO CONTROL NASAL ALLERGY SYMPTOMS, DO NOT BLOW NOSE FOR 30 MINUTES AFTER USING    olmesartan (BENICAR) 40 MG tablet Take 1 tablet (40 mg) by mouth daily INDICATION:TO LOWER BLOOD PRESSURE AND TO HELP PRESERVE KIDNEY FUNCTION    order for DME Equipment being ordered:  Wheeled walker with basket and seat attachment    nitroglycerin (NITROSTAT) 0.4 MG SL tablet Place 1 tablet (0.4 mg) under the tongue every 5 minutes as needed for chest pain    ascorbic acid (VITAMIN C) 1000 MG TABS Take 1 tablet (1,000 mg) by mouth daily VITAMIN C REPLACEMENT    pravastatin (PRAVACHOL) 80 MG tablet Take 0.5 tablets (40 mg) by mouth every evening INDICATION: TO LOWER CHOLESTEROL AND TO HELP  PREVENT HEART DISEASE    cyabnocobalamin (VITAMIN B-12) 2500 MCG sublingual tablet Take 2,500 mcg by mouth daily INDICATION: FOR VITAMIN B12 SUPPLEMENTATION - TO IMPROVE MEMORY, BALANCE, SLEEP AND MOOD, DIRECTIONS: PLEASE PLACE UNDER THE TONGUE TO DISSOLVE    esomeprazole (NEXIUM) 40 MG capsule Take 1 capsule (40 mg) by mouth every morning (before breakfast) TAKE  30'-60' BEFORE 1ST MEAL    calcium-vitamin D-vitamin K (VIACTIV) 500-500-40 MG-UNT-MCG CHEW Take 1 tablet by mouth 2 times daily (with meals) INDICATION: FOR BONE AND BREAST HEALTH    melatonin 5 MG CAPS Take by mouth At Bedtime    BETA BLOCKER NOT PRESCRIBED, INTENTIONAL, Beta Blocker not prescribed intentionally due to Intolerance    ASPIRIN NOT PRESCRIBED, INTENTIONAL, continuous prn Reported on 3/6/2017            Allergies   Allergen Reactions     Codeine Difficulty breathing     Chest gets tight & difficulty breathing      Atenolol Other (See Comments)     Sob w/ exertion - asthma symptoms     Diltiazem Other (See Comments)     edema - fluid retention in legs       Labetalol Other (See Comments) and Itching     Sob w/ exertion - asthma symptoms  itching     Lipitor [Atorvastatin Calcium] Other (See Comments)     Muscle weakness     Nifedipine Other (See Comments)     Sob w/ exertion - asthma symptoms, HAs       Sulfa Drugs Hives     hives     Zocor [Simvastatin] Other (See Comments)     Muscle weakness     Advair Diskus Other (See Comments)     hoarseness with 500/50, not the 250/50     Aleve [Naproxen] Other (See Comments)     Raises BP      Amlodipine Besylate Fatigue and Diarrhea     Bystolic [Nebivolol Hcl] Other (See Comments)     headaches     Calcium Carbonate Diarrhea     Crestor [Rosuvastatin Calcium] Muscle Pain (Myalgia)     Diovan [Valsartan] GI Disturbance     gas       Hydralazine Other (See Comments) and Nausea     Increases pulse     Hydrochlorothiazide Other (See Comments)     increasing glucose     Imdur [Isosorbide Mononitrate] Other (See Comments)     headaches     Kcl GI Disturbance     gas      Lisinopril Cough     Hoarse voice     Lovastatin Muscle Pain (Myalgia)     Metformin Diarrhea            Metoprolol Other (See Comments)     Sob w/ exertion - asthma symptoms       Niacin Other (See Comments)     hyperglycemia     Omnicef Diarrhea     Pravachol [Hmg-Coa-R Inhibitors] Muscle Pain (Myalgia)     Prilosec Otc [Omeprazole Magnesium] Diarrhea     diarrhea       Zetia [Ezetimibe] Muscle Pain (Myalgia)     weakness        CONSTITUTIONAL:NEGATIVE for fever, chills, change in weight  INTEGUMENTARY/SKIN: POSITIVE for hair loss  ROS : 14 point review of systems was negative except the symptoms listed above in the HPI.    This document serves as a record of the services and decisions personally performed and made by Josette Ny MD. It was created on her behalf by Clive Barrera, a trained medical scribe.  The creation of this document is based on the scribe's personal observations and the provider's statements to the medical scribe.  Clive Barrera, November 8, 2017 10:12 AM      OBJECTIVE:                                                    GENERAL: healthy, alert and no distress  SKIN: Eid Skin Type - I.  Scalp, Face, Neck and Trunk were examined. The dermatoscope was used to help evaluate pigmented lesions.  Skin Pertinent Findings:  Pattern of loss : No discrete patches of alopecia.  Hair condition : texture/turgor normal.  Scalp condition : No scaling, no erythema.  Incr. hair w/ gentle pull : NO.  Excess hair : NO.    Left anterior  "chest : 6 mm in size, \"stuck on\" appearing papules, raised, brown, coarse-textured, round lesion(s) most consistent with seborrheic keratoses.    Underneath left breast : 4 cm x 3 cm linear patch of hyperpigmentation and light scaling.  ENDOCRINE : Thyroid : normal.    Diagnostic Test Results:  No results found for this or any previous visit (from the past 24 hour(s)).    MDM : discussed possible causes of nonscarring hair loss in particular female pattern hair loss.      ASSESSMENT:                                                      Encounter Diagnoses   Name Primary?     Hair loss Yes     Seborrheic keratoses          PLAN:                                                    Patient Instructions   FUTURE APPOINTMENTS  Follow up as needed.    TOPICAL ANTIFUNGAL  Apply OTC clotrimazole (Lotrimin AF) 1% cream generously (half an inch beyond the borders) two time(s) a day for three weeks to the affected areas.    If antifungal is ineffective or irritating, then discontinue Lotrimin and use OTC hydrocortisone 1% applied twice daily for 7-10 days.        PROCEDURES:                                                    None.    TT : 20 minutes.  CT : 15 minutes.      The information in this document, created by the medical scribe for me, accurately reflects the services I personally performed and the decisions made by me. I have reviewed and approved this document for accuracy prior to leaving the patient care area.  Josette Ny MD November 8, 2017 10:12 AM  [facility] - PRIMARY CARE SKIN    Again, thank you for allowing me to participate in the care of your patient.        Sincerely,        Kailyn Ny MD    "

## 2017-11-08 NOTE — PROGRESS NOTES
[facility] - PRIMARY CARE SKIN    CC : Hair loss   SUBJECTIVE:                                                    Ginger Abreu is a 77 year old female who presents to clinic today because of thinning hair loss on the scalp. Hair loss on eyebrows occurred years ago.    Onset : gradually noticeable.  Symptoms include : hair thinning, scalp itchiness  Denies these symptoms : scalp pain, dandruff.    Tension hair styles : NO.  Excessive weight loss : NO.  Recent serious medical illness : chronic UTIs.    Medications : No new medications. She recalls a previous episode of hair loss; hair loss diminished with possible correlation of discontinuation of Neurontin.  Products used : 2-in-1 Pantene shampoo/conditioner.    Issue Two : Spots on chest.    Issue Three : She reports a spot underneath the left breast. The spot is associated with burning sensations and itchiness. She has used OTC Desitin. Symptoms appear to be provoked by usage of oral antibiotics.    Personal Medical History  Skin Cancer : YES - basal cell carcinoma on face and squamous cell carcinoma on hand  Eczema Psoriasis Rosacea Autoimmune   NO NO NO NO     Family Medical History  Connective tissue disease : NO  Thyroid disease : NO  Hair Loss : NO - gradually thinning hair now in 94-year-old mother  Skin Cancer : NO  Eczema Psoriasis Rosacea Autoimmune   NO NO NO NO       Patient Active Problem List   Diagnosis     Essential hypertension     Mild persistent asthma     Mitral valve disorder     Esophageal reflux     Migraine with aura     Allergic rhinitis     Disorder of bone and cartilage     Lumbago     Generalized osteoarthrosis, unspecified site     Spinal stenosis, lumbar region, without neurogenic claudication     Dermatitis     Glossitis     Type 2 diabetes mellitus without complication (H)     Thyroid nodule     DVT (deep venous thrombosis) (H)     Pulmonary embolism (H)     Anti-cardiolipin antibody positive     Recurrent UTI     Fatigue      Hot flashes     Vitamin B12 deficiency without anemia     Immunoglobulin A deficiency (H)     Immunoglobulin G subclass deficiency (H)     Vitamin D deficiency     Vitamin B 12 deficiency     Ascorbic acid deficiency     Low iron stores     Advanced care planning/counseling discussion     HTN, goal below 140/90     Chest pain     Myocarditis (H)     Anxiety     Spinal stenosis     PE (pulmonary embolism)     Vitamin D deficiency     Personal history of venous thrombosis and embolism     Hyperlipidemia with target LDL less than 130     Sleep disorder     BCC (basal cell carcinoma), face     Peripheral neuropathy     Benign essential hypertension     Hyperlipidemia LDL goal <100     Gastroesophageal reflux disease without esophagitis     Mild persistent asthma, uncomplicated     Vitamin B12 deficiency (non anemic)     Cramp of limb     Scurvy     Chronic pain syndrome     Squamous cell cancer of skin of left hand     Migraine with aura and without status migrainosus, not intractable     Exertional dyspnea     Type 2 diabetes mellitus without complication, without long-term current use of insulin (H)     Renovascular hypertension     Chronic kidney disease, stage III (moderate)     Left renal artery stenosis (H)     Osteopenia, unspecified location     Gastric ulcer without hemorrhage or perforation, unspecified chronicity       Past Medical History:   Diagnosis Date     Acute peptic ulcer, unspecified site, without mention of hemorrhage, perforation, or obstruction      Allergic rhinitis, cause unspecified      Anxiety      Basal cell carcinoma      Chest pain      DVT (deep venous thrombosis) (H)      Esophageal reflux      Generalized osteoarthrosis, unspecified site      Herpes zoster      Hyperlipidaemia      Lumbago      Migraine with aura, without mention of intractable migraine without mention of status migrainosus      Mild persistent asthma      MITRAL VALVE PROLAPSE      MVP (mitral valve prolapse)       Myocarditis (H)      Osteoarthritis      OSTEOPENIA      Osteopenia      Other and unspecified hyperlipidemia      Other specified disorders of bladder      Palpitations      PE (pulmonary embolism)      Pneumonia, organism unspecified(486)      Pulmonary embolism (H)      Shortness of breath      Spinal stenosis      Spinal stenosis, lumbar region, without neurogenic claudication      Squamous cell carcinoma      Thyroid nodule      Thyroid nodule      Type II or unspecified type diabetes mellitus without mention of complication, not stated as uncontrolled      Unspecified essential hypertension      Unspecified vitamin D deficiency     Past Surgical History:   Procedure Laterality Date     C NONSPECIFIC PROCEDURE  approx     hyst/bso     C NONSPECIFIC PROCEDURE  approx     gb     C NONSPECIFIC PROCEDURE  approx     cystocele repair     C NONSPECIFIC PROCEDURE  approx     endoscopic sinus      C NONSPECIFIC PROCEDURE      epidurals x 7     C NONSPECIFIC PROCEDURE      nl colonoscopy 2006     C NONSPECIFIC PROCEDURE      L2-L3 foramenotomies     C NONSPECIFIC PROCEDURE      lumbar fusion     NONSPECIFIC PROCEDURE      Bilateral sphenoidotomy with removal of polypoid tissue.     Right frontal sinusotomy.   Bilateral total ethmoidectomy.  Bilateral maxillary antrostomy with removal of polypoid tissue.       ORTHOPEDIC SURGERY      lumbar fusion      Social History   Substance Use Topics     Smoking status: Never Smoker     Smokeless tobacco: Never Used     Alcohol use No      Comment: very rare    Family History     Problem (# of Occurrences) Relation (Name,Age of Onset)    HEART DISEASE (2) Father (25):  in a fire , Paternal Grandfather           Prescription Medications as of 2017             cloNIDine (CATAPRES) 0.1 MG tablet Take 2 tablets (0.2 mg) by mouth 3 times daily (with meals) INDICATION: TO LOWER BLOOD PRESSURE    furosemide (LASIX) 20 MG tablet Take 1 tablet  (20 mg) by mouth as needed INDICATION: TO HELP LOWER BLOOD PRESSURE AND TO TREAT EDEMA    triamterene-hydrochlorothiazide (MAXZIDE-25) 37.5-25 MG per tablet Take 1 tablet by mouth every morning INDICATION:TO LOWER BLOOD PRESSURE AND CONTROL EDEMA    albuterol (PROAIR HFA/PROVENTIL HFA/VENTOLIN HFA) 108 (90 BASE) MCG/ACT Inhaler Inhale 2 puffs into the lungs every 6 hours INDICATION: RESCUE INHALER FOR SHORTNESS OF BREATH, CHEST TIGHTNESS AND COUGH    conjugated estrogens (PREMARIN) cream Place 0.5 g vaginally twice a week    ALPRAZolam (XANAX) 0.25 MG tablet TAKE ONE TABLET BY MOUTH ONCE NIGHTLY AS NEEDED FOR SEVERE ANXIETY ONLY    LACTOBACILLUS EXTRA STRENGTH CAPS Take 1 capsule by mouth daily INDICATION: TO RESTORE GOOD INTESTINAL BACTERIA    butalbital-acetaminophen-caffeine (FIORICET/ESGIC) -40 MG per tablet TAKE ONE TABLET BY MOUTH EVERY 6 HOURS AS NEEDED FOR  SEVERE  MIGRAINE  HEADACHE  ONLY    aspirin EC 81 MG EC tablet Take 1 tablet (81 mg) by mouth daily    cholecalciferol (VITAMIN D3) 92194 UNITS capsule ONE CAP 3 X A MONTH ON THE 1ST, 10TH AND 20TH OF EACH MONTH, FOR VITAMIN D DEFICIENCY MUST TAKE WITH WITH FAT CONTAINING MEAL, TAKE ONCE A WEEK FOR 1 MONTH    glimepiride (AMARYL) 2 MG tablet Take 1 tablet (2 mg) by mouth every morning (before breakfast) INDICATION: TO TREAT DIABETES    fluticasone (FLONASE) 50 MCG/ACT spray Spray 2 sprays in nostril At Bedtime INDICATION: TO CONTROL NASAL ALLERGY SYMPTOMS, DO NOT BLOW NOSE FOR 30 MINUTES AFTER USING    olmesartan (BENICAR) 40 MG tablet Take 1 tablet (40 mg) by mouth daily INDICATION:TO LOWER BLOOD PRESSURE AND TO HELP PRESERVE KIDNEY FUNCTION    order for DME Equipment being ordered: Wheeled walker with basket and seat attachment    nitroglycerin (NITROSTAT) 0.4 MG SL tablet Place 1 tablet (0.4 mg) under the tongue every 5 minutes as needed for chest pain    ascorbic acid (VITAMIN C) 1000 MG TABS Take 1 tablet (1,000 mg) by mouth daily VITAMIN C  REPLACEMENT    pravastatin (PRAVACHOL) 80 MG tablet Take 0.5 tablets (40 mg) by mouth every evening INDICATION: TO LOWER CHOLESTEROL AND TO HELP  PREVENT HEART DISEASE    cyabnocobalamin (VITAMIN B-12) 2500 MCG sublingual tablet Take 2,500 mcg by mouth daily INDICATION: FOR VITAMIN B12 SUPPLEMENTATION - TO IMPROVE MEMORY, BALANCE, SLEEP AND MOOD, DIRECTIONS: PLEASE PLACE UNDER THE TONGUE TO DISSOLVE    esomeprazole (NEXIUM) 40 MG capsule Take 1 capsule (40 mg) by mouth every morning (before breakfast) TAKE  30'-60' BEFORE 1ST MEAL    calcium-vitamin D-vitamin K (VIACTIV) 500-500-40 MG-UNT-MCG CHEW Take 1 tablet by mouth 2 times daily (with meals) INDICATION: FOR BONE AND BREAST HEALTH    melatonin 5 MG CAPS Take by mouth At Bedtime    BETA BLOCKER NOT PRESCRIBED, INTENTIONAL, Beta Blocker not prescribed intentionally due to Intolerance    ASPIRIN NOT PRESCRIBED, INTENTIONAL, continuous prn Reported on 3/6/2017            Allergies   Allergen Reactions     Codeine Difficulty breathing     Chest gets tight & difficulty breathing      Atenolol Other (See Comments)     Sob w/ exertion - asthma symptoms     Diltiazem Other (See Comments)     edema - fluid retention in legs       Labetalol Other (See Comments) and Itching     Sob w/ exertion - asthma symptoms  itching     Lipitor [Atorvastatin Calcium] Other (See Comments)     Muscle weakness     Nifedipine Other (See Comments)     Sob w/ exertion - asthma symptoms, HAs       Sulfa Drugs Hives     hives     Zocor [Simvastatin] Other (See Comments)     Muscle weakness     Advair Diskus Other (See Comments)     hoarseness with 500/50, not the 250/50     Aleve [Naproxen] Other (See Comments)     Raises BP     Amlodipine Besylate Fatigue and Diarrhea     Bystolic [Nebivolol Hcl] Other (See Comments)     headaches     Calcium Carbonate Diarrhea     Crestor [Rosuvastatin Calcium] Muscle Pain (Myalgia)     Diovan [Valsartan] GI Disturbance     gas       Hydralazine Other (See  "Comments) and Nausea     Increases pulse     Hydrochlorothiazide Other (See Comments)     increasing glucose     Imdur [Isosorbide Mononitrate] Other (See Comments)     headaches     Kcl GI Disturbance     gas      Lisinopril Cough     Hoarse voice     Lovastatin Muscle Pain (Myalgia)     Metformin Diarrhea            Metoprolol Other (See Comments)     Sob w/ exertion - asthma symptoms       Niacin Other (See Comments)     hyperglycemia     Omnicef Diarrhea     Pravachol [Hmg-Coa-R Inhibitors] Muscle Pain (Myalgia)     Prilosec Otc [Omeprazole Magnesium] Diarrhea     diarrhea       Zetia [Ezetimibe] Muscle Pain (Myalgia)     weakness        CONSTITUTIONAL:NEGATIVE for fever, chills, change in weight  INTEGUMENTARY/SKIN: POSITIVE for hair loss  ROS : 14 point review of systems was negative except the symptoms listed above in the HPI.    This document serves as a record of the services and decisions personally performed and made by Josette Ny MD. It was created on her behalf by Clive Barrera, a trained medical scribe.  The creation of this document is based on the scribe's personal observations and the provider's statements to the medical scribe.  Clive Barrera, November 8, 2017 10:12 AM      OBJECTIVE:                                                    GENERAL: healthy, alert and no distress  SKIN: Eid Skin Type - I.  Scalp, Face, Neck and Trunk were examined. The dermatoscope was used to help evaluate pigmented lesions.  Skin Pertinent Findings:  Pattern of loss : No discrete patches of alopecia.  Hair condition : texture/turgor normal.  Scalp condition : No scaling, no erythema.  Incr. hair w/ gentle pull : NO.  Excess hair : NO.    Left anterior chest : 6 mm in size, \"stuck on\" appearing papules, raised, brown, coarse-textured, round lesion(s) most consistent with seborrheic keratoses.    Underneath left breast : 4 cm x 3 cm linear patch of hyperpigmentation and light scaling.  ENDOCRINE : Thyroid : " normal.    Diagnostic Test Results:  No results found for this or any previous visit (from the past 24 hour(s)).    MDM : discussed possible causes of nonscarring hair loss in particular female pattern hair loss.      ASSESSMENT:                                                      Encounter Diagnoses   Name Primary?     Hair loss Yes     Seborrheic keratoses          PLAN:                                                    Patient Instructions   FUTURE APPOINTMENTS  Follow up as needed.    TOPICAL ANTIFUNGAL  Apply OTC clotrimazole (Lotrimin AF) 1% cream generously (half an inch beyond the borders) two time(s) a day for three weeks to the affected areas.    If antifungal is ineffective or irritating, then discontinue Lotrimin and use OTC hydrocortisone 1% applied twice daily for 7-10 days.        PROCEDURES:                                                    None.    TT : 20 minutes.  CT : 15 minutes.      The information in this document, created by the medical scribe for me, accurately reflects the services I personally performed and the decisions made by me. I have reviewed and approved this document for accuracy prior to leaving the patient care area.  Josette Ny MD November 8, 2017 10:12 AM  [facility] - PRIMARY CARE SKIN

## 2017-11-08 NOTE — PATIENT INSTRUCTIONS
FUTURE APPOINTMENTS  Follow up as needed.    TOPICAL ANTIFUNGAL  Apply OTC clotrimazole (Lotrimin AF) 1% cream generously (half an inch beyond the borders) two time(s) a day for three weeks to the affected areas.    If antifungal is ineffective or irritating, then discontinue Lotrimin and use OTC hydrocortisone 1% applied twice daily for 7-10 days.

## 2017-11-08 NOTE — MR AVS SNAPSHOT
After Visit Summary   11/8/2017    Ginger Abreu    MRN: [id number]           Patient Information     Date Of Birth          1940        Visit Information        Provider Department      11/8/2017 10:20 AM Kailyn Ny MD [facility] - Primary Care Skin        Today's Diagnoses     Hair loss    -  1    Seborrheic keratoses          Care Instructions    FUTURE APPOINTMENTS  Follow up as needed.    TOPICAL ANTIFUNGAL  Apply OTC clotrimazole (Lotrimin AF) 1% cream generously (half an inch beyond the borders) two time(s) a day for three weeks to the affected areas.    If antifungal is ineffective or irritating, then discontinue Lotrimin and use OTC hydrocortisone 1% applied twice daily for 7-10 days.          Follow-ups after your visit        Who to contact     If you have questions or need follow up information about today's clinic visit or your schedule please contact [facility] - PRIMARY CARE SKIN directly at [phone].  Normal or non-critical lab and imaging results will be communicated to you by [employer], letter or phone within 4 business days after the clinic has received the results. If you do not hear from us within 7 days, please contact the clinic through [employer] or phone. If you have a critical or abnormal lab result, we will notify you by phone as soon as possible.  Submit refill requests through [employer] or call your pharmacy and they will forward the refill request to us. Please allow 3 business days for your refill to be completed.          Additional Information About Your Visit        MyChart Information     [employer] gives you secure access to your electronic health record. If you see a primary care provider, you can also send messages to your care team and make appointments. If you have questions, please call your primary care clinic.  If you do not have a primary care provider, please call [phone] and they will assist you.        Care EveryWhere ID      This is your Care EveryWhere ID. This could be used by other organizations to access your [place] medical records  [id number]         Blood Pressure from Last 3 Encounters:   10/10/17 118/74   09/08/17 124/74   08/20/17 110/50    Weight from Last 3 Encounters:   10/10/17 161 lb (73 kg)   09/08/17 164 lb (74.4 kg)   08/20/17 162 lb 6.4 oz (73.7 kg)              Today, you had the following     No orders found for display       Primary Care Provider Office Phone # Fax #    Steven Fonseca [phone]       [address] [facility] [address]        Equal Access to Services     LIANA RIZO : Mag Caba, washahida millan, qaybta kaalmada an, juan luis ham . So [facility] [phone].    ATENCIÓN: Si habla español, tiene a decker disposición servicios gratuitos de asistencia lingüística. Ll[facility] [phone].    We comply with applicable federal civil rights laws and [place] laws. We do not discriminate on the basis of race, color, national origin, age, disability, sex, sexual orientation, or gender identity.            Thank you!     Thank you for choosing [facility] - PRIMARY CARE [facility]  for your care. Our goal is always to provide you with excellent care. Hearing back from our patients is one way we can continue to improve our services. Please take a few minutes to complete the written survey that you may receive in the mail after your visit with us. Thank you!             Your Updated Medication List - Protect others around you: Learn how to safely use, store and throw away your medicines at www.disposemymeds.org.          This list is accurate as of: 11/8/17 10:24 AM.  Always use your most recent med list.                   Brand Name Dispense Instructions for use Diagnosis    albuterol 108 (90 BASE) MCG/ACT Inhaler    PROAIR HFA/PROVENTIL HFA/VENTOLIN HFA    3 Inhaler    Inhale 2 puffs into the lungs every 6 hours INDICATION: RESCUE  INHALER FOR SHORTNESS OF BREATH, CHEST TIGHTNESS AND COUGH    Mild persistent asthma without complication       ALPRAZolam 0.25 MG tablet    XANAX    30 tablet    TAKE ONE TABLET BY MOUTH ONCE NIGHTLY AS NEEDED FOR SEVERE ANXIETY ONLY    Anxiety       ascorbic acid 1000 MG Tabs    vitamin C    100 tablet    Take 1 tablet (1,000 mg) by mouth daily VITAMIN C REPLACEMENT    Scurvy       aspirin EC 81 MG EC tablet      Take 1 tablet (81 mg) by mouth daily    Renal artery stenosis (H)       ASPIRIN NOT PRESCRIBED    INTENTIONAL    0 each    continuous prn Reported on 3/6/2017        BETA BLOCKER NOT PRESCRIBED (INTENTIONAL)      Beta Blocker not prescribed intentionally due to Intolerance    Type 2 diabetes, HbA1c goal < 7% (H), Hypertension goal BP (blood pressure) < 130/80       butalbital-acetaminophen-caffeine -40 MG per tablet    FIORICET/ESGIC    30 tablet    TAKE ONE TABLET BY MOUTH EVERY 6 HOURS AS NEEDED FOR  SEVERE  MIGRAINE  HEADACHE  ONLY    Migraine with aura and without status migrainosus, not intractable       calcium-vitamin D-vitamin K 500-500-40 MG-UNT-MCG Chew    VIACTIV    100 tablet    Take 1 tablet by mouth 2 times daily (with meals) INDICATION: FOR BONE AND BREAST HEALTH    Vitamin D deficiency       cholecalciferol 64299 UNITS capsule    VITAMIN D3    40 capsule    ONE CAP 3 X A MONTH ON THE 1ST, 10TH AND 20TH OF EACH MONTH, FOR VITAMIN D DEFICIENCY MUST TAKE WITH WITH FAT CONTAINING MEAL, TAKE ONCE A WEEK FOR 1 MONTH    Vitamin D deficiency       cloNIDine 0.1 MG tablet    CATAPRES    540 tablet    Take 2 tablets (0.2 mg) by mouth 3 times daily (with meals) INDICATION: TO LOWER BLOOD PRESSURE    Essential hypertension, benign       conjugated estrogens cream    PREMARIN    30 g    Place 0.5 g vaginally twice a week    Recurrent urinary tract infection       cyabnocobalamin 2500 MCG sublingual tablet    VITAMIN B-12    250 tablet    Take 2,500 mcg by mouth daily INDICATION: FOR VITAMIN B12  SUPPLEMENTATION - TO IMPROVE MEMORY, BALANCE, SLEEP AND MOOD, DIRECTIONS: PLEASE PLACE UNDER THE TONGUE TO DISSOLVE    Vitamin B12 deficiency (non anemic)       esomeprazole 40 MG CR capsule    nexIUM    180 capsule    Take 1 capsule (40 mg) by mouth every morning (before breakfast) TAKE  30'-60' BEFORE 1ST MEAL    Gastroesophageal reflux disease without esophagitis       fluticasone 50 MCG/ACT spray    FLONASE    16 g    Spray 2 sprays in nostril At Bedtime INDICATION: TO CONTROL NASAL ALLERGY SYMPTOMS, DO NOT BLOW NOSE FOR 30 MINUTES AFTER USING    Seasonal allergic rhinitis, unspecified allergic rhinitis trigger       furosemide 20 MG tablet    LASIX    90 tablet    Take 1 tablet (20 mg) by mouth as needed INDICATION: TO HELP LOWER BLOOD PRESSURE AND TO TREAT EDEMA    Benign essential hypertension       glimepiride 2 MG tablet    AMARYL    90 tablet    Take 1 tablet (2 mg) by mouth every morning (before breakfast) INDICATION: TO TREAT DIABETES    Type 2 diabetes mellitus without complication, without long-term current use of insulin (H)       LACTOBACILLUS EXTRA STRENGTH Caps     30 capsule    Take 1 capsule by mouth daily INDICATION: TO RESTORE GOOD INTESTINAL BACTERIA    Dyspepsia       melatonin 5 MG Caps      Take by mouth At Bedtime        nitroGLYcerin 0.4 MG sublingual tablet    NITROSTAT    25 tablet    Place 1 tablet (0.4 mg) under the tongue every 5 minutes as needed for chest pain    Exertional dyspnea       olmesartan 40 MG tablet    BENICAR    30 tablet    Take 1 tablet (40 mg) by mouth daily INDICATION:TO LOWER BLOOD PRESSURE AND TO HELP PRESERVE KIDNEY FUNCTION    Benign essential hypertension       order for DME     1 Units    Equipment being ordered: Wheeled walker with basket and seat attachment    Spinal stenosis, lumbar region, without neurogenic claudication       pravastatin 80 MG tablet    PRAVACHOL    45 tablet    Take 0.5 tablets (40 mg) by mouth every evening INDICATION: TO LOWER  CHOLESTEROL AND TO HELP  PREVENT HEART DISEASE    Hyperlipidemia LDL goal <100       triamterene-hydrochlorothiazide 37.5-25 MG per tablet    MAXZIDE-25    90 tablet    Take 1 tablet by mouth every morning INDICATION:TO LOWER BLOOD PRESSURE AND CONTROL EDEMA    Benign essential hypertension

## 2017-11-11 LAB — VIT B1 BLD-MCNC: 117 NMOL/L (ref 70–180)

## 2017-11-28 DIAGNOSIS — E11.9 TYPE 2 DIABETES MELLITUS WITHOUT COMPLICATION, WITHOUT LONG-TERM CURRENT USE OF INSULIN (H): ICD-10-CM

## 2017-11-28 NOTE — TELEPHONE ENCOUNTER
Pt is out of medication- Please refill asap    glimepiride (AMARYL) 2 MG tablet         Last Written Prescription Date: 4/14/17  Last Fill Quantity: 90, # refills: 0  Last Office Visit with [facility], [facility] or [facility] prescribing provider:  11/8/17        BP Readings from Last 3 Encounters:   10/10/17 118/74   09/08/17 124/74   08/20/17 110/50     Lab Results   Component Value Date    MICROL 13 02/20/2017     Lab Results   Component Value Date    UMALCR 9.35 02/20/2017     Creatinine   Date Value Ref Range Status   04/07/2017 1.13 (H) 0.52 - 1.04 mg/dL Final   ]  GFR Estimate   Date Value Ref Range Status   04/07/2017 47 (L) >60 mL/min/1.7m2 Final     Comment:     Non  GFR Calc   04/03/2017 42 (L) >60 mL/min/1.7m2 Final   03/06/2017 33 (L) >60 mL/min/1.7m2 Final     GFR Estimate If Black   Date Value Ref Range Status   04/07/2017 57 (L) >60 mL/min/1.7m2 Final     Comment:      GFR Calc   04/03/2017 50 (L) >60 mL/min/1.7m2 Final   03/06/2017 40 (L) >60 mL/min/1.7m2 Final     Lab Results   Component Value Date    CHOL 157 04/07/2017     Lab Results   Component Value Date    HDL 54 04/07/2017     Lab Results   Component Value Date    LDL 62 04/07/2017     Lab Results   Component Value Date    TRIG 204 04/07/2017     Lab Results   Component Value Date    CHOLHDLRATIO 4.2 04/03/2015     Lab Results   Component Value Date    AST 16 04/07/2017     Lab Results   Component Value Date    ALT 33 04/07/2017     Lab Results   Component Value Date    A1C 5.8 04/07/2017    A1C 6.3 12/15/2016    A1C 6.3 06/28/2016    A1C 6.6 11/03/2015    A1C 6.7 06/19/2015     Potassium   Date Value Ref Range Status   04/07/2017 4.2 3.4 - 5.3 mmol/L Final

## 2017-11-29 RX ORDER — GLIMEPIRIDE 2 MG/1
2 TABLET ORAL
Qty: 90 TABLET | Refills: 0 | Status: SHIPPED | OUTPATIENT
Start: 2017-11-29 | End: 2018-02-19

## 2017-11-29 NOTE — TELEPHONE ENCOUNTER
Routing refill request to provider for review/approval because:  Labs out of range:  creatinine  Labs not current:  a1c

## 2017-12-17 ENCOUNTER — HOSPITAL ENCOUNTER (EMERGENCY)
Facility: CLINIC | Age: 77
Discharge: HOME | End: 2017-12-18
Attending: EMERGENCY MEDICINE | Admitting: EMERGENCY MEDICINE
Payer: MEDICARE

## 2017-12-17 DIAGNOSIS — R10.13 ABDOMINAL PAIN, EPIGASTRIC: ICD-10-CM

## 2017-12-17 PROCEDURE — 83690 ASSAY OF LIPASE: CPT | Performed by: EMERGENCY MEDICINE

## 2017-12-17 PROCEDURE — 99284 EMERGENCY DEPT VISIT MOD MDM: CPT

## 2017-12-17 PROCEDURE — 85025 COMPLETE CBC W/AUTO DIFF WBC: CPT | Performed by: EMERGENCY MEDICINE

## 2017-12-17 PROCEDURE — 80053 COMPREHEN METABOLIC PANEL: CPT | Performed by: EMERGENCY MEDICINE

## 2017-12-17 NOTE — ED AVS SNAPSHOT
Emergency Department    [address] [facility] [address]    Phone:  [phone]    Fax:  [phone]                                       Ginger Abreu   MRN: [id number]    Department:   Emergency Department   Date of Visit:  12/17/2017           Patient Information     Date Of Birth          1940        Your diagnoses for this visit were:     Abdominal pain, epigastric        You were seen by Emanuel Jurado MD.      Follow-up Information     Follow up with Jerry Iniguez MD. Schedule an appointment as soon as possible for a visit in 2 days.    Specialty:  Internal Medicine    Contact information:    [address] [facility] [id number] [phone]          Follow up with  Emergency Department.    Specialty:  EMERGENCY MEDICINE    Why:  If symptoms worsen    Contact information:    [address] [facility] [address] [phone]      Discharge References/Attachments     EPIGASTRIC PAIN (UNCERTAIN CAUSE) (ENGLISH)      Future Appointments        Provider Department Dept Phone Center    12/21/2017 8:30 AM Jerry Iniguez MD [facility] [phone]     2/2/2018 10:30 AM RAMY RUIZ [facility] [phone] [facility] CL[facility]      24 Hour Appointment Hotline       To make an appointment at any [facility], call [phone] ([phone]). If you don't have a family doctor or clinic, we will help you find one. [facility] are conveniently located to serve the needs of you and your family.             Review of your medicines      Our records show that you are taking the medicines listed below. If these are incorrect, please call your family doctor or clinic.        Dose / Directions Last dose taken    albuterol 108 (90 BASE) MCG/ACT Inhaler   Commonly known as:  PROAIR HFA/PROVENTIL HFA/VENTOLIN HFA   Dose:  2 puff   Quantity:  3 Inhaler        Inhale 2 puffs into the lungs every 6  hours INDICATION: RESCUE INHALER FOR SHORTNESS OF BREATH, CHEST TIGHTNESS AND COUGH   Refills:  PRN        ALPRAZolam 0.25 MG tablet   Commonly known as:  XANAX   Quantity:  30 tablet        TAKE ONE TABLET BY MOUTH ONCE NIGHTLY AS NEEDED FOR SEVERE ANXIETY ONLY   Refills:  0        ascorbic acid 1000 MG Tabs   Commonly known as:  vitamin C   Dose:  1000 mg   Quantity:  100 tablet        Take 1 tablet (1,000 mg) by mouth daily VITAMIN C REPLACEMENT   Refills:  3        aspirin EC 81 MG EC tablet   Dose:  81 mg        Take 1 tablet (81 mg) by mouth daily   Refills:  0        ASPIRIN NOT PRESCRIBED   Commonly known as:  INTENTIONAL   Quantity:  0 each        continuous prn Reported on 3/6/2017   Refills:  0        BETA BLOCKER NOT PRESCRIBED (INTENTIONAL)        Beta Blocker not prescribed intentionally due to Intolerance   Refills:  0        butalbital-acetaminophen-caffeine -40 MG per tablet   Commonly known as:  FIORICET/ESGIC   Quantity:  30 tablet        TAKE ONE TABLET BY MOUTH EVERY 6 HOURS AS NEEDED FOR  SEVERE  MIGRAINE  HEADACHE  ONLY   Refills:  0        calcium-vitamin D-vitamin K 500-500-40 MG-UNT-MCG Chew   Commonly known as:  VIACTIV   Dose:  1 tablet   Quantity:  100 tablet        Take 1 tablet by mouth 2 times daily (with meals) INDICATION: FOR BONE AND BREAST HEALTH   Refills:  PRN        cholecalciferol 17738 UNITS capsule   Commonly known as:  VITAMIN D3   Quantity:  40 capsule        ONE CAP 3 X A MONTH ON THE 1ST, 10TH AND 20TH OF EACH MONTH, FOR VITAMIN D DEFICIENCY MUST TAKE WITH WITH FAT CONTAINING MEAL, TAKE ONCE A WEEK FOR 1 MONTH   Refills:  0        cloNIDine 0.1 MG tablet   Commonly known as:  CATAPRES   Dose:  0.2 mg   Quantity:  540 tablet        Take 2 tablets (0.2 mg) by mouth 3 times daily (with meals) INDICATION: TO LOWER BLOOD PRESSURE   Refills:  3        conjugated estrogens cream   Commonly known as:  PREMARIN   Dose:  0.5 g   Quantity:  30 g        Place 0.5 g vaginally  twice a week   Refills:  12        cyabnocobalamin 2500 MCG sublingual tablet   Commonly known as:  VITAMIN B-12   Dose:  2500 mcg   Quantity:  250 tablet        Take 2,500 mcg by mouth daily INDICATION: FOR VITAMIN B12 SUPPLEMENTATION - TO IMPROVE MEMORY, BALANCE, SLEEP AND MOOD, DIRECTIONS: PLEASE PLACE UNDER THE TONGUE TO DISSOLVE   Refills:  3        esomeprazole 40 MG CR capsule   Commonly known as:  nexIUM   Dose:  40 mg   Quantity:  180 capsule        Take 1 capsule (40 mg) by mouth every morning (before breakfast) TAKE  30'-60' BEFORE 1ST MEAL   Refills:  2        fluticasone 50 MCG/ACT spray   Commonly known as:  FLONASE   Dose:  2 spray   Quantity:  16 g        Spray 2 sprays in nostril At Bedtime INDICATION: TO CONTROL NASAL ALLERGY SYMPTOMS, DO NOT BLOW NOSE FOR 30 MINUTES AFTER USING   Refills:  PRN        furosemide 20 MG tablet   Commonly known as:  LASIX   Dose:  20 mg   Quantity:  90 tablet        Take 1 tablet (20 mg) by mouth as needed INDICATION: TO HELP LOWER BLOOD PRESSURE AND TO TREAT EDEMA   Refills:  PRN        glimepiride 2 MG tablet   Commonly known as:  AMARYL   Dose:  2 mg   Quantity:  90 tablet        Take 1 tablet (2 mg) by mouth every morning (before breakfast) INDICATION: TO TREAT DIABETES   Refills:  0        LACTOBACILLUS EXTRA STRENGTH Caps   Dose:  1 capsule   Quantity:  30 capsule        Take 1 capsule by mouth daily INDICATION: TO RESTORE GOOD INTESTINAL BACTERIA   Refills:  prn        melatonin 5 MG Caps        Take by mouth At Bedtime   Refills:  0        nitroGLYcerin 0.4 MG sublingual tablet   Commonly known as:  NITROSTAT   Dose:  0.4 mg   Quantity:  25 tablet        Place 1 tablet (0.4 mg) under the tongue every 5 minutes as needed for chest pain   Refills:  0        olmesartan 40 MG tablet   Commonly known as:  BENICAR   Dose:  40 mg   Quantity:  30 tablet        Take 1 tablet (40 mg) by mouth daily INDICATION:TO LOWER BLOOD PRESSURE AND TO HELP PRESERVE KIDNEY FUNCTION    Refills:  1        order for DME   Quantity:  1 Units        Equipment being ordered: Wheeled walker with basket and seat attachment   Refills:  0        pravastatin 80 MG tablet   Commonly known as:  PRAVACHOL   Dose:  40 mg   Quantity:  45 tablet        Take 0.5 tablets (40 mg) by mouth every evening INDICATION: TO LOWER CHOLESTEROL AND TO HELP  PREVENT HEART DISEASE   Refills:  PRN        triamterene-hydrochlorothiazide 37.5-25 MG per tablet   Commonly known as:  MAXZIDE-25   Dose:  1 tablet   Quantity:  90 tablet        Take 1 tablet by mouth every morning INDICATION:TO LOWER BLOOD PRESSURE AND CONTROL EDEMA   Refills:  PRN                Procedures and tests performed during your visit     Abdomen XR, 2 vw, flat and upright    CBC with platelets + differential    Comprehensive metabolic panel    Lipase      Orders Needing Specimen Collection     None      Pending Results     No orders found for last 3 day(s).            Pending Culture Results     No orders found for last 3 day(s).            Pending Results Instructions     If you had any lab results that were not finalized at the time of your Discharge, you can call the ED Lab Result RN at [phone]. You will be contacted by this team for any positive Lab results or changes in treatment. The nurses are available 7 days a week from 10A to 6:30P.  You can leave a message 24 hours per day and they will return your call.        Test Results From Your Hospital Stay        12/18/2017 12:10 AM      Component Results     Component Value Ref Range & Units Status    WBC 8.6 4.0 - 11.0 10e9/L Final    RBC Count 4.45 3.8 - 5.2 10e12/L Final    Hemoglobin 13.5 11.7 - 15.7 g/dL Final    Hematocrit 41.3 35.0 - 47.0 % Final    MCV 93 78 - 100 fl Final    MCH 30.3 26.5 - 33.0 pg Final    MCHC 32.7 31.5 - 36.5 g/dL Final    RDW 12.9 10.0 - 15.0 % Final    Platelet Count 157 150 - 450 10e9/L Final    Diff Method Automated Method  Final    % Neutrophils 50.3 % Final    %  Lymphocytes 33.3 % Final    % Monocytes 7.2 % Final    % Eosinophils 8.9 % Final    % Basophils 0.2 % Final    % Immature Granulocytes 0.1 % Final    Nucleated RBCs 0 0 /100 Final    Absolute Neutrophil 4.3 1.6 - 8.3 10e9/L Final    Absolute Lymphocytes 2.9 0.8 - 5.3 10e9/L Final    Absolute Monocytes 0.6 0.0 - 1.3 10e9/L Final    Absolute Eosinophils 0.8 (H) 0.0 - 0.7 10e9/L Final    Absolute Basophils 0.0 0.0 - 0.2 10e9/L Final    Abs Immature Granulocytes 0.0 0 - 0.4 10e9/L Final    Absolute Nucleated RBC 0.0  Final         12/18/2017 12:26 AM      Component Results     Component Value Ref Range & Units Status    Sodium 141 133 - 144 mmol/L Final    Potassium 3.9 3.4 - 5.3 mmol/L Final    Chloride 107 94 - 109 mmol/L Final    Carbon Dioxide 27 20 - 32 mmol/L Final    Anion Gap 7 3 - 14 mmol/L Final    Glucose 186 (H) 70 - 99 mg/dL Final    Urea Nitrogen 32 (H) 7 - 30 mg/dL Final    Creatinine 1.03 0.52 - 1.04 mg/dL Final    GFR Estimate 52 (L) >60 mL/min/1.7m2 Final    Non  GFR Calc    GFR Estimate If Black 63 >60 mL/min/1.7m2 Final    African American GFR Calc    Calcium 9.3 8.5 - 10.1 mg/dL Final    Bilirubin Total 0.1 (L) 0.2 - 1.3 mg/dL Final    Albumin 3.7 3.4 - 5.0 g/dL Final    Protein Total 7.3 6.8 - 8.8 g/dL Final    Alkaline Phosphatase 110 40 - 150 U/L Final    ALT 30 0 - 50 U/L Final    AST 23 0 - 45 U/L Final         12/18/2017 12:23 AM      Component Results     Component Value Ref Range & Units Status    Lipase 230 73 - 393 U/L Final         12/18/2017  1:02 AM      Narrative     ABDOMEN 2 VIEWS   12/18/2017 12:51 AM     HISTORY: Upper abdominal pain.    COMPARISON: None.    FINDINGS: Gas and a small amount of stool are scattered within a  nondilated colon. A paucity of gas is present within the small bowel,  limiting its evaluation. No visualized bowel wall thickening,  pneumatosis or free intraperitoneal gas. Fusion hardware in the lumbar  spine.        Impression     IMPRESSION:    1. A small amount of stool is scattered within the colon.  2. No evidence of bowel obstruction.    GIOVANNI STARKEY MD                Clinical Quality Measure: Blood Pressure Screening     Your blood pressure was checked while you were in the emergency department today. The last reading we obtained was  BP: 189/66 . Please read the guidelines below about what these numbers mean and what you should do about them.  If your systolic blood pressure (the top number) is less than 120 and your diastolic blood pressure (the bottom number) is less than 80, then your blood pressure is normal. There is nothing more that you need to do about it.  If your systolic blood pressure (the top number) is 120-139 or your diastolic blood pressure (the bottom number) is 80-89, your blood pressure may be higher than it should be. You should have your blood pressure rechecked within a year by a primary care provider.  If your systolic blood pressure (the top number) is 140 or greater or your diastolic blood pressure (the bottom number) is 90 or greater, you may have high blood pressure. High blood pressure is treatable, but if left untreated over time it can put you at risk for heart attack, stroke, or kidney failure. You should have your blood pressure rechecked by a primary care provider within the next 4 weeks.  If your provider in the emergency department today gave you specific instructions to follow-up with your doctor or provider even sooner than that, you should follow that instruction and not wait for up to 4 weeks for your follow-up visit.        Thank you for choosing [facility]       Thank you for choosing [facility] for your care. Our goal is always to provide you with excellent care. Hearing back from our patients is one way we can continue to improve our services. Please take a few minutes to complete the written survey that you may receive in the mail after you visit with us. Thank you!        MyChart Information     [facility]  gives you secure access to your electronic health record. If you see a primary care provider, you can also send messages to your care team and make appointments. If you have questions, please call your primary care clinic.  If you do not have a primary care provider, please call [phone] and they will assist you.        Care EveryWhere ID     This is your Care EveryWhere ID. This could be used by other organizations to access your [place] medical records  [id number]        Equal Access to Services     LIANA RIZO : Hadii ed pittman Soernesto, waaxda lusuniadaha, qaybta kaalmasundar nolan, juan luis ham . So [facility] [phone].    ATENCIÓN: Si habla español, tiene a decker disposición servicios gratuitos de asistencia lingüística. Joe al [phone].    We comply with applicable federal civil rights laws and [place] laws. We do not discriminate on the basis of race, color, national origin, age, disability, sex, sexual orientation, or gender identity.            After Visit Summary       This is your record. Keep this with you and show to your community pharmacist(s) and doctor(s) at your next visit.

## 2017-12-17 NOTE — ED AVS SNAPSHOT
Emergency Department    [address] [place] [address]    Phone:  [phone]    Fax:  [phone]                                       Ginger Abreu   MRN: [id number]    Department:   Emergency Department   Date of Visit:  12/17/2017           After Visit Summary Signature Page     I have received my discharge instructions, and my questions have been answered. I have discussed any challenges I see with this plan with the nurse or doctor.    ..........................................................................................................................................  Patient/Patient Representative Signature      ..........................................................................................................................................  Patient Representative Print Name and Relationship to Patient    ..................................................               ................................................  Date                                            Time    ..........................................................................................................................................  Reviewed by Signature/Title    ...................................................              ..............................................  Date                                                            Time

## 2017-12-18 ENCOUNTER — APPOINTMENT (OUTPATIENT)
Dept: GENERAL RADIOLOGY | Facility: CLINIC | Age: 77
End: 2017-12-18
Attending: EMERGENCY MEDICINE
Payer: MEDICARE

## 2017-12-18 VITALS
DIASTOLIC BLOOD PRESSURE: 54 MMHG | WEIGHT: 160 LBS | OXYGEN SATURATION: 98 % | HEIGHT: 63 IN | TEMPERATURE: 98.3 F | HEART RATE: 61 BPM | SYSTOLIC BLOOD PRESSURE: 168 MMHG | BODY MASS INDEX: 28.35 KG/M2 | RESPIRATION RATE: 18 BRPM

## 2017-12-18 LAB
ALBUMIN SERPL-MCNC: 3.7 G/DL (ref 3.4–5)
ALP SERPL-CCNC: 110 U/L (ref 40–150)
ALT SERPL W P-5'-P-CCNC: 30 U/L (ref 0–50)
ANION GAP SERPL CALCULATED.3IONS-SCNC: 7 MMOL/L (ref 3–14)
AST SERPL W P-5'-P-CCNC: 23 U/L (ref 0–45)
BASOPHILS # BLD AUTO: 0 10E9/L (ref 0–0.2)
BASOPHILS NFR BLD AUTO: 0.2 %
BILIRUB SERPL-MCNC: 0.1 MG/DL (ref 0.2–1.3)
BUN SERPL-MCNC: 32 MG/DL (ref 7–30)
CALCIUM SERPL-MCNC: 9.3 MG/DL (ref 8.5–10.1)
CHLORIDE SERPL-SCNC: 107 MMOL/L (ref 94–109)
CO2 SERPL-SCNC: 27 MMOL/L (ref 20–32)
CREAT SERPL-MCNC: 1.03 MG/DL (ref 0.52–1.04)
DIFFERENTIAL METHOD BLD: ABNORMAL
EOSINOPHIL # BLD AUTO: 0.8 10E9/L (ref 0–0.7)
EOSINOPHIL NFR BLD AUTO: 8.9 %
ERYTHROCYTE [DISTWIDTH] IN BLOOD BY AUTOMATED COUNT: 12.9 % (ref 10–15)
GFR SERPL CREATININE-BSD FRML MDRD: 52 ML/MIN/1.7M2
GLUCOSE SERPL-MCNC: 186 MG/DL (ref 70–99)
HCT VFR BLD AUTO: 41.3 % (ref 35–47)
HGB BLD-MCNC: 13.5 G/DL (ref 11.7–15.7)
IMM GRANULOCYTES # BLD: 0 10E9/L (ref 0–0.4)
IMM GRANULOCYTES NFR BLD: 0.1 %
LIPASE SERPL-CCNC: 230 U/L (ref 73–393)
LYMPHOCYTES # BLD AUTO: 2.9 10E9/L (ref 0.8–5.3)
LYMPHOCYTES NFR BLD AUTO: 33.3 %
MCH RBC QN AUTO: 30.3 PG (ref 26.5–33)
MCHC RBC AUTO-ENTMCNC: 32.7 G/DL (ref 31.5–36.5)
MCV RBC AUTO: 93 FL (ref 78–100)
MONOCYTES # BLD AUTO: 0.6 10E9/L (ref 0–1.3)
MONOCYTES NFR BLD AUTO: 7.2 %
NEUTROPHILS # BLD AUTO: 4.3 10E9/L (ref 1.6–8.3)
NEUTROPHILS NFR BLD AUTO: 50.3 %
NRBC # BLD AUTO: 0 10*3/UL
NRBC BLD AUTO-RTO: 0 /100
PLATELET # BLD AUTO: 157 10E9/L (ref 150–450)
POTASSIUM SERPL-SCNC: 3.9 MMOL/L (ref 3.4–5.3)
PROT SERPL-MCNC: 7.3 G/DL (ref 6.8–8.8)
RBC # BLD AUTO: 4.45 10E12/L (ref 3.8–5.2)
SODIUM SERPL-SCNC: 141 MMOL/L (ref 133–144)
WBC # BLD AUTO: 8.6 10E9/L (ref 4–11)

## 2017-12-18 PROCEDURE — 25000125 ZZHC RX 250: Performed by: EMERGENCY MEDICINE

## 2017-12-18 PROCEDURE — 74020 XR ABDOMEN 2 VW: CPT

## 2017-12-18 PROCEDURE — A9270 NON-COVERED ITEM OR SERVICE: HCPCS | Mod: GY | Performed by: EMERGENCY MEDICINE

## 2017-12-18 PROCEDURE — 25000132 ZZH RX MED GY IP 250 OP 250 PS 637: Mod: GY | Performed by: EMERGENCY MEDICINE

## 2017-12-18 RX ADMIN — LIDOCAINE HYDROCHLORIDE 30 ML: 20 SOLUTION ORAL; TOPICAL at 00:30

## 2017-12-18 ASSESSMENT — ENCOUNTER SYMPTOMS
FREQUENCY: 0
DIAPHORESIS: 0
NAUSEA: 0
CONSTIPATION: 0
FEVER: 0
COUGH: 0
FLANK PAIN: 0
ABDOMINAL PAIN: 1
HEMATURIA: 0
BLOOD IN STOOL: 0
CHILLS: 0
DIARRHEA: 0

## 2017-12-18 NOTE — ED NOTES
Bed: ED05  Expected date:   Expected time:   Means of arrival:   Comments:  Triage- abdominal pain

## 2017-12-18 NOTE — ED PROVIDER NOTES
History     Chief Complaint:  Abdominal Pain     HPI   Ginger Abreu is a 77-year-old female with a complicated past medical history listed below who presents with intermittent episodes of sharp epigastric abdominal pain that stared this evening. The patient reports that about every ten minutes she would feel this sharp, stabbing pain that would last for only a few seconds and then go away. She has had no chest pain, shortness of breath, nausea, vomiting, diarrhea, fever, or urinary symptoms. She states that had a normal bowel movement today. The patient reports that she took some antacids, which she states did not really help. However, she reports that she has not had any pain since arrival.     Allergies:  Codeine  Atenolol  Diltiazem  Labetalol  Lipitor [Atorvastatin Calcium]  Nifedipine  Sulfa Drugs  Zocor [Simvastatin]  Advair Diskus  Aleve [Naproxen]  Amlodipine Besylate  Bystolic [Nebivolol Hcl]  Calcium Carbonate  Crestor [Rosuvastatin Calcium]  Diovan [Valsartan]  Hydralazine  Hydrochlorothiazide  Imdur [Isosorbide Mononitrate]  KCl  Lisinopril  Lovastatin  Metformin  Metoprolol  Niacin  Omnicef  Pravachol [Hmg-Coa-R Inhibitors]  Prilosec Otc [Omeprazole Magnesium]  Zetia [Ezetimibe]     Medications:    Amaryl   Catapres   Lasix   Maxzide   Flonase   Benicar   Pravastatin   Nexium     Past Medical History:    Anxiety   Asthma   Basal cell carcinoma   Chronic Kidney Disease   Chronic Pain Syndrome   Diabetes, Type 2   DVT  Herpes zoster   Hyperlipidemia   Hypertension   Lumbago   Migraine with aura   Mitral valve prolapse   Osteoarthritis   PE   Spinal stenosis   Squamous cell carcinoma   Thyroid nodule     Past Surgical History:    Hysterectomy   Cholecystectomy   Gastric bypass   Cystocele repair   Endoscopic sinus surgery   L2-L3 Foraminotomies   Sphenoidotomy, Right frontal sinusotomy , Bilateral total ethmoidectomy  Lumbar fusion     Family History:    Heart disease--Father     Social  "History:  Marital Status:    Presents to the ED with    Tobacco Use: Never  Alcohol Use: No   PCP: Steven Fonseca     Review of Systems   Constitutional: Negative for chills, diaphoresis and fever.   Respiratory: Negative for cough.    Gastrointestinal: Positive for abdominal pain. Negative for blood in stool, constipation, diarrhea and nausea.   Genitourinary: Negative for dyspareunia, flank pain, frequency, hematuria and urgency.   All other systems reviewed and are negative.    Physical Exam   First Vitals:  BP: 189/66  Heart Rate: 60  Temp: 98.3  F (36.8  C)  Resp: 16  Height: 160 cm (5' 3\")  Weight: 72.6 kg (160 lb)  SpO2: 98 %    Physical Exam  Constitutional:  Appears well-developed and well-nourished. Cooperative.   HENT:   Head:    Atraumatic.   Mouth/Throat:   Oropharynx is without erythema or exudate and mucous     membranes are moist.   Eyes:    Conjunctivae normal and EOM are normal.      Pupils are equal, round, and reactive to light.   Neck:    Normal range of motion. Neck supple.   Cardiovascular:  Normal rate, regular rhythm, normal heart sounds and radial and    dorsalis pedis pulses are 2+ and symmetric.    Pulmonary/Chest:  Effort normal and breath sounds normal.   Abdominal:   Soft. Bowel sounds are normal.      No splenomegaly or hepatomegaly. Mild epigastric abdominal tenderness with deep palpation. No rebound.   Musculoskeletal:  Normal range of motion. No edema and no tenderness.   Neurological:  Alert. Normal strength. No cranial nerve deficit.  Skin:    Skin is warm and dry.   Psychiatric:   Normal mood and affect.     Emergency Department Course   Imaging:  Abdomen XR, 2 Views Flat and Upright  1. A small amount of stool is scattered within the colon.  2. No evidence of bowel obstruction.  Report per radiology: Chalo Hernandez MD (12/18/17 01:01:31)    Radiographic findings were communicated with the patient who voiced understanding of the findings. "     Laboratory:  Blood:  CBC:  WBC 8.6, HGB 13.5, , otherwise WNL  CMP: Glc 186, BUN 32, GFR 52, Bilirubin 0.1, otherwise WNL (Creatinine 1.03)   Lipase: 230    Interventions:  (0030) GI Cocktail, 30 mL, PO     Emergency Department Course:  Nursing notes and vitals reviewed.    (0016) I entered the room with my scribe, obtained the history, and performed an exam of the patient as documented above.    A peripheral IV was established. Blood was drawn from the patient. This was sent for laboratory testing, findings above.      The patient was sent for an abdominal x-ray while in the emergency department, findings above.      (0237) I went to update the patient on the findings and the plan for discharge.     Findings and plan explained to the patient. Patient discharged home with instructions regarding supportive care, medications, and reasons to return. The importance of close follow-up was reviewed.     Impression & Plan    Medical Decision Making:  Ginger Abreu is a 77-year-old female who presents with intermittent episodes of brief, stabbing epigastric abdominal pain. The clinical exam today is non-specific and non-focal and non-surgical.  The differential diagnosis included, but was not limited to, gastritis, GERD, cholecystitis, choledocolithiasis, biliary colic, pancreatitis, colonic or small bowel pathology, obstruction, ileus, vascular etiologies including aneurysm, ulcer, tumor. Patient reports only having one episode of this pain while in the ED that lasted only seconds. The exact etiology of the abdominal pain is not clear, but with her blood work including CBC, CMP, and lipase all being grossly unremarkable and with a negative abdominal x-ray, I feel she is safe for discharge to home. Reasons to return to the ED were discussed. Follow up with primary care physician if symptoms persist for more than a day. Questions were answered.     Diagnosis:    ICD-10-CM   1. Abdominal pain, epigastric R10.13      Disposition:  discharged to home      [facility] EMERGENCY DEPARTMENT       Scribe disclosure:  I, Beck Dubois, am serving as a scribe on 12/18/2017 at 12:16 AM to personally document services performed by Dr. Jurado based on my observations and the provider's statements to me.                     Emanuel Jurado MD  12/18/17 1834

## 2017-12-20 PROBLEM — K25.9 GASTRIC ULCER WITHOUT HEMORRHAGE OR PERFORATION, UNSPECIFIED CHRONICITY: Status: RESOLVED | Noted: 2017-10-10 | Resolved: 2017-12-20

## 2017-12-21 ENCOUNTER — OFFICE VISIT (OUTPATIENT)
Dept: INTERNAL MEDICINE | Facility: CLINIC | Age: 77
End: 2017-12-21
Payer: COMMERCIAL

## 2017-12-21 VITALS
TEMPERATURE: 98 F | BODY MASS INDEX: 29.05 KG/M2 | DIASTOLIC BLOOD PRESSURE: 78 MMHG | WEIGHT: 164 LBS | SYSTOLIC BLOOD PRESSURE: 140 MMHG | HEART RATE: 54 BPM | OXYGEN SATURATION: 99 %

## 2017-12-21 DIAGNOSIS — G62.9 NEUROPATHY: Primary | ICD-10-CM

## 2017-12-21 DIAGNOSIS — F41.9 ANXIETY: ICD-10-CM

## 2017-12-21 DIAGNOSIS — J45.30 MILD PERSISTENT ASTHMA WITHOUT COMPLICATION: ICD-10-CM

## 2017-12-21 DIAGNOSIS — G43.109 MIGRAINE WITH AURA AND WITHOUT STATUS MIGRAINOSUS, NOT INTRACTABLE: ICD-10-CM

## 2017-12-21 DIAGNOSIS — E11.9 TYPE 2 DIABETES MELLITUS WITHOUT COMPLICATION, WITHOUT LONG-TERM CURRENT USE OF INSULIN (H): ICD-10-CM

## 2017-12-21 PROBLEM — I70.1 LEFT RENAL ARTERY STENOSIS (H): Status: RESOLVED | Noted: 2017-04-14 | Resolved: 2017-12-21

## 2017-12-21 LAB — HBA1C MFR BLD: 6.2 % (ref 4.3–6)

## 2017-12-21 PROCEDURE — 99214 OFFICE O/P EST MOD 30 MIN: CPT | Mod: 25 | Performed by: INTERNAL MEDICINE

## 2017-12-21 PROCEDURE — 83036 HEMOGLOBIN GLYCOSYLATED A1C: CPT | Performed by: INTERNAL MEDICINE

## 2017-12-21 PROCEDURE — 99207 C FOOT EXAM  NO CHARGE: CPT | Performed by: INTERNAL MEDICINE

## 2017-12-21 PROCEDURE — 36415 COLL VENOUS BLD VENIPUNCTURE: CPT | Performed by: INTERNAL MEDICINE

## 2017-12-21 RX ORDER — ALBUTEROL SULFATE 90 UG/1
2 AEROSOL, METERED RESPIRATORY (INHALATION) EVERY 6 HOURS
Qty: 1 INHALER | Refills: 11 | Status: SHIPPED | OUTPATIENT
Start: 2017-12-21

## 2017-12-21 RX ORDER — ALBUTEROL SULFATE 90 UG/1
2 AEROSOL, METERED RESPIRATORY (INHALATION) EVERY 6 HOURS
Qty: 3 INHALER | Status: CANCELLED | OUTPATIENT
Start: 2017-12-21

## 2017-12-21 RX ORDER — BUTALBITAL, ACETAMINOPHEN AND CAFFEINE 50; 325; 40 MG/1; MG/1; MG/1
TABLET ORAL
Qty: 30 TABLET | Refills: 0 | Status: SHIPPED | OUTPATIENT
Start: 2017-12-21 | End: 2018-03-22

## 2017-12-21 RX ORDER — ALPRAZOLAM 0.25 MG
TABLET ORAL
Qty: 30 TABLET | Refills: 0 | Status: SHIPPED | OUTPATIENT
Start: 2017-12-21 | End: 2018-05-02

## 2017-12-21 RX ORDER — BUTALBITAL, ACETAMINOPHEN AND CAFFEINE 50; 325; 40 MG/1; MG/1; MG/1
TABLET ORAL
Qty: 30 TABLET | Refills: 0 | Status: CANCELLED | OUTPATIENT
Start: 2017-12-21

## 2017-12-21 ASSESSMENT — ANXIETY QUESTIONNAIRES
IF YOU CHECKED OFF ANY PROBLEMS ON THIS QUESTIONNAIRE, HOW DIFFICULT HAVE THESE PROBLEMS MADE IT FOR YOU TO DO YOUR WORK, TAKE CARE OF THINGS AT HOME, OR GET ALONG WITH OTHER PEOPLE: NOT DIFFICULT AT ALL
2. NOT BEING ABLE TO STOP OR CONTROL WORRYING: NOT AT ALL
GAD7 TOTAL SCORE: 4
GAD7 TOTAL SCORE: 2
7. FEELING AFRAID AS IF SOMETHING AWFUL MIGHT HAPPEN: NOT AT ALL
5. BEING SO RESTLESS THAT IT IS HARD TO SIT STILL: NOT AT ALL
3. WORRYING TOO MUCH ABOUT DIFFERENT THINGS: SEVERAL DAYS
6. BECOMING EASILY ANNOYED OR IRRITABLE: NOT AT ALL
7. FEELING AFRAID AS IF SOMETHING AWFUL MIGHT HAPPEN: NOT AT ALL
6. BECOMING EASILY ANNOYED OR IRRITABLE: NOT AT ALL
1. FEELING NERVOUS, ANXIOUS, OR ON EDGE: SEVERAL DAYS
1. FEELING NERVOUS, ANXIOUS, OR ON EDGE: SEVERAL DAYS
IF YOU CHECKED OFF ANY PROBLEMS ON THIS QUESTIONNAIRE, HOW DIFFICULT HAVE THESE PROBLEMS MADE IT FOR YOU TO DO YOUR WORK, TAKE CARE OF THINGS AT HOME, OR GET ALONG WITH OTHER PEOPLE: NOT DIFFICULT AT ALL
3. WORRYING TOO MUCH ABOUT DIFFERENT THINGS: MORE THAN HALF THE DAYS
5. BEING SO RESTLESS THAT IT IS HARD TO SIT STILL: NOT AT ALL
2. NOT BEING ABLE TO STOP OR CONTROL WORRYING: SEVERAL DAYS

## 2017-12-21 ASSESSMENT — PATIENT HEALTH QUESTIONNAIRE - PHQ9
5. POOR APPETITE OR OVEREATING: NOT AT ALL
5. POOR APPETITE OR OVEREATING: NOT AT ALL
SUM OF ALL RESPONSES TO PHQ QUESTIONS 1-9: 1

## 2017-12-21 NOTE — NURSING NOTE
"Chief Complaint   Patient presents with     Establish Care     Was under Dr. Fonseca's Care       Initial /78  Pulse 54  Temp 98  F (36.7  C) (Oral)  Wt 164 lb (74.4 kg)  SpO2 99%  BMI 29.05 kg/m2 Estimated body mass index is 29.05 kg/(m^2) as calculated from the following:    Height as of 12/17/17: 5' 3\" (1.6 m).    Weight as of this encounter: 164 lb (74.4 kg).  Medication Reconciliation: complete  "

## 2017-12-21 NOTE — MR AVS SNAPSHOT
After Visit Summary   12/21/2017    Ginger Abreu    MRN: [id number]           Patient Information     Date Of Birth          1940        Visit Information        Provider Department      12/21/2017 8:30 AM Jerry Iniguez MD [facility]        Today's Diagnoses     Neuropathy    -  1    Type 2 diabetes mellitus without complication, without long-term current use of insulin (H)        Mild persistent asthma without complication        Anxiety        Migraine with aura and without status migrainosus, not intractable           Follow-ups after your visit        Who to contact     If you have questions or need follow up information about today's clinic visit or your schedule please contact [facility] directly at [phone].  Normal or non-critical lab and imaging results will be communicated to you by [employer], letter or phone within 4 business days after the clinic has received the results. If you do not hear from us within 7 days, please contact the clinic through [employer] or phone. If you have a critical or abnormal lab result, we will notify you by phone as soon as possible.  Submit refill requests through [employer] or call your pharmacy and they will forward the refill request to us. Please allow 3 business days for your refill to be completed.          Additional Information About Your Visit        MyChart Information     [employer] gives you secure access to your electronic health record. If you see a primary care provider, you can also send messages to your care team and make appointments. If you have questions, please call your primary care clinic.  If you do not have a primary care provider, please call [phone] and they will assist you.        Care EveryWhere ID     This is your Care EveryWhere ID. This could be used by other organizations to access your [facility] medical records  [id number]        Your Vitals Were     Pulse Temperature  Pulse Oximetry BMI (Body Mass Index)          54 98  F (36.7  C) (Oral) 99% 29.05 kg/m2         Blood Pressure from Last 3 Encounters:   02/02/18 137/80   01/03/18 162/84   12/21/17 140/78    Weight from Last 3 Encounters:   02/02/18 166 lb (75.3 kg)   01/03/18 165 lb (74.8 kg)   12/21/17 164 lb (74.4 kg)              We Performed the Following     FOOT EXAM     Hemoglobin A1c          Today's Medication Changes          These changes are accurate as of 12/21/17 11:59 PM.  If you have any questions, ask your nurse or doctor.               These medicines have changed or have updated prescriptions.        Dose/Directions    ALPRAZolam 0.25 MG tablet   Commonly known as:  XANAX   This may have changed:  See the new instructions.   Used for:  Anxiety   Changed by:  Jerry Iniguez MD        TAKE ONE TABLET BY MOUTH ONCE NIGHTLY AS NEEDED FOR SEVERE ANXIETY ONLY   Quantity:  30 tablet   Refills:  0       butalbital-acetaminophen-caffeine -40 MG per tablet   Commonly known as:  FIORICET/ESGIC   This may have changed:  See the new instructions.   Used for:  Migraine with aura and without status migrainosus, not intractable   Changed by:  Jerry Iniguez MD        TAKE ONE TABLET BY MOUTH EVERY 8 HOURS AS NEEDED FOR  SEVERE  MIGRAINE  HEADACHE  ONLY   Quantity:  30 tablet   Refills:  0         Stop taking these medicines if you haven't already. Please contact your care team if you have questions.     ASPIRIN NOT PRESCRIBED   Commonly known as:  INTENTIONAL   Stopped by:  Jerry Iniguez MD           calcium-vitamin D-vitamin K 500-500-40 MG-UNT-MCG Chew   Commonly known as:  VIACTIV   Stopped by:  Jerry Iniguez MD           cholecalciferol 46785 UNITS capsule   Commonly known as:  VITAMIN D3   Stopped by:  Jerry Iniguez MD           cyanocobalamin 2500 MCG sublingual tablet   Commonly known as:  VITAMIN B-12   Stopped by:  Jerry Iniguez MD           furosemide 20 MG tablet   Commonly known as:  LASIX   Stopped by:  Geetha  Jerry BACA MD                Where to get your medicines      These medications were sent to [facility] [address] [place] [address] - [address] [facility]  [address] [facility] [address]     Phone:  [phone]     albuterol 108 (90 BASE) MCG/ACT Inhaler         Some of these will need a paper prescription and others can be bought over the counter.  Ask your nurse if you have questions.     Bring a paper prescription for each of these medications     ALPRAZolam 0.25 MG tablet    butalbital-acetaminophen-caffeine -40 MG per tablet                Primary Care Provider Office Phone # Fax #    Steven Fonseca [phone]       [address] [facility] [address]        Equal Access to Services     LIANA RIZO : Hadii aad ku hadasho Soernesto, waaxda luqadaha, qaybta kaalmada adeegyada, juan luis crane. [facility] [phone].    ATENCIÓN: Si habla español, tiene a decker disposición servicios gratuitos de asistencia lingüística. [facility] [phone].    We comply with applicable federal civil rights laws and [place] laws. We do not discriminate on the basis of race, color, national origin, age, disability, sex, sexual orientation, or gender identity.            Thank you!     Thank you for choosing [facility]  for your care. Our goal is always to provide you with excellent care. Hearing back from our patients is one way we can continue to improve our services. Please take a few minutes to complete the written survey that you may receive in the mail after your visit with us. Thank you!             Your Updated Medication List - Protect others around you: Learn how to safely use, store and throw away your medicines at www.disposemymeds.org.          This list is accurate as of 12/21/17 11:59 PM.  Always use your most recent med list.                   Brand Name Dispense Instructions for use Diagnosis    albuterol 108 (90 BASE)  MCG/ACT Inhaler    PROAIR HFA/PROVENTIL HFA/VENTOLIN HFA    1 Inhaler    Inhale 2 puffs into the lungs every 6 hours INDICATION: RESCUE INHALER FOR SHORTNESS OF BREATH, CHEST TIGHTNESS AND COUGH    Mild persistent asthma without complication       ALPRAZolam 0.25 MG tablet    XANAX    30 tablet    TAKE ONE TABLET BY MOUTH ONCE NIGHTLY AS NEEDED FOR SEVERE ANXIETY ONLY    Anxiety       ascorbic acid 1000 MG Tabs    vitamin C    100 tablet    Take 1 tablet (1,000 mg) by mouth daily VITAMIN C REPLACEMENT    Scurvy       aspirin EC 81 MG EC tablet      Take 1 tablet (81 mg) by mouth daily    Renal artery stenosis (H)       BETA BLOCKER NOT PRESCRIBED (INTENTIONAL)      Beta Blocker not prescribed intentionally due to Intolerance    Type 2 diabetes, HbA1c goal < 7% (H), Hypertension goal BP (blood pressure) < 130/80       butalbital-acetaminophen-caffeine -40 MG per tablet    FIORICET/ESGIC    30 tablet    TAKE ONE TABLET BY MOUTH EVERY 8 HOURS AS NEEDED FOR  SEVERE  MIGRAINE  HEADACHE  ONLY    Migraine with aura and without status migrainosus, not intractable       cloNIDine 0.1 MG tablet    CATAPRES    540 tablet    Take 2 tablets (0.2 mg) by mouth 3 times daily (with meals) INDICATION: TO LOWER BLOOD PRESSURE    Essential hypertension, benign       conjugated estrogens cream    PREMARIN    30 g    Place 0.5 g vaginally twice a week    Recurrent urinary tract infection       esomeprazole 40 MG CR capsule    nexIUM    180 capsule    Take 1 capsule (40 mg) by mouth every morning (before breakfast) TAKE  30'-60' BEFORE 1ST MEAL    Gastroesophageal reflux disease without esophagitis       glimepiride 2 MG tablet    AMARYL    90 tablet    Take 1 tablet (2 mg) by mouth every morning (before breakfast) INDICATION: TO TREAT DIABETES    Type 2 diabetes mellitus without complication, without long-term current use of insulin (H)       LACTOBACILLUS EXTRA STRENGTH Caps     30 capsule    Take 1 capsule by mouth daily  INDICATION: TO RESTORE GOOD INTESTINAL BACTERIA    Dyspepsia       melatonin 5 MG Caps      Take by mouth At Bedtime        nitroGLYcerin 0.4 MG sublingual tablet    NITROSTAT    25 tablet    Place 1 tablet (0.4 mg) under the tongue every 5 minutes as needed for chest pain    Exertional dyspnea       order for DME     1 Units    Equipment being ordered: Wheeled walker with basket and seat attachment    Spinal stenosis, lumbar region, without neurogenic claudication       pravastatin 80 MG tablet    PRAVACHOL    45 tablet    Take 0.5 tablets (40 mg) by mouth every evening INDICATION: TO LOWER CHOLESTEROL AND TO HELP  PREVENT HEART DISEASE    Hyperlipidemia LDL goal <100       triamterene-hydrochlorothiazide 37.5-25 MG per tablet    MAXZIDE-25    90 tablet    Take 1 tablet by mouth every morning INDICATION:TO LOWER BLOOD PRESSURE AND CONTROL EDEMA    Benign essential hypertension

## 2017-12-21 NOTE — PROGRESS NOTES
SUBJECTIVE:   Ginger Abreu is a 77 year old female who presents to clinic today for the following health issues:    Chief Complaint   Patient presents with     Establish Care     Was under Dr. Fonseca's Care     Review Medications     Pt's past medical history, family history, habits, medications and allergies were reviewed with the patient today.  See snap shot for  HCM status. Most recent lab results reviewed with pt. Problem list and histories reviewed & adjusted, as indicated.  Additional history as below:    Following DM Diet.  Weight stable.   Sugars recently in AM only are 117-150     Review of Systems:  C: NEGATIVE for fever, chills, change in weight  I: NEGATIVE for worrisome rashes, moles or lesions  E: NEGATIVE for vision changes or irritation. Eye exam Sept 2017. Has glaucoma  E/M: NEGATIVE for ear, mouth and throat problems  R: NEGATIVE for significant cough. Has had SOB with exertion for 3-4 mos. Hx asthma by dx. Had not used inhaler for > 1 year  B: NEGATIVE for masses, tenderness or discharge  CV: NEGATIVE for chest pain with activity, palpitations or peripheral edema  GI: NEGATIVE for nausea, abdominal pain,   or change in bowel habits. Mild GERD  If not on Nexium  : NEGATIVE for frequency, dysuria, or hematuria. Seeing an ID doctor for recurrent UTI's at [facility] (Dr Downs). Hx CKD. Had renal arter stent placed approx 1 year ago  M: NEGATIVE for significant arthralgias or myalgia that limit activity  N: NEGATIVE for weakness, dizziness . HAs neuropathy distal BLEs (L>R) since   back surgery in past. Migraines about every 3-4 weeks. Using Fiorecet  E: NEGATIVE for temperature intolerance. DM and lipids as below  H: NEGATIVE for bleeding problems  P:  POSITIVE for anxiety. Using  Xanax approx 30 tabs every 3 mos. . OBEY =4     Lab Results   Component Value Date     12/17/2017     Lab Results   Component Value Date    A1C 5.8 04/07/2017     Lab Results   Component Value Date    CHOL 157  "04/07/2017     Lab Results   Component Value Date    LDL 62 04/07/2017     Lab Results   Component Value Date    HDL 54 04/07/2017     Lab Results   Component Value Date    TRIG 204 04/07/2017     Lab Results   Component Value continue current medications.  Check A1c lab today Date    CR 1.03 12/17/2017     Lab Results   Component Value Date    ALT 30 12/17/2017     Lab Results   Component Value Date    AST 23 12/17/2017     Lab Results   Component Value Date    MICROL 13 02/20/2017     Lab Results   Component Value Date    TSH 2.36 11/08/2017         OBJECTIVE:  /78  Pulse 54  Temp 98  F (36.7  C) (Oral)  Wt 164 lb (74.4 kg)  SpO2 99%  BMI 29.05 kg/m2   Estimated body mass index is 29.05 kg/(m^2) as calculated from the following:    Height as of 12/17/17: 5' 3\" (1.6 m).    Weight as of this encounter: 164 lb (74.4 kg).  Eye: PERRL, EOMI  HENT: ear canals and TM's normal and nose and mouth without ulcers or lesions   Neck: no adenopathy. Thyroid normal to palpation. No bruits  Pulm: Lungs clear to auscultation   CV: Regular rates and rhythm  GI: Soft, nontender, Normal active bowel sounds, No hepatosplenomegaly or masses palpable  Ext: Peripheral pulses intact. Minimal BLE edema.  Neuro: Normal strength and tone, sensory exam mildly reduced to light touch sensation distal bilateral lower extremities  Back: Mild tenderness to palpation bilateral  paralumbar area. Neg SLRT bilaterally.    Gen: Calm affect    Assessment/Plan: (See plan discussion below for further details)  1. Type 2 diabetes mellitus without complication, without long-term current use of insulin (H)  Continue current medications.  Check A1c lab today. Previous good control as above  - FOOT EXAM  - Hemoglobin A1c    2. Mild persistent asthma without complication   ACT 14. Not controlled. However, has not used inhaler in > 1 year. Pt to try using inhaler to see if helps sx. If so, then will use prn and if find using  More than 2-3x/week, pt " to inform MD and will consider daily use of inhaler like Advair or Breo  - albuterol (PROAIR HFA/PROVENTIL HFA/VENTOLIN HFA) 108 (90 BASE) MCG/ACT Inhaler; Inhale 2 puffs into the lungs every 6 hours INDICATION: RESCUE INHALER FOR SHORTNESS OF BREATH, CHEST TIGHTNESS AND COUGH  Dispense: 1 Inhaler; Refill: 11    3. Anxiety  Stable. Rare use Xanax. Denies side effects of fatigue when using. RF done  - ALPRAZolam (XANAX) 0.25 MG tablet; TAKE ONE TABLET BY MOUTH ONCE NIGHTLY AS NEEDED FOR SEVERE ANXIETY ONLY  Dispense: 30 tablet; Refill: 0    4. Migraine with aura and without status migrainosus, not intractable  Rare use. Continue current prn med  - butalbital-acetaminophen-caffeine (FIORICET/ESGIC) -40 MG per tablet; TAKE ONE TABLET BY MOUTH EVERY 8 HOURS AS NEEDED FOR  SEVERE  MIGRAINE  HEADACHE  ONLY  Dispense: 30 tablet; Refill: 0    5. Neuropathy  Counselled to examine feet daily. Sx chronic            Jerry Iniguez MD  Internal Medicine Department  [facility]

## 2017-12-22 ASSESSMENT — ASTHMA QUESTIONNAIRES: ACT_TOTALSCORE: 14

## 2017-12-22 ASSESSMENT — ANXIETY QUESTIONNAIRES: GAD7 TOTAL SCORE: 4

## 2018-01-01 ENCOUNTER — MYC MEDICAL ADVICE (OUTPATIENT)
Dept: INTERNAL MEDICINE | Facility: CLINIC | Age: 78
End: 2018-01-01

## 2018-01-01 DIAGNOSIS — E11.9 TYPE 2 DIABETES MELLITUS WITHOUT COMPLICATION, WITHOUT LONG-TERM CURRENT USE OF INSULIN (H): Primary | ICD-10-CM

## 2018-01-03 ENCOUNTER — OFFICE VISIT (OUTPATIENT)
Dept: URGENT CARE | Facility: URGENT CARE | Age: 78
End: 2018-01-03
Payer: COMMERCIAL

## 2018-01-03 VITALS
TEMPERATURE: 98.1 F | SYSTOLIC BLOOD PRESSURE: 162 MMHG | BODY MASS INDEX: 29.23 KG/M2 | WEIGHT: 165 LBS | DIASTOLIC BLOOD PRESSURE: 84 MMHG | OXYGEN SATURATION: 98 % | RESPIRATION RATE: 20 BRPM | HEART RATE: 62 BPM

## 2018-01-03 DIAGNOSIS — J20.9 ACUTE BRONCHITIS WITH SYMPTOMS > 10 DAYS: Primary | ICD-10-CM

## 2018-01-03 PROCEDURE — 99213 OFFICE O/P EST LOW 20 MIN: CPT | Performed by: NURSE PRACTITIONER

## 2018-01-03 RX ORDER — AZITHROMYCIN 250 MG/1
TABLET, FILM COATED ORAL
Qty: 6 TABLET | Refills: 0 | Status: SHIPPED | OUTPATIENT
Start: 2018-01-03 | End: 2018-02-02

## 2018-01-03 RX ORDER — BENZONATATE 200 MG/1
200 CAPSULE ORAL 3 TIMES DAILY PRN
Qty: 21 CAPSULE | Refills: 0 | Status: SHIPPED | OUTPATIENT
Start: 2018-01-03 | End: 2018-02-02

## 2018-01-03 NOTE — MR AVS SNAPSHOT
After Visit Summary   1/3/2018    Ginger Abreu    MRN: [id number]           Patient Information     Date Of Birth          1940        Visit Information        Provider Department      1/3/2018 6:55 PM Sue Ferrell APRN CNP [facility]        Today's Diagnoses     Acute bronchitis with symptoms > 10 days    -  1      Care Instructions      Bronchitis, Antibiotic Treatment (Adult)    Bronchitis is an infection of the air passages (bronchial tubes) in your lungs. It often occurs when you have a cold. This illness is contagious during the first few days and is spread through the air by coughing and sneezing, or by direct contact (touching the sick person and then touching your own eyes, nose, or mouth).  Symptoms of bronchitis include cough with mucus (phlegm) and low-grade fever. Bronchitis usually lasts 7 to 14 days. Mild cases can be treated with simple home remedies. More severe infection is treated with an antibiotic.  Home care  Follow these guidelines when caring for yourself at home:    If your symptoms are severe, rest at home for the first 2 to 3 days. When you go back to your usual activities, don't let yourself get too tired.    Do not smoke. Also avoid being exposed to secondhand smoke.    You may use over-the-counter medicines to control fever or pain, unless another medicine was prescribed. (Note: If you have chronic liver or kidney disease or have ever had a stomach ulcer or gastrointestinal bleeding, talk with your healthcare provider before using these medicines. Also talk to your provider if you are taking medicine to prevent blood clots.) Aspirin should never be given to anyone younger than 18 years of age who is ill with a viral infection or fever. It may cause severe liver or brain damage.    Your appetite may be poor, so a light diet is fine. Avoid dehydration by drinking 6 to 8 glasses of fluids per day (such as water, soft drinks,  sports drinks, juices, tea, or soup). Extra fluids will help loosen secretions in the nose and lungs.    Over-the-counter cough, cold, and sore-throat medicines will not shorten the length of the illness, but they may be helpful to reduce symptoms. (Note: Do not use decongestants if you have high blood pressure.)    Finish all antibiotic medicine. Do this even if you are feeling better after only a few days.  Follow-up care  Follow up with your healthcare provider, or as advised. If you had an X-ray or ECG (electrocardiogram), a specialist will review it. You will be notified of any new findings that may affect your care.  Note: If you are age 65 or older, or if you have a chronic lung disease or condition that affects your immune system, or you smoke, talk to your healthcare provider about having pneumococcal vaccinations and a yearly influenza vaccination (flu shot).  When to seek medical advice  Call your healthcare provider right away if any of these occur:    Fever of 100.4 F (38 C) or higher    Coughing up increased amounts of colored sputum    Weakness, drowsiness, headache, facial pain, ear pain, or a stiff neck  Call 911, or get immediate medical care  Contact emergency services right away if any of these occur.    Coughing up blood    Worsening weakness, drowsiness, headache, or stiff neck    Trouble breathing, wheezing, or pain with breathing  Date Last Reviewed: 9/13/2015 2000-2017 [employer]. [address] [place] [address]. All rights reserved. This information is not intended as a substitute for professional medical care. Always follow your healthcare professional's instructions.                Follow-ups after your visit        Your next 10 appointments already scheduled     Feb 02, 2018 10:30 AM CST   Return Visit with RAMY Pacheco CNP   [facility] ([facility])    [address] [address]    [phone]              Who to contact     If you have questions or need follow up information about today's clinic visit or your schedule please contact [facility] directly at [phone].  Normal or non-critical lab and imaging results will be communicated to you by Ravgenhart, letter or phone within 4 business days after the clinic has received the results. If you do not hear from us within 7 days, please contact the clinic through Ravgenhart or phone. If you have a critical or abnormal lab result, we will notify you by phone as soon as possible.  Submit refill requests through Piccsy or call your pharmacy and they will forward the refill request to us. Please allow 3 business days for your refill to be completed.          Additional Information About Your Visit        Ravgenhart Information     [employer] gives you secure access to your electronic health record. If you see a primary care provider, you can also send messages to your care team and make appointments. If you have questions, please call your primary care clinic.  If you do not have a primary care provider, please call [phone] and they will assist you.        Care EveryWhere ID     This is your Care EveryWhere ID. This could be used by other organizations to access your [facility] medical records  [id number]        Your Vitals Were     Pulse Temperature Respirations Pulse Oximetry BMI (Body Mass Index)       62 98.1  F (36.7  C) (Oral) 20 98% 29.23 kg/m2        Blood Pressure from Last 3 Encounters:   01/03/18 162/84   12/21/17 140/78   12/18/17 168/54    Weight from Last 3 Encounters:   01/03/18 165 lb (74.8 kg)   12/21/17 164 lb (74.4 kg)   12/17/17 160 lb (72.6 kg)              Today, you had the following     No orders found for display         Today's Medication Changes          These changes are accurate as of: 1/3/18  7:36 PM.  If you have any questions, ask your nurse or doctor.               Start taking these  medicines.        Dose/Directions    azithromycin 250 MG tablet   Commonly known as:  ZITHROMAX   Used for:  Acute bronchitis with symptoms > 10 days   Started by:  Sue Ferrell APRN CNP        Two tablets first day, then one tablet daily for four days.   Quantity:  6 tablet   Refills:  0       benzonatate 200 MG capsule   Commonly known as:  TESSALON   Used for:  Acute bronchitis with symptoms > 10 days   Started by:  Sue Ferrell APRN CNP        Dose:  200 mg   Take 1 capsule (200 mg) by mouth 3 times daily as needed for cough   Quantity:  21 capsule   Refills:  0            Where to get your medicines      These medications were sent to [facility] [address]  [address] [address]     Phone:  [phone]     azithromycin 250 MG tablet    benzonatate 200 MG capsule                Primary Care Provider Office Phone # Fax #    Jerry Iniguez [phone]       [address] [place], [place] [address]TH [facility] [address]        Equal Access to Services     : Hadii ed ku hadasho Soomaali, waaxda luqadaha, qaybta kaalmada adeegyada, waxay idiin haywong ham . So [facility] [phone].    ATENCIÓN: Si habla español, tiene a decker disposición servicios gratuitos de asistencia lingüística. Ll[facility] [phone].    We comply with applicable federal civil rights laws and [place] laws. We do not discriminate on the basis of race, color, national origin, age, disability, sex, sexual orientation, or gender identity.            Thank you!     Thank you for choosing [facility]  for your care. Our goal is always to provide you with excellent care. Hearing back from our patients is one way we can continue to improve our services. Please take a few minutes to complete the written survey that you may receive in the mail after your visit with us. Thank you!             Your Updated Medication List - Protect  others around you: Learn how to safely use, store and throw away your medicines at www.disposemymeds.org.          This list is accurate as of: 1/3/18  7:36 PM.  Always use your most recent med list.                   Brand Name Dispense Instructions for use Diagnosis    albuterol 108 (90 BASE) MCG/ACT Inhaler    PROAIR HFA/PROVENTIL HFA/VENTOLIN HFA    1 Inhaler    Inhale 2 puffs into the lungs every 6 hours INDICATION: RESCUE INHALER FOR SHORTNESS OF BREATH, CHEST TIGHTNESS AND COUGH    Mild persistent asthma without complication       ALPRAZolam 0.25 MG tablet    XANAX    30 tablet    TAKE ONE TABLET BY MOUTH ONCE NIGHTLY AS NEEDED FOR SEVERE ANXIETY ONLY    Anxiety       ascorbic acid 1000 MG Tabs    vitamin C    100 tablet    Take 1 tablet (1,000 mg) by mouth daily VITAMIN C REPLACEMENT    Scurvy       aspirin EC 81 MG EC tablet      Take 1 tablet (81 mg) by mouth daily    Renal artery stenosis (H)       azithromycin 250 MG tablet    ZITHROMAX    6 tablet    Two tablets first day, then one tablet daily for four days.    Acute bronchitis with symptoms > 10 days       benzonatate 200 MG capsule    TESSALON    21 capsule    Take 1 capsule (200 mg) by mouth 3 times daily as needed for cough    Acute bronchitis with symptoms > 10 days       BETA BLOCKER NOT PRESCRIBED (INTENTIONAL)      Beta Blocker not prescribed intentionally due to Intolerance    Type 2 diabetes, HbA1c goal < 7% (H), Hypertension goal BP (blood pressure) < 130/80       butalbital-acetaminophen-caffeine -40 MG per tablet    FIORICET/ESGIC    30 tablet    TAKE ONE TABLET BY MOUTH EVERY 8 HOURS AS NEEDED FOR  SEVERE  MIGRAINE  HEADACHE  ONLY    Migraine with aura and without status migrainosus, not intractable       cloNIDine 0.1 MG tablet    CATAPRES    540 tablet    Take 2 tablets (0.2 mg) by mouth 3 times daily (with meals) INDICATION: TO LOWER BLOOD PRESSURE    Essential hypertension, benign       conjugated estrogens cream    PREMARIN     30 g    Place 0.5 g vaginally twice a week    Recurrent urinary tract infection       esomeprazole 40 MG CR capsule    nexIUM    180 capsule    Take 1 capsule (40 mg) by mouth every morning (before breakfast) TAKE  30'-60' BEFORE 1ST MEAL    Gastroesophageal reflux disease without esophagitis       fluticasone 50 MCG/ACT spray    FLONASE    16 g    Spray 2 sprays in nostril At Bedtime INDICATION: TO CONTROL NASAL ALLERGY SYMPTOMS, DO NOT BLOW NOSE FOR 30 MINUTES AFTER USING    Seasonal allergic rhinitis, unspecified allergic rhinitis trigger       glimepiride 2 MG tablet    AMARYL    90 tablet    Take 1 tablet (2 mg) by mouth every morning (before breakfast) INDICATION: TO TREAT DIABETES    Type 2 diabetes mellitus without complication, without long-term current use of insulin (H)       LACTOBACILLUS EXTRA STRENGTH Caps     30 capsule    Take 1 capsule by mouth daily INDICATION: TO RESTORE GOOD INTESTINAL BACTERIA    Dyspepsia       melatonin 5 MG Caps      Take by mouth At Bedtime        nitroGLYcerin 0.4 MG sublingual tablet    NITROSTAT    25 tablet    Place 1 tablet (0.4 mg) under the tongue every 5 minutes as needed for chest pain    Exertional dyspnea       olmesartan 40 MG tablet    BENICAR    30 tablet    Take 1 tablet (40 mg) by mouth daily INDICATION:TO LOWER BLOOD PRESSURE AND TO HELP PRESERVE KIDNEY FUNCTION    Benign essential hypertension       order for [facility]     1 Units    Equipment being ordered: Wheeled walker with basket and seat attachment    Spinal stenosis, lumbar region, without neurogenic claudication       pravastatin 80 MG tablet    PRAVACHOL    45 tablet    Take 0.5 tablets (40 mg) by mouth every evening INDICATION: TO LOWER CHOLESTEROL AND TO HELP  PREVENT HEART DISEASE    Hyperlipidemia LDL goal <100       triamterene-hydrochlorothiazide 37.5-25 MG per tablet    MAXZIDE-25    90 tablet    Take 1 tablet by mouth every morning INDICATION:TO LOWER BLOOD PRESSURE AND CONTROL EDEMA    Benign  essential hypertension

## 2018-01-04 NOTE — PROGRESS NOTES
SUBJECTIVE:   Ginger Abreu is a 77 year old female presenting with a chief complaint of   Chief Complaint   Patient presents with     URI     cough, runny nose x about 2 weeks    .  Reports nasal congestion and cough, has been getting worse over last 2 weeks. Feels chest tightness, short of breath. Feels fatigued. She denies fever, nausea, vomiting, or body aches. No sore throat or significant facial tenderness. She is diabetic and has had hx of immunoglobulin issues, exercise induced asthma      Review of Systems   All other systems reviewed and are negative.        Past Medical History:   Diagnosis Date     Acute peptic ulcer, unspecified site, without mention of hemorrhage, perforation, or obstruction      Allergic rhinitis, cause unspecified      Anxiety      Basal cell carcinoma      DVT (deep venous thrombosis) (H) 03/2012    following spinal surgery. Saw Oncology. 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 Saw Oncology. treated 6 months     Peripheral neuropathy 6/19/2015     Pneumonia, organism unspecified(486)      Spinal stenosis      Squamous cell carcinoma      Thyroid nodule      Type II or unspecified type diabetes mellitus without mention of complication, not stated as uncontrolled      Unspecified essential hypertension      Current Outpatient Prescriptions   Medication  Sig Dispense Refill     ALPRAZolam (XANAX) 0.25 MG tablet TAKE ONE TABLET BY MOUTH ONCE NIGHTLY AS NEEDED FOR SEVERE ANXIETY ONLY 30 tablet 0     albuterol (PROAIR HFA/PROVENTIL HFA/VENTOLIN HFA) 108 (90 BASE) MCG/ACT Inhaler Inhale 2 puffs into the lungs every 6 hours INDICATION: RESCUE INHALER FOR SHORTNESS OF BREATH, CHEST TIGHTNESS AND COUGH 1 Inhaler 11     butalbital-acetaminophen-caffeine (FIORICET/ESGIC) -40 MG per tablet TAKE ONE TABLET BY MOUTH EVERY 8 HOURS AS NEEDED FOR  SEVERE  MIGRAINE  HEADACHE  ONLY 30 tablet 0     glimepiride (AMARYL) 2 MG tablet Take 1 tablet (2 mg) by mouth every morning (before breakfast) INDICATION: TO TREAT DIABETES 90 tablet 0     cloNIDine (CATAPRES) 0.1 MG tablet Take 2 tablets (0.2 mg) by mouth 3 times daily (with meals) INDICATION: TO LOWER BLOOD PRESSURE 540 tablet 3     triamterene-hydrochlorothiazide (MAXZIDE-25) 37.5-25 MG per tablet Take 1 tablet by mouth every morning INDICATION:TO LOWER BLOOD PRESSURE AND CONTROL EDEMA 90 tablet PRN     conjugated estrogens (PREMARIN) cream Place 0.5 g vaginally twice a week 30 g 12     LACTOBACILLUS EXTRA STRENGTH CAPS Take 1 capsule by mouth daily INDICATION: TO RESTORE GOOD INTESTINAL BACTERIA 30 capsule prn     aspirin EC 81 MG EC tablet Take 1 tablet (81 mg) by mouth daily       fluticasone (FLONASE) 50 MCG/ACT spray Spray 2 sprays in nostril At Bedtime INDICATION: TO CONTROL NASAL ALLERGY SYMPTOMS, DO NOT BLOW NOSE FOR 30 MINUTES AFTER USING 16 g PRN     olmesartan (BENICAR) 40 MG tablet Take 1 tablet (40 mg) by mouth daily INDICATION:TO LOWER BLOOD PRESSURE AND TO HELP PRESERVE KIDNEY FUNCTION 30 tablet 1     nitroglycerin (NITROSTAT) 0.4 MG SL tablet Place 1 tablet (0.4 mg) under the tongue every 5 minutes as needed for chest pain 25 tablet 0     ascorbic acid (VITAMIN C) 1000 MG TABS Take 1 tablet (1,000 mg) by mouth daily VITAMIN C REPLACEMENT 100 tablet 3     pravastatin (PRAVACHOL) 80 MG tablet Take 0.5 tablets (40  mg) by mouth every evening INDICATION: TO LOWER CHOLESTEROL AND TO HELP  PREVENT HEART DISEASE 45 tablet PRN     esomeprazole (NEXIUM) 40 MG capsule Take 1 capsule (40 mg) by mouth every morning (before breakfast) TAKE  30'-60' BEFORE 1ST MEAL 180 capsule 2     melatonin 5 MG CAPS Take by mouth At Bedtime       order for DME Equipment being ordered: Wheeled walker with basket and seat attachment 1 Units 0     BETA BLOCKER NOT PRESCRIBED, INTENTIONAL, Beta Blocker not prescribed intentionally due to Intolerance  0     [DISCONTINUED] fluticasone (FLOVENT HFA) 110 MCG/ACT inhaler Inhale 2 puffs into the lungs 2 times daily rinse mouth after each use 3 Inhaler 3     Social History   Substance Use Topics     Smoking status: Never Smoker     Smokeless tobacco: Never Used     Alcohol use No      Comment: very rare       OBJECTIVE  /84  Pulse 62  Temp 98.1  F (36.7  C) (Oral)  Resp 20  Wt 165 lb (74.8 kg)  SpO2 98%  BMI 29.23 kg/m2    Physical Exam   Constitutional: She is oriented to person, place, and time and well-developed, well-nourished, and in no distress. No distress.   HENT:   Head: Normocephalic and atraumatic.   Right Ear: External ear normal.   Left Ear: External ear normal.   Mouth/Throat: No oropharyngeal exudate.   Eyes: Conjunctivae and EOM are normal. Pupils are equal, round, and reactive to light. Right eye exhibits no discharge. Left eye exhibits no discharge. No scleral icterus.   Neck: Normal range of motion. Neck supple.   Cardiovascular: Normal rate, regular rhythm and normal heart sounds.    No murmur heard.  Pulmonary/Chest: Effort normal. No respiratory distress. She has wheezes. She has no rales. She exhibits no tenderness.   Abdominal: Soft. Bowel sounds are normal.   Musculoskeletal: Normal range of motion.   Lymphadenopathy:     She has no cervical adenopathy.   Neurological: She is alert and oriented to person, place, and time.   Skin: Skin is warm and dry. She is not diaphoretic.  No erythema.   Psychiatric: Mood, memory, affect and judgment normal.       Labs:  No results found for this or any previous visit (from the past 24 hour(s)).    X-Ray was not done.    ASSESSMENT:    No diagnosis found.     Medical Decision Making:    Differential Diagnosis:  URI Adult/Peds:  Bronchitis-viral, asthma    Serious Comorbid Conditions:  Adult:  Asthma and Diabetes    PLAN:    URI Adult:  Rx bronchitis  zithromax due to comorbid DM2, asthma, 2 week duration    Followup:    If not improving or if condition worsens, follow up with your Primary Care Provider    Patient Instructions     Bronchitis, Antibiotic Treatment (Adult)    Bronchitis is an infection of the air passages (bronchial tubes) in your lungs. It often occurs when you have a cold. This illness is contagious during the first few days and is spread through the air by coughing and sneezing, or by direct contact (touching the sick person and then touching your own eyes, nose, or mouth).  Symptoms of bronchitis include cough with mucus (phlegm) and low-grade fever. Bronchitis usually lasts 7 to 14 days. Mild cases can be treated with simple home remedies. More severe infection is treated with an antibiotic.  Home care  Follow these guidelines when caring for yourself at home:    If your symptoms are severe, rest at home for the first 2 to 3 days. When you go back to your usual activities, don't let yourself get too tired.    Do not smoke. Also avoid being exposed to secondhand smoke.    You may use over-the-counter medicines to control fever or pain, unless another medicine was prescribed. (Note: If you have chronic liver or kidney disease or have ever had a stomach ulcer or gastrointestinal bleeding, talk with your healthcare provider before using these medicines. Also talk to your provider if you are taking medicine to prevent blood clots.) Aspirin should never be given to anyone younger than 18 years of age who is ill with a viral infection or  fever. It may cause severe liver or brain damage.    Your appetite may be poor, so a light diet is fine. Avoid dehydration by drinking 6 to 8 glasses of fluids per day (such as water, soft drinks, sports drinks, juices, tea, or soup). Extra fluids will help loosen secretions in the nose and lungs.    Over-the-counter cough, cold, and sore-throat medicines will not shorten the length of the illness, but they may be helpful to reduce symptoms. (Note: Do not use decongestants if you have high blood pressure.)    Finish all antibiotic medicine. Do this even if you are feeling better after only a few days.  Follow-up care  Follow up with your healthcare provider, or as advised. If you had an X-ray or ECG (electrocardiogram), a specialist will review it. You will be notified of any new findings that may affect your care.  Note: If you are age 65 or older, or if you have a chronic lung disease or condition that affects your immune system, or you smoke, talk to your healthcare provider about having pneumococcal vaccinations and a yearly influenza vaccination (flu shot).  When to seek medical advice  Call your healthcare provider right away if any of these occur:    Fever of 100.4 F (38 C) or higher    Coughing up increased amounts of colored sputum    Weakness, drowsiness, headache, facial pain, ear pain, or a stiff neck  Call 911, or get immediate medical care  Contact emergency services right away if any of these occur.    Coughing up blood    Worsening weakness, drowsiness, headache, or stiff neck    Trouble breathing, wheezing, or pain with breathing  Date Last Reviewed: 9/13/2015 2000-2017 The [employer]. [address] [place] [address], [place] [address]. All rights reserved. This information is not intended as a substitute for professional medical care. Always follow your healthcare professional's instructions.

## 2018-01-04 NOTE — NURSING NOTE
"Chief Complaint   Patient presents with     URI     cough, runny nose x about 2 weeks        Initial /84  Pulse 62  Temp 98.1  F (36.7  C) (Oral)  Resp 20  Wt 165 lb (74.8 kg)  SpO2 98%  BMI 29.23 kg/m2 Estimated body mass index is 29.23 kg/(m^2) as calculated from the following:    Height as of 12/17/17: 5' 3\" (1.6 m).    Weight as of this encounter: 165 lb (74.8 kg).  Medication Reconciliation: complete    "

## 2018-01-04 NOTE — PATIENT INSTRUCTIONS
Bronchitis, Antibiotic Treatment (Adult)    Bronchitis is an infection of the air passages (bronchial tubes) in your lungs. It often occurs when you have a cold. This illness is contagious during the first few days and is spread through the air by coughing and sneezing, or by direct contact (touching the sick person and then touching your own eyes, nose, or mouth).  Symptoms of bronchitis include cough with mucus (phlegm) and low-grade fever. Bronchitis usually lasts 7 to 14 days. Mild cases can be treated with simple home remedies. More severe infection is treated with an antibiotic.  Home care  Follow these guidelines when caring for yourself at home:    If your symptoms are severe, rest at home for the first 2 to 3 days. When you go back to your usual activities, don't let yourself get too tired.    Do not smoke. Also avoid being exposed to secondhand smoke.    You may use over-the-counter medicines to control fever or pain, unless another medicine was prescribed. (Note: If you have chronic liver or kidney disease or have ever had a stomach ulcer or gastrointestinal bleeding, talk with your healthcare provider before using these medicines. Also talk to your provider if you are taking medicine to prevent blood clots.) Aspirin should never be given to anyone younger than 18 years of age who is ill with a viral infection or fever. It may cause severe liver or brain damage.    Your appetite may be poor, so a light diet is fine. Avoid dehydration by drinking 6 to 8 glasses of fluids per day (such as water, soft drinks, sports drinks, juices, tea, or soup). Extra fluids will help loosen secretions in the nose and lungs.    Over-the-counter cough, cold, and sore-throat medicines will not shorten the length of the illness, but they may be helpful to reduce symptoms. (Note: Do not use decongestants if you have high blood pressure.)    Finish all antibiotic medicine. Do this even if you are feeling better after only a  few days.  Follow-up care  Follow up with your healthcare provider, or as advised. If you had an X-ray or ECG (electrocardiogram), a specialist will review it. You will be notified of any new findings that may affect your care.  Note: If you are age 65 or older, or if you have a chronic lung disease or condition that affects your immune system, or you smoke, talk to your healthcare provider about having pneumococcal vaccinations and a yearly influenza vaccination (flu shot).  When to seek medical advice  Call your healthcare provider right away if any of these occur:    Fever of 100.4 F (38 C) or higher    Coughing up increased amounts of colored sputum    Weakness, drowsiness, headache, facial pain, ear pain, or a stiff neck  Call 911, or get immediate medical care  Contact emergency services right away if any of these occur.    Coughing up blood    Worsening weakness, drowsiness, headache, or stiff neck    Trouble breathing, wheezing, or pain with breathing  Date Last Reviewed: 9/13/2015 2000-2017 [employer]. [address] [place] [address], [place] [address]. All rights reserved. This information is not intended as a substitute for professional medical care. Always follow your healthcare professional's instructions.

## 2018-02-02 ENCOUNTER — OFFICE VISIT (OUTPATIENT)
Dept: CARDIOLOGY | Facility: CLINIC | Age: 78
End: 2018-02-02
Attending: INTERNAL MEDICINE
Payer: COMMERCIAL

## 2018-02-02 VITALS
HEIGHT: 62 IN | HEART RATE: 52 BPM | DIASTOLIC BLOOD PRESSURE: 80 MMHG | SYSTOLIC BLOOD PRESSURE: 137 MMHG | BODY MASS INDEX: 30.55 KG/M2 | WEIGHT: 166 LBS

## 2018-02-02 DIAGNOSIS — R06.09 EXERTIONAL DYSPNEA: ICD-10-CM

## 2018-02-02 DIAGNOSIS — E78.5 HYPERLIPIDEMIA LDL GOAL <100: ICD-10-CM

## 2018-02-02 DIAGNOSIS — I70.1 RENAL ARTERY STENOSIS (H): ICD-10-CM

## 2018-02-02 DIAGNOSIS — I10 BENIGN ESSENTIAL HYPERTENSION: ICD-10-CM

## 2018-02-02 PROCEDURE — 99214 OFFICE O/P EST MOD 30 MIN: CPT | Performed by: NURSE PRACTITIONER

## 2018-02-02 RX ORDER — FUROSEMIDE 20 MG
20 TABLET ORAL DAILY
Qty: 30 TABLET | Refills: 3 | Status: SHIPPED | OUTPATIENT
Start: 2018-02-02 | End: 2019-07-12

## 2018-02-02 RX ORDER — OLMESARTAN MEDOXOMIL 40 MG/1
40 TABLET ORAL DAILY
Qty: 30 TABLET | Refills: 1 | Status: SHIPPED | OUTPATIENT
Start: 2018-02-02 | End: 2018-02-02

## 2018-02-02 RX ORDER — LOSARTAN POTASSIUM 50 MG/1
50 TABLET ORAL DAILY
Qty: 30 TABLET | Refills: 11 | Status: SHIPPED | OUTPATIENT
Start: 2018-02-02 | End: 2018-10-22

## 2018-02-02 NOTE — PROGRESS NOTES
Cardiology Clinic Progress Note  Ginger Abreu MRN# [id number]   YOB: 1940 Age: 77 year old     Reason for visit: Hypertension           Assessment and Plan:     1. Hypertension, labile    SBP is stable at 120-140 mmHg, prior to medications    Discontinue Olemsartan due to cost (patient is willing to restart if BP is not controlled on alternative medication)    Start Losartan 50 mg daily, can titrate up to 100 mg if BP is not controlled    Monitor BP with goal of 130/80 1-2 hours after medications    Continue clonidine 0.1 mg in the am and 0.2 mg in the afternoon and evening, Maxzide 37.5-25 milligrams daily, Lasix 20 mg prn for edema    Follow up with Dr. Maldonado in 6 months with BMP    2. Bilateral renal artery stenosis, left greater than right    Status post PTA and stenting to the left renal artery for severe stenosis    Patent stent on renal artery ultrasound    3.   CKD, Stage III    Last BMP showed creatine was 1.03, BUN 32 and GFR 52    Repeat BMP 1-2 weeks after starting Losartan         History of Presenting Illness:    Ginger Abreu is a very pleasant 77 year old patient of Dr. Maldonado who saw Dr. Elizalde for labile hypertension and renal artery stenosis. She presents today for a 6 month follow up. She has a past medical history is significant for long-standing hypertension with multiple allergies and adverse reactions listed to antihypertensive medications.  She also has a history of hyperlipidemia, asthma, diabetes and chronic renal insufficiency.     The patient was referred to Dr. Elizalde after she underwent a CTA of her renal arteries in the setting of long-standing difficult to control hypertension.  The CTA showed single renal artery to both kidneys, the proximal portion of the left renal artery was heavily calcified with significant noncalcified plaque in the proximal portion.  The right renal artery was somewhat obscured by heavy calcific plaque; however it was  "commented on that there was also noncalcified plaque at that level suggesting moderate to moderate to severe narrowing. Therefore, she underwent an abdominal aortogram with bilateral renal angiography with successful PTA and stent of the ostial to proximal left renal artery with the placement of a 6.0 x 15 mm Herculink stent on 2/27/2017 with Dr. Elizalde.     Today in clinic her blood pressure was 137/80. She states her SBP is usually 120-130 mmHg prior to medications. She asks for an alternative to Olemsartan due to cost. She was previously on Losartan, but is unsure why it was changed. She continues to deny chest pain, shortness of breath, PND, orthopnea, presyncope, edema, heart racing, or palpitations.          This note was completed in part using Dragon voice recognition software. Although reviewed after completion, some word and grammatical errors may occur       Review of Systems:   Review of Systems:  Skin:  Negative     Eyes:  Negative    ENT:  Negative    Respiratory:  Positive for dyspnea on exertion;wheezing  Cardiovascular:    Positive for;fatigue;dizziness  Gastroenterology: Negative  (pt. taking zantac)  Genitourinary:  Negative    Musculoskeletal:  Positive for back pain;nocturnal cramping;neck pain;arthritis  Neurologic:  Positive for migraine headaches;headaches;numbness or tingling of feet  Psychiatric:  Negative    Heme/Lymph/Imm:  Positive for allergies  Endocrine:  Positive for diabetes              Physical Exam:     Vitals: /80  Pulse 52  Ht 1.575 m (5' 2.01\")  Wt 75.3 kg (166 lb)  BMI 30.35 kg/m2  Constitutional:  cooperative, alert and oriented, well developed, well nourished, in no acute distress overweight      Skin:  warm and dry to the touch, no apparent skin lesions or masses noted        Head:  normocephalic, no masses or lesions        Eyes:  pupils equal and round        ENT:  no pallor or cyanosis        Chest:  clear to auscultation        Cardiac: regular rhythm;normal " S1 and S2                  Abdomen:  abdomen soft        Extremities and Back:  no edema        Neurological:  no gross motor deficits;affect appropriate             Medications:     Current Outpatient Prescriptions   Medication Sig Dispense Refill     losartan (COZAAR) 50 MG tablet Take 1 tablet (50 mg) by mouth daily 30 tablet 11     furosemide (LASIX) 20 MG tablet Take 1 tablet (20 mg) by mouth daily 30 tablet 3     ALPRAZolam (XANAX) 0.25 MG tablet TAKE ONE TABLET BY MOUTH ONCE NIGHTLY AS NEEDED FOR SEVERE ANXIETY ONLY 30 tablet 0     albuterol (PROAIR HFA/PROVENTIL HFA/VENTOLIN HFA) 108 (90 BASE) MCG/ACT Inhaler Inhale 2 puffs into the lungs every 6 hours INDICATION: RESCUE INHALER FOR SHORTNESS OF BREATH, CHEST TIGHTNESS AND COUGH 1 Inhaler 11     butalbital-acetaminophen-caffeine (FIORICET/ESGIC) -40 MG per tablet TAKE ONE TABLET BY MOUTH EVERY 8 HOURS AS NEEDED FOR  SEVERE  MIGRAINE  HEADACHE  ONLY 30 tablet 0     glimepiride (AMARYL) 2 MG tablet Take 1 tablet (2 mg) by mouth every morning (before breakfast) INDICATION: TO TREAT DIABETES 90 tablet 0     cloNIDine (CATAPRES) 0.1 MG tablet Take 2 tablets (0.2 mg) by mouth 3 times daily (with meals) INDICATION: TO LOWER BLOOD PRESSURE 540 tablet 3     triamterene-hydrochlorothiazide (MAXZIDE-25) 37.5-25 MG per tablet Take 1 tablet by mouth every morning INDICATION:TO LOWER BLOOD PRESSURE AND CONTROL EDEMA 90 tablet PRN     conjugated estrogens (PREMARIN) cream Place 0.5 g vaginally twice a week 30 g 12     LACTOBACILLUS EXTRA STRENGTH CAPS Take 1 capsule by mouth daily INDICATION: TO RESTORE GOOD INTESTINAL BACTERIA 30 capsule prn     aspirin EC 81 MG EC tablet Take 1 tablet (81 mg) by mouth daily       nitroglycerin (NITROSTAT) 0.4 MG SL tablet Place 1 tablet (0.4 mg) under the tongue every 5 minutes as needed for chest pain 25 tablet 0     ascorbic acid (VITAMIN C) 1000 MG TABS Take 1 tablet (1,000 mg) by mouth daily VITAMIN C REPLACEMENT 100 tablet 3      pravastatin (PRAVACHOL) 80 MG tablet Take 0.5 tablets (40 mg) by mouth every evening INDICATION: TO LOWER CHOLESTEROL AND TO HELP  PREVENT HEART DISEASE 45 tablet PRN     esomeprazole (NEXIUM) 40 MG capsule Take 1 capsule (40 mg) by mouth every morning (before breakfast) TAKE  30'-60' BEFORE 1ST MEAL 180 capsule 2     melatonin 5 MG CAPS Take by mouth At Bedtime       order for DME Equipment being ordered: Wheeled walker with basket and seat attachment 1 Units 0     BETA BLOCKER NOT PRESCRIBED, INTENTIONAL, Beta Blocker not prescribed intentionally due to Intolerance  0     [DISCONTINUED] fluticasone (FLOVENT HFA) 110 MCG/ACT inhaler Inhale 2 puffs into the lungs 2 times daily rinse mouth after each use 3 Inhaler 3           Family History   Problem Relation Age of Onset     HEART DISEASE Father 25      in a fire      HEART DISEASE Paternal Grandfather        Social History     Social History     Marital status:      Spouse name: N/A     Number of children: N/A     Years of education: N/A     Occupational History     Not on file.     Social History Main Topics     Smoking status: Never Smoker     Smokeless tobacco: Never Used     Alcohol use No      Comment: very rare     Drug use: No     Sexual activity: Yes     Other Topics Concern     Parent/Sibling W/ Cabg, Mi Or Angioplasty Before 65f 55m? No     Caffeine Concern No     soda 1 time per day     Special Diet No     Exercise No     limited due to back pain      Social History Narrative            Past Medical History:     Past Medical History:   Diagnosis Date     Acute peptic ulcer, unspecified site, without mention of hemorrhage, perforation, or obstruction      Allergic rhinitis, cause unspecified      Anxiety      Basal cell carcinoma      DVT (deep venous thrombosis) (H) 2012    following spinal surgery. Saw Oncology. 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 Saw Oncology. treated 6 months     Peripheral neuropathy 6/19/2015     Pneumonia, organism unspecified(486)      Spinal stenosis      Squamous cell carcinoma      Thyroid nodule      Type II or unspecified type diabetes mellitus without mention of complication, not stated as uncontrolled      Unspecified essential hypertension               Past Surgical History:     Past Surgical History:   Procedure Laterality Date     C NONSPECIFIC PROCEDURE  approx 1970's    hyst/bso     C NONSPECIFIC PROCEDURE  approx 1970's    gb     C NONSPECIFIC PROCEDURE  approx 1980's    cystocele repair     C NONSPECIFIC PROCEDURE  approx 1980's    endoscopic sinus      C NONSPECIFIC PROCEDURE      epidurals x 7     C NONSPECIFIC PROCEDURE      nl colonoscopy 1/2006     C NONSPECIFIC PROCEDURE  2011    L2-L3 foramenotomies     C NONSPECIFIC PROCEDURE  2012    lumbar fusion     NONSPECIFIC PROCEDURE  2011    Bilateral sphenoidotomy with removal of polypoid tissue.     Right frontal sinusotomy.   Bilateral total ethmoidectomy.  Bilateral maxillary antrostomy with removal of polypoid tissue.       ORTHOPEDIC SURGERY      lumbar fusion              Allergies:   Codeine; Atenolol; Diltiazem; Labetalol; Lipitor [atorvastatin calcium]; Nifedipine; Sulfa drugs; Zocor [simvastatin]; Advair diskus; Aleve [naproxen]; Amlodipine besylate; Bystolic [nebivolol hcl]; Calcium carbonate; Crestor  [rosuvastatin calcium]; Diovan [valsartan]; Hydralazine; Hydrochlorothiazide; Imdur [isosorbide mononitrate]; Kcl; Lisinopril; Lovastatin; Metformin; Metoprolol; Niacin; Omnicef; Pravachol [hmg-coa-r inhibitors]; Prilosec otc [omeprazole magnesium]; and Zetia [ezetimibe]       Data:   All laboratory data reviewed:    Last Basic Metabolic Panel:  Lab Results   Component Value Date     12/17/2017      Lab Results   Component Value Date    POTASSIUM 3.9 12/17/2017     Lab Results   Component Value Date    CHLORIDE 107 12/17/2017     Lab Results   Component Value Date    ROGE 9.3 12/17/2017     Lab Results   Component Value Date    CO2 27 12/17/2017     Lab Results   Component Value Date    BUN 32 12/17/2017     Lab Results   Component Value Date    CR 1.03 12/17/2017     Lab Results   Component Value Date     12/17/2017       RAMY RUIZ, CNP

## 2018-02-02 NOTE — LETTER
2/2/2018    Jerry Iniguez MD  [address] [facility] [address]    RE: Ginger Abreu       Dear Colleague,    I had the pleasure of seeing Ginger Abreu in the [facility].    Cardiology Clinic Progress Note  Ginger Abreu MRN# [id number]   YOB: 1940 Age: 77 year old     Reason for visit: Hypertension           Assessment and Plan:     1. Hypertension, labile    SBP is stable at 120-140 mmHg, prior to medications    Discontinue Olemsartan due to cost (patient is willing to restart if BP is not controlled on alternative medication)    Start Losartan 50 mg daily, can titrate up to 100 mg if BP is not controlled    Monitor BP with goal of 130/80 1-2 hours after medications    Continue clonidine 0.1 mg in the am and 0.2 mg in the afternoon and evening, Maxzide 37.5-25 milligrams daily, Lasix 20 mg prn for edema    Follow up with Dr. Maldonado in 6 months with BMP    2. Bilateral renal artery stenosis, left greater than right    Status post PTA and stenting to the left renal artery for severe stenosis    Patent stent on renal artery ultrasound    3.   CKD, Stage III    Last BMP showed creatine was 1.03, BUN 32 and GFR 52    Repeat BMP 1-2 weeks after starting Losartan         History of Presenting Illness:    Ginger Abreu is a very pleasant 77 year old patient of Dr. Maldonado who saw Dr. Elizalde for labile hypertension and renal artery stenosis. She presents today for a 6 month follow up. She has a past medical history is significant for long-standing hypertension with multiple allergies and adverse reactions listed to antihypertensive medications.  She also has a history of hyperlipidemia, asthma, diabetes and chronic renal insufficiency.     The patient was referred to Dr. Elizalde after she underwent a CTA of her renal arteries in the setting of long-standing difficult to control hypertension.  The CTA showed single renal artery to both kidneys, the  "proximal portion of the left renal artery was heavily calcified with significant noncalcified plaque in the proximal portion.  The right renal artery was somewhat obscured by heavy calcific plaque; however it was commented on that there was also noncalcified plaque at that level suggesting moderate to moderate to severe narrowing. Therefore, she underwent an abdominal aortogram with bilateral renal angiography with successful PTA and stent of the ostial to proximal left renal artery with the placement of a 6.0 x 15 mm Herculink stent on 2/27/2017 with Dr. Elizalde.     Today in clinic her blood pressure was 137/80. She states her SBP is usually 120-130 mmHg prior to medications. She asks for an alternative to Olemsartan due to cost. She was previously on Losartan, but is unsure why it was changed. She continues to deny chest pain, shortness of breath, PND, orthopnea, presyncope, edema, heart racing, or palpitations.          This note was completed in part using Dragon voice recognition software. Although reviewed after completion, some word and grammatical errors may occur       Review of Systems:   Review of Systems:  Skin:  Negative     Eyes:  Negative    ENT:  Negative    Respiratory:  Positive for dyspnea on exertion;wheezing  Cardiovascular:    Positive for;fatigue;dizziness  Gastroenterology: Negative  (pt. taking zantac)  Genitourinary:  Negative    Musculoskeletal:  Positive for back pain;nocturnal cramping;neck pain;arthritis  Neurologic:  Positive for migraine headaches;headaches;numbness or tingling of feet  Psychiatric:  Negative    Heme/Lymph/Imm:  Positive for allergies  Endocrine:  Positive for diabetes              Physical Exam:     Vitals: /80  Pulse 52  Ht 1.575 m (5' 2.01\")  Wt 75.3 kg (166 lb)  BMI 30.35 kg/m2  Constitutional:  cooperative, alert and oriented, well developed, well nourished, in no acute distress overweight      Skin:  warm and dry to the touch, no apparent skin lesions " or masses noted        Head:  normocephalic, no masses or lesions        Eyes:  pupils equal and round        ENT:  no pallor or cyanosis        Chest:  clear to auscultation        Cardiac: regular rhythm;normal S1 and S2                  Abdomen:  abdomen soft        Extremities and Back:  no edema        Neurological:  no gross motor deficits;affect appropriate             Medications:     Current Outpatient Prescriptions   Medication Sig Dispense Refill     losartan (COZAAR) 50 MG tablet Take 1 tablet (50 mg) by mouth daily 30 tablet 11     furosemide (LASIX) 20 MG tablet Take 1 tablet (20 mg) by mouth daily 30 tablet 3     ALPRAZolam (XANAX) 0.25 MG tablet TAKE ONE TABLET BY MOUTH ONCE NIGHTLY AS NEEDED FOR SEVERE ANXIETY ONLY 30 tablet 0     albuterol (PROAIR HFA/PROVENTIL HFA/VENTOLIN HFA) 108 (90 BASE) MCG/ACT Inhaler Inhale 2 puffs into the lungs every 6 hours INDICATION: RESCUE INHALER FOR SHORTNESS OF BREATH, CHEST TIGHTNESS AND COUGH 1 Inhaler 11     butalbital-acetaminophen-caffeine (FIORICET/ESGIC) -40 MG per tablet TAKE ONE TABLET BY MOUTH EVERY 8 HOURS AS NEEDED FOR  SEVERE  MIGRAINE  HEADACHE  ONLY 30 tablet 0     glimepiride (AMARYL) 2 MG tablet Take 1 tablet (2 mg) by mouth every morning (before breakfast) INDICATION: TO TREAT DIABETES 90 tablet 0     cloNIDine (CATAPRES) 0.1 MG tablet Take 2 tablets (0.2 mg) by mouth 3 times daily (with meals) INDICATION: TO LOWER BLOOD PRESSURE 540 tablet 3     triamterene-hydrochlorothiazide (MAXZIDE-25) 37.5-25 MG per tablet Take 1 tablet by mouth every morning INDICATION:TO LOWER BLOOD PRESSURE AND CONTROL EDEMA 90 tablet PRN     conjugated estrogens (PREMARIN) cream Place 0.5 g vaginally twice a week 30 g 12     LACTOBACILLUS EXTRA STRENGTH CAPS Take 1 capsule by mouth daily INDICATION: TO RESTORE GOOD INTESTINAL BACTERIA 30 capsule prn     aspirin EC 81 MG EC tablet Take 1 tablet (81 mg) by mouth daily       nitroglycerin (NITROSTAT) 0.4 MG SL tablet  Place 1 tablet (0.4 mg) under the tongue every 5 minutes as needed for chest pain 25 tablet 0     ascorbic acid (VITAMIN C) 1000 MG TABS Take 1 tablet (1,000 mg) by mouth daily VITAMIN C REPLACEMENT 100 tablet 3     pravastatin (PRAVACHOL) 80 MG tablet Take 0.5 tablets (40 mg) by mouth every evening INDICATION: TO LOWER CHOLESTEROL AND TO HELP  PREVENT HEART DISEASE 45 tablet PRN     esomeprazole (NEXIUM) 40 MG capsule Take 1 capsule (40 mg) by mouth every morning (before breakfast) TAKE  30'-60' BEFORE 1ST MEAL 180 capsule 2     melatonin 5 MG CAPS Take by mouth At Bedtime       order for DME Equipment being ordered: Wheeled walker with basket and seat attachment 1 Units 0     BETA BLOCKER NOT PRESCRIBED, INTENTIONAL, Beta Blocker not prescribed intentionally due to Intolerance  0     [DISCONTINUED] fluticasone (FLOVENT HFA) 110 MCG/ACT inhaler Inhale 2 puffs into the lungs 2 times daily rinse mouth after each use 3 Inhaler 3           Family History   Problem Relation Age of Onset     HEART DISEASE Father 25      in a fire      HEART DISEASE Paternal Grandfather        Social History     Social History     Marital status:      Spouse name: N/A     Number of children: N/A     Years of education: N/A     Occupational History     Not on file.     Social History Main Topics     Smoking status: Never Smoker     Smokeless tobacco: Never Used     Alcohol use No      Comment: very rare     Drug use: No     Sexual activity: Yes     Other Topics Concern     Parent/Sibling W/ Cabg, Mi Or Angioplasty Before 65f 55m? No     Caffeine Concern No     soda 1 time per day     Special Diet No     Exercise No     limited due to back pain      Social History Narrative            Past Medical History:     Past Medical History:   Diagnosis Date     Acute peptic ulcer, unspecified site, without mention of hemorrhage, perforation, or obstruction      Allergic rhinitis, cause unspecified      Anxiety      Basal cell carcinoma       DVT (deep venous thrombosis) (H) 03/2012    following spinal surgery. Saw Oncology. 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 Saw Oncology. treated 6 months     Peripheral neuropathy 6/19/2015     Pneumonia, organism unspecified(486)      Spinal stenosis      Squamous cell carcinoma      Thyroid nodule      Type II or unspecified type diabetes mellitus without mention of complication, not stated as uncontrolled      Unspecified essential hypertension               Past Surgical History:     Past Surgical History:   Procedure Laterality Date     C NONSPECIFIC PROCEDURE  approx 1970's    hyst/bso     C NONSPECIFIC PROCEDURE  approx 1970's    gb     C NONSPECIFIC PROCEDURE  approx 1980's    cystocele repair     C NONSPECIFIC PROCEDURE  approx 1980's    endoscopic sinus      C NONSPECIFIC PROCEDURE      epidurals x 7     C NONSPECIFIC PROCEDURE      nl colonoscopy 1/2006     C NONSPECIFIC PROCEDURE  2011    L2-L3 foramenotomies     C NONSPECIFIC PROCEDURE  2012    lumbar fusion     NONSPECIFIC PROCEDURE  2011    Bilateral sphenoidotomy with removal of polypoid tissue.     Right frontal sinusotomy.   Bilateral total ethmoidectomy.  Bilateral maxillary antrostomy with removal of polypoid tissue.       ORTHOPEDIC SURGERY      lumbar fusion              Allergies:   Codeine; Atenolol; Diltiazem;  Labetalol; Lipitor [atorvastatin calcium]; Nifedipine; Sulfa drugs; Zocor [simvastatin]; Advair diskus; Aleve [naproxen]; Amlodipine besylate; Bystolic [nebivolol hcl]; Calcium carbonate; Crestor [rosuvastatin calcium]; Diovan [valsartan]; Hydralazine; Hydrochlorothiazide; Imdur [isosorbide mononitrate]; Kcl; Lisinopril; Lovastatin; Metformin; Metoprolol; Niacin; Omnicef; Pravachol [hmg-coa-r inhibitors]; Prilosec otc [omeprazole magnesium]; and Zetia [ezetimibe]       Data:   All laboratory data reviewed:    Last Basic Metabolic Panel:  Lab Results   Component Value Date     12/17/2017      Lab Results   Component Value Date    POTASSIUM 3.9 12/17/2017     Lab Results   Component Value Date    CHLORIDE 107 12/17/2017     Lab Results   Component Value Date    ROGE 9.3 12/17/2017     Lab Results   Component Value Date    CO2 27 12/17/2017     Lab Results   Component Value Date    BUN 32 12/17/2017     Lab Results   Component Value Date    CR 1.03 12/17/2017     Lab Results   Component Value Date     12/17/2017     Thank you for allowing me to participate in the care of your patient.    Sincerely,     RAMY RUIZ [facility]

## 2018-02-02 NOTE — PATIENT INSTRUCTIONS
1) STOP Olemsartan     2) START Losartan 50 mg daily. Can increase to 100 mg daily    3) Continue all other medications    4) Check labs in 1-2 weeks    5) Continue monitor blood pressure. If consistently greater than 130/80 1-2 hours, AFTER medications, please call [phone]

## 2018-02-02 NOTE — MR AVS SNAPSHOT
After Visit Summary   2/2/2018    Ginger Abreu    MRN: [id number]           Patient Information     Date Of Birth          1940        Visit Information        Provider Department      2/2/2018 10:30 AM Kassandra Nichols APRN CNP [facility]        Today's Diagnoses     Benign essential hypertension        Hyperlipidemia LDL goal <100        Exertional dyspnea        Renal artery stenosis (H)          Care Instructions    1) STOP Olemsartan     2) START Losartan 50 mg daily. Can increase to 100 mg daily    3) Continue all other medications    4) Check labs in 1-2 weeks    5) Continue monitor blood pressure. If consistently greater than 130/80 1-2 hours, AFTER medications, please call [phone]          Follow-ups after your visit        Future tests that were ordered for you today     Open Future Orders        Priority Expected Expires Ordered    Basic metabolic panel Routine 2/9/2018 2/2/2019 2/2/2018            Who to contact     If you have questions or need follow up information about today's clinic visit or your schedule please contact [facility] directly at [phone].  Normal or non-critical lab and imaging results will be communicated to you by [employer], letter or phone within 4 business days after the clinic has received the results. If you do not hear from us within 7 days, please contact the clinic through [employer] or phone. If you have a critical or abnormal lab result, we will notify you by phone as soon as possible.  Submit refill requests through [employer] or call your pharmacy and they will forward the refill request to us. Please allow 3 business days for your refill to be completed.          Additional Information About Your Visit        [employer] Information     [employer] gives you secure access to your electronic health record. If you see a primary care provider, you can also send messages to your care  "team and make appointments. If you have questions, please call your primary care clinic.  If you do not have a primary care provider, please call [phone] and they will assist you.        Care EveryWhere ID     This is your Care EveryWhere ID. This could be used by other organizations to access your [place] medical records  [id number]        Your Vitals Were     Pulse Height BMI (Body Mass Index)             52 1.575 m (5' 2.01\") 30.35 kg/m2          Blood Pressure from Last 3 Encounters:   02/02/18 137/80   01/03/18 162/84   12/21/17 140/78    Weight from Last 3 Encounters:   02/02/18 75.3 kg (166 lb)   01/03/18 74.8 kg (165 lb)   12/21/17 74.4 kg (164 lb)              We Performed the Following     Follow-Up with Cardiac Advanced Practice Provider          Today's Medication Changes          These changes are accurate as of 2/2/18 10:59 AM.  If you have any questions, ask your nurse or doctor.               Start taking these medicines.        Dose/Directions    furosemide 20 MG tablet   Commonly known as:  LASIX   Used for:  Benign essential hypertension   Started by:  Kassandra Nichols APRN CNP        Dose:  20 mg   Take 1 tablet (20 mg) by mouth daily   Quantity:  30 tablet   Refills:  3       losartan 50 MG tablet   Commonly known as:  COZAAR   Used for:  Benign essential hypertension   Started by:  Kassandra Nichols APRN CNP        Dose:  50 mg   Take 1 tablet (50 mg) by mouth daily   Quantity:  30 tablet   Refills:  11         Stop taking these medicines if you haven't already. Please contact your care team if you have questions.     olmesartan 40 MG tablet   Commonly known as:  BENICAR   Stopped by:  Kassandra Nichols APRN CNP                Where to get your medicines      These medications were sent to [facility] [address] [place] [address] [address] [address]     Phone:  [phone]     furosemide 20 MG tablet    losartan 50 MG tablet             "    Primary Care Provider Office Phone # Fax #    Jerry Iniguez [phone]       [address] [place] [address]        Equal Access to Services     LIANA RIZO : Hadrodger ed brown tamio Gertrudis, waaxda luqadaha, qaybta kaalmada an, juan luis bergeron laDenzelwong crane. So [facility] [phone].    ATENCIÓN: Si habla español, tiene a decker disposición servicios gratuitos de asistencia lingüística. Llame al [phone].    We comply with applicable federal civil rights laws and [place] laws. We do not discriminate on the basis of race, color, national origin, age, disability, sex, sexual orientation, or gender identity.            Thank you!     Thank you for choosing [facility]  for your care. Our goal is always to provide you with excellent care. Hearing back from our patients is one way we can continue to improve our services. Please take a few minutes to complete the written survey that you may receive in the mail after your visit with us. Thank you!             Your Updated Medication List - Protect others around you: Learn how to safely use, store and throw away your medicines at www.disposemymeds.org.          This list is accurate as of 2/2/18 10:59 AM.  Always use your most recent med list.                   Brand Name Dispense Instructions for use Diagnosis    albuterol 108 (90 BASE) MCG/ACT Inhaler    PROAIR HFA/PROVENTIL HFA/VENTOLIN HFA    1 Inhaler    Inhale 2 puffs into the lungs every 6 hours INDICATION: RESCUE INHALER FOR SHORTNESS OF BREATH, CHEST TIGHTNESS AND COUGH    Mild persistent asthma without complication       ALPRAZolam 0.25 MG tablet    XANAX    30 tablet    TAKE ONE TABLET BY MOUTH ONCE NIGHTLY AS NEEDED FOR SEVERE ANXIETY ONLY    Anxiety       ascorbic acid 1000 MG Tabs    vitamin C    100 tablet    Take 1 tablet (1,000 mg) by mouth daily VITAMIN C REPLACEMENT    Scurvy       aspirin EC 81 MG EC tablet      Take 1 tablet  (81 mg) by mouth daily    Renal artery stenosis (H)       BETA BLOCKER NOT PRESCRIBED (INTENTIONAL)      Beta Blocker not prescribed intentionally due to Intolerance    Type 2 diabetes, HbA1c goal < 7% (H), Hypertension goal BP (blood pressure) < 130/80       butalbital-acetaminophen-caffeine -40 MG per tablet    FIORICET/ESGIC    30 tablet    TAKE ONE TABLET BY MOUTH EVERY 8 HOURS AS NEEDED FOR  SEVERE  MIGRAINE  HEADACHE  ONLY    Migraine with aura and without status migrainosus, not intractable       cloNIDine 0.1 MG tablet    CATAPRES    540 tablet    Take 2 tablets (0.2 mg) by mouth 3 times daily (with meals) INDICATION: TO LOWER BLOOD PRESSURE    Essential hypertension, benign       conjugated estrogens cream    PREMARIN    30 g    Place 0.5 g vaginally twice a week    Recurrent urinary tract infection       esomeprazole 40 MG CR capsule    nexIUM    180 capsule    Take 1 capsule (40 mg) by mouth every morning (before breakfast) TAKE  30'-60' BEFORE 1ST MEAL    Gastroesophageal reflux disease without esophagitis       furosemide 20 MG tablet    LASIX    30 tablet    Take 1 tablet (20 mg) by mouth daily    Benign essential hypertension       glimepiride 2 MG tablet    AMARYL    90 tablet    Take 1 tablet (2 mg) by mouth every morning (before breakfast) INDICATION: TO TREAT DIABETES    Type 2 diabetes mellitus without complication, without long-term current use of insulin (H)       LACTOBACILLUS EXTRA STRENGTH Caps     30 capsule    Take 1 capsule by mouth daily INDICATION: TO RESTORE GOOD INTESTINAL BACTERIA    Dyspepsia       losartan 50 MG tablet    COZAAR    30 tablet    Take 1 tablet (50 mg) by mouth daily    Benign essential hypertension       melatonin 5 MG Caps      Take by mouth At Bedtime        nitroGLYcerin 0.4 MG sublingual tablet    NITROSTAT    25 tablet    Place 1 tablet (0.4 mg) under the tongue every 5 minutes as needed for chest pain    Exertional dyspnea       order for DME     1 Units     Equipment being ordered: Wheeled walker with basket and seat attachment    Spinal stenosis, lumbar region, without neurogenic claudication       pravastatin 80 MG tablet    PRAVACHOL    45 tablet    Take 0.5 tablets (40 mg) by mouth every evening INDICATION: TO LOWER CHOLESTEROL AND TO HELP  PREVENT HEART DISEASE    Hyperlipidemia LDL goal <100       triamterene-hydrochlorothiazide 37.5-25 MG per tablet    MAXZIDE-25    90 tablet    Take 1 tablet by mouth every morning INDICATION:TO LOWER BLOOD PRESSURE AND CONTROL EDEMA    Benign essential hypertension

## 2018-02-19 DIAGNOSIS — E11.9 TYPE 2 DIABETES MELLITUS WITHOUT COMPLICATION, WITHOUT LONG-TERM CURRENT USE OF INSULIN (H): ICD-10-CM

## 2018-02-20 RX ORDER — GLIMEPIRIDE 2 MG/1
TABLET ORAL
Qty: 90 TABLET | Refills: 0 | Status: SHIPPED | OUTPATIENT
Start: 2018-02-20 | End: 2018-05-10

## 2018-03-22 DIAGNOSIS — G43.109 MIGRAINE WITH AURA AND WITHOUT STATUS MIGRAINOSUS, NOT INTRACTABLE: ICD-10-CM

## 2018-03-22 NOTE — TELEPHONE ENCOUNTER
kinsey      Last Written Prescription Date:  12/21/17  Last Fill Quantity: 30,   # refills: 0  Last Office Visit: 12/21/17  Future Office visit:       Routing refill request to provider for review/approval because:  Drug not on the [facility], P or [facility] refill protocol or controlled substance

## 2018-03-23 RX ORDER — BUTALBITAL, ACETAMINOPHEN AND CAFFEINE 50; 325; 40 MG/1; MG/1; MG/1
TABLET ORAL
Qty: 30 TABLET | Refills: 1 | Status: SHIPPED | OUTPATIENT
Start: 2018-03-23 | End: 2023-07-06

## 2018-04-05 DIAGNOSIS — E11.9 TYPE 2 DIABETES MELLITUS WITHOUT COMPLICATION, WITHOUT LONG-TERM CURRENT USE OF INSULIN (H): ICD-10-CM

## 2018-04-05 LAB
ALBUMIN SERPL-MCNC: 3.7 G/DL (ref 3.4–5)
ALP SERPL-CCNC: 64 U/L (ref 40–150)
ALT SERPL W P-5'-P-CCNC: 25 U/L (ref 0–50)
ANION GAP SERPL CALCULATED.3IONS-SCNC: 5 MMOL/L (ref 3–14)
AST SERPL W P-5'-P-CCNC: 22 U/L (ref 0–45)
BILIRUB SERPL-MCNC: 0.3 MG/DL (ref 0.2–1.3)
BUN SERPL-MCNC: 30 MG/DL (ref 7–30)
CALCIUM SERPL-MCNC: 9.3 MG/DL (ref 8.5–10.1)
CHLORIDE SERPL-SCNC: 108 MMOL/L (ref 94–109)
CHOLEST SERPL-MCNC: 179 MG/DL
CO2 SERPL-SCNC: 28 MMOL/L (ref 20–32)
CREAT SERPL-MCNC: 1.16 MG/DL (ref 0.52–1.04)
CREAT UR-MCNC: 79 MG/DL
GFR SERPL CREATININE-BSD FRML MDRD: 45 ML/MIN/1.7M2
GLUCOSE SERPL-MCNC: 111 MG/DL (ref 70–99)
HBA1C MFR BLD: 6.4 % (ref 0–6.4)
HDLC SERPL-MCNC: 51 MG/DL
LDLC SERPL CALC-MCNC: 87 MG/DL
MICROALBUMIN UR-MCNC: 11 MG/L
MICROALBUMIN/CREAT UR: 13.83 MG/G CR (ref 0–25)
NONHDLC SERPL-MCNC: 128 MG/DL
POTASSIUM SERPL-SCNC: 4.2 MMOL/L (ref 3.4–5.3)
PROT SERPL-MCNC: 6.7 G/DL (ref 6.8–8.8)
SODIUM SERPL-SCNC: 141 MMOL/L (ref 133–144)
TRIGL SERPL-MCNC: 204 MG/DL

## 2018-04-05 PROCEDURE — 83036 HEMOGLOBIN GLYCOSYLATED A1C: CPT | Performed by: INTERNAL MEDICINE

## 2018-04-05 PROCEDURE — 80053 COMPREHEN METABOLIC PANEL: CPT | Performed by: INTERNAL MEDICINE

## 2018-04-05 PROCEDURE — 80061 LIPID PANEL: CPT | Performed by: INTERNAL MEDICINE

## 2018-04-05 PROCEDURE — 36415 COLL VENOUS BLD VENIPUNCTURE: CPT | Performed by: INTERNAL MEDICINE

## 2018-04-05 PROCEDURE — 82043 UR ALBUMIN QUANTITATIVE: CPT | Performed by: INTERNAL MEDICINE

## 2018-04-05 NOTE — LETTER
"[facility]  [address] [address] [phone]      11/2/2018       Ginger Abreu  [address]   [facility] [address]        Dear Ginger,  Review of your chart shows you are due for laboratory monitoring studies with your  history of  diabetes mellitus.   Call our appointment desk at [phone] or use [employer] to schedule a  non-fasting  \"lab only\"  appointment  in the next 1 month    Sincerely,      Jerry Iniguez MD  Internal Medicine      "

## 2018-04-13 DIAGNOSIS — E78.5 HYPERLIPIDEMIA LDL GOAL <100: ICD-10-CM

## 2018-04-13 RX ORDER — PRAVASTATIN SODIUM 80 MG/1
40 TABLET ORAL EVERY EVENING
Qty: 45 TABLET | Refills: 3 | Status: SHIPPED | OUTPATIENT
Start: 2018-04-13 | End: 2019-07-12

## 2018-04-13 NOTE — TELEPHONE ENCOUNTER
Routing refill request to provider for review/approval because:  Drug interaction warning/allergy   Last prescribed by different provider

## 2018-04-13 NOTE — TELEPHONE ENCOUNTER
Requested Prescriptions   Pending Prescriptions Disp Refills     pravastatin (PRAVACHOL) 80 MG tablet 45 tablet PRN     Sig: Take 0.5 tablets (40 mg) by mouth every evening INDICATION: TO LOWER CHOLESTEROL AND TO HELP  PREVENT HEART DISEASE    There is no refill protocol information for this order      Last Written Prescription Date:  09/13/16  Last Fill Quantity: 45,  # refills: prn   Last office visit: 12/21/2017 with prescribing provider:  12/21/17   Future Office Visit:  0

## 2018-05-02 DIAGNOSIS — F41.9 ANXIETY: ICD-10-CM

## 2018-05-02 NOTE — TELEPHONE ENCOUNTER
alprazolam      Last Written Prescription Date:  12/21/17  Last Fill Quantity: 30,   # refills: 0  Last Office Visit: 12/21/17  Future Office visit:       Routing refill request to provider for review/approval because:  Drug not on the [facility], P or [facility] refill protocol or controlled substance

## 2018-05-04 RX ORDER — ALPRAZOLAM 0.25 MG
TABLET ORAL
Qty: 30 TABLET | Refills: 0 | Status: SHIPPED | OUTPATIENT
Start: 2018-05-04 | End: 2023-07-06

## 2018-05-04 NOTE — TELEPHONE ENCOUNTER
Last refill for  #30 tabs > 4 mos ago. Timing of RF appropriate. Printed Rx in [place] basket. Please fax to pharmacy

## 2018-05-07 ENCOUNTER — TELEPHONE (OUTPATIENT)
Dept: INTERNAL MEDICINE | Facility: CLINIC | Age: 78
End: 2018-05-07

## 2018-05-07 NOTE — TELEPHONE ENCOUNTER
Central Prior Authorization Team   Phone: [phone]      PA Initiation    Medication: butalbital-acetaminophen-caffeine (FIORICET/ESGIC) -40 MG per tablet  Insurance Company: [place] - Phone [phone] Fax [phone]  Pharmacy Filling the Rx: [facility] [address] [place] [address] - [address] [facility]  Filling Pharmacy Phone: [phone]  Filling Pharmacy Fax: [phone]  Start Date: 5/7/2018

## 2018-05-07 NOTE — TELEPHONE ENCOUNTER
Prior Authorization Retail Medication Request    Medication/Dose:butalbital-acetaminophen-caffeine (FIORICET/ESGIC) -40 MG per tablet   ICD code (if different than what is on RX):  G43.109  Previously Tried and Failed:    Rationale:      Insurance Name:  Med[employer]RX[employer] [place] [employer]  Insurance ID:  [id number]  Contact # [phone]      Pharmacy Information (if different than what is on RX)  [facility]  Phone:  [phone]

## 2018-05-07 NOTE — TELEPHONE ENCOUNTER
Prior Authorization Approval    Authorization Effective Date: 2/6/2018  Authorization Expiration Date: 5/7/2019  Medication: butalbital-acetaminophen-caffeine (FIORICET/ESGIC) -40 MG per tablet - Approved  Approved Dose/Quantity:    Reference #: [id number]   Insurance Company: [place] - Phone [phone] Fax [phone]  Expected CoPay:       CoPay Card Available:      Foundation Assistance Needed:    Which Pharmacy is filling the prescription (Not needed for infusion/clinic administered): [id number] - [place], [place] - [address] [facility]  Pharmacy Notified: Yes  Patient Notified: Yes

## 2018-05-10 DIAGNOSIS — E11.9 TYPE 2 DIABETES MELLITUS WITHOUT COMPLICATION, WITHOUT LONG-TERM CURRENT USE OF INSULIN (H): ICD-10-CM

## 2018-05-11 RX ORDER — GLIMEPIRIDE 2 MG/1
TABLET ORAL
Qty: 90 TABLET | Refills: 3 | Status: SHIPPED | OUTPATIENT
Start: 2018-05-11 | End: 2019-07-12

## 2018-05-11 NOTE — TELEPHONE ENCOUNTER
"Requested Prescriptions   Pending Prescriptions Disp Refills     glimepiride (AMARYL) 2 MG tablet [Pharmacy Med Name: GLIMEPIRIDE 2MG TAB] 90 tablet 0     Sig: TAKE ONE TABLET (2 MG) BY MOUTH IN THE MORNING BEFORE BREAKFAST TO TREAT DIABETES    Sulfonylurea Agents Failed    5/10/2018  6:14 PM       Failed - Patient has a recent creatinine (normal) within the past 12 mos.    Recent Labs   Lab Test  04/05/18   0929   CR  1.16*            Passed - Blood pressure less than 140/90 in past 6 months    BP Readings from Last 3 Encounters:   02/02/18 137/80   01/03/18 162/84   12/21/17 140/78                Passed - Patient has documented LDL within the past 12 mos.    Recent Labs   Lab Test  04/05/18   0929   LDL  87            Passed - Patient has had a Microalbumin in the past 12 mos.    Recent Labs   Lab Test  04/05/18   1023   MICROL  11   UMALCR  13.83            Passed - Patient has documented A1c within the specified period of time.    If HgbA1C is 8 or greater, it needs to be on file within the past 3 months.  If less than 8, must be on file within the past 6 months.     Recent Labs   Lab Test  04/05/18   0929   A1C  6.4            Passed - Patient is age 18 or older       Passed - No active pregnancy on record       Passed - Patient has not had a positive pregnancy test within the past 12 mos.       Passed - Recent (6 mo) or future (30 days) visit within the authorizing provider's specialty    Patient had office visit in the last 6 months or has a visit in the next 30 days with authorizing provider or within the authorizing provider's specialty.  See \"Patient Info\" tab in inbasket, or \"Choose Columns\" in Meds & Orders section of the refill encounter.            Routing refill request to provider for review/approval because:  Labs out of range:  creatinine        "

## 2018-06-08 ENCOUNTER — TELEPHONE (OUTPATIENT)
Dept: INTERNAL MEDICINE | Facility: CLINIC | Age: 78
End: 2018-06-08

## 2018-06-09 NOTE — TELEPHONE ENCOUNTER
fenofibrate 160 MG tablet (Discontinued)      Last Written Prescription Date:  4/14/2017-8/3/2017  Last Fill Quantity: 90 tablet,   # refills: 2  Last Office Visit: 12/21/2017  Future Office visit:       Routing refill request to provider for review/approval because:  Drug not active on patient's medication list

## 2018-06-11 NOTE — TELEPHONE ENCOUNTER
Med was stopped by cardiology last August and refill not appropriate as not necessary. Inform pt and pharmacy. RF declined

## 2018-06-12 DIAGNOSIS — I10 BENIGN ESSENTIAL HYPERTENSION: Primary | ICD-10-CM

## 2018-06-16 ENCOUNTER — HEALTH MAINTENANCE LETTER (OUTPATIENT)
Age: 78
End: 2018-06-16

## 2018-07-03 DIAGNOSIS — M54.5 CHRONIC BILATERAL LOW BACK PAIN, WITH SCIATICA PRESENCE UNSPECIFIED: ICD-10-CM

## 2018-07-03 DIAGNOSIS — R26.9 ABNORMAL GAIT: ICD-10-CM

## 2018-07-03 DIAGNOSIS — M70.61 TROCHANTERIC BURSITIS OF RIGHT HIP: Primary | ICD-10-CM

## 2018-07-03 DIAGNOSIS — G89.29 CHRONIC BILATERAL LOW BACK PAIN, WITH SCIATICA PRESENCE UNSPECIFIED: ICD-10-CM

## 2018-07-06 ENCOUNTER — HOSPITAL ENCOUNTER (OUTPATIENT)
Dept: PHYSICAL THERAPY | Facility: CLINIC | Age: 78
Setting detail: THERAPIES SERIES
End: 2018-07-06
Payer: MEDICARE

## 2018-07-06 PROCEDURE — G8979 MOBILITY GOAL STATUS: HCPCS | Mod: GP,CJ | Performed by: PHYSICAL THERAPIST

## 2018-07-06 PROCEDURE — 97116 GAIT TRAINING THERAPY: CPT | Mod: GP | Performed by: PHYSICAL THERAPIST

## 2018-07-06 PROCEDURE — 97162 PT EVAL MOD COMPLEX 30 MIN: CPT | Mod: GP | Performed by: PHYSICAL THERAPIST

## 2018-07-06 PROCEDURE — 40000185 ZZHC STATISTIC PT OUTPT VISIT: Performed by: PHYSICAL THERAPIST

## 2018-07-06 PROCEDURE — G8978 MOBILITY CURRENT STATUS: HCPCS | Mod: GP,CK | Performed by: PHYSICAL THERAPIST

## 2018-07-06 PROCEDURE — 97110 THERAPEUTIC EXERCISES: CPT | Mod: GP | Performed by: PHYSICAL THERAPIST

## 2018-07-06 NOTE — PROGRESS NOTES
[facility]        OUTPATIENT PHYSICAL THERAPY FUNCTIONAL EVALUATION  PLAN OF TREATMENT FOR OUTPATIENT REHABILITATION  (COMPLETE FOR INITIAL CLAIMS ONLY)  Patient's Last Name, First Name, M.I.  YOB: 1940  Ginger Abreu     Provider's Name   [facility]   Medical Record No.  [id number]     Start of Care Date:  07/06/18   Onset Date:  7/3/18   Type:     _X__PT   ____OT  ____SLP Medical Diagnosis:  Trochanteric bursitis of right hip, abnormal gait     PT Diagnosis:  impaired balance and pain Visits from SOC:  1                              __________________________________________________________________________________  Plan of Treatment/Functional Goals:  balance training, gait training, neuromuscular re-education, ROM, strengthening, stretching, transfer training, manual therapy           GOALS  DGI  The pt will improve score on DGI to 20/24 or greater, indicating a reduction in fall risk  09/04/18    Walking tolerane  The pt will be able to ambulate >200' without a significant (2 point) increase in pain, improving ability to ambulate in the household and community  09/04/18    HEP  The pt will be independent with HEP focusing on improving strength, ROM and balance, improving independence with mobility and reducing pain outside of therapy  09/04/18             Therapy Frequency:  1 time/week   Predicted Duration of Therapy Intervention:  60 days    Yady Cruz, PT                                    I CERTIFY THE NEED FOR THESE SERVICES FURNISHED UNDER        THIS PLAN OF TREATMENT AND WHILE UNDER MY CARE     (Physician co-signature of this document indicates review and certification of the therapy plan).                Certification Date From:  07/06/18   Certification Date To:  09/04/18    Referring Provider:  Checo Fonseca    Initial Assessment  See  Epic Evaluation- Start of Care Date: 07/06/18

## 2018-07-06 NOTE — PROGRESS NOTES
07/06/18 0900   Quick Adds   Quick Adds Certification   General Information   Start of Care Date 07/06/18   Referring Physician Checo Fonseca   Orders Evaluate and Treat as Indicated   Order Date 07/03/18   Medical Diagnosis Trochanteric Bursitis, gait abnormality   Surgical/Medical history reviewed Yes   Pertinent history of current problem (include personal factors and/or comorbidities that impact the POC) The pt reports that B LEs are weak following lumbar fusion about 6 years ago. Has been feeling more weakness and unsteady. Feels that she lists to the side. Has L sided numbness, decreased ability to pull toes back. Has difficulty walking longer distances. Hip pain has been present for the last 3-4 months. Initially had an injection in the R bursa however this didnt' really improve her pain. The pt has been told that she needs to have 2 more levels fused. PMH significant for HTN, diabetes, arthritis, asthma.    Patient role/Employment history Retired   Living environment Apartment/condo   Home/Community Accessibility Comments elevaor access   Current Assistive Devices (has a 4ww but rarely uses)   Patient/Family Goals Statement Improve ability to walk   Fall Risk Screen   Fall screen completed by PT   Have you fallen 2 or more times in the past year? No   Have you fallen and had an injury in the past year? No   Timed Up and Go score (seconds) 11.87   Is patient a fall risk? Yes;Department fall risk interventions implemented   Fall screen comments per DGI   Pain   Patient currently in pain Yes   Pain comments R lateral hip, increases when walking for more than 50'.    Cognitive Status Examination   Orientation orientation to person, place and time   Posture   Posture Forward head position   Range of Motion (ROM)   ROM Comment B LEs WFLs   Strength   Strength Comments R hip flexion 4-/5, R hip abduction 4-/5, knee extension 4+/5, dorsiflexion 5/5; L hip flexion 4/5, L hip abduction 4+/5, knee extension 4+/5, L  "dorsiflexion 4-5   Bed Mobility   Bed Mobility Comments Independent   Transfer Skills   Transfer Comments Able without use of UEs   Gait   Gait Comments Ambulates wtih slightly slower krystyna, leans to L side, decreased R arm swing, decreased L heel strike   Gait Special Tests   Gait Special Tests DYNAMIC GAIT INDEX   Gait Special Tests Dynamic Gait Index   Score out of 24 18   Balance Special Tests   Balance Special Tests Sit to stand reps;Modified CTSIB Conditions   Balance Special Tests Modified CTSIB Conditions   Condition 1, seconds 30 Seconds   Condition 2, seconds 30 Seconds   Condition 4, seconds 30 Seconds   Condition 5, seconds 5 Seconds   Balance Special Tests Sit to Stand Reps in 30 Seconds   Reps in 30 seconds 8   Height 16\"   Sensory Examination   Sensory Perception Comments L LE N/T below knee. present following surgery   Planned Therapy Interventions   Planned Therapy Interventions balance training;gait training;neuromuscular re-education;ROM;strengthening;stretching;transfer training;manual therapy   Clinical Impression   Criteria for Skilled Therapeutic Interventions Met yes, treatment indicated   PT Diagnosis impaired balance and pain   Influenced by the following impairments weakness, pain, fatigue, instability,nunbness   Functional limitations due to impairments difficulty with gait household and community distances, fall risk   Clinical Presentation Evolving/Changing   Clinical Presentation Rationale fluctuating levels of pain with gait   Clinical Decision Making (Complexity) Moderate complexity   Therapy Frequency 1 time/week   Predicted Duration of Therapy Intervention (days/wks) 60 days   Risk & Benefits of therapy have been explained Yes   Patient, Family & other staff in agreement with plan of care Yes   Education Assessment   Preferred Learning Style Listening;Demonstration   Barriers to Learning No barriers   GOALS   PT Eval Goals 1;2;3   Goal 1   Goal Identifier DGI   Goal Description " The pt will improve score on DGI to 20/24 or greater, indicating a reduction in fall risk   Target Date 09/04/18   Goal 2   Goal Identifier Walking tolerane   Goal Description The pt will be able to ambulate >200' without a significant (2 point) increase in pain, improving ability to ambulate in the household and community   Target Date 09/04/18   Goal 3   Goal Identifier HEP   Goal Description The pt will be independent with HEP focusing on improving strength, ROM and balance, improving independence with mobility and reducing pain outside of therapy   Target Date 09/04/18   Total Evaluation Time   Total Evaluation Time (Minutes) 30   Therapy Certification   Certification date from 07/06/18   Certification date to 09/04/18   Medical Diagnosis Trochanteric bursitis of right hip, abnormal gait   Certification I certify the need for these services furnished under this plan of treatment and while under my care.  (Physician co-signature of this document indicates review and certification of the therapy plan).

## 2018-07-10 ENCOUNTER — OFFICE VISIT (OUTPATIENT)
Dept: DERMATOLOGY | Facility: CLINIC | Age: 78
End: 2018-07-10
Payer: COMMERCIAL

## 2018-07-10 VITALS — OXYGEN SATURATION: 99 % | DIASTOLIC BLOOD PRESSURE: 65 MMHG | HEART RATE: 50 BPM | SYSTOLIC BLOOD PRESSURE: 126 MMHG

## 2018-07-10 DIAGNOSIS — L82.1 SEBORRHEIC KERATOSIS: ICD-10-CM

## 2018-07-10 DIAGNOSIS — D48.5 NEOPLASM OF UNCERTAIN BEHAVIOR OF SKIN: Primary | ICD-10-CM

## 2018-07-10 PROCEDURE — 11101 HC BIOPSY SKIN/SUBQ/MUC MEM, EACH ADDTL LESION: CPT | Performed by: PHYSICIAN ASSISTANT

## 2018-07-10 PROCEDURE — 88305 TISSUE EXAM BY PATHOLOGIST: CPT | Mod: TC | Performed by: PHYSICIAN ASSISTANT

## 2018-07-10 PROCEDURE — 99213 OFFICE O/P EST LOW 20 MIN: CPT | Mod: 25 | Performed by: PHYSICIAN ASSISTANT

## 2018-07-10 PROCEDURE — 11100 HC BIOPSY SKIN/SUBQ/MUC MEM, SINGLE LESION: CPT | Performed by: PHYSICIAN ASSISTANT

## 2018-07-10 RX ORDER — CEPHALEXIN 500 MG/1
1 CAPSULE ORAL 2 TIMES DAILY
COMMUNITY
Start: 2018-06-18 | End: 2019-03-05

## 2018-07-10 NOTE — PROGRESS NOTES
HPI:   Ginger Abreu is a 77 year old female who presents for evaluation of spots on chest and face  chief complaint  Location: chest and face  Condition present for:  awhile.   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 It was created on her behalf by Natalie Barroso, a trained medical scribe. The creation of this document is based on the provider's statements to the medical scribe.  Natalie Barroso 10:01 AM July 10, 2018    PHYSICAL EXAM:    /65  Pulse 50  SpO2 99%  Skin exam performed as follows: Type 1 skin. Mood appropriate  Alert and Oriented X 3. Well developed, well nourished in no distress.  General appearance: Normal  Head including face: Normal  Eyes: conjunctiva and lids: Normal  Mouth: Lips, teeth, gums: Normal  Neck: Normal  Chest-breast/axillae: Normal  Back: Normal  Spleen and liver: Normal  Cardiovascular: Exam of peripheral vascular system by observation for swelling, varicosities, edema: Normal  Genitalia: groin, buttocks: Normal  Extremities: digits/nails (clubbing): Normal  Eccrine and Apocrine glands: Normal  Right upper extremity: Normal  Left upper extremity: Normal  Right lower extremity: Normal  Left lower extremity: Normal  Skin: Scalp and body hair: See below    1. 4 mm hypertrophic papule on left dorsal hand  2. 8 mm pink plaque on right elbow  3. Multiple scattered keratotic plaques        ASSESSMENT/PLAN:     1. Cutaneous horn R/o SCC on left dorsal hand. Photo taken and placed in chart. Shave bx in typical  fashion .  Area cleaned with betadyne and anesthetized with 1% lidocaine with epi .  Dermablade used to remove the lesion and sent to pathology. Bleeding was cauterized. Pt tolerated procedure well.  2. R/o SCC on right elbow. Photo taken and placed in chart. Shave bx in typical fashion .  Area cleaned with betadyne and anesthetized with 1% lidocaine with epi .  Dermablade used to remove the lesion and sent to pathology. Bleeding was cauterized. Pt tolerated procedure well.  3.   Seborrheic keratosis on arms, legs and trunk. Advised benign, no treatment needed.      Follow-up: pending path  CC:   Scribed By:Natalie Barroso, Medical Scribe    The information in this document, created by the medical scribe for me, accurately reflects the services I personally performed and the decisions made by me. I have reviewed and approved this document for accuracy prior to leaving the patient care area.  July 10, 2018 10:08 AM    Karla Novoa MS, PA-C

## 2018-07-10 NOTE — LETTER
7/10/2018         RE: Ginger Abreu  [address]  [place] [address]        Dear Colleague,    Thank you for referring your patient, Ginger Abreu, to the [facility]. Please see a copy of my visit note below.    HPI:   Ginger Abreu is a 77 year old female who presents for evaluation of spots on chest and face  chief complaint  Location: chest and face  Condition present for:  awhile.   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 It was created on her behalf by Natalie Barroso, a trained medical scribe. The creation of this document is based on the provider's statements to the medical scribe.  Natalie Barroso 10:01 AM July 10, 2018    PHYSICAL EXAM:    /65  Pulse 50  SpO2 99%  Skin exam performed as follows: Type 1 skin. Mood appropriate  Alert and Oriented X 3. Well developed, well nourished in no distress.  General appearance: Normal  Head including face: Normal  Eyes: conjunctiva and lids: Normal  Mouth: Lips, teeth, gums: Normal  Neck: Normal  Chest-breast/axillae: Normal  Back: Normal  Spleen and liver: Normal  Cardiovascular: Exam of peripheral vascular system by observation for swelling, varicosities, edema: Normal  Genitalia: groin, buttocks: Normal  Extremities: digits/nails (clubbing): Normal  Eccrine and Apocrine glands: Normal  Right upper extremity: Normal  Left upper extremity: Normal  Right lower extremity: Normal  Left lower extremity: Normal  Skin: Scalp and body hair: See  below    1. 4 mm hypertrophic papule on left dorsal hand  2. 8 mm pink plaque on right elbow  3. Multiple scattered keratotic plaques        ASSESSMENT/PLAN:     1. Cutaneous horn R/o SCC on left dorsal hand. Photo taken and placed in chart. Shave bx in typical fashion .  Area cleaned with betadyne and anesthetized with 1% lidocaine with epi .  Dermablade used to remove the lesion and sent to pathology. Bleeding was cauterized. Pt tolerated procedure well.  2. R/o SCC on right elbow. Photo taken and placed in chart. Shave bx in typical fashion .  Area cleaned with betadyne and anesthetized with 1% lidocaine with epi .  Dermablade used to remove the lesion and sent to pathology. Bleeding was cauterized. Pt tolerated procedure well.  3.   Seborrheic keratosis on arms, legs and trunk. Advised benign, no treatment needed.      Follow-up: pending path  CC:   Scribed By:Natalie Barroso, Medical Scribe    The information in this document, created by the medical scribe for me, accurately reflects the services I personally performed and the decisions made by me. I have reviewed and approved this document for accuracy prior to leaving the patient care area.  July 10, 2018 10:08 AM    Karla Novoa MS, PASHONDA      Again, thank you for allowing me to participate in the care of your patient.        Sincerely,        Karla Novoa PA-C

## 2018-07-10 NOTE — MR AVS SNAPSHOT
After Visit Summary   7/10/2018    Ginger Abreu    MRN: [id number]           Patient Information     Date Of Birth          1940        Visit Information        Provider Department      7/10/2018 9:45 AM Karla Novoa PA-C [facility]        Today's Diagnoses     Neoplasm of uncertain behavior of skin    -  1    Seborrheic keratosis           Follow-ups after your visit        Your next 10 appointments already scheduled     Jul 13, 2018  4:00 PM CDT   Ortho Treatment with Yady Cruz, PT   [facility] ([facility])    [address]  [address]  [place] [address]   [phone]            Jul 19, 2018 10:30 AM CDT   Ortho Treatment with Yady Cruz, PT   [facility] ([facility])    [address]  [address]  [place] [address]   [phone]            Jul 26, 2018 10:15 AM CDT   Ortho Treatment with Yady Cruz, PT   [facility] ([facility])    [address]  [address]  [place] [address]   [phone]            Aug 02, 2018  2:15 PM CDT   Ortho Treatment with Yady Cruz, PT   [facility] ([facility])    [address]  [address]  [place] [address]   [phone]            Oct 22, 2018  2:00 PM CDT   LAB with [facility]   [facility] ([facility])    [address] [place] [address] [address]  [place] [address]   [phone]           Please do not eat 10-12 hours before your appointment if you are coming in fasting for labs on lipids, cholesterol, or glucose (sugar). This does not apply to pregnant women. Water, hot tea and black coffee (with nothing added) are okay. Do not drink other fluids, diet soda or chew gum.            Oct 22, 2018  2:45 PM CDT   Return Visit with Vaibhav Pearson  MD Erica   [facility] ([facility])    [address]  [place] [address] [phone] OPT 2              Who to contact     If you have questions or need follow up information about today's clinic visit or your schedule please contact [facility] directly at [phone].  Normal or non-critical lab and imaging results will be communicated to you by [employer], letter or phone within 4 business days after the clinic has received the results. If you do not hear from us within 7 days, please contact the clinic through [employer] or phone. If you have a critical or abnormal lab result, we will notify you by phone as soon as possible.  Submit refill requests through [employer] or call your pharmacy and they will forward the refill request to us. Please allow 3 business days for your refill to be completed.          Additional Information About Your Visit        [employer] Information     [employer] gives you secure access to your electronic health record. If you see a primary care provider, you can also send messages to your care team and make appointments. If you have questions, please call your primary care clinic.  If you do not have a primary care provider, please call [phone] and they will assist you.        Care EveryWhere ID     This is your Care EveryWhere ID. This could be used by other organizations to access your [facility] medical records  [id number]        Your Vitals Were     Pulse Pulse Oximetry                50 99%           Blood Pressure from Last 3 Encounters:   07/10/18 126/65   02/02/18 137/80   01/03/18 162/84    Weight from Last 3 Encounters:   02/02/18 75.3 kg (166 lb)   01/03/18 74.8 kg (165 lb)   12/21/17 74.4 kg (164 lb)              We Performed the Following     BIOPSY SKIN/SUBQ/MUC MEM, EACH ADDTL LESION     BIOPSY SKIN/SUBQ/MUC MEM, SINGLE LESION     Dermatological path order and indications         Primary Care Provider Office Phone # Fax #    Steven Fonseca [phone]       [facility] [address] [address]        Equal Access to Services     LIANA RIZO : Hadrodger ed ku tamio Sodungali, waaxda luqadaha, qaybta kaalmada adeegyada, juan luis logann kirsten bergeron fatoumata crane. So [facility] [phone].    ATENCIÓN: Si habla español, tiene a decker disposición servicios gratuitos de asistencia lingüística. Llame al [phone].    We comply with applicable federal civil rights laws and [place] laws. We do not discriminate on the basis of race, color, national origin, age, disability, sex, sexual orientation, or gender identity.            Thank you!     Thank you for choosing [facility]  for your care. Our goal is always to provide you with excellent care. Hearing back from our patients is one way we can continue to improve our services. Please take a few minutes to complete the written survey that you may receive in the mail after your visit with us. Thank you!             Your Updated Medication List - Protect others around you: Learn how to safely use, store and throw away your medicines at www.disposemymeds.org.          This list is accurate as of 7/10/18  1:54 PM.  Always use your most recent med list.                   Brand Name Dispense Instructions for use Diagnosis    albuterol 108 (90 Base) MCG/ACT Inhaler    PROAIR HFA/PROVENTIL HFA/VENTOLIN HFA    1 Inhaler    Inhale 2 puffs into the lungs every 6 hours INDICATION: RESCUE INHALER FOR SHORTNESS OF BREATH, CHEST TIGHTNESS AND COUGH    Mild persistent asthma without complication       ALPRAZolam 0.25 MG tablet    XANAX    30 tablet    TAKE ONE TABLET BY MOUTH ONCE DAILY AT  NIGHT  AS  NEEDED  FOR  SEVERE  ANXIETY  ONLY    Anxiety       ascorbic acid 1000 MG Tabs    vitamin C    100 tablet    Take 1 tablet (1,000 mg) by mouth daily VITAMIN C REPLACEMENT    Scurvy       aspirin 81 MG EC  tablet      Take 1 tablet (81 mg) by mouth daily    Renal artery stenosis (H)       BETA BLOCKER NOT PRESCRIBED (INTENTIONAL)      Beta Blocker not prescribed intentionally due to Intolerance    Type 2 diabetes, HbA1c goal < 7% (H), Hypertension goal BP (blood pressure) < 130/80       butalbital-acetaminophen-caffeine -40 MG per tablet    FIORICET/ESGIC    30 tablet    TAKE ONE TABLET BY MOUTH EVERY 8 HOURS AS NEEDED FOR  SEVERE  MIGRAINE  HEADACHE  ONLY    Migraine with aura and without status migrainosus, not intractable       cephALEXin 500 MG capsule    KEFLEX     Take 1 capsule by mouth 2 times daily        cloNIDine 0.1 MG tablet    CATAPRES    540 tablet    Take 2 tablets (0.2 mg) by mouth 3 times daily (with meals) INDICATION: TO LOWER BLOOD PRESSURE    Essential hypertension, benign       conjugated estrogens cream    PREMARIN    30 g    Place 0.5 g vaginally twice a week    Recurrent urinary tract infection       esomeprazole 40 MG CR capsule    nexIUM    180 capsule    Take 1 capsule (40 mg) by mouth every morning (before breakfast) TAKE  30'-60' BEFORE 1ST MEAL    Gastroesophageal reflux disease without esophagitis       furosemide 20 MG tablet    LASIX    30 tablet    Take 1 tablet (20 mg) by mouth daily    Benign essential hypertension       glimepiride 2 MG tablet    AMARYL    90 tablet    TAKE ONE TABLET (2 MG) BY MOUTH IN THE MORNING BEFORE BREAKFAST TO TREAT DIABETES    Type 2 diabetes mellitus without complication, without long-term current use of insulin (H)       HERBALS      alphalithiod - 600 mg twice daily        LACTOBACILLUS EXTRA STRENGTH Caps     30 capsule    Take 1 capsule by mouth daily INDICATION: TO RESTORE GOOD INTESTINAL BACTERIA    Dyspepsia       losartan 50 MG tablet    COZAAR    30 tablet    Take 1 tablet (50 mg) by mouth daily    Benign essential hypertension       melatonin 5 MG Caps      Take by mouth At Bedtime        nitroGLYcerin 0.4 MG sublingual tablet     NITROSTAT    25 tablet    Place 1 tablet (0.4 mg) under the tongue every 5 minutes as needed for chest pain    Exertional dyspnea       order for DME     1 Units    Equipment being ordered: Wheeled walker with basket and seat attachment    Spinal stenosis, lumbar region, without neurogenic claudication       pravastatin 80 MG tablet    PRAVACHOL    45 tablet    Take 0.5 tablets (40 mg) by mouth every evening    Hyperlipidemia LDL goal <100       triamterene-hydrochlorothiazide 37.5-25 MG per tablet    MAXZIDE-25    90 tablet    Take 1 tablet by mouth every morning INDICATION:TO LOWER BLOOD PRESSURE AND CONTROL EDEMA    Benign essential hypertension

## 2018-07-13 ENCOUNTER — HOSPITAL ENCOUNTER (OUTPATIENT)
Dept: PHYSICAL THERAPY | Facility: CLINIC | Age: 78
Setting detail: THERAPIES SERIES
End: 2018-07-13
Attending: INTERNAL MEDICINE
Payer: MEDICARE

## 2018-07-13 PROCEDURE — 40000185 ZZHC STATISTIC PT OUTPT VISIT: Performed by: PHYSICAL THERAPIST

## 2018-07-13 PROCEDURE — 97112 NEUROMUSCULAR REEDUCATION: CPT | Mod: GP | Performed by: PHYSICAL THERAPIST

## 2018-07-13 PROCEDURE — 97110 THERAPEUTIC EXERCISES: CPT | Mod: GP | Performed by: PHYSICAL THERAPIST

## 2018-07-17 ENCOUNTER — TELEPHONE (OUTPATIENT)
Dept: DERMATOLOGY | Facility: CLINIC | Age: 78
End: 2018-07-17

## 2018-07-17 LAB — COPATH REPORT: NORMAL

## 2018-07-17 NOTE — TELEPHONE ENCOUNTER
Notes Recorded by Karla Novoa PA-C on 7/17/2018 at 3:57 PM  Path for the biopsy on the hand and elbow both came back as an actinic keratosis. Recommend cryo for complete resolution.

## 2018-07-17 NOTE — TELEPHONE ENCOUNTER
Called and LM for patient to call back in regards to biopsy results hiral Gutierrez RN-BSN  [facility]  [phone]

## 2018-07-18 NOTE — TELEPHONE ENCOUNTER
Called and spoke to patient. Educated patient on biopsy results x2- Actinic Keratosis (x2). Scheduled future cryo appointment. Patient voiced understanding.    Brenda RN-BSN  [facility]  [phone]

## 2018-07-19 ENCOUNTER — HOSPITAL ENCOUNTER (OUTPATIENT)
Dept: PHYSICAL THERAPY | Facility: CLINIC | Age: 78
Setting detail: THERAPIES SERIES
End: 2018-07-19
Attending: INTERNAL MEDICINE
Payer: MEDICARE

## 2018-07-19 PROCEDURE — 97110 THERAPEUTIC EXERCISES: CPT | Mod: GP | Performed by: PHYSICAL THERAPIST

## 2018-07-19 PROCEDURE — 40000718 ZZHC STATISTIC PT DEPARTMENT ORTHO VISIT: Performed by: PHYSICAL THERAPIST

## 2018-07-19 PROCEDURE — 97112 NEUROMUSCULAR REEDUCATION: CPT | Mod: GP | Performed by: PHYSICAL THERAPIST

## 2018-07-27 ENCOUNTER — TELEPHONE (OUTPATIENT)
Dept: DERMATOLOGY | Facility: CLINIC | Age: 78
End: 2018-07-27

## 2018-07-27 NOTE — TELEPHONE ENCOUNTER
Reason for call:  Patient reporting a symptom    Symptom or request: Red and itchy around the biopsy site    Duration (how long have symptoms been present): four days    Have you been treated for this before? No    Additional comments: patient had a biopsy on 7/10/18    Phone Number patient can be reached at:  Home number on file [phone] (home)    Best Time:  anytime    Can we leave a detailed message on this number:  YES    Call taken on 7/27/2018 at 9:30 AM by DIA RIVERA

## 2018-07-27 NOTE — TELEPHONE ENCOUNTER
Called patient- states the biopsy site has a grey/white center and the the wound has a red [place] around the site with itching. Denies pain/swelling/heat/increasing bright redness. Advised that this sounds like the normal healing process. Advised if continue cleaning area daily and applying ointment and a bandage. report if increasing pain/bright red/heat. Also advised that she could take a photo with her cell phone and then log onto [employer] on her cell phone and sent a message and attach this photo. Patient will try to send photo.    Thank You,    Blanca WASHINGTON RN  [facility]  [phone]

## 2018-07-31 DIAGNOSIS — E04.2 NONTOXIC MULTINODULAR GOITER: Primary | ICD-10-CM

## 2018-08-02 ENCOUNTER — HOSPITAL ENCOUNTER (OUTPATIENT)
Dept: PHYSICAL THERAPY | Facility: CLINIC | Age: 78
Setting detail: THERAPIES SERIES
End: 2018-08-02
Attending: INTERNAL MEDICINE
Payer: MEDICARE

## 2018-08-02 PROCEDURE — G8979 MOBILITY GOAL STATUS: HCPCS | Mod: GP,CJ | Performed by: PHYSICAL THERAPIST

## 2018-08-02 PROCEDURE — G8980 MOBILITY D/C STATUS: HCPCS | Mod: GP,CJ | Performed by: PHYSICAL THERAPIST

## 2018-08-02 PROCEDURE — 97112 NEUROMUSCULAR REEDUCATION: CPT | Mod: GP | Performed by: PHYSICAL THERAPIST

## 2018-08-02 PROCEDURE — 40000718 ZZHC STATISTIC PT DEPARTMENT ORTHO VISIT: Performed by: PHYSICAL THERAPIST

## 2018-08-02 PROCEDURE — 97110 THERAPEUTIC EXERCISES: CPT | Mod: GP | Performed by: PHYSICAL THERAPIST

## 2018-08-02 NOTE — PROGRESS NOTES
Outpatient Physical Therapy Discharge Note     Patient: Ginger Abreu  : 1940    Beginning/End Dates of Reporting Period:  2018 to 2018    Referring Provider: Dr. Fonseca    Therapy Diagnosis: Impaired balance and pain     Client Self Report: The pt reports that the exercises have helped. She no longer has significant pain with walking. Feels that her hip is stronger.    Objective Measurements:  Objective Measure: DGI  Details: 20  Objective Measure: Pain  Details: 0/10            Goals:  Goal Identifier DGI   Goal Description The pt will improve score on DGI to 20/24 or greater, indicating a reduction in fall risk   Target Date 18   Date Met   18   Progress:     Goal Identifier Walking tolerane   Goal Description The pt will be able to ambulate >200' without a significant (2 point) increase in pain, improving ability to ambulate in the household and community   Target Date 18   Date Met   18   Progress:     Goal Identifier HEP   Goal Description The pt will be independent with HEP focusing on improving strength, ROM and balance, improving independence with mobility and reducing pain outside of therapy   Target Date 18   Date Met   18   Progress:       Progress Toward Goals:   Progress this reporting period: The pt has made improvements to pain, balance and had a subsequent reduction in hip pain. She is able to walk moderated distances keeping her pain below a 2/10. Her balance score on the dynamic gait index improved to 20/24 which is above the cut off for fall risk. She is independent with a program to continue improving strength and balance.     Plan:  Discharge from therapy.    Discharge:    Reason for Discharge: Patient has met all goals.      Discharge Plan: Patient to continue home program.

## 2018-08-10 ENCOUNTER — OFFICE VISIT (OUTPATIENT)
Dept: DERMATOLOGY | Facility: CLINIC | Age: 78
End: 2018-08-10
Payer: COMMERCIAL

## 2018-08-10 VITALS — SYSTOLIC BLOOD PRESSURE: 132 MMHG | OXYGEN SATURATION: 95 % | DIASTOLIC BLOOD PRESSURE: 65 MMHG | HEART RATE: 57 BPM

## 2018-08-10 DIAGNOSIS — L57.0 AK (ACTINIC KERATOSIS): Primary | ICD-10-CM

## 2018-08-10 PROCEDURE — 17000 DESTRUCT PREMALG LESION: CPT | Performed by: PHYSICIAN ASSISTANT

## 2018-08-10 PROCEDURE — 17003 DESTRUCT PREMALG LES 2-14: CPT | Performed by: PHYSICIAN ASSISTANT

## 2018-08-10 NOTE — PROGRESS NOTES
HPI:   Ginger Abreu is a 78 year old female who presents for follow up of biopsy proven AK on left hand and right elbow.  chief complaint  Location: left hand and right elbow       Review Of Systems  Eyes: negative  Ears/Nose/Throat: negative  Respiratory: No shortness of breath, dyspnea on exertion, cough, or hemoptysis  Cardiovascular: negative  Gastrointestinal: negative  Genitourinary: negative  Musculoskeletal: negative  Neurologic: negative  Psychiatric: negative    This document serves as a record of the services and decisions personally performed and made by Karla Novoa, MS, PA-C. It was created on her behalf by Natalie Barroso, a trained medical scribe. The creation of this document is based on the provider's statements to the medical scribe.  Natalie Barroso 10:34 AM August 10, 2018    PHYSICAL EXAM:    /65  Pulse 57  SpO2 95%  Breastfeeding? No  Skin exam performed as follows: Type 1 skin. Mood appropriate  Alert and Oriented X 3. Well developed, well nourished in no distress.  General appearance: Normal  Head including face: Normal  Eyes: conjunctiva and lids: Normal  Mouth: Lips, teeth, gums: Normal  Neck: Normal  Chest-breast/axillae: Normal  Back: Normal  Spleen and liver: Normal  Cardiovascular: Exam of peripheral vascular system by observation for swelling, varicosities, edema: Normal  Genitalia: groin, buttocks: Normal  Extremities: digits/nails (clubbing): Normal  Eccrine and Apocrine glands: Normal  Right upper extremity: Normal  Left upper extremity: Normal  Right lower extremity: Normal  Left lower extremity: Normal  Skin: Scalp and body hair: See below    1. Pink scaly papules located on left dorsal hand x 1 and right elbow x 1      ASSESSMENT/PLAN:     1. Actinic keratosis on left dorsal hand x 1 and right elbow x 1. As precancerous, cryosurgery performed. Advised on blistering and post-op care. Advised if not resolved in 1-2 months to return for  evaluation      Follow-up: PRN  CC:   Scribed By: Natalie Barroso, Medical Scribe    The information in this document, created by the medical scribe for me, accurately reflects the services I personally performed and the decisions made by me. I have reviewed and approved this document for accuracy prior to leaving the patient care area.  August 10, 2018 10:37 AM    Karla Novoa MS, PA-C

## 2018-08-10 NOTE — LETTER
8/10/2018         RE: Ginger Abreu  [address]  [facility] [address]        Dear Colleague,    Thank you for referring your patient, Ginger Abreu, to the [facility]. Please see a copy of my visit note below.    HPI:   Ginger Abreu is a 78 year old female who presents for follow up of biopsy proven AK on left hand and right elbow.  chief complaint  Location: left hand and right elbow       Review Of Systems  Eyes: negative  Ears/Nose/Throat: negative  Respiratory: No shortness of breath, dyspnea on exertion, cough, or hemoptysis  Cardiovascular: negative  Gastrointestinal: negative  Genitourinary: negative  Musculoskeletal: negative  Neurologic: negative  Psychiatric: negative    This document serves as a record of the services and decisions personally performed and made by Karla Novoa, MS, PA-C. It was created on her behalf by Natalie Barroso, a trained medical scribe. The creation of this document is based on the provider's statements to the medical scribe.  Natalie Barroso 10:34 AM August 10, 2018    PHYSICAL EXAM:    /65  Pulse 57  SpO2 95%  Breastfeeding? No  Skin exam performed as follows: Type 1 skin. Mood appropriate  Alert and Oriented X 3. Well developed, well nourished in no distress.  General appearance: Normal  Head including face: Normal  Eyes: conjunctiva and lids: Normal  Mouth: Lips, teeth, gums: Normal  Neck: Normal  Chest-breast/axillae: Normal  Back: Normal  Spleen and liver: Normal  Cardiovascular: Exam of peripheral vascular system by observation for swelling, varicosities, edema: Normal  Genitalia: groin, buttocks: Normal  Extremities: digits/nails (clubbing): Normal  Eccrine and Apocrine glands: Normal  Right upper extremity: Normal  Left upper extremity: Normal  Right lower extremity: Normal  Left lower extremity: Normal  Skin: Scalp and body hair: See below    1. Pink scaly papules located on left  dorsal hand x 1 and right elbow x 1      ASSESSMENT/PLAN:     1. Actinic keratosis on left dorsal hand x 1 and right elbow x 1. As precancerous, cryosurgery performed. Advised on blistering and post-op care. Advised if not resolved in 1-2 months to return for evaluation      Follow-up: PRN  CC:   Scribed By: Natalie Barroso, Medical Scribe    The information in this document, created by the medical scribe for me, accurately reflects the services I personally performed and the decisions made by me. I have reviewed and approved this document for accuracy prior to leaving the patient care area.  August 10, 2018 10:37 AM    Karla Novoa MS, PASHONDA        Again, thank you for allowing me to participate in the care of your patient.        Sincerely,        Karla Novoa PA-C

## 2018-08-20 ENCOUNTER — HOSPITAL ENCOUNTER (OUTPATIENT)
Dept: NUCLEAR MEDICINE | Facility: CLINIC | Age: 78
Setting detail: NUCLEAR MEDICINE
Discharge: HOME | End: 2018-08-20
Attending: INTERNAL MEDICINE | Admitting: INTERNAL MEDICINE
Payer: MEDICARE

## 2018-08-20 DIAGNOSIS — E04.2 NONTOXIC MULTINODULAR GOITER: ICD-10-CM

## 2018-08-20 PROCEDURE — 34300033 ZZH RX 343: Performed by: INTERNAL MEDICINE

## 2018-08-20 PROCEDURE — A9516 IODINE I-123 SOD IODIDE MIC: HCPCS | Performed by: INTERNAL MEDICINE

## 2018-08-20 RX ADMIN — Medication 242 UCI.: at 13:45

## 2018-08-21 ENCOUNTER — HOSPITAL ENCOUNTER (OUTPATIENT)
Dept: NUCLEAR MEDICINE | Facility: CLINIC | Age: 78
Setting detail: NUCLEAR MEDICINE
Discharge: HOME | End: 2018-08-21
Attending: INTERNAL MEDICINE | Admitting: INTERNAL MEDICINE
Payer: MEDICARE

## 2018-08-21 PROCEDURE — 78014 THYROID IMAGING W/BLOOD FLOW: CPT

## 2018-10-01 ENCOUNTER — TRANSFERRED RECORDS (OUTPATIENT)
Dept: HEALTH INFORMATION MANAGEMENT | Facility: CLINIC | Age: 78
End: 2018-10-01

## 2018-10-01 LAB
ANION GAP SERPL CALCULATED.3IONS-SCNC: NORMAL MMOL/L
BUN SERPL-MCNC: 37 MG/DL
BUN/CREATININE RATIO: 39
CALCIUM SERPL-MCNC: 9.3 MG/DL
CHLORIDE SERPLBLD-SCNC: 105 MMOL/L
CO2 SERPL-SCNC: 26 MMOL/L
CREAT SERPL-MCNC: 0.95 MG/DL
GFR SERPL CREATININE-BSD FRML MDRD: 57 ML/MIN/1.73M2
GLUCOSE SERPL-MCNC: 228 MG/DL (ref 70–99)
POTASSIUM SERPL-SCNC: 3.9 MMOL/L
SODIUM SERPL-SCNC: 137 MMOL/L

## 2018-10-22 ENCOUNTER — OFFICE VISIT (OUTPATIENT)
Dept: CARDIOLOGY | Facility: CLINIC | Age: 78
End: 2018-10-22
Payer: COMMERCIAL

## 2018-10-22 VITALS
HEIGHT: 63 IN | BODY MASS INDEX: 29.98 KG/M2 | OXYGEN SATURATION: 95 % | DIASTOLIC BLOOD PRESSURE: 77 MMHG | WEIGHT: 169.2 LBS | SYSTOLIC BLOOD PRESSURE: 132 MMHG | HEART RATE: 55 BPM

## 2018-10-22 DIAGNOSIS — I10 BENIGN ESSENTIAL HYPERTENSION: ICD-10-CM

## 2018-10-22 DIAGNOSIS — I70.1 RENAL ARTERY STENOSIS (H): ICD-10-CM

## 2018-10-22 DIAGNOSIS — E78.5 HYPERLIPIDEMIA LDL GOAL <100: ICD-10-CM

## 2018-10-22 DIAGNOSIS — R06.09 EXERTIONAL DYSPNEA: ICD-10-CM

## 2018-10-22 DIAGNOSIS — I10 BENIGN ESSENTIAL HYPERTENSION: Primary | ICD-10-CM

## 2018-10-22 PROCEDURE — 99214 OFFICE O/P EST MOD 30 MIN: CPT | Performed by: INTERNAL MEDICINE

## 2018-10-22 RX ORDER — CANDESARTAN 16 MG/1
16 TABLET ORAL 2 TIMES DAILY
COMMUNITY
Start: 2018-10-22 | End: 2018-10-22

## 2018-10-22 RX ORDER — CANDESARTAN 16 MG/1
16 TABLET ORAL 2 TIMES DAILY
Qty: 180 TABLET | Refills: 3 | Status: SHIPPED | OUTPATIENT
Start: 2018-10-22 | End: 2019-12-02

## 2018-10-22 RX ORDER — CANDESARTAN 16 MG/1
32 TABLET ORAL DAILY
COMMUNITY
End: 2018-10-22

## 2018-10-22 RX ORDER — ERGOCALCIFEROL 1.25 MG/1
50000 CAPSULE, LIQUID FILLED ORAL WEEKLY
COMMUNITY

## 2018-10-22 RX ORDER — PERPHENAZINE 16 MG
600 TABLET ORAL 3 TIMES DAILY
COMMUNITY

## 2018-10-22 NOTE — LETTER
10/22/2018      Steven Fonseca MD  [facility] [address] [facility] [address]  [place] [address]      RE: Ginger Abreu       Dear Colleague,    I had the pleasure of seeing Ginger Abreu in the [facility].    Service Date: 10/22/2018      REFERRING PHYSICIAN:  Dr. Steven Fonseca.        HISTORY OF PRESENT ILLNESS:  It is my pleasure to see your patient, Ginger Abreu, who is a very pleasant 78-year-old patient with a history of hypertension, hyperlipidemia, diabetes mellitus, and also history of pulmonary embolism.  If you remember, this patient had difficult-to-control hypertension.  She was found to have bilateral renal artery stenosis.  It was severe on the left and moderate on the right.  She underwent stenting of the right coronary artery.  Unfortunately, there was not much change in her blood pressure with this.  Most recently she has had candesartan added in.  Normally candesartan is a once-a-day drug, but in her case it has been given to her at the 16 mg dose twice a day.  The patient tells me that sometimes when she takes the medication in the morning with her clonidine that her blood pressure gets so low and she feels very weak.  Unusually, if her blood pressure gets below 120, she starts feeling quite weak.  The present medical regimen is working for her, but it is quite unusual.  She is taking candesartan 16 mg twice a day.  She is taking clonidine 0.1 mg in the morning, 0.1 mg in the afternoon, and 0.2 mg in the evening time.  She is also taking triamterene/hydrochlorothiazide.  She cannot take beta blockers because she is bradycardic.  Her renal function has recently improved with her BUN being 39, but her creatinine has gone to 0.95 and her GFR is improved to 57.  In April her creatinine was 1.16 with a GFR of 45 and a BUN of 30.  Today her blood pressure is well controlled at 132/77.  Her pulse rate is 55 beats per minute.  Last lipid profile was drawn  on the  showing an LDL of 87, HDL of 51, triglycerides of 204 with a total cholesterol of 179.  This patient also has type 2 diabetes mellitus.  She is telling me that her glucoses have been labile.      IMPRESSION:   1.  Essential hypertension.  On an unusual drug regimen her blood pressure is well controlled.   2.  Bilateral renal artery stenosis with stenting of the left renal artery.  The right renal artery is moderately stenosed and has been treated medically.   3.  Hyperlipidemia with good lipids with the exception of mild hypertriglyceridemia.   4.  History of mitral valve prolapse, but echocardiography in  showed no evidence of mitral valve prolapse.      PLAN:  We will continue the patient on her present medications, as this regimen, though complicated, appears to be working for her.  I will see her back again in a year's time with a basic metabolic profile and lipids, but as always, she has been told to contact us if she has any questions or concerns.      It has been my pleasure to be involved in the care of this very nice patient.      Vaibhav Maldonado MD, FACC       cc:   [place] [address]         VAIBHAV ORTEGA MD, FACC             D: 10/22/2018   T: 10/22/2018   MT: LANE      Name:     SARAY MÉNDEZ   MRN:      [id number]        Account:      [id number]   :      1940           Service Date: 10/22/2018      Document: [id number]         Outpatient Encounter Prescriptions as of 10/22/2018   Medication Sig Dispense Refill     albuterol (PROAIR HFA/PROVENTIL HFA/VENTOLIN HFA) 108 (90 BASE) MCG/ACT Inhaler Inhale 2 puffs into the lungs every 6 hours INDICATION: RESCUE INHALER FOR SHORTNESS OF BREATH, CHEST TIGHTNESS AND COUGH 1 Inhaler 11     alpha-lipoic acid 600 MG CAPS Take by mouth 3 times daily       ALPRAZolam (XANAX) 0.25 MG tablet TAKE ONE TABLET BY MOUTH ONCE DAILY AT  NIGHT  AS   NEEDED  FOR  SEVERE  ANXIETY  ONLY 30 tablet 0     aspirin EC 81 MG EC tablet Take 1 tablet (81 mg) by mouth daily       butalbital-acetaminophen-caffeine (FIORICET/ESGIC) -40 MG per tablet TAKE ONE TABLET BY MOUTH EVERY 8 HOURS AS NEEDED FOR  SEVERE  MIGRAINE  HEADACHE  ONLY 30 tablet 1     candesartan (ATACAND) 16 MG tablet Take 1 tablet (16 mg) by mouth 2 times daily 180 tablet 3     CEFDINIR PO        cloNIDine (CATAPRES) 0.1 MG tablet Take 2 tablets (0.2 mg) by mouth 3 times daily (with meals) INDICATION: TO LOWER BLOOD PRESSURE (Patient taking differently: Take 0.1 mg by mouth 3 times daily (with meals) INDICATION: TO LOWER BLOOD PRESSURE) 540 tablet 3     esomeprazole (NEXIUM) 40 MG capsule Take 1 capsule (40 mg) by mouth every morning (before breakfast) TAKE  30'-60' BEFORE 1ST MEAL 180 capsule 2     furosemide (LASIX) 20 MG tablet Take 1 tablet (20 mg) by mouth daily (Patient taking differently: Take 20 mg by mouth daily as needed ) 30 tablet 3     glimepiride (AMARYL) 2 MG tablet TAKE ONE TABLET (2 MG) BY MOUTH IN THE MORNING BEFORE BREAKFAST TO TREAT DIABETES (Patient taking differently: bid) 90 tablet 3     LACTOBACILLUS EXTRA STRENGTH CAPS Take 1 capsule by mouth daily INDICATION: TO RESTORE GOOD INTESTINAL BACTERIA 30 capsule prn     melatonin 5 MG CAPS Take by mouth At Bedtime       nitroglycerin (NITROSTAT) 0.4 MG SL tablet Place 1 tablet (0.4 mg) under the tongue every 5 minutes as needed for chest pain 25 tablet 0     pravastatin (PRAVACHOL) 80 MG tablet Take 0.5 tablets (40 mg) by mouth every evening 45 tablet 3     RaNITidine HCl (ZANTAC PO)        triamterene-hydrochlorothiazide (MAXZIDE-25) 37.5-25 MG per tablet Take 1 tablet by mouth every morning INDICATION:TO LOWER BLOOD PRESSURE AND CONTROL EDEMA 90 tablet PRN     vitamin D (ERGOCALCIFEROL) 84748 UNIT capsule Take 50,000 Units by mouth 1st, 10th, 20th of the month       ascorbic acid (VITAMIN C) 1000 MG TABS Take 1 tablet (1,000 mg)  by mouth daily VITAMIN C REPLACEMENT (Patient not taking: Reported on 10/22/2018) 100 tablet 3     BETA BLOCKER NOT PRESCRIBED, INTENTIONAL, Beta Blocker not prescribed intentionally due to Intolerance  0     cephALEXin (KEFLEX) 500 MG capsule Take 1 capsule by mouth 2 times daily       conjugated estrogens (PREMARIN) cream Place 0.5 g vaginally twice a week (Patient not taking: Reported on 10/22/2018) 30 g 12     HERBALS alphalithiod - 600 mg twice daily       order for DME Equipment being ordered: Wheeled walker with basket and seat attachment 1 Units 0     [DISCONTINUED] candesartan (ATACAND) 16 MG tablet Take 32 mg by mouth daily       [DISCONTINUED] candesartan (ATACAND) 16 MG tablet Take 1 tablet (16 mg) by mouth 2 times daily       [DISCONTINUED] CANDESARTAN CILEXETIL PO Take by mouth 2 times daily       [DISCONTINUED] losartan (COZAAR) 50 MG tablet Take 1 tablet (50 mg) by mouth daily (Patient not taking: Reported on 10/22/2018) 30 tablet 11     No facility-administered encounter medications on file as of 10/22/2018.        Again, thank you for allowing me to participate in the care of your patient.      Sincerely,    Vaibhav Maldonado MD, MD     [facility]

## 2018-10-22 NOTE — PROGRESS NOTES
HPI and Plan:   See dictation    Orders Placed This Encounter   Procedures     Basic metabolic panel     Lipid Profile     ALT     Follow-Up with Cardiologist       Orders Placed This Encounter   Medications     alpha-lipoic acid 600 MG CAPS     Sig: Take by mouth 3 times daily     DISCONTD: CANDESARTAN CILEXETIL PO     Sig: Take by mouth 2 times daily     CEFDINIR PO     vitamin D (ERGOCALCIFEROL) 15526 UNIT capsule     Sig: Take 50,000 Units by mouth 1st, 10th, 20th of the month     RaNITidine HCl (ZANTAC PO)     DISCONTD: candesartan (ATACAND) 16 MG tablet     Sig: Take 32 mg by mouth daily     DISCONTD: candesartan (ATACAND) 16 MG tablet     Sig: Take 1 tablet (16 mg) by mouth 2 times daily     candesartan (ATACAND) 16 MG tablet     Sig: Take 1 tablet (16 mg) by mouth 2 times daily     Dispense:  180 tablet     Refill:  3       Medications Discontinued During This Encounter   Medication Reason     losartan (COZAAR) 50 MG tablet      CANDESARTAN CILEXETIL PO Reorder     candesartan (ATACAND) 16 MG tablet Reorder     candesartan (ATACAND) 16 MG tablet          Encounter Diagnoses   Name Primary?     Benign essential hypertension Yes     Hyperlipidemia LDL goal <100      Exertional dyspnea      Renal artery stenosis (H)        CURRENT MEDICATIONS:  Current Outpatient Prescriptions   Medication Sig Dispense Refill     albuterol (PROAIR HFA/PROVENTIL HFA/VENTOLIN HFA) 108 (90 BASE) MCG/ACT Inhaler Inhale 2 puffs into the lungs every 6 hours INDICATION: RESCUE INHALER FOR SHORTNESS OF BREATH, CHEST TIGHTNESS AND COUGH 1 Inhaler 11     alpha-lipoic acid 600 MG CAPS Take by mouth 3 times daily       ALPRAZolam (XANAX) 0.25 MG tablet TAKE ONE TABLET BY MOUTH ONCE DAILY AT  NIGHT  AS  NEEDED  FOR  SEVERE  ANXIETY  ONLY 30 tablet 0     aspirin EC 81 MG EC tablet Take 1 tablet (81 mg) by mouth daily       butalbital-acetaminophen-caffeine (FIORICET/ESGIC) -40 MG per tablet TAKE ONE TABLET BY MOUTH EVERY 8 HOURS AS  NEEDED FOR  SEVERE  MIGRAINE  HEADACHE  ONLY 30 tablet 1     candesartan (ATACAND) 16 MG tablet Take 1 tablet (16 mg) by mouth 2 times daily 180 tablet 3     CEFDINIR PO        cloNIDine (CATAPRES) 0.1 MG tablet Take 2 tablets (0.2 mg) by mouth 3 times daily (with meals) INDICATION: TO LOWER BLOOD PRESSURE (Patient taking differently: Take 0.1 mg by mouth 3 times daily (with meals) INDICATION: TO LOWER BLOOD PRESSURE) 540 tablet 3     esomeprazole (NEXIUM) 40 MG capsule Take 1 capsule (40 mg) by mouth every morning (before breakfast) TAKE  30'-60' BEFORE 1ST MEAL 180 capsule 2     furosemide (LASIX) 20 MG tablet Take 1 tablet (20 mg) by mouth daily (Patient taking differently: Take 20 mg by mouth daily as needed ) 30 tablet 3     glimepiride (AMARYL) 2 MG tablet TAKE ONE TABLET (2 MG) BY MOUTH IN THE MORNING BEFORE BREAKFAST TO TREAT DIABETES (Patient taking differently: bid) 90 tablet 3     LACTOBACILLUS EXTRA STRENGTH CAPS Take 1 capsule by mouth daily INDICATION: TO RESTORE GOOD INTESTINAL BACTERIA 30 capsule prn     melatonin 5 MG CAPS Take by mouth At Bedtime       nitroglycerin (NITROSTAT) 0.4 MG SL tablet Place 1 tablet (0.4 mg) under the tongue every 5 minutes as needed for chest pain 25 tablet 0     pravastatin (PRAVACHOL) 80 MG tablet Take 0.5 tablets (40 mg) by mouth every evening 45 tablet 3     RaNITidine HCl (ZANTAC PO)        triamterene-hydrochlorothiazide (MAXZIDE-25) 37.5-25 MG per tablet Take 1 tablet by mouth every morning INDICATION:TO LOWER BLOOD PRESSURE AND CONTROL EDEMA 90 tablet PRN     vitamin D (ERGOCALCIFEROL) 48731 UNIT capsule Take 50,000 Units by mouth 1st, 10th, 20th of the month       ascorbic acid (VITAMIN C) 1000 MG TABS Take 1 tablet (1,000 mg) by mouth daily VITAMIN C REPLACEMENT (Patient not taking: Reported on 10/22/2018) 100 tablet 3     BETA BLOCKER NOT PRESCRIBED, INTENTIONAL, Beta Blocker not prescribed intentionally due to Intolerance  0     cephALEXin (KEFLEX) 500 MG  capsule Take 1 capsule by mouth 2 times daily       conjugated estrogens (PREMARIN) cream Place 0.5 g vaginally twice a week (Patient not taking: Reported on 10/22/2018) 30 g 12     HERBALS alphalithiod - 600 mg twice daily       order for DME Equipment being ordered: Wheeled walker with basket and seat attachment 1 Units 0     [DISCONTINUED] candesartan (ATACAND) 16 MG tablet Take 32 mg by mouth daily       [DISCONTINUED] candesartan (ATACAND) 16 MG tablet Take 1 tablet (16 mg) by mouth 2 times daily       [DISCONTINUED] CANDESARTAN CILEXETIL PO Take by mouth 2 times daily       [DISCONTINUED] fluticasone (FLOVENT HFA) 110 MCG/ACT inhaler Inhale 2 puffs into the lungs 2 times daily rinse mouth after each use 3 Inhaler 3       ALLERGIES     Allergies   Allergen Reactions     Codeine Difficulty breathing     Chest gets tight & difficulty breathing      Atenolol Other (See Comments)     Sob w/ exertion - asthma symptoms     Diltiazem Other (See Comments)     edema - fluid retention in legs       Labetalol Other (See Comments) and Itching     Sob w/ exertion - asthma symptoms  itching     Lipitor [Atorvastatin Calcium] Other (See Comments)     Muscle weakness     Nifedipine Other (See Comments)     Sob w/ exertion - asthma symptoms, HAs       Sulfa Drugs Hives     hives     Zocor [Simvastatin] Other (See Comments)     Muscle weakness     Advair Diskus Other (See Comments)     hoarseness with 500/50, not the 250/50     Aleve [Naproxen] Other (See Comments)     Raises BP     Amlodipine Besylate Fatigue and Diarrhea     Bystolic [Nebivolol Hcl] Other (See Comments)     headaches     Calcium Carbonate Diarrhea     Crestor [Rosuvastatin Calcium] Muscle Pain (Myalgia)     Diovan [Valsartan] GI Disturbance     gas       Hydralazine Other (See Comments) and Nausea     Increases pulse     Hydrochlorothiazide Other (See Comments)     increasing glucose     Imdur [Isosorbide Mononitrate] Other (See Comments)     headaches     Kcl  GI Disturbance     gas      Lisinopril Cough     Hoarse voice     Lovastatin Muscle Pain (Myalgia)     Metformin Diarrhea            Metoprolol Other (See Comments)     Sob w/ exertion - asthma symptoms       Niacin Other (See Comments)     hyperglycemia     Omnicef Diarrhea     Pravachol [Hmg-Coa-R Inhibitors] Muscle Pain (Myalgia)     Prilosec Otc [Omeprazole Magnesium] Diarrhea     diarrhea       Zetia [Ezetimibe] Muscle Pain (Myalgia)     weakness       PAST MEDICAL HISTORY:  Past Medical History:   Diagnosis Date     Acute peptic ulcer, unspecified site, without mention of hemorrhage, perforation, or obstruction      Allergic rhinitis, cause unspecified      Anxiety      Basal cell carcinoma      DVT (deep venous thrombosis) (H) 03/2012    following spinal surgery. Saw Oncology. 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 Saw Oncology. treated 6 months     Peripheral neuropathy 6/19/2015     Pneumonia, organism unspecified(486)      Spinal stenosis      Squamous cell carcinoma      Thyroid nodule      Type II or unspecified type diabetes mellitus without mention of complication, not stated as uncontrolled      Unspecified essential hypertension        PAST SURGICAL HISTORY:  Past Surgical History:   Procedure Laterality Date     C NONSPECIFIC PROCEDURE  approx 1970's    hyst/bso     C  NONSPECIFIC PROCEDURE  approx     gb     C NONSPECIFIC PROCEDURE  approx     cystocele repair     C NONSPECIFIC PROCEDURE  approx     endoscopic sinus      C NONSPECIFIC PROCEDURE      epidurals x 7     C NONSPECIFIC PROCEDURE      nl colonoscopy 2006     C NONSPECIFIC PROCEDURE      L2-L3 foramenotomies     C NONSPECIFIC PROCEDURE  2012    lumbar fusion     NONSPECIFIC PROCEDURE      Bilateral sphenoidotomy with removal of polypoid tissue.     Right frontal sinusotomy.   Bilateral total ethmoidectomy.  Bilateral maxillary antrostomy with removal of polypoid tissue.       ORTHOPEDIC SURGERY      lumbar fusion       FAMILY HISTORY:  Family History   Problem Relation Age of Onset     HEART DISEASE Father 25      in a fire      HEART DISEASE Paternal Grandfather        SOCIAL HISTORY:  Social History     Social History     Marital status:      Spouse name: N/A     Number of children: N/A     Years of education: N/A     Social History Main Topics     Smoking status: Never Smoker     Smokeless tobacco: Never Used     Alcohol use No      Comment: very rare     Drug use: No     Sexual activity: Yes     Other Topics Concern     Parent/Sibling W/ Cabg, Mi Or Angioplasty Before 65f 55m? No     Caffeine Concern No     soda 1 time per day     Special Diet No     Exercise No     limited due to back pain      Social History Narrative       Review of Systems:  Skin:  Positive for itching     Eyes:  Negative      ENT:  Positive for nasal congestion    Respiratory:  Negative       Cardiovascular:  Negative   left foot  Gastroenterology: Positive for heartburn (pt. taking zantac)    Genitourinary:  Negative urinary frequency;dysuria    Musculoskeletal:  Positive for back pain;nocturnal cramping;neck pain;arthritis    Neurologic:  Positive for migraine headaches;headaches;numbness or tingling of feet    Psychiatric:  Negative anxiety;excessive stress;sleep disturbances    Heme/Lymph/Imm:   "Positive for allergies    Endocrine:  Positive for diabetes      Physical Exam:  Vitals: /77  Pulse 55  Ht 1.6 m (5' 3\")  Wt 76.7 kg (169 lb 3.2 oz)  SpO2 95%  BMI 29.97 kg/m2    Constitutional:  cooperative, alert and oriented, well developed, well nourished, in no acute distress overweight      Skin:  warm and dry to the touch, no apparent skin lesions or masses noted          Head:  normocephalic, no masses or lesions        Eyes:  pupils equal and round        Lymph:      ENT:  no pallor or cyanosis        Neck:  carotid pulses are full and equal bilaterally, JVP normal, no carotid bruit        Respiratory:  clear to auscultation         Cardiac: regular rhythm;normal S1 and S2   S4            pulses full and equal, no bruits auscultated                                   right femoral access site clean dry and intact with small lump at the site and mild ecchymosis    GI:  abdomen soft;abdomen soft, non-tender, BS normoactive, no mass, no HSM, no bruits        Extremities and Muscular Skeletal:  no edema              Neurological:  no gross motor deficits;affect appropriate  (Uses a walker)      Psych:  Alert and Oriented x 3        CC  No referring provider defined for this encounter.              "

## 2018-10-22 NOTE — LETTER
10/22/2018    Steven Fonseca MD  [facility] [address]  [place] [address]    RE: Ginger Abreu       Dear Colleague,    I had the pleasure of seeing Ginger Abreu in the [facility].    HPI and Plan:   See dictation    Orders Placed This Encounter   Procedures     Basic metabolic panel     Lipid Profile     ALT     Follow-Up with Cardiologist       Orders Placed This Encounter   Medications     alpha-lipoic acid 600 MG CAPS     Sig: Take by mouth 3 times daily     DISCONTD: CANDESARTAN CILEXETIL PO     Sig: Take by mouth 2 times daily     CEFDINIR PO     vitamin D (ERGOCALCIFEROL) 92595 UNIT capsule     Sig: Take 50,000 Units by mouth 1st, 10th, 20th of the month     RaNITidine HCl (ZANTAC PO)     DISCONTD: candesartan (ATACAND) 16 MG tablet     Sig: Take 32 mg by mouth daily     DISCONTD: candesartan (ATACAND) 16 MG tablet     Sig: Take 1 tablet (16 mg) by mouth 2 times daily     candesartan (ATACAND) 16 MG tablet     Sig: Take 1 tablet (16 mg) by mouth 2 times daily     Dispense:  180 tablet     Refill:  3       Medications Discontinued During This Encounter   Medication Reason     losartan (COZAAR) 50 MG tablet      CANDESARTAN CILEXETIL PO Reorder     candesartan (ATACAND) 16 MG tablet Reorder     candesartan (ATACAND) 16 MG tablet          Encounter Diagnoses   Name Primary?     Benign essential hypertension Yes     Hyperlipidemia LDL goal <100      Exertional dyspnea      Renal artery stenosis (H)        CURRENT MEDICATIONS:  Current Outpatient Prescriptions   Medication Sig Dispense Refill     albuterol (PROAIR HFA/PROVENTIL HFA/VENTOLIN HFA) 108 (90 BASE) MCG/ACT Inhaler Inhale 2 puffs into the lungs every 6 hours INDICATION: RESCUE INHALER FOR SHORTNESS OF BREATH, CHEST TIGHTNESS AND COUGH 1 Inhaler 11     alpha-lipoic acid 600 MG CAPS Take by mouth 3 times daily       ALPRAZolam (XANAX) 0.25 MG tablet TAKE ONE TABLET BY MOUTH ONCE DAILY AT   NIGHT  AS  NEEDED  FOR  SEVERE  ANXIETY  ONLY 30 tablet 0     aspirin EC 81 MG EC tablet Take 1 tablet (81 mg) by mouth daily       butalbital-acetaminophen-caffeine (FIORICET/ESGIC) -40 MG per tablet TAKE ONE TABLET BY MOUTH EVERY 8 HOURS AS NEEDED FOR  SEVERE  MIGRAINE  HEADACHE  ONLY 30 tablet 1     candesartan (ATACAND) 16 MG tablet Take 1 tablet (16 mg) by mouth 2 times daily 180 tablet 3     CEFDINIR PO        cloNIDine (CATAPRES) 0.1 MG tablet Take 2 tablets (0.2 mg) by mouth 3 times daily (with meals) INDICATION: TO LOWER BLOOD PRESSURE (Patient taking differently: Take 0.1 mg by mouth 3 times daily (with meals) INDICATION: TO LOWER BLOOD PRESSURE) 540 tablet 3     esomeprazole (NEXIUM) 40 MG capsule Take 1 capsule (40 mg) by mouth every morning (before breakfast) TAKE  30'-60' BEFORE 1ST MEAL 180 capsule 2     furosemide (LASIX) 20 MG tablet Take 1 tablet (20 mg) by mouth daily (Patient taking differently: Take 20 mg by mouth daily as needed ) 30 tablet 3     glimepiride (AMARYL) 2 MG tablet TAKE ONE TABLET (2 MG) BY MOUTH IN THE MORNING BEFORE BREAKFAST TO TREAT DIABETES (Patient taking differently: bid) 90 tablet 3     LACTOBACILLUS EXTRA STRENGTH CAPS Take 1 capsule by mouth daily INDICATION: TO RESTORE GOOD INTESTINAL BACTERIA 30 capsule prn     melatonin 5 MG CAPS Take by mouth At Bedtime       nitroglycerin (NITROSTAT) 0.4 MG SL tablet Place 1 tablet (0.4 mg) under the tongue every 5 minutes as needed for chest pain 25 tablet 0     pravastatin (PRAVACHOL) 80 MG tablet Take 0.5 tablets (40 mg) by mouth every evening 45 tablet 3     RaNITidine HCl (ZANTAC PO)        triamterene-hydrochlorothiazide (MAXZIDE-25) 37.5-25 MG per tablet Take 1 tablet by mouth every morning INDICATION:TO LOWER BLOOD PRESSURE AND CONTROL EDEMA 90 tablet PRN     vitamin D (ERGOCALCIFEROL) 29858 UNIT capsule Take 50,000 Units by mouth 1st, 10th, 20th of the month       ascorbic acid (VITAMIN C) 1000 MG TABS Take 1 tablet  (1,000 mg) by mouth daily VITAMIN C REPLACEMENT (Patient not taking: Reported on 10/22/2018) 100 tablet 3     BETA BLOCKER NOT PRESCRIBED, INTENTIONAL, Beta Blocker not prescribed intentionally due to Intolerance  0     cephALEXin (KEFLEX) 500 MG capsule Take 1 capsule by mouth 2 times daily       conjugated estrogens (PREMARIN) cream Place 0.5 g vaginally twice a week (Patient not taking: Reported on 10/22/2018) 30 g 12     HERBALS alphalithiod - 600 mg twice daily       order for DME Equipment being ordered: Wheeled walker with basket and seat attachment 1 Units 0     [DISCONTINUED] candesartan (ATACAND) 16 MG tablet Take 32 mg by mouth daily       [DISCONTINUED] candesartan (ATACAND) 16 MG tablet Take 1 tablet (16 mg) by mouth 2 times daily       [DISCONTINUED] CANDESARTAN CILEXETIL PO Take by mouth 2 times daily       [DISCONTINUED] fluticasone (FLOVENT HFA) 110 MCG/ACT inhaler Inhale 2 puffs into the lungs 2 times daily rinse mouth after each use 3 Inhaler 3       ALLERGIES     Allergies   Allergen Reactions     Codeine Difficulty breathing     Chest gets tight & difficulty breathing      Atenolol Other (See Comments)     Sob w/ exertion - asthma symptoms     Diltiazem Other (See Comments)     edema - fluid retention in legs       Labetalol Other (See Comments) and Itching     Sob w/ exertion - asthma symptoms  itching     Lipitor [Atorvastatin Calcium] Other (See Comments)     Muscle weakness     Nifedipine Other (See Comments)     Sob w/ exertion - asthma symptoms, HAs       Sulfa Drugs Hives     hives     Zocor [Simvastatin] Other (See Comments)     Muscle weakness     Advair Diskus Other (See Comments)     hoarseness with 500/50, not the 250/50     Aleve [Naproxen] Other (See Comments)     Raises BP     Amlodipine Besylate Fatigue and Diarrhea     Bystolic [Nebivolol Hcl] Other (See Comments)     headaches     Calcium Carbonate Diarrhea     Crestor [Rosuvastatin Calcium] Muscle Pain (Myalgia)     Diovan  [Valsartan] GI Disturbance     gas       Hydralazine Other (See Comments) and Nausea     Increases pulse     Hydrochlorothiazide Other (See Comments)     increasing glucose     Imdur [Isosorbide Mononitrate] Other (See Comments)     headaches     Kcl GI Disturbance     gas      Lisinopril Cough     Hoarse voice     Lovastatin Muscle Pain (Myalgia)     Metformin Diarrhea            Metoprolol Other (See Comments)     Sob w/ exertion - asthma symptoms       Niacin Other (See Comments)     hyperglycemia     Omnicef Diarrhea     Pravachol [Hmg-Coa-R Inhibitors] Muscle Pain (Myalgia)     Prilosec Otc [Omeprazole Magnesium] Diarrhea     diarrhea       Zetia [Ezetimibe] Muscle Pain (Myalgia)     weakness       PAST MEDICAL HISTORY:  Past Medical History:   Diagnosis Date     Acute peptic ulcer, unspecified site, without mention of hemorrhage, perforation, or obstruction      Allergic rhinitis, cause unspecified      Anxiety      Basal cell carcinoma      DVT (deep venous thrombosis) (H) 03/2012    following spinal surgery. Saw Oncology. 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 Saw Oncology. treated 6 months     Peripheral neuropathy 6/19/2015     Pneumonia, organism unspecified(486)      Spinal stenosis      Squamous cell carcinoma      Thyroid nodule      Type II or unspecified type diabetes  mellitus without mention of complication, not stated as uncontrolled      Unspecified essential hypertension        PAST SURGICAL HISTORY:  Past Surgical History:   Procedure Laterality Date     C NONSPECIFIC PROCEDURE  approx     hyst/bso     C NONSPECIFIC PROCEDURE  approx     gb     C NONSPECIFIC PROCEDURE  approx     cystocele repair     C NONSPECIFIC PROCEDURE  approx     endoscopic sinus      C NONSPECIFIC PROCEDURE      epidurals x 7     C NONSPECIFIC PROCEDURE      nl colonoscopy 2006     C NONSPECIFIC PROCEDURE      L2-L3 foramenotomies     C NONSPECIFIC PROCEDURE      lumbar fusion     NONSPECIFIC PROCEDURE      Bilateral sphenoidotomy with removal of polypoid tissue.     Right frontal sinusotomy.   Bilateral total ethmoidectomy.  Bilateral maxillary antrostomy with removal of polypoid tissue.       ORTHOPEDIC SURGERY      lumbar fusion       FAMILY HISTORY:  Family History   Problem Relation Age of Onset     HEART DISEASE Father 25      in a fire      HEART DISEASE Paternal Grandfather        SOCIAL HISTORY:  Social History     Social History     Marital status:      Spouse name: N/A     Number of children: N/A     Years of education: N/A     Social History Main Topics     Smoking status: Never Smoker     Smokeless tobacco: Never Used     Alcohol use No      Comment: very rare     Drug use: No     Sexual activity: Yes     Other Topics Concern     Parent/Sibling W/ Cabg, Mi Or Angioplasty Before 65f 55m? No     Caffeine Concern No     soda 1 time per day     Special Diet No     Exercise No     limited due to back pain      Social History Narrative       Review of Systems:  Skin:  Positive for itching     Eyes:  Negative      ENT:  Positive for nasal congestion    Respiratory:  Negative       Cardiovascular:  Negative   left foot  Gastroenterology: Positive for heartburn (pt. taking zantac)    Genitourinary:  Negative urinary frequency;dysuria   "  Musculoskeletal:  Positive for back pain;nocturnal cramping;neck pain;arthritis    Neurologic:  Positive for migraine headaches;headaches;numbness or tingling of feet    Psychiatric:  Negative anxiety;excessive stress;sleep disturbances    Heme/Lymph/Imm:  Positive for allergies    Endocrine:  Positive for diabetes      Physical Exam:  Vitals: /77  Pulse 55  Ht 1.6 m (5' 3\")  Wt 76.7 kg (169 lb 3.2 oz)  SpO2 95%  BMI 29.97 kg/m2    Constitutional:  cooperative, alert and oriented, well developed, well nourished, in no acute distress overweight      Skin:  warm and dry to the touch, no apparent skin lesions or masses noted          Head:  normocephalic, no masses or lesions        Eyes:  pupils equal and round        Lymph:      ENT:  no pallor or cyanosis        Neck:  carotid pulses are full and equal bilaterally, JVP normal, no carotid bruit        Respiratory:  clear to auscultation         Cardiac: regular rhythm;normal S1 and S2   S4            pulses full and equal, no bruits auscultated                                   right femoral access site clean dry and intact with small lump at the site and mild ecchymosis    GI:  abdomen soft;abdomen soft, non-tender, BS normoactive, no mass, no HSM, no bruits        Extremities and Muscular Skeletal:  no edema              Neurological:  no gross motor deficits;affect appropriate  (Uses a walker)      Psych:  Alert and Oriented x 3        CC  No referring provider defined for this encounter.                Thank you for allowing me to participate in the care of your patient.      Sincerely,     Vaibhav Maldonado MD, MD     [facility]    cc:   No referring provider defined for this encounter.        "

## 2018-10-22 NOTE — MR AVS SNAPSHOT
After Visit Summary   10/22/2018    Ginger Abreu    MRN: [id number]           Patient Information     Date Of Birth          1940        Visit Information        Provider Department      10/22/2018 2:45 PM Vaibhav Maldonado MD [facility]        Today's Diagnoses     Benign essential hypertension    -  1    Hyperlipidemia LDL goal <100        Exertional dyspnea        Renal artery stenosis (H)           Follow-ups after your visit        Additional Services     Follow-Up with Cardiologist                 Future tests that were ordered for you today     Open Future Orders        Priority Expected Expires Ordered    Basic metabolic panel Routine 10/22/2019 10/23/2019 10/22/2018    Lipid Profile Routine 10/22/2019 10/22/2019 10/22/2018    ALT Routine 10/22/2019 10/22/2019 10/22/2018    Follow-Up with Cardiologist Routine 10/22/2019 10/23/2019 10/22/2018            Who to contact     If you have questions or need follow up information about today's clinic visit or your schedule please contact [facility] directly at [phone].  Normal or non-critical lab and imaging results will be communicated to you by Vlingohart, letter or phone within 4 business days after the clinic has received the results. If you do not hear from us within 7 days, please contact the clinic through [email or web address] or phone. If you have a critical or abnormal lab result, we will notify you by phone as soon as possible.  Submit refill requests through [email or web address] or call your pharmacy and they will forward the refill request to us. Please allow 3 business days for your refill to be completed.          Additional Information About Your Visit        MyChart Information     [email or web address] gives you secure access to your electronic health record. If you see a primary care provider, you can also send messages to your care team and make appointments. If you have  "questions, please call your primary care clinic.  If you do not have a primary care provider, please call [phone] and they will assist you.        Care EveryWhere ID     This is your Care EveryWhere ID. This could be used by other organizations to access your [facility] medical records  [id number]        Your Vitals Were     Pulse Height Pulse Oximetry BMI (Body Mass Index)          55 1.6 m (5' 3\") 95% 29.97 kg/m2         Blood Pressure from Last 3 Encounters:   10/22/18 132/77   08/10/18 132/65   07/10/18 126/65    Weight from Last 3 Encounters:   10/22/18 76.7 kg (169 lb 3.2 oz)   02/02/18 75.3 kg (166 lb)   01/03/18 74.8 kg (165 lb)                 Today's Medication Changes          These changes are accurate as of 10/22/18  3:14 PM.  If you have any questions, ask your nurse or doctor.               These medicines have changed or have updated prescriptions.        Dose/Directions    candesartan 16 MG tablet   Commonly known as:  ATACAND   This may have changed:    - how much to take  - when to take this   Used for:  Benign essential hypertension   Changed by:  Vaibhav Maldonado MD        Dose:  16 mg   Take 1 tablet (16 mg) by mouth 2 times daily   Quantity:  180 tablet   Refills:  3       cloNIDine 0.1 MG tablet   Commonly known as:  CATAPRES   This may have changed:    - how much to take  - additional instructions   Used for:  Essential hypertension, benign        Dose:  0.2 mg   Take 2 tablets (0.2 mg) by mouth 3 times daily (with meals) INDICATION: TO LOWER BLOOD PRESSURE   Quantity:  540 tablet   Refills:  3       furosemide 20 MG tablet   Commonly known as:  LASIX   This may have changed:    - when to take this  - reasons to take this   Used for:  Benign essential hypertension        Dose:  20 mg   Take 1 tablet (20 mg) by mouth daily   Quantity:  30 tablet   Refills:  3       glimepiride 2 MG tablet   Commonly known as:  AMARYL   This may have changed:  See the new instructions.   Used " for:  Type 2 diabetes mellitus without complication, without long-term current use of insulin (H)        TAKE ONE TABLET (2 MG) BY MOUTH IN THE MORNING BEFORE BREAKFAST TO TREAT DIABETES   Quantity:  90 tablet   Refills:  3         Stop taking these medicines if you haven't already. Please contact your care team if you have questions.     losartan 50 MG tablet   Commonly known as:  COZAAR   Stopped by:  Vaibhav Maldonado MD                Where to get your medicines      These medications were sent to [facility] [address] [place] [address]  [address] [facility] [address]     Phone:  [phone]     candesartan 16 MG tablet                Primary Care Provider Office Phone # Fax #    Steven Fonseca [phone]       [facility] [address] [facility] [place] [address]        Equal Access to Services     [facility] HospitalJEREMY : Hadii ed brown hadasho Soomaali, waaxda luqadaha, qaybta kaalmada adeegyada, juan luis ham . So [facility] [phone].    ATENCIÓN: Si habla español, tiene a decker disposición servicios gratuitos de asistencia lingüística. Joe al [phone].    We comply with applicable federal civil rights laws and [place] laws. We do not discriminate on the basis of race, color, national origin, age, disability, sex, sexual orientation, or gender identity.            Thank you!     Thank you for choosing [facility]  for your care. Our goal is always to provide you with excellent care. Hearing back from our patients is one way we can continue to improve our services. Please take a few minutes to complete the written survey that you may receive in the mail after your visit with us. Thank you!             Your Updated Medication List - Protect others around you: Learn how to safely use, store and throw away your medicines at www.disposemymeds.org.          This list is accurate as  of 10/22/18  3:14 PM.  Always use your most recent med list.                   Brand Name Dispense Instructions for use Diagnosis    albuterol 108 (90 Base) MCG/ACT inhaler    PROAIR HFA/PROVENTIL HFA/VENTOLIN HFA    1 Inhaler    Inhale 2 puffs into the lungs every 6 hours INDICATION: RESCUE INHALER FOR SHORTNESS OF BREATH, CHEST TIGHTNESS AND COUGH    Mild persistent asthma without complication       alpha-lipoic acid 600 MG Caps      Take by mouth 3 times daily        ALPRAZolam 0.25 MG tablet    XANAX    30 tablet    TAKE ONE TABLET BY MOUTH ONCE DAILY AT  NIGHT  AS  NEEDED  FOR  SEVERE  ANXIETY  ONLY    Anxiety       ascorbic acid 1000 MG Tabs    vitamin C    100 tablet    Take 1 tablet (1,000 mg) by mouth daily VITAMIN C REPLACEMENT    Scurvy       aspirin 81 MG EC tablet      Take 1 tablet (81 mg) by mouth daily    Renal artery stenosis (H)       BETA BLOCKER NOT PRESCRIBED (INTENTIONAL)      Beta Blocker not prescribed intentionally due to Intolerance    Type 2 diabetes, HbA1c goal < 7% (H), Hypertension goal BP (blood pressure) < 130/80       butalbital-acetaminophen-caffeine -40 MG per tablet    FIORICET/ESGIC    30 tablet    TAKE ONE TABLET BY MOUTH EVERY 8 HOURS AS NEEDED FOR  SEVERE  MIGRAINE  HEADACHE  ONLY    Migraine with aura and without status migrainosus, not intractable       candesartan 16 MG tablet    ATACAND    180 tablet    Take 1 tablet (16 mg) by mouth 2 times daily    Benign essential hypertension       CEFDINIR PO           cephALEXin 500 MG capsule    KEFLEX     Take 1 capsule by mouth 2 times daily        cloNIDine 0.1 MG tablet    CATAPRES    540 tablet    Take 2 tablets (0.2 mg) by mouth 3 times daily (with meals) INDICATION: TO LOWER BLOOD PRESSURE    Essential hypertension, benign       conjugated estrogens cream    PREMARIN    30 g    Place 0.5 g vaginally twice a week    Recurrent urinary tract infection       esomeprazole 40 MG CR capsule    nexIUM    180 capsule    Take 1  capsule (40 mg) by mouth every morning (before breakfast) TAKE  30'-60' BEFORE 1ST MEAL    Gastroesophageal reflux disease without esophagitis       furosemide 20 MG tablet    LASIX    30 tablet    Take 1 tablet (20 mg) by mouth daily    Benign essential hypertension       glimepiride 2 MG tablet    AMARYL    90 tablet    TAKE ONE TABLET (2 MG) BY MOUTH IN THE MORNING BEFORE BREAKFAST TO TREAT DIABETES    Type 2 diabetes mellitus without complication, without long-term current use of insulin (H)       HERBALS      alphalithiod - 600 mg twice daily        LACTOBACILLUS EXTRA STRENGTH Caps     30 capsule    Take 1 capsule by mouth daily INDICATION: TO RESTORE GOOD INTESTINAL BACTERIA    Dyspepsia       melatonin 5 MG Caps      Take by mouth At Bedtime        nitroGLYcerin 0.4 MG sublingual tablet    NITROSTAT    25 tablet    Place 1 tablet (0.4 mg) under the tongue every 5 minutes as needed for chest pain    Exertional dyspnea       order for DME     1 Units    Equipment being ordered: Wheeled walker with basket and seat attachment    Spinal stenosis, lumbar region, without neurogenic claudication       pravastatin 80 MG tablet    PRAVACHOL    45 tablet    Take 0.5 tablets (40 mg) by mouth every evening    Hyperlipidemia LDL goal <100       triamterene-hydrochlorothiazide 37.5-25 MG per tablet    MAXZIDE-25    90 tablet    Take 1 tablet by mouth every morning INDICATION:TO LOWER BLOOD PRESSURE AND CONTROL EDEMA    Benign essential hypertension       vitamin D 07511 UNIT capsule    ERGOCALCIFEROL     Take 50,000 Units by mouth 1st, 10th, 20th of the month        ZANTAC PO

## 2018-10-22 NOTE — PROGRESS NOTES
Service Date: 10/22/2018      REFERRING PHYSICIAN:  Dr. Steven Fonseca.        HISTORY OF PRESENT ILLNESS:  It is my pleasure to see your patient, Ginger Abreu, who is a very pleasant 78-year-old patient with a history of hypertension, hyperlipidemia, diabetes mellitus, and also history of pulmonary embolism.  If you remember, this patient had difficult-to-control hypertension.  She was found to have bilateral renal artery stenosis.  It was severe on the left and moderate on the right.  She underwent stenting of the right coronary artery.  Unfortunately, there was not much change in her blood pressure with this.  Most recently she has had candesartan added in.  Normally candesartan is a once-a-day drug, but in her case it has been given to her at the 16 mg dose twice a day.  The patient tells me that sometimes when she takes the medication in the morning with her clonidine that her blood pressure gets so low and she feels very weak.  Unusually, if her blood pressure gets below 120, she starts feeling quite weak.  The present medical regimen is working for her, but it is quite unusual.  She is taking candesartan 16 mg twice a day.  She is taking clonidine 0.1 mg in the morning, 0.1 mg in the afternoon, and 0.2 mg in the evening time.  She is also taking triamterene/hydrochlorothiazide.  She cannot take beta blockers because she is bradycardic.  Her renal function has recently improved with her BUN being 39, but her creatinine has gone to 0.95 and her GFR is improved to 57.  In April her creatinine was 1.16 with a GFR of 45 and a BUN of 30.  Today her blood pressure is well controlled at 132/77.  Her pulse rate is 55 beats per minute.  Last lipid profile was drawn on the 5th of April showing an LDL of 87, HDL of 51, triglycerides of 204 with a total cholesterol of 179.  This patient also has type 2 diabetes mellitus.  She is telling me that her glucoses have been labile.      IMPRESSION:   1.  Essential  hypertension.  On an unusual drug regimen her blood pressure is well controlled.   2.  Bilateral renal artery stenosis with stenting of the left renal artery.  The right renal artery is moderately stenosed and has been treated medically.   3.  Hyperlipidemia with good lipids with the exception of mild hypertriglyceridemia.   4.  History of mitral valve prolapse, but echocardiography in  showed no evidence of mitral valve prolapse.      PLAN:  We will continue the patient on her present medications, as this regimen, though complicated, appears to be working for her.  I will see her back again in a year's time with a basic metabolic profile and lipids, but as always, she has been told to contact us if she has any questions or concerns.      It has been my pleasure to be involved in the care of this very nice patient.      Vaibhav Maldonado MD, FACC       cc:   [place] [address]         VAIBHAV ORTEGA MD, FACC             D: 10/22/2018   T: 10/22/2018   MT: LANE      Name:     SARAY MÉNDEZ   MRN:      [id number]        Account:      [id number]   :      1940           Service Date: 10/22/2018      Document: [id number]

## 2018-10-25 DIAGNOSIS — N39.0 RECURRENT URINARY TRACT INFECTION: Primary | ICD-10-CM

## 2018-11-09 ENCOUNTER — HOSPITAL ENCOUNTER (OUTPATIENT)
Dept: CT IMAGING | Facility: CLINIC | Age: 78
Discharge: HOME | End: 2018-11-09
Attending: INTERNAL MEDICINE | Admitting: INTERNAL MEDICINE
Payer: MEDICARE

## 2018-11-09 DIAGNOSIS — N39.0 RECURRENT URINARY TRACT INFECTION: ICD-10-CM

## 2018-11-09 LAB
CREAT BLD-MCNC: 0.8 MG/DL (ref 0.52–1.04)
GFR SERPL CREATININE-BSD FRML MDRD: 69 ML/MIN/1.7M2

## 2018-11-09 PROCEDURE — 25000128 H RX IP 250 OP 636: Performed by: INTERNAL MEDICINE

## 2018-11-09 PROCEDURE — 25000125 ZZHC RX 250: Performed by: INTERNAL MEDICINE

## 2018-11-09 PROCEDURE — 74178 CT ABD&PLV WO CNTR FLWD CNTR: CPT

## 2018-11-09 PROCEDURE — 82565 ASSAY OF CREATININE: CPT

## 2018-11-09 RX ORDER — IOPAMIDOL 755 MG/ML
100 INJECTION, SOLUTION INTRAVASCULAR ONCE
Status: COMPLETED | OUTPATIENT
Start: 2018-11-09 | End: 2018-11-09

## 2018-11-09 RX ADMIN — SODIUM CHLORIDE, PRESERVATIVE FREE 60 ML: 5 INJECTION INTRAVENOUS at 10:48

## 2018-11-09 RX ADMIN — IOPAMIDOL 100 ML: 755 INJECTION, SOLUTION INTRAVENOUS at 10:48

## 2019-03-04 NOTE — PROGRESS NOTES
I was asked to see Ginger by Dr. Fonseca for recurrent urinary tract infections.    SUBJECTIVE:                                                   Ginger Abreu is a 78 year old female who presents to clinic today for the following health issue(s):  Patient presents with:  Consult: referred by primary care provider for frequent UTI's        HPI:  Ginger presents with recurrent UTIs. Per her chart and from her most of the time it has been E coli with Klebsiella as well. She usually worsening of her frequent urination, burning with urination. She has tried cephalexin for suppression but was on it before her symptoms started. She has been seen by a Urologist and has also been seen by an Infectious Disease specialist as well. She had a CT Urogram that was negative for stones.    In addition to the recurrent UTIs, she has increased urination with polyuria and urge urinary incontinence. She wakes up almost every hour to void overnight. She has to know where the restroom is every where she goes. She has to wear a pantiliner because she will have leakage in her underwear if she doesn't. She denies loss of urine with coughing or sneezing. She tried Mirabegron in the past but it caused joint pain.    No LMP recorded. Patient has had a hysterectomy..   Patient is not sexually active   reports that  has never smoked. she has never used smokeless tobacco.  STD testing offered?  Declined - not sexually active  Health maintenance updated:  yes    Today's PHQ-2 Score:   PHQ-2 ( 1999 [employer]) 12/21/2017   Q1: Little interest or pleasure in doing things 0   Q2: Feeling down, depressed or hopeless 0   PHQ-2 Score 0   Q1: Little interest or pleasure in doing things -   Q2: Feeling down, depressed or hopeless -   PHQ-2 Score -     Today's PHQ-9 Score:   PHQ-9 SCORE 3/5/2019   PHQ-9 Total Score 0     Today's OBEY-7 Score:   OBEY-7 SCORE 3/5/2019   Total Score -   Total Score 0       Problem list and histories reviewed & adjusted, as  indicated.  Additional history: as documented.    Patient Active Problem List   Diagnosis     Essential hypertension     Mitral valve disorder     Allergic rhinitis     Generalized osteoarthrosis, unspecified site     Spinal stenosis, lumbar region, without neurogenic claudication     Thyroid nodule     Anti-cardiolipin antibody positive     Hot flashes     Immunoglobulin A deficiency (H)     Immunoglobulin G subclass deficiency (H)     Ascorbic acid deficiency     Low iron stores     Advanced care planning/counseling discussion     Anxiety     Personal history of venous thrombosis and embolism     Hyperlipidemia with target LDL less than 130     Sleep disorder     BCC (basal cell carcinoma), face     Peripheral neuropathy     Hyperlipidemia LDL goal <100     Gastroesophageal reflux disease without esophagitis     Mild persistent asthma, uncomplicated     Chronic pain syndrome     Squamous cell cancer of skin of left hand     Migraine with aura and without status migrainosus, not intractable     Type 2 diabetes mellitus without complication, without long-term current use of insulin (H)     Chronic kidney disease, stage III (moderate) (H)     Osteopenia, unspecified location     Neuropathy     Past Surgical History:   Procedure Laterality Date     C NONSPECIFIC PROCEDURE  approx 1970's    hyst/bso     C NONSPECIFIC PROCEDURE  approx 1970's    gb     C NONSPECIFIC PROCEDURE  approx 1980's    cystocele repair     C NONSPECIFIC PROCEDURE  approx 1980's    endoscopic sinus      C NONSPECIFIC PROCEDURE      epidurals x 7     C NONSPECIFIC PROCEDURE      nl colonoscopy 1/2006     C NONSPECIFIC PROCEDURE  2011    L2-L3 foramenotomies     C NONSPECIFIC PROCEDURE  2012    lumbar fusion     NONSPECIFIC PROCEDURE  2011    Bilateral sphenoidotomy with removal of polypoid tissue.     Right frontal sinusotomy.   Bilateral total ethmoidectomy.  Bilateral maxillary antrostomy with removal of polypoid tissue.       ORTHOPEDIC SURGERY       lumbar fusion      Social History     Tobacco Use     Smoking status: Never Smoker     Smokeless tobacco: Never Used   Substance Use Topics     Alcohol use: No     Alcohol/week: 0.0 oz     Comment: very rare      Problem (# of Occurrences) Relation (Name,Age of Onset)    Breast Cancer (2) Mother, Sister    Heart Disease (2) Father (25):  in a fire , Paternal Grandfather    Hyperlipidemia (1) Sister            Current Outpatient Medications   Medication Sig     boric acid 600 mg vaginal suppository - PHARMACY TO MIX COMPOUND Place 600 mg vaginally     cefdinir (OMNICEF) 300 MG capsule Take 1 capsule (300 mg) by mouth 2 times daily for 14 days     methenamine (HIPREX) 1 g tablet Take 1 tablet (1 g) by mouth 2 times daily     oxybutynin ER (DITROPAN-XL) 10 MG 24 hr tablet Take 1 tablet (10 mg) by mouth At Bedtime     albuterol (PROAIR HFA/PROVENTIL HFA/VENTOLIN HFA) 108 (90 BASE) MCG/ACT Inhaler Inhale 2 puffs into the lungs every 6 hours INDICATION: RESCUE INHALER FOR SHORTNESS OF BREATH, CHEST TIGHTNESS AND COUGH     alpha-lipoic acid 600 MG CAPS Take by mouth 3 times daily     ALPRAZolam (XANAX) 0.25 MG tablet TAKE ONE TABLET BY MOUTH ONCE DAILY AT  NIGHT  AS  NEEDED  FOR  SEVERE  ANXIETY  ONLY     ascorbic acid (VITAMIN C) 1000 MG TABS Take 1 tablet (1,000 mg) by mouth daily VITAMIN C REPLACEMENT (Patient not taking: Reported on 10/22/2018)     aspirin EC 81 MG EC tablet Take 1 tablet (81 mg) by mouth daily     BETA BLOCKER NOT PRESCRIBED, INTENTIONAL, Beta Blocker not prescribed intentionally due to Intolerance     butalbital-acetaminophen-caffeine (FIORICET/ESGIC) -40 MG per tablet TAKE ONE TABLET BY MOUTH EVERY 8 HOURS AS NEEDED FOR  SEVERE  MIGRAINE  HEADACHE  ONLY     candesartan (ATACAND) 16 MG tablet Take 1 tablet (16 mg) by mouth 2 times daily     CEFDINIR PO      cloNIDine (CATAPRES) 0.1 MG tablet Take 2 tablets (0.2 mg) by mouth 3 times daily (with meals) INDICATION: TO LOWER BLOOD PRESSURE  (Patient taking differently: Take 0.1 mg by mouth 3 times daily (with meals) INDICATION: TO LOWER BLOOD PRESSURE)     conjugated estrogens (PREMARIN) cream Place 0.5 g vaginally twice a week (Patient not taking: Reported on 10/22/2018)     esomeprazole (NEXIUM) 40 MG capsule Take 1 capsule (40 mg) by mouth every morning (before breakfast) TAKE  30'-60' BEFORE 1ST MEAL     furosemide (LASIX) 20 MG tablet Take 1 tablet (20 mg) by mouth daily (Patient taking differently: Take 20 mg by mouth daily as needed )     glimepiride (AMARYL) 2 MG tablet TAKE ONE TABLET (2 MG) BY MOUTH IN THE MORNING BEFORE BREAKFAST TO TREAT DIABETES (Patient taking differently: bid)     HERBALS alphalithiod - 600 mg twice daily     LACTOBACILLUS EXTRA STRENGTH CAPS Take 1 capsule by mouth daily INDICATION: TO RESTORE GOOD INTESTINAL BACTERIA     melatonin 5 MG CAPS Take by mouth At Bedtime     nitroglycerin (NITROSTAT) 0.4 MG SL tablet Place 1 tablet (0.4 mg) under the tongue every 5 minutes as needed for chest pain     pravastatin (PRAVACHOL) 80 MG tablet Take 0.5 tablets (40 mg) by mouth every evening     RaNITidine HCl (ZANTAC PO)      triamterene-hydrochlorothiazide (MAXZIDE-25) 37.5-25 MG per tablet Take 1 tablet by mouth every morning INDICATION:TO LOWER BLOOD PRESSURE AND CONTROL EDEMA     vitamin D (ERGOCALCIFEROL) 25936 UNIT capsule Take 50,000 Units by mouth 1st, 10th, 20th of the month     No current facility-administered medications for this visit.      Allergies   Allergen Reactions     Codeine Difficulty breathing     Chest gets tight & difficulty breathing      Atenolol Other (See Comments)     Sob w/ exertion - asthma symptoms     Diltiazem Other (See Comments)     edema - fluid retention in legs       Labetalol Other (See Comments) and Itching     Sob w/ exertion - asthma symptoms  itching     Lipitor [Atorvastatin Calcium] Other (See Comments)     Muscle weakness     Nifedipine Other (See Comments)     Sob w/ exertion -  "asthma symptoms, HAs       Sulfa Drugs Hives     hives     Zocor [Simvastatin] Other (See Comments)     Muscle weakness     Advair Diskus Other (See Comments)     hoarseness with 500/50, not the 250/50     Aleve [Naproxen] Other (See Comments)     Raises BP     Amlodipine Besylate Fatigue and Diarrhea     Bystolic [Nebivolol Hcl] Other (See Comments)     headaches     Calcium Carbonate Diarrhea     Crestor [Rosuvastatin Calcium] Muscle Pain (Myalgia)     Diovan [Valsartan] GI Disturbance     gas       Hydralazine Other (See Comments) and Nausea     Increases pulse     Hydrochlorothiazide Other (See Comments)     increasing glucose     Imdur [Isosorbide Mononitrate] Other (See Comments)     headaches     Kcl GI Disturbance     gas      Lisinopril Cough     Hoarse voice     Lovastatin Muscle Pain (Myalgia)     Metformin Diarrhea            Metoprolol Other (See Comments)     Sob w/ exertion - asthma symptoms       Niacin Other (See Comments)     hyperglycemia     Omnicef Diarrhea     Pravachol [Hmg-Coa-R Inhibitors] Muscle Pain (Myalgia)     Prilosec Otc [Omeprazole Magnesium] Diarrhea     diarrhea       Zetia [Ezetimibe] Muscle Pain (Myalgia)     weakness       ROS:  12 point review of systems negative other than symptoms noted below.  Gastrointestinal: Diarrhea and Heartburn  Genitourinary: Incontinence and Urgency  Musculoskeletal: Joint Pain  Endocrine: Loss of Hair  Psychiatric: Anxiety    OBJECTIVE:     /64   Ht 1.6 m (5' 3\")   Wt 74.4 kg (164 lb)   BMI 29.05 kg/m    Body mass index is 29.05 kg/m .    Exam:  Constitutional:  Appearance: Well nourished, well developed alert, in no acute distress  Chest:  Respiratory Effort:  Breathing unlabored  Neurologic/Psychiatric:  Mental Status:  Oriented X3   No Pelvic Exam performed     In-Clinic Test Results:  Results for orders placed or performed in visit on 03/05/19 (from the past 24 hour(s))   UA with Microscopic   Result Value Ref Range    Color Urine " Yellow     Appearance Urine Slightly Cloudy     Glucose Urine Negative NEG^Negative mg/dL    Bilirubin Urine Negative NEG^Negative    Ketones Urine 1+ (A) NEG^Negative mg/dL    Specific Gravity Urine 1.025 1.003 - 1.035    pH Urine 5.5 5.0 - 7.0 pH    Protein Albumin Urine Trace (A) NEG^Negative mg/dL    Urobilinogen Urine 0.2 0.2 - 1.0 EU/dL    Nitrite Urine Negative NEG^Negative    Blood Urine Trace (A) NEG^Negative    Leukocyte Esterase Urine 2+ (A) NEG^Negative    Source Midstream Urine     WBC Urine 25-50 (A) OTO5^0 - 5 /HPF    RBC Urine O - 2 OTO2^O - 2 /HPF    Squamous Epithelial /LPF Urine Few FEW^Few /LPF    Bacteria Urine Moderate (A) NEG^Negative /HPF       ASSESSMENT/PLAN:                                                        ICD-10-CM    1. Urinary tract infection with hematuria, site unspecified N39.0 cefdinir (OMNICEF) 300 MG capsule    R31.9    2. Frequent UTI N39.0 UA with Microscopic     methenamine (HIPREX) 1 g tablet     Urine Culture Aerobic Bacterial   3. Overactive bladder N32.81 oxybutynin ER (DITROPAN-XL) 10 MG 24 hr tablet     Initial urinalysis is suspicious for a UTI therefore will initiate treatment with Omnicef and await her final urine culture results.  We discussed prevention of recurrent UTIs. Her susceptibility is due to the normal changes that occurs with menopause. She is not willing to be placed back on vaginal estrogen. We discussed taking oral vitamin C to acidify the urine as well as Hiprex/Methenamine. I recommended rinsing with water after stooling in addition to wiping in the proper direction--which she is already doing.   Her history is suspicious for overactive bladder as well. The plan will be as follows. After treating her UTI she will take oxybutynin XL at night time before bed to limit the risk of fall. She was counseled about the dry mouth side effect with it. She is averse to surgical intervention for overactive bladder.     Lucía Pimentel MD  [facility]  [facility]    30 minutes was spent face to face with the patient today discussing her history, diagnosis, and follow-up plan as noted above. Over 50% of the visit was spent in counseling and coordination of care.    Total Visit Time: 30 minutes.

## 2019-03-05 ENCOUNTER — OFFICE VISIT (OUTPATIENT)
Dept: OBGYN | Facility: CLINIC | Age: 79
End: 2019-03-05
Payer: COMMERCIAL

## 2019-03-05 VITALS
BODY MASS INDEX: 29.06 KG/M2 | DIASTOLIC BLOOD PRESSURE: 64 MMHG | SYSTOLIC BLOOD PRESSURE: 112 MMHG | WEIGHT: 164 LBS | HEIGHT: 63 IN

## 2019-03-05 DIAGNOSIS — N32.81 OVERACTIVE BLADDER: ICD-10-CM

## 2019-03-05 DIAGNOSIS — N39.0 FREQUENT UTI: ICD-10-CM

## 2019-03-05 DIAGNOSIS — N39.0 URINARY TRACT INFECTION WITH HEMATURIA, SITE UNSPECIFIED: Primary | ICD-10-CM

## 2019-03-05 DIAGNOSIS — R31.9 URINARY TRACT INFECTION WITH HEMATURIA, SITE UNSPECIFIED: Primary | ICD-10-CM

## 2019-03-05 LAB
ALBUMIN UR-MCNC: ABNORMAL MG/DL
APPEARANCE UR: ABNORMAL
BACTERIA #/AREA URNS HPF: ABNORMAL /HPF
BILIRUB UR QL STRIP: NEGATIVE
COLOR UR AUTO: YELLOW
GLUCOSE UR STRIP-MCNC: NEGATIVE MG/DL
HGB UR QL STRIP: ABNORMAL
KETONES UR STRIP-MCNC: ABNORMAL MG/DL
LEUKOCYTE ESTERASE UR QL STRIP: ABNORMAL
NITRATE UR QL: NEGATIVE
NON-SQ EPI CELLS #/AREA URNS LPF: ABNORMAL /LPF
PH UR STRIP: 5.5 PH (ref 5–7)
RBC #/AREA URNS AUTO: ABNORMAL /HPF
SOURCE: ABNORMAL
SP GR UR STRIP: 1.02 (ref 1–1.03)
UROBILINOGEN UR STRIP-ACNC: 0.2 EU/DL (ref 0.2–1)
WBC #/AREA URNS AUTO: ABNORMAL /HPF

## 2019-03-05 PROCEDURE — 81001 URINALYSIS AUTO W/SCOPE: CPT | Performed by: OBSTETRICS & GYNECOLOGY

## 2019-03-05 PROCEDURE — 87186 SC STD MICRODIL/AGAR DIL: CPT | Performed by: OBSTETRICS & GYNECOLOGY

## 2019-03-05 PROCEDURE — 99203 OFFICE O/P NEW LOW 30 MIN: CPT | Performed by: OBSTETRICS & GYNECOLOGY

## 2019-03-05 PROCEDURE — 87088 URINE BACTERIA CULTURE: CPT | Performed by: OBSTETRICS & GYNECOLOGY

## 2019-03-05 PROCEDURE — 87086 URINE CULTURE/COLONY COUNT: CPT | Performed by: OBSTETRICS & GYNECOLOGY

## 2019-03-05 RX ORDER — CEFDINIR 300 MG/1
300 CAPSULE ORAL 2 TIMES DAILY
Qty: 28 CAPSULE | Refills: 0 | Status: SHIPPED | OUTPATIENT
Start: 2019-03-05 | End: 2019-04-04

## 2019-03-05 RX ORDER — OXYBUTYNIN CHLORIDE 10 MG/1
10 TABLET, EXTENDED RELEASE ORAL AT BEDTIME
Qty: 60 TABLET | Refills: 3 | Status: SHIPPED | OUTPATIENT
Start: 2019-03-05 | End: 2023-07-06

## 2019-03-05 RX ORDER — METHENAMINE HIPPURATE 1000 MG/1
1 TABLET ORAL 2 TIMES DAILY
Qty: 120 TABLET | Refills: 3 | Status: SHIPPED | OUTPATIENT
Start: 2019-03-05 | End: 2022-09-29

## 2019-03-05 ASSESSMENT — PATIENT HEALTH QUESTIONNAIRE - PHQ9
5. POOR APPETITE OR OVEREATING: NOT AT ALL
SUM OF ALL RESPONSES TO PHQ QUESTIONS 1-9: 0

## 2019-03-05 ASSESSMENT — ANXIETY QUESTIONNAIRES
7. FEELING AFRAID AS IF SOMETHING AWFUL MIGHT HAPPEN: NOT AT ALL
2. NOT BEING ABLE TO STOP OR CONTROL WORRYING: NOT AT ALL
5. BEING SO RESTLESS THAT IT IS HARD TO SIT STILL: NOT AT ALL
1. FEELING NERVOUS, ANXIOUS, OR ON EDGE: NOT AT ALL
IF YOU CHECKED OFF ANY PROBLEMS ON THIS QUESTIONNAIRE, HOW DIFFICULT HAVE THESE PROBLEMS MADE IT FOR YOU TO DO YOUR WORK, TAKE CARE OF THINGS AT HOME, OR GET ALONG WITH OTHER PEOPLE: NOT DIFFICULT AT ALL
3. WORRYING TOO MUCH ABOUT DIFFERENT THINGS: NOT AT ALL
6. BECOMING EASILY ANNOYED OR IRRITABLE: NOT AT ALL
GAD7 TOTAL SCORE: 0

## 2019-03-05 ASSESSMENT — MIFFLIN-ST. JEOR: SCORE: 1193.03

## 2019-03-05 NOTE — LETTER
3/5/2019         RE: Ginger Abreu  [address]  [facility] [id number]        Dear Colleague,    Thank you for referring your patient, Ginger Abreu, to the [facility] [place]. Please see a copy of my visit note below.    I was asked to see Ginger by Dr. Fonseca for recurrent urinary tract infections.    SUBJECTIVE:                                                   Ginger Abreu is a 78 year old female who presents to clinic today for the following health issue(s):  Patient presents with:  Consult: referred by primary care provider for frequent UTI's        HPI:  Ginger presents with recurrent UTIs. Per her chart and from her most of the time it has been E coli with Klebsiella as well. She usually worsening of her frequent urination, burning with urination. She has tried cephalexin for suppression but was on it before her symptoms started. She has been seen by a Urologist and has also been seen by an Infectious Disease specialist as well. She had a CT Urogram that was negative for stones.    In addition to the recurrent UTIs, she has increased urination with polyuria and urge urinary incontinence. She wakes up almost every hour to void overnight. She has to know where the restroom is every where she goes. She has to wear a pantiliner because she will have leakage in her underwear if she doesn't. She denies loss of urine with coughing or sneezing. She tried Mirabegron in the past but it caused joint pain.    No LMP recorded. Patient has had a hysterectomy..   Patient is not sexually active   reports that  has never smoked. she has never used smokeless tobacco.  STD testing offered?  Declined - not sexually active  Health maintenance updated:  yes    Today's PHQ-2 Score:   PHQ-2 ( 1999 [employer]) 12/21/2017   Q1: Little interest or pleasure in doing things 0   Q2: Feeling down, depressed or hopeless 0   PHQ-2 Score 0   Q1: Little interest or pleasure in doing things -   Q2:  Feeling down, depressed or hopeless -   PHQ-2 Score -     Today's PHQ-9 Score:   PHQ-9 SCORE 3/5/2019   PHQ-9 Total Score 0     Today's OBEY-7 Score:   OBEY-7 SCORE 3/5/2019   Total Score -   Total Score 0       Problem list and histories reviewed & adjusted, as indicated.  Additional history: as documented.    Patient Active Problem List   Diagnosis     Essential hypertension     Mitral valve disorder     Allergic rhinitis     Generalized osteoarthrosis, unspecified site     Spinal stenosis, lumbar region, without neurogenic claudication     Thyroid nodule     Anti-cardiolipin antibody positive     Hot flashes     Immunoglobulin A deficiency (H)     Immunoglobulin G subclass deficiency (H)     Ascorbic acid deficiency     Low iron stores     Advanced care planning/counseling discussion     Anxiety     Personal history of venous thrombosis and embolism     Hyperlipidemia with target LDL less than 130     Sleep disorder     BCC (basal cell carcinoma), face     Peripheral neuropathy     Hyperlipidemia LDL goal <100     Gastroesophageal reflux disease without esophagitis     Mild persistent asthma, uncomplicated     Chronic pain syndrome     Squamous cell cancer of skin of left hand     Migraine with aura and without status migrainosus, not intractable     Type 2 diabetes mellitus without complication, without long-term current use of insulin (H)     Chronic kidney disease, stage III (moderate) (H)     Osteopenia, unspecified location     Neuropathy     Past Surgical History:   Procedure Laterality Date     C NONSPECIFIC PROCEDURE  approx 1970's    hyst/bso     C NONSPECIFIC PROCEDURE  approx 1970's    gb     C NONSPECIFIC PROCEDURE  approx 1980's    cystocele repair     C NONSPECIFIC PROCEDURE  approx 1980's    endoscopic sinus      C NONSPECIFIC PROCEDURE      epidurals x 7     C NONSPECIFIC PROCEDURE      nl colonoscopy 1/2006     C NONSPECIFIC PROCEDURE  2011    L2-L3 foramenotomies     C NONSPECIFIC PROCEDURE  2012     lumbar fusion     NONSPECIFIC PROCEDURE      Bilateral sphenoidotomy with removal of polypoid tissue.     Right frontal sinusotomy.   Bilateral total ethmoidectomy.  Bilateral maxillary antrostomy with removal of polypoid tissue.       ORTHOPEDIC SURGERY      lumbar fusion      Social History     Tobacco Use     Smoking status: Never Smoker     Smokeless tobacco: Never Used   Substance Use Topics     Alcohol use: No     Alcohol/week: 0.0 oz     Comment: very rare      Problem (# of Occurrences) Relation (Name,Age of Onset)    Breast Cancer (2) Mother, Sister    Heart Disease (2) Father (25):  in a fire , Paternal Grandfather    Hyperlipidemia (1) Sister            Current Outpatient Medications   Medication Sig     boric acid 600 mg vaginal suppository - PHARMACY TO MIX COMPOUND Place 600 mg vaginally     cefdinir (OMNICEF) 300 MG capsule Take 1 capsule (300 mg) by mouth 2 times daily for 14 days     methenamine (HIPREX) 1 g tablet Take 1 tablet (1 g) by mouth 2 times daily     oxybutynin ER (DITROPAN-XL) 10 MG 24 hr tablet Take 1 tablet (10 mg) by mouth At Bedtime     albuterol (PROAIR HFA/PROVENTIL HFA/VENTOLIN HFA) 108 (90 BASE) MCG/ACT Inhaler Inhale 2 puffs into the lungs every 6 hours INDICATION: RESCUE INHALER FOR SHORTNESS OF BREATH, CHEST TIGHTNESS AND COUGH     alpha-lipoic acid 600 MG CAPS Take by mouth 3 times daily     ALPRAZolam (XANAX) 0.25 MG tablet TAKE ONE TABLET BY MOUTH ONCE DAILY AT  NIGHT  AS  NEEDED  FOR  SEVERE  ANXIETY  ONLY     ascorbic acid (VITAMIN C) 1000 MG TABS Take 1 tablet (1,000 mg) by mouth daily VITAMIN C REPLACEMENT (Patient not taking: Reported on 10/22/2018)     aspirin EC 81 MG EC tablet Take 1 tablet (81 mg) by mouth daily     BETA BLOCKER NOT PRESCRIBED, INTENTIONAL, Beta Blocker not prescribed intentionally due to Intolerance     butalbital-acetaminophen-caffeine (FIORICET/ESGIC) -40 MG per tablet TAKE ONE TABLET BY MOUTH EVERY 8 HOURS AS NEEDED FOR  SEVERE   MIGRAINE  HEADACHE  ONLY     candesartan (ATACAND) 16 MG tablet Take 1 tablet (16 mg) by mouth 2 times daily     CEFDINIR PO      cloNIDine (CATAPRES) 0.1 MG tablet Take 2 tablets (0.2 mg) by mouth 3 times daily (with meals) INDICATION: TO LOWER BLOOD PRESSURE (Patient taking differently: Take 0.1 mg by mouth 3 times daily (with meals) INDICATION: TO LOWER BLOOD PRESSURE)     conjugated estrogens (PREMARIN) cream Place 0.5 g vaginally twice a week (Patient not taking: Reported on 10/22/2018)     esomeprazole (NEXIUM) 40 MG capsule Take 1 capsule (40 mg) by mouth every morning (before breakfast) TAKE  30'-60' BEFORE 1ST MEAL     furosemide (LASIX) 20 MG tablet Take 1 tablet (20 mg) by mouth daily (Patient taking differently: Take 20 mg by mouth daily as needed )     glimepiride (AMARYL) 2 MG tablet TAKE ONE TABLET (2 MG) BY MOUTH IN THE MORNING BEFORE BREAKFAST TO TREAT DIABETES (Patient taking differently: bid)     HERBALS alphalithiod - 600 mg twice daily     LACTOBACILLUS EXTRA STRENGTH CAPS Take 1 capsule by mouth daily INDICATION: TO RESTORE GOOD INTESTINAL BACTERIA     melatonin 5 MG CAPS Take by mouth At Bedtime     nitroglycerin (NITROSTAT) 0.4 MG SL tablet Place 1 tablet (0.4 mg) under the tongue every 5 minutes as needed for chest pain     pravastatin (PRAVACHOL) 80 MG tablet Take 0.5 tablets (40 mg) by mouth every evening     RaNITidine HCl (ZANTAC PO)      triamterene-hydrochlorothiazide (MAXZIDE-25) 37.5-25 MG per tablet Take 1 tablet by mouth every morning INDICATION:TO LOWER BLOOD PRESSURE AND CONTROL EDEMA     vitamin D (ERGOCALCIFEROL) 87654 UNIT capsule Take 50,000 Units by mouth 1st, 10th, 20th of the month     No current facility-administered medications for this visit.      Allergies   Allergen Reactions     Codeine Difficulty breathing     Chest gets tight & difficulty breathing      Atenolol Other (See Comments)     Sob w/ exertion - asthma symptoms     Diltiazem Other (See Comments)     edema  "- fluid retention in legs       Labetalol Other (See Comments) and Itching     Sob w/ exertion - asthma symptoms  itching     Lipitor [Atorvastatin Calcium] Other (See Comments)     Muscle weakness     Nifedipine Other (See Comments)     Sob w/ exertion - asthma symptoms, HAs       Sulfa Drugs Hives     hives     Zocor [Simvastatin] Other (See Comments)     Muscle weakness     Advair Diskus Other (See Comments)     hoarseness with 500/50, not the 250/50     Aleve [Naproxen] Other (See Comments)     Raises BP     Amlodipine Besylate Fatigue and Diarrhea     Bystolic [Nebivolol Hcl] Other (See Comments)     headaches     Calcium Carbonate Diarrhea     Crestor [Rosuvastatin Calcium] Muscle Pain (Myalgia)     Diovan [Valsartan] GI Disturbance     gas       Hydralazine Other (See Comments) and Nausea     Increases pulse     Hydrochlorothiazide Other (See Comments)     increasing glucose     Imdur [Isosorbide Mononitrate] Other (See Comments)     headaches     Kcl GI Disturbance     gas      Lisinopril Cough     Hoarse voice     Lovastatin Muscle Pain (Myalgia)     Metformin Diarrhea            Metoprolol Other (See Comments)     Sob w/ exertion - asthma symptoms       Niacin Other (See Comments)     hyperglycemia     Omnicef Diarrhea     Pravachol [Hmg-Coa-R Inhibitors] Muscle Pain (Myalgia)     Prilosec Otc [Omeprazole Magnesium] Diarrhea     diarrhea       Zetia [Ezetimibe] Muscle Pain (Myalgia)     weakness       ROS:  12 point review of systems negative other than symptoms noted below.  Gastrointestinal: Diarrhea and Heartburn  Genitourinary: Incontinence and Urgency  Musculoskeletal: Joint Pain  Endocrine: Loss of Hair  Psychiatric: Anxiety    OBJECTIVE:     /64   Ht 1.6 m (5' 3\")   Wt 74.4 kg (164 lb)   BMI 29.05 kg/m     Body mass index is 29.05 kg/m .    Exam:  Constitutional:  Appearance: Well nourished, well developed alert, in no acute distress  Chest:  Respiratory Effort:  Breathing " unlabored  Neurologic/Psychiatric:  Mental Status:  Oriented X3   No Pelvic Exam performed     In-Clinic Test Results:  Results for orders placed or performed in visit on 03/05/19 (from the past 24 hour(s))   UA with Microscopic   Result Value Ref Range    Color Urine Yellow     Appearance Urine Slightly Cloudy     Glucose Urine Negative NEG^Negative mg/dL    Bilirubin Urine Negative NEG^Negative    Ketones Urine 1+ (A) NEG^Negative mg/dL    Specific Gravity Urine 1.025 1.003 - 1.035    pH Urine 5.5 5.0 - 7.0 pH    Protein Albumin Urine Trace (A) NEG^Negative mg/dL    Urobilinogen Urine 0.2 0.2 - 1.0 EU/dL    Nitrite Urine Negative NEG^Negative    Blood Urine Trace (A) NEG^Negative    Leukocyte Esterase Urine 2+ (A) NEG^Negative    Source Midstream Urine     WBC Urine 25-50 (A) OTO5^0 - 5 /HPF    RBC Urine O - 2 OTO2^O - 2 /HPF    Squamous Epithelial /LPF Urine Few FEW^Few /LPF    Bacteria Urine Moderate (A) NEG^Negative /HPF       ASSESSMENT/PLAN:                                                        ICD-10-CM    1. Urinary tract infection with hematuria, site unspecified N39.0 cefdinir (OMNICEF) 300 MG capsule    R31.9    2. Frequent UTI N39.0 UA with Microscopic     methenamine (HIPREX) 1 g tablet     Urine Culture Aerobic Bacterial   3. Overactive bladder N32.81 oxybutynin ER (DITROPAN-XL) 10 MG 24 hr tablet     Initial urinalysis is suspicious for a UTI therefore will initiate treatment with Omnicef and await her final urine culture results.  We discussed prevention of recurrent UTIs. Her susceptibility is due to the normal changes that occurs with menopause. She is not willing to be placed back on vaginal estrogen. We discussed taking oral vitamin C to acidify the urine as well as Hiprex/Methenamine. I recommended rinsing with water after stooling in addition to wiping in the proper direction--which she is already doing.   Her history is suspicious for overactive bladder as well. The plan will be as follows.  After treating her UTI she will take oxybutynin XL at night time before bed to limit the risk of fall. She was counseled about the dry mouth side effect with it. She is averse to surgical intervention for overactive bladder.     Lucía Pimentel MD  [facility]    30 minutes was spent face to face with the patient today discussing her history, diagnosis, and follow-up plan as noted above. Over 50% of the visit was spent in counseling and coordination of care.    Total Visit Time: 30 minutes.       Again, thank you for allowing me to participate in the care of your patient.        Sincerely,        Lucía Pimentel MD

## 2019-03-06 LAB
BACTERIA SPEC CULT: ABNORMAL
SPECIMEN SOURCE: ABNORMAL

## 2019-03-06 ASSESSMENT — ANXIETY QUESTIONNAIRES: GAD7 TOTAL SCORE: 0

## 2019-04-04 ENCOUNTER — OFFICE VISIT (OUTPATIENT)
Dept: OBGYN | Facility: CLINIC | Age: 79
End: 2019-04-04
Payer: COMMERCIAL

## 2019-04-04 VITALS
BODY MASS INDEX: 29.05 KG/M2 | WEIGHT: 164 LBS | SYSTOLIC BLOOD PRESSURE: 120 MMHG | HEART RATE: 56 BPM | DIASTOLIC BLOOD PRESSURE: 54 MMHG

## 2019-04-04 DIAGNOSIS — R35.0 URINARY FREQUENCY: Primary | ICD-10-CM

## 2019-04-04 LAB
ALBUMIN UR-MCNC: NEGATIVE MG/DL
APPEARANCE UR: ABNORMAL
BACTERIA #/AREA URNS HPF: ABNORMAL /HPF
BILIRUB UR QL STRIP: NEGATIVE
COLOR UR AUTO: YELLOW
GLUCOSE UR STRIP-MCNC: NEGATIVE MG/DL
HGB UR QL STRIP: ABNORMAL
KETONES UR STRIP-MCNC: ABNORMAL MG/DL
LEUKOCYTE ESTERASE UR QL STRIP: ABNORMAL
NITRATE UR QL: NEGATIVE
NON-SQ EPI CELLS #/AREA URNS LPF: ABNORMAL /LPF
PH UR STRIP: 5.5 PH (ref 5–7)
RBC #/AREA URNS AUTO: ABNORMAL /HPF
SOURCE: ABNORMAL
SP GR UR STRIP: 1.02 (ref 1–1.03)
UROBILINOGEN UR STRIP-ACNC: 0.2 EU/DL (ref 0.2–1)
WBC #/AREA URNS AUTO: >100 /HPF

## 2019-04-04 PROCEDURE — 87186 SC STD MICRODIL/AGAR DIL: CPT | Performed by: OBSTETRICS & GYNECOLOGY

## 2019-04-04 PROCEDURE — 99213 OFFICE O/P EST LOW 20 MIN: CPT | Performed by: OBSTETRICS & GYNECOLOGY

## 2019-04-04 PROCEDURE — 81001 URINALYSIS AUTO W/SCOPE: CPT | Performed by: OBSTETRICS & GYNECOLOGY

## 2019-04-04 PROCEDURE — 87086 URINE CULTURE/COLONY COUNT: CPT | Performed by: OBSTETRICS & GYNECOLOGY

## 2019-04-04 PROCEDURE — 87088 URINE BACTERIA CULTURE: CPT | Performed by: OBSTETRICS & GYNECOLOGY

## 2019-04-04 RX ORDER — CEFDINIR 300 MG/1
300 CAPSULE ORAL 2 TIMES DAILY
Qty: 28 CAPSULE | Refills: 0 | Status: SHIPPED | OUTPATIENT
Start: 2019-04-04 | End: 2019-05-02

## 2019-04-04 NOTE — PROGRESS NOTES
SUBJECTIVE:                                                   Ginger Abreu is a 78 year old female who presents to clinic today for the following health issue(s):  Patient presents with:  Follow Up: on the medications patient was given at last visit and follow up on UTI      HPI:  Ginger presents for follow up. She states that she felt better after she completed her last dose of antibiotics. She has noted no more urinary leakage since starting the Oxybutynin but she still wakes up to void several times a night. She thinks she may have a new urinary tract infection because her urgency and frequency appears to be worsening over the last few days. She is tolerating the Oxybutynin.    No LMP recorded. Patient has had a hysterectomy..   Patient is sexually active, .  Using hysterectomy for contraception.    reports that  has never smoked. she has never used smokeless tobacco.    STD testing offered?  Declined    Health maintenance updated:  yes    Today's PHQ-2 Score:   PHQ-2 (  [employer]) 2017   Q1: Little interest or pleasure in doing things 0   Q2: Feeling down, depressed or hopeless 0   PHQ-2 Score 0   Q1: Little interest or pleasure in doing things -   Q2: Feeling down, depressed or hopeless -   PHQ-2 Score -     Today's PHQ-9 Score:   PHQ-9 SCORE 3/5/2019   PHQ-9 Total Score 0     Today's OBEY-7 Score:   OBEY-7 SCORE 3/5/2019   Total Score -   Total Score 0       Problem list and histories reviewed & adjusted, as indicated.  Additional history: as documented.    Patient Active Problem List   Diagnosis     Essential hypertension     Mitral valve disorder     Allergic rhinitis     Generalized osteoarthrosis, unspecified site     Spinal stenosis, lumbar region, without neurogenic claudication     Thyroid nodule     Anti-cardiolipin antibody positive     Hot flashes     Immunoglobulin A deficiency (H)     Immunoglobulin G subclass deficiency (H)     Ascorbic acid deficiency     Low iron stores      Advanced care planning/counseling discussion     Anxiety     Personal history of venous thrombosis and embolism     Hyperlipidemia with target LDL less than 130     Sleep disorder     BCC (basal cell carcinoma), face     Peripheral neuropathy     Hyperlipidemia LDL goal <100     Gastroesophageal reflux disease without esophagitis     Mild persistent asthma, uncomplicated     Chronic pain syndrome     Squamous cell cancer of skin of left hand     Migraine with aura and without status migrainosus, not intractable     Type 2 diabetes mellitus without complication, without long-term current use of insulin (H)     Chronic kidney disease, stage III (moderate) (H)     Osteopenia, unspecified location     Neuropathy     Past Surgical History:   Procedure Laterality Date     C NONSPECIFIC PROCEDURE  approx     hyst/bso     C NONSPECIFIC PROCEDURE  approx     gb     C NONSPECIFIC PROCEDURE  approx     cystocele repair     C NONSPECIFIC PROCEDURE  approx     endoscopic sinus      C NONSPECIFIC PROCEDURE      epidurals x 7     C NONSPECIFIC PROCEDURE      nl colonoscopy 2006     C NONSPECIFIC PROCEDURE      L2-L3 foramenotomies     C NONSPECIFIC PROCEDURE  2012    lumbar fusion     NONSPECIFIC PROCEDURE      Bilateral sphenoidotomy with removal of polypoid tissue.     Right frontal sinusotomy.   Bilateral total ethmoidectomy.  Bilateral maxillary antrostomy with removal of polypoid tissue.       ORTHOPEDIC SURGERY      lumbar fusion      Social History     Tobacco Use     Smoking status: Never Smoker     Smokeless tobacco: Never Used   Substance Use Topics     Alcohol use: No     Alcohol/week: 0.0 oz     Comment: very rare      Problem (# of Occurrences) Relation (Name,Age of Onset)    Breast Cancer (2) Mother, Sister    Heart Disease (2) Father (25):  in a fire , Paternal Grandfather    Hyperlipidemia (1) Sister            Current Outpatient Medications   Medication Sig     albuterol  (PROAIR HFA/PROVENTIL HFA/VENTOLIN HFA) 108 (90 BASE) MCG/ACT Inhaler Inhale 2 puffs into the lungs every 6 hours INDICATION: RESCUE INHALER FOR SHORTNESS OF BREATH, CHEST TIGHTNESS AND COUGH     alpha-lipoic acid 600 MG CAPS Take by mouth 3 times daily     ALPRAZolam (XANAX) 0.25 MG tablet TAKE ONE TABLET BY MOUTH ONCE DAILY AT  NIGHT  AS  NEEDED  FOR  SEVERE  ANXIETY  ONLY     ascorbic acid (VITAMIN C) 1000 MG TABS Take 1 tablet (1,000 mg) by mouth daily VITAMIN C REPLACEMENT     aspirin EC 81 MG EC tablet Take 1 tablet (81 mg) by mouth daily     BETA BLOCKER NOT PRESCRIBED, INTENTIONAL, Beta Blocker not prescribed intentionally due to Intolerance     boric acid 600 mg vaginal suppository - PHARMACY TO MIX COMPOUND Place 600 mg vaginally     butalbital-acetaminophen-caffeine (FIORICET/ESGIC) -40 MG per tablet TAKE ONE TABLET BY MOUTH EVERY 8 HOURS AS NEEDED FOR  SEVERE  MIGRAINE  HEADACHE  ONLY     candesartan (ATACAND) 16 MG tablet Take 1 tablet (16 mg) by mouth 2 times daily     cloNIDine (CATAPRES) 0.1 MG tablet Take 2 tablets (0.2 mg) by mouth 3 times daily (with meals) INDICATION: TO LOWER BLOOD PRESSURE (Patient taking differently: Take 0.1 mg by mouth 3 times daily (with meals) INDICATION: TO LOWER BLOOD PRESSURE)     esomeprazole (NEXIUM) 40 MG capsule Take 1 capsule (40 mg) by mouth every morning (before breakfast) TAKE  30'-60' BEFORE 1ST MEAL     furosemide (LASIX) 20 MG tablet Take 1 tablet (20 mg) by mouth daily (Patient taking differently: Take 20 mg by mouth daily as needed )     glimepiride (AMARYL) 2 MG tablet TAKE ONE TABLET (2 MG) BY MOUTH IN THE MORNING BEFORE BREAKFAST TO TREAT DIABETES (Patient taking differently: bid)     HERBALS alphalithiod - 600 mg twice daily     LACTOBACILLUS EXTRA STRENGTH CAPS Take 1 capsule by mouth daily INDICATION: TO RESTORE GOOD INTESTINAL BACTERIA     melatonin 5 MG CAPS Take by mouth At Bedtime     methenamine (HIPREX) 1 g tablet Take 1 tablet (1 g) by  mouth 2 times daily     nitroglycerin (NITROSTAT) 0.4 MG SL tablet Place 1 tablet (0.4 mg) under the tongue every 5 minutes as needed for chest pain     oxybutynin ER (DITROPAN-XL) 10 MG 24 hr tablet Take 1 tablet (10 mg) by mouth At Bedtime     pravastatin (PRAVACHOL) 80 MG tablet Take 0.5 tablets (40 mg) by mouth every evening     RaNITidine HCl (ZANTAC PO)      triamterene-hydrochlorothiazide (MAXZIDE-25) 37.5-25 MG per tablet Take 1 tablet by mouth every morning INDICATION:TO LOWER BLOOD PRESSURE AND CONTROL EDEMA     vitamin D (ERGOCALCIFEROL) 62372 UNIT capsule Take 50,000 Units by mouth 1st, 10th, 20th of the month     No current facility-administered medications for this visit.      Allergies   Allergen Reactions     Codeine Difficulty breathing     Chest gets tight & difficulty breathing      Diatrizoate Shortness Of Breath     Other reaction(s): Shortness Of Breath  rapid heart beat  rapid heart beat       Morphine      Other reaction(s): GI Upset  Patient says she will not take because causes GI Upset     Oxycodone-Acetaminophen      Other reaction(s): GI Upset  Patient says she will not take because causes GI Upset     Atenolol Other (See Comments)     Sob w/ exertion - asthma symptoms     Diltiazem Other (See Comments)     edema - fluid retention in legs    Other reaction(s): Edema  Other reaction(s): Edema       Labetalol Other (See Comments) and Itching     Sob w/ exertion - asthma symptoms  itching     Lipitor [Atorvastatin Calcium] Other (See Comments)     Muscle weakness     Nifedipine Other (See Comments)     Sob w/ exertion - asthma symptoms, HAs    Other reaction(s): Other (See Comments)  Sob w/ exertion - asthma symptoms, HAs     Sulfa Drugs Hives     hives     Zocor [Simvastatin] Other (See Comments)     Muscle weakness     Advair Diskus Other (See Comments)     hoarseness with 500/50, not the 250/50     Aleve [Naproxen] Other (See Comments)     Raises BP     Amlodipine      Fatigue  Fatigue        Amlodipine Besylate Fatigue and Diarrhea     Blood-Group Specific Substance Other (See Comments)     Patient has anti-[place]. Blood product orders may be delayed.  Other reaction(s): Other - Describe In Comment Field  Patient has anti-[place]. Blood product orders may be delayed.       Bystolic [Nebivolol Hcl] Other (See Comments)     headaches     Calcium Carbonate Diarrhea     Crestor [Rosuvastatin Calcium] Muscle Pain (Myalgia)     Diovan [Valsartan] GI Disturbance     gas       Ezetimibe Muscle Pain (Myalgia)     Other reaction(s): Muscle Weakness  Other reaction(s): Myalgia       Fluticasone-Salmeterol Other (See Comments)     Hoarseness with 50/50 not the 25/50  hoarseness with 500/50, not the 250/50  Hoarseness with 50/50 not the 25/50       Hydralazine Other (See Comments) and Nausea     Increases pulse     Hydrochlorothiazide Other (See Comments)     increasing glucose     Imdur [Isosorbide Mononitrate] Other (See Comments)     headaches     Kcl GI Disturbance     gas      Lisinopril Cough     Hoarse voice     Lovastatin Muscle Pain (Myalgia)     Metformin Diarrhea            Metoprolol Other (See Comments)     Sob w/ exertion - asthma symptoms       Niacin Other (See Comments)     hyperglycemia     No Clinical Screening - See Comments      PN: LW CM1: >>> NO CONTRAST ADVERSE REACTION <<<     Reaction :  KCL---gas sx     Omnicef Diarrhea     Pravachol [Hmg-Coa-R Inhibitors] Muscle Pain (Myalgia)     Prilosec Otc [Omeprazole Magnesium] Diarrhea     diarrhea       Zetia [Ezetimibe] Muscle Pain (Myalgia)     weakness       ROS:  12 point review of systems negative other than symptoms noted below.  Head: Nasal Congestion  Gastrointestinal: Heartburn  Endocrine: Loss of Hair    OBJECTIVE:     /54 (BP Location: Right arm, Patient Position: Sitting, Cuff Size: Adult Regular)   Pulse 56   Wt 74.4 kg (164 lb)   Breastfeeding? No   BMI 29.05 kg/m    Body mass index is 29.05 kg/m .    Exam:  Constitutional:   Appearance: Well nourished, well developed alert, in no acute distress  Neurologic/Psychiatric:  Mental Status:  Oriented X3      In-Clinic Test Results:  Results for orders placed or performed in visit on 04/04/19 (from the past 24 hour(s))   UA with Microscopic   Result Value Ref Range    Color Urine Yellow     Appearance Urine Cloudy     Glucose Urine Negative NEG^Negative mg/dL    Bilirubin Urine Negative NEG^Negative    Ketones Urine Trace (A) NEG^Negative mg/dL    Specific Gravity Urine 1.020 1.003 - 1.035    pH Urine 5.5 5.0 - 7.0 pH    Protein Albumin Urine Negative NEG^Negative mg/dL    Urobilinogen Urine 0.2 0.2 - 1.0 EU/dL    Nitrite Urine Negative NEG^Negative    Blood Urine Trace (A) NEG^Negative    Leukocyte Esterase Urine 3+ (A) NEG^Negative    Source Midstream Urine     WBC Urine >100 (A) OTO5^0 - 5 /HPF    RBC Urine O - 2 OTO2^O - 2 /HPF    Squamous Epithelial /LPF Urine Few FEW^Few /LPF    Bacteria Urine Moderate (A) NEG^Negative /HPF       ASSESSMENT/PLAN:                                                        ICD-10-CM    1. Urinary frequency R35.0 UA with Microscopic     Urine Culture Aerobic Bacterial     Some improvement in her symptoms after the antibiotics and the oxybutynin. We will rule out a new UTI today. If her UTI is present we will treat and assess for improvement. She has large voids when she urinates overnight. We talked about holding one of her diuretics as these may be aggravating the issue. She has stopped all bladder irritants.     Lucía Pimentel MD  [facility]

## 2019-04-06 LAB
BACTERIA SPEC CULT: ABNORMAL
SPECIMEN SOURCE: ABNORMAL

## 2019-04-30 NOTE — PROGRESS NOTES
SUBJECTIVE:                                                   Ginger Abreu is a 78 year old female who presents to clinic today for the following health issue(s):  Patient presents with:  Follow Up: UTI      HPI:  Ginger is feeling better since her last visit. She has less frequency. She is still on her diuretic. She states that she has alternating days of burning with urination that does not feel like a UTI. She only wakes up twice a day. She does report more difficult to control blood sugars as well as a feeling like something is stuck in her throat. She does not want to stop the Hiprex or Oxybutynin because of her improvement in her symptoms.    No LMP recorded. Patient has had a hysterectomy..   Patient is sexually active, .  Using hysterectomy for contraception.    reports that she has never smoked. She has never used smokeless tobacco.    STD testing offered?  Declined    Health maintenance updated:  No - mammogram due  Today's PHQ-2 Score:   PHQ-2 (  [employer]) 2017   Q1: Little interest or pleasure in doing things 0   Q2: Feeling down, depressed or hopeless 0   PHQ-2 Score 0   Q1: Little interest or pleasure in doing things -   Q2: Feeling down, depressed or hopeless -   PHQ-2 Score -     Today's PHQ-9 Score:   PHQ-9 SCORE 3/5/2019   PHQ-9 Total Score 0     Today's OBEY-7 Score:   OBEY-7 SCORE 3/5/2019   Total Score -   Total Score 0       Problem list and histories reviewed & adjusted, as indicated.  Additional history: as documented.    Patient Active Problem List   Diagnosis     Essential hypertension     Mitral valve disorder     Allergic rhinitis     Generalized osteoarthrosis, unspecified site     Spinal stenosis, lumbar region, without neurogenic claudication     Thyroid nodule     Anti-cardiolipin antibody positive     Hot flashes     Immunoglobulin A deficiency (H)     Immunoglobulin G subclass deficiency (H)     Ascorbic acid deficiency     Low iron stores     Advanced care  planning/counseling discussion     Anxiety     Personal history of venous thrombosis and embolism     Hyperlipidemia with target LDL less than 130     Sleep disorder     BCC (basal cell carcinoma), face     Peripheral neuropathy     Hyperlipidemia LDL goal <100     Gastroesophageal reflux disease without esophagitis     Mild persistent asthma, uncomplicated     Chronic pain syndrome     Squamous cell cancer of skin of left hand     Migraine with aura and without status migrainosus, not intractable     Type 2 diabetes mellitus without complication, without long-term current use of insulin (H)     Chronic kidney disease, stage III (moderate) (H)     Osteopenia, unspecified location     Neuropathy     Past Surgical History:   Procedure Laterality Date     C NONSPECIFIC PROCEDURE  approx     hyst/bso     C NONSPECIFIC PROCEDURE  approx     gb     C NONSPECIFIC PROCEDURE  approx     cystocele repair     C NONSPECIFIC PROCEDURE  approx     endoscopic sinus      C NONSPECIFIC PROCEDURE      epidurals x 7     C NONSPECIFIC PROCEDURE      nl colonoscopy 2006     C NONSPECIFIC PROCEDURE      L2-L3 foramenotomies     C NONSPECIFIC PROCEDURE  2012    lumbar fusion     NONSPECIFIC PROCEDURE      Bilateral sphenoidotomy with removal of polypoid tissue.     Right frontal sinusotomy.   Bilateral total ethmoidectomy.  Bilateral maxillary antrostomy with removal of polypoid tissue.       ORTHOPEDIC SURGERY      lumbar fusion      Social History     Tobacco Use     Smoking status: Never Smoker     Smokeless tobacco: Never Used   Substance Use Topics     Alcohol use: No     Alcohol/week: 0.0 oz     Comment: very rare      Problem (# of Occurrences) Relation (Name,Age of Onset)    Breast Cancer (2) Mother, Sister    Heart Disease (2) Father (25):  in a fire , Paternal Grandfather    Hyperlipidemia (1) Sister            Current Outpatient Medications   Medication Sig     albuterol (PROAIR  HFA/PROVENTIL HFA/VENTOLIN HFA) 108 (90 BASE) MCG/ACT Inhaler Inhale 2 puffs into the lungs every 6 hours INDICATION: RESCUE INHALER FOR SHORTNESS OF BREATH, CHEST TIGHTNESS AND COUGH     alpha-lipoic acid 600 MG CAPS Take by mouth 3 times daily     ALPRAZolam (XANAX) 0.25 MG tablet TAKE ONE TABLET BY MOUTH ONCE DAILY AT  NIGHT  AS  NEEDED  FOR  SEVERE  ANXIETY  ONLY     ascorbic acid (VITAMIN C) 1000 MG TABS Take 1 tablet (1,000 mg) by mouth daily VITAMIN C REPLACEMENT     aspirin EC 81 MG EC tablet Take 1 tablet (81 mg) by mouth daily     BETA BLOCKER NOT PRESCRIBED, INTENTIONAL, Beta Blocker not prescribed intentionally due to Intolerance     boric acid 600 mg vaginal suppository - PHARMACY TO MIX COMPOUND Place 600 mg vaginally     butalbital-acetaminophen-caffeine (FIORICET/ESGIC) -40 MG per tablet TAKE ONE TABLET BY MOUTH EVERY 8 HOURS AS NEEDED FOR  SEVERE  MIGRAINE  HEADACHE  ONLY     candesartan (ATACAND) 16 MG tablet Take 1 tablet (16 mg) by mouth 2 times daily     cloNIDine (CATAPRES) 0.1 MG tablet Take 2 tablets (0.2 mg) by mouth 3 times daily (with meals) INDICATION: TO LOWER BLOOD PRESSURE (Patient taking differently: Take 0.1 mg by mouth 3 times daily (with meals) INDICATION: TO LOWER BLOOD PRESSURE)     esomeprazole (NEXIUM) 40 MG capsule Take 1 capsule (40 mg) by mouth every morning (before breakfast) TAKE  30'-60' BEFORE 1ST MEAL     furosemide (LASIX) 20 MG tablet Take 1 tablet (20 mg) by mouth daily (Patient taking differently: Take 20 mg by mouth daily as needed )     glimepiride (AMARYL) 2 MG tablet TAKE ONE TABLET (2 MG) BY MOUTH IN THE MORNING BEFORE BREAKFAST TO TREAT DIABETES (Patient taking differently: bid)     HERBALS alphalithiod - 600 mg twice daily     LACTOBACILLUS EXTRA STRENGTH CAPS Take 1 capsule by mouth daily INDICATION: TO RESTORE GOOD INTESTINAL BACTERIA     melatonin 5 MG CAPS Take by mouth At Bedtime     methenamine (HIPREX) 1 g tablet Take 1 tablet (1 g) by mouth 2  times daily     nitroglycerin (NITROSTAT) 0.4 MG SL tablet Place 1 tablet (0.4 mg) under the tongue every 5 minutes as needed for chest pain     oxybutynin ER (DITROPAN-XL) 10 MG 24 hr tablet Take 1 tablet (10 mg) by mouth At Bedtime     pravastatin (PRAVACHOL) 80 MG tablet Take 0.5 tablets (40 mg) by mouth every evening     RaNITidine HCl (ZANTAC PO)      triamterene-hydrochlorothiazide (MAXZIDE-25) 37.5-25 MG per tablet Take 1 tablet by mouth every morning INDICATION:TO LOWER BLOOD PRESSURE AND CONTROL EDEMA     vitamin D (ERGOCALCIFEROL) 97008 UNIT capsule Take 50,000 Units by mouth 1st, 10th, 20th of the month     No current facility-administered medications for this visit.      Allergies   Allergen Reactions     Codeine Difficulty breathing     Chest gets tight & difficulty breathing      Diatrizoate Shortness Of Breath     Other reaction(s): Shortness Of Breath  rapid heart beat  rapid heart beat       Morphine      Other reaction(s): GI Upset  Patient says she will not take because causes GI Upset     Oxycodone-Acetaminophen      Other reaction(s): GI Upset  Patient says she will not take because causes GI Upset     Atenolol Other (See Comments)     Sob w/ exertion - asthma symptoms     Diltiazem Other (See Comments)     edema - fluid retention in legs    Other reaction(s): Edema  Other reaction(s): Edema       Labetalol Other (See Comments) and Itching     Sob w/ exertion - asthma symptoms  itching     Lipitor [Atorvastatin Calcium] Other (See Comments)     Muscle weakness     Nifedipine Other (See Comments)     Sob w/ exertion - asthma symptoms, HAs    Other reaction(s): Other (See Comments)  Sob w/ exertion - asthma symptoms, HAs     Sulfa Drugs Hives     hives     Zocor [Simvastatin] Other (See Comments)     Muscle weakness     Advair Diskus Other (See Comments)     hoarseness with 500/50, not the 250/50     Aleve [Naproxen] Other (See Comments)     Raises BP     Amlodipine      Fatigue  Fatigue        Amlodipine Besylate Fatigue and Diarrhea     Blood-Group Specific Substance Other (See Comments)     Patient has anti-Odalys. Blood product orders may be delayed.  Other reaction(s): Other - Describe In Comment Field  Patient has anti-Odalys. Blood product orders may be delayed.       Bystolic [Nebivolol Hcl] Other (See Comments)     headaches     Calcium Carbonate Diarrhea     Crestor [Rosuvastatin Calcium] Muscle Pain (Myalgia)     Diovan [Valsartan] GI Disturbance     gas       Ezetimibe Muscle Pain (Myalgia)     Other reaction(s): Muscle Weakness  Other reaction(s): Myalgia       Fluticasone-Salmeterol Other (See Comments)     Hoarseness with 50/50 not the 25/50  hoarseness with 500/50, not the 250/50  Hoarseness with 50/50 not the 25/50       Hydralazine Other (See Comments) and Nausea     Increases pulse     Hydrochlorothiazide Other (See Comments)     increasing glucose     Imdur [Isosorbide Mononitrate] Other (See Comments)     headaches     Kcl GI Disturbance     gas      Lisinopril Cough     Hoarse voice     Lovastatin Muscle Pain (Myalgia)     Metformin Diarrhea            Metoprolol Other (See Comments)     Sob w/ exertion - asthma symptoms       Niacin Other (See Comments)     hyperglycemia     No Clinical Screening - See Comments      PN: LW CM1: >>> NO CONTRAST ADVERSE REACTION <<<     Reaction :  KCL---gas sx     Omnicef Diarrhea     Pravachol [Hmg-Coa-R Inhibitors] Muscle Pain (Myalgia)     Prilosec Otc [Omeprazole Magnesium] Diarrhea     diarrhea       Zetia [Ezetimibe] Muscle Pain (Myalgia)     weakness       ROS:  12 point review of systems negative other than symptoms noted below.    OBJECTIVE:     /50   Wt 76 kg (167 lb 9.6 oz)   BMI 29.69 kg/m    Body mass index is 29.69 kg/m .    Exam:  Constitutional:  Appearance: Well nourished, well developed alert, in no acute distress  Chest:  Respiratory Effort:  Breathing unlabored  Neurologic/Psychiatric:  Mental Status:  Oriented X3       In-Clinic Test Results:  Results for orders placed or performed in visit on 05/02/19 (from the past 24 hour(s))   UA with Microscopic   Result Value Ref Range    Color Urine Yellow     Appearance Urine Clear     Glucose Urine Negative NEG^Negative mg/dL    Bilirubin Urine Negative NEG^Negative    Ketones Urine Negative NEG^Negative mg/dL    Specific Gravity Urine 1.015 1.003 - 1.035    pH Urine 5.5 5.0 - 7.0 pH    Protein Albumin Urine Negative NEG^Negative mg/dL    Urobilinogen Urine 0.2 0.2 - 1.0 EU/dL    Nitrite Urine Negative NEG^Negative    Blood Urine Negative NEG^Negative    Leukocyte Esterase Urine 1+ (A) NEG^Negative    Source Midstream Urine     WBC Urine 5-10 (A) OTO5^0 - 5 /HPF    RBC Urine O - 2 OTO2^O - 2 /HPF    Squamous Epithelial /LPF Urine Moderate (A) FEW^Few /LPF       ASSESSMENT/PLAN:                                                        ICD-10-CM    1. Recurrent UTI N39.0    2. E-coli UTI N39.0 Urine Culture Aerobic Bacterial    B96.20    3. Dysuria R30.0 UA with Microscopic     Improved symptoms. Will check a urine culture to document clearance of her UTI. Given her lack of symptoms, will defer antibiotics until she is symptomatic even if there is bacteruria.   Her side effects are likely due to Ditropan but she is tolerating these therefore we will continue with the Hiprex and Oxybutynin for now.    Lucía Pimentel MD  [facility]

## 2019-05-02 ENCOUNTER — OFFICE VISIT (OUTPATIENT)
Dept: OBGYN | Facility: CLINIC | Age: 79
End: 2019-05-02
Payer: COMMERCIAL

## 2019-05-02 VITALS — BODY MASS INDEX: 29.69 KG/M2 | SYSTOLIC BLOOD PRESSURE: 102 MMHG | DIASTOLIC BLOOD PRESSURE: 50 MMHG | WEIGHT: 167.6 LBS

## 2019-05-02 DIAGNOSIS — N39.0 RECURRENT UTI: Primary | ICD-10-CM

## 2019-05-02 DIAGNOSIS — N39.0 E-COLI UTI: ICD-10-CM

## 2019-05-02 DIAGNOSIS — R30.0 DYSURIA: ICD-10-CM

## 2019-05-02 DIAGNOSIS — B96.20 E-COLI UTI: ICD-10-CM

## 2019-05-02 LAB
ALBUMIN UR-MCNC: NEGATIVE MG/DL
APPEARANCE UR: CLEAR
BILIRUB UR QL STRIP: NEGATIVE
COLOR UR AUTO: YELLOW
GLUCOSE UR STRIP-MCNC: NEGATIVE MG/DL
HGB UR QL STRIP: NEGATIVE
KETONES UR STRIP-MCNC: NEGATIVE MG/DL
LEUKOCYTE ESTERASE UR QL STRIP: ABNORMAL
NITRATE UR QL: NEGATIVE
NON-SQ EPI CELLS #/AREA URNS LPF: ABNORMAL /LPF
PH UR STRIP: 5.5 PH (ref 5–7)
RBC #/AREA URNS AUTO: ABNORMAL /HPF
SOURCE: ABNORMAL
SP GR UR STRIP: 1.01 (ref 1–1.03)
UROBILINOGEN UR STRIP-ACNC: 0.2 EU/DL (ref 0.2–1)
WBC #/AREA URNS AUTO: ABNORMAL /HPF

## 2019-05-02 PROCEDURE — 99213 OFFICE O/P EST LOW 20 MIN: CPT | Performed by: OBSTETRICS & GYNECOLOGY

## 2019-05-02 PROCEDURE — 81001 URINALYSIS AUTO W/SCOPE: CPT | Performed by: OBSTETRICS & GYNECOLOGY

## 2019-05-02 PROCEDURE — 87086 URINE CULTURE/COLONY COUNT: CPT | Performed by: OBSTETRICS & GYNECOLOGY

## 2019-05-03 LAB
BACTERIA SPEC CULT: NORMAL
Lab: NORMAL
SPECIMEN SOURCE: NORMAL

## 2019-05-14 DIAGNOSIS — R13.19 ESOPHAGEAL DYSPHAGIA: Primary | ICD-10-CM

## 2019-05-16 ENCOUNTER — HOSPITAL ENCOUNTER (OUTPATIENT)
Dept: GENERAL RADIOLOGY | Facility: CLINIC | Age: 79
Discharge: HOME | End: 2019-05-16
Attending: INTERNAL MEDICINE | Admitting: INTERNAL MEDICINE
Payer: COMMERCIAL

## 2019-05-16 DIAGNOSIS — R13.19 ESOPHAGEAL DYSPHAGIA: ICD-10-CM

## 2019-05-16 PROCEDURE — 74220 X-RAY XM ESOPHAGUS 1CNTRST: CPT

## 2019-05-16 PROCEDURE — 25500045 ZZH RX 255: Performed by: RADIOLOGY

## 2019-05-16 RX ADMIN — ANTACID/ANTIFLATULENT 4 G: 380; 550; 10; 10 GRANULE, EFFERVESCENT ORAL at 09:51

## 2019-07-12 ENCOUNTER — HOSPITAL ENCOUNTER (OUTPATIENT)
Facility: CLINIC | Age: 79
Setting detail: OBSERVATION
Discharge: HOME | End: 2019-07-13
Attending: EMERGENCY MEDICINE | Admitting: INTERNAL MEDICINE
Payer: COMMERCIAL

## 2019-07-12 DIAGNOSIS — Z96.649 STATUS POST HIP REPLACEMENT, UNSPECIFIED LATERALITY: Primary | ICD-10-CM

## 2019-07-12 DIAGNOSIS — E16.0 HYPOGLYCEMIA SECONDARY TO SULFONYLUREA, ACCIDENTAL OR UNINTENTIONAL, INITIAL ENCOUNTER: ICD-10-CM

## 2019-07-12 DIAGNOSIS — E16.2 HYPOGLYCEMIA: ICD-10-CM

## 2019-07-12 DIAGNOSIS — T38.3X1A HYPOGLYCEMIA SECONDARY TO SULFONYLUREA, ACCIDENTAL OR UNINTENTIONAL, INITIAL ENCOUNTER: ICD-10-CM

## 2019-07-12 LAB
ANION GAP SERPL CALCULATED.3IONS-SCNC: 8 MMOL/L (ref 3–14)
BASOPHILS # BLD AUTO: 0 10E9/L (ref 0–0.2)
BASOPHILS NFR BLD AUTO: 0.1 %
BUN SERPL-MCNC: 26 MG/DL (ref 7–30)
CALCIUM SERPL-MCNC: 9.6 MG/DL (ref 8.5–10.1)
CHLORIDE SERPL-SCNC: 104 MMOL/L (ref 94–109)
CO2 SERPL-SCNC: 29 MMOL/L (ref 20–32)
CREAT SERPL-MCNC: 1.04 MG/DL (ref 0.52–1.04)
DIFFERENTIAL METHOD BLD: ABNORMAL
EOSINOPHIL # BLD AUTO: 0.1 10E9/L (ref 0–0.7)
EOSINOPHIL NFR BLD AUTO: 1.2 %
ERYTHROCYTE [DISTWIDTH] IN BLOOD BY AUTOMATED COUNT: 13.8 % (ref 10–15)
GFR SERPL CREATININE-BSD FRML MDRD: 51 ML/MIN/{1.73_M2}
GLUCOSE BLDC GLUCOMTR-MCNC: 151 MG/DL (ref 70–99)
GLUCOSE BLDC GLUCOMTR-MCNC: 177 MG/DL (ref 70–99)
GLUCOSE BLDC GLUCOMTR-MCNC: 185 MG/DL (ref 70–99)
GLUCOSE BLDC GLUCOMTR-MCNC: 222 MG/DL (ref 70–99)
GLUCOSE BLDC GLUCOMTR-MCNC: 233 MG/DL (ref 70–99)
GLUCOSE BLDC GLUCOMTR-MCNC: 235 MG/DL (ref 70–99)
GLUCOSE BLDC GLUCOMTR-MCNC: 243 MG/DL (ref 70–99)
GLUCOSE BLDC GLUCOMTR-MCNC: 44 MG/DL (ref 70–99)
GLUCOSE SERPL-MCNC: 73 MG/DL (ref 70–99)
HBA1C MFR BLD: 6 % (ref 0–5.6)
HCT VFR BLD AUTO: 31.2 % (ref 35–47)
HGB BLD-MCNC: 10.3 G/DL (ref 11.7–15.7)
IMM GRANULOCYTES # BLD: 0 10E9/L (ref 0–0.4)
IMM GRANULOCYTES NFR BLD: 0.1 %
INTERPRETATION ECG - MUSE: NORMAL
LYMPHOCYTES # BLD AUTO: 1 10E9/L (ref 0.8–5.3)
LYMPHOCYTES NFR BLD AUTO: 12.5 %
MCH RBC QN AUTO: 31.8 PG (ref 26.5–33)
MCHC RBC AUTO-ENTMCNC: 33 G/DL (ref 31.5–36.5)
MCV RBC AUTO: 96 FL (ref 78–100)
MONOCYTES # BLD AUTO: 0.7 10E9/L (ref 0–1.3)
MONOCYTES NFR BLD AUTO: 9 %
NEUTROPHILS # BLD AUTO: 5.9 10E9/L (ref 1.6–8.3)
NEUTROPHILS NFR BLD AUTO: 77.1 %
NRBC # BLD AUTO: 0 10*3/UL
NRBC BLD AUTO-RTO: 0 /100
PLATELET # BLD AUTO: 335 10E9/L (ref 150–450)
POTASSIUM SERPL-SCNC: 4 MMOL/L (ref 3.4–5.3)
RBC # BLD AUTO: 3.24 10E12/L (ref 3.8–5.2)
SODIUM SERPL-SCNC: 141 MMOL/L (ref 133–144)
WBC # BLD AUTO: 7.7 10E9/L (ref 4–11)

## 2019-07-12 PROCEDURE — 36415 COLL VENOUS BLD VENIPUNCTURE: CPT | Performed by: INTERNAL MEDICINE

## 2019-07-12 PROCEDURE — 25000132 ZZH RX MED GY IP 250 OP 250 PS 637: Performed by: INTERNAL MEDICINE

## 2019-07-12 PROCEDURE — 96376 TX/PRO/DX INJ SAME DRUG ADON: CPT

## 2019-07-12 PROCEDURE — 96372 THER/PROPH/DIAG INJ SC/IM: CPT | Mod: 59

## 2019-07-12 PROCEDURE — 85025 COMPLETE CBC W/AUTO DIFF WBC: CPT | Performed by: EMERGENCY MEDICINE

## 2019-07-12 PROCEDURE — 25000131 ZZH RX MED GY IP 250 OP 636 PS 637: Performed by: INTERNAL MEDICINE

## 2019-07-12 PROCEDURE — 96366 THER/PROPH/DIAG IV INF ADDON: CPT

## 2019-07-12 PROCEDURE — 25800025 ZZH RX 258: Performed by: EMERGENCY MEDICINE

## 2019-07-12 PROCEDURE — 96365 THER/PROPH/DIAG IV INF INIT: CPT

## 2019-07-12 PROCEDURE — 80048 BASIC METABOLIC PNL TOTAL CA: CPT | Performed by: EMERGENCY MEDICINE

## 2019-07-12 PROCEDURE — 00000146 ZZHCL STATISTIC GLUCOSE BY METER IP

## 2019-07-12 PROCEDURE — 93005 ELECTROCARDIOGRAM TRACING: CPT

## 2019-07-12 PROCEDURE — 99220 ZZC INITIAL OBSERVATION CARE,LEVL III: CPT | Performed by: INTERNAL MEDICINE

## 2019-07-12 PROCEDURE — 99285 EMERGENCY DEPT VISIT HI MDM: CPT | Mod: 25

## 2019-07-12 PROCEDURE — 83036 HEMOGLOBIN GLYCOSYLATED A1C: CPT | Performed by: INTERNAL MEDICINE

## 2019-07-12 PROCEDURE — G0378 HOSPITAL OBSERVATION PER HR: HCPCS

## 2019-07-12 RX ORDER — CANDESARTAN 16 MG/1
16 TABLET ORAL 2 TIMES DAILY
Status: DISCONTINUED | OUTPATIENT
Start: 2019-07-12 | End: 2019-07-13 | Stop reason: HOSPADM

## 2019-07-12 RX ORDER — NALOXONE HYDROCHLORIDE 0.4 MG/ML
.1-.4 INJECTION, SOLUTION INTRAMUSCULAR; INTRAVENOUS; SUBCUTANEOUS
Status: DISCONTINUED | OUTPATIENT
Start: 2019-07-12 | End: 2019-07-13 | Stop reason: HOSPADM

## 2019-07-12 RX ORDER — ACETAMINOPHEN 325 MG/1
650 TABLET ORAL EVERY 4 HOURS PRN
Status: DISCONTINUED | OUTPATIENT
Start: 2019-07-12 | End: 2019-07-13 | Stop reason: HOSPADM

## 2019-07-12 RX ORDER — CLONIDINE HYDROCHLORIDE 0.1 MG/1
0.1 TABLET ORAL 2 TIMES DAILY
Status: DISCONTINUED | OUTPATIENT
Start: 2019-07-12 | End: 2019-07-13 | Stop reason: HOSPADM

## 2019-07-12 RX ORDER — AMOXICILLIN 250 MG
2 CAPSULE ORAL 2 TIMES DAILY PRN
Status: DISCONTINUED | OUTPATIENT
Start: 2019-07-12 | End: 2019-07-13 | Stop reason: HOSPADM

## 2019-07-12 RX ORDER — BISACODYL 10 MG
10 SUPPOSITORY, RECTAL RECTAL DAILY PRN
Status: DISCONTINUED | OUTPATIENT
Start: 2019-07-12 | End: 2019-07-13 | Stop reason: HOSPADM

## 2019-07-12 RX ORDER — GLIMEPIRIDE 4 MG/1
4 TABLET ORAL 2 TIMES DAILY
COMMUNITY
End: 2023-10-16

## 2019-07-12 RX ORDER — DEXTROSE MONOHYDRATE 25 G/50ML
25-50 INJECTION, SOLUTION INTRAVENOUS
Status: DISCONTINUED | OUTPATIENT
Start: 2019-07-12 | End: 2019-07-12

## 2019-07-12 RX ORDER — ALPRAZOLAM 0.25 MG
0.25 TABLET ORAL
Status: DISCONTINUED | OUTPATIENT
Start: 2019-07-12 | End: 2019-07-13 | Stop reason: HOSPADM

## 2019-07-12 RX ORDER — OXYCODONE HYDROCHLORIDE 5 MG/1
5-10 CAPSULE ORAL EVERY 4 HOURS PRN
Status: ON HOLD | COMMUNITY
End: 2019-07-13

## 2019-07-12 RX ORDER — LIDOCAINE 4 G/G
1 PATCH TOPICAL
Status: DISCONTINUED | OUTPATIENT
Start: 2019-07-12 | End: 2019-07-13 | Stop reason: HOSPADM

## 2019-07-12 RX ORDER — DEXTROSE MONOHYDRATE 25 G/50ML
25-50 INJECTION, SOLUTION INTRAVENOUS
Status: DISCONTINUED | OUTPATIENT
Start: 2019-07-12 | End: 2019-07-13 | Stop reason: HOSPADM

## 2019-07-12 RX ORDER — ALBUTEROL SULFATE 90 UG/1
2 AEROSOL, METERED RESPIRATORY (INHALATION) EVERY 6 HOURS PRN
Status: DISCONTINUED | OUTPATIENT
Start: 2019-07-12 | End: 2019-07-13 | Stop reason: HOSPADM

## 2019-07-12 RX ORDER — TRAMADOL HYDROCHLORIDE 50 MG/1
50 TABLET ORAL EVERY 6 HOURS PRN
Status: DISCONTINUED | OUTPATIENT
Start: 2019-07-12 | End: 2019-07-13 | Stop reason: HOSPADM

## 2019-07-12 RX ORDER — NICOTINE POLACRILEX 4 MG
15-30 LOZENGE BUCCAL
Status: DISCONTINUED | OUTPATIENT
Start: 2019-07-12 | End: 2019-07-12

## 2019-07-12 RX ORDER — TIMOLOL MALEATE 5 MG/ML
1 SOLUTION/ DROPS OPHTHALMIC 2 TIMES DAILY
COMMUNITY

## 2019-07-12 RX ORDER — TIMOLOL MALEATE 5 MG/ML
1 SOLUTION/ DROPS OPHTHALMIC 2 TIMES DAILY
Status: DISCONTINUED | OUTPATIENT
Start: 2019-07-12 | End: 2019-07-13 | Stop reason: HOSPADM

## 2019-07-12 RX ORDER — PHENOL 1.4 %
10 AEROSOL, SPRAY (ML) MUCOUS MEMBRANE AT BEDTIME
COMMUNITY

## 2019-07-12 RX ORDER — LACTOBACILLUS RHAMNOSUS GG 10B CELL
1 CAPSULE ORAL DAILY
Status: DISCONTINUED | OUTPATIENT
Start: 2019-07-12 | End: 2019-07-13 | Stop reason: HOSPADM

## 2019-07-12 RX ORDER — POLYETHYLENE GLYCOL 3350 17 G/17G
17 POWDER, FOR SOLUTION ORAL DAILY PRN
Status: DISCONTINUED | OUTPATIENT
Start: 2019-07-12 | End: 2019-07-13 | Stop reason: HOSPADM

## 2019-07-12 RX ORDER — ROSUVASTATIN CALCIUM 10 MG/1
10 TABLET, COATED ORAL DAILY
Status: DISCONTINUED | OUTPATIENT
Start: 2019-07-12 | End: 2019-07-13 | Stop reason: HOSPADM

## 2019-07-12 RX ORDER — DEXTROSE MONOHYDRATE 25 G/50ML
50 INJECTION, SOLUTION INTRAVENOUS ONCE
Status: COMPLETED | OUTPATIENT
Start: 2019-07-12 | End: 2019-07-12

## 2019-07-12 RX ORDER — NITROGLYCERIN 0.4 MG/1
0.4 TABLET SUBLINGUAL EVERY 5 MIN PRN
Status: DISCONTINUED | OUTPATIENT
Start: 2019-07-12 | End: 2019-07-13 | Stop reason: HOSPADM

## 2019-07-12 RX ORDER — NICOTINE POLACRILEX 4 MG
15-30 LOZENGE BUCCAL
Status: DISCONTINUED | OUTPATIENT
Start: 2019-07-12 | End: 2019-07-13 | Stop reason: HOSPADM

## 2019-07-12 RX ORDER — PANTOPRAZOLE SODIUM 40 MG/1
40 TABLET, DELAYED RELEASE ORAL
Status: DISCONTINUED | OUTPATIENT
Start: 2019-07-13 | End: 2019-07-13 | Stop reason: HOSPADM

## 2019-07-12 RX ORDER — ESOMEPRAZOLE MAGNESIUM 40 MG/1
40 CAPSULE, DELAYED RELEASE ORAL
Status: DISCONTINUED | OUTPATIENT
Start: 2019-07-13 | End: 2019-07-12 | Stop reason: CLARIF

## 2019-07-12 RX ORDER — ACETAMINOPHEN 650 MG/1
650 SUPPOSITORY RECTAL EVERY 4 HOURS PRN
Status: DISCONTINUED | OUTPATIENT
Start: 2019-07-12 | End: 2019-07-13 | Stop reason: HOSPADM

## 2019-07-12 RX ORDER — OXYCODONE HYDROCHLORIDE 5 MG/1
5-10 CAPSULE ORAL EVERY 4 HOURS PRN
Status: DISCONTINUED | OUTPATIENT
Start: 2019-07-12 | End: 2019-07-12

## 2019-07-12 RX ORDER — AMOXICILLIN 250 MG
1 CAPSULE ORAL 2 TIMES DAILY PRN
Status: DISCONTINUED | OUTPATIENT
Start: 2019-07-12 | End: 2019-07-13 | Stop reason: HOSPADM

## 2019-07-12 RX ORDER — ONDANSETRON 2 MG/ML
4 INJECTION INTRAMUSCULAR; INTRAVENOUS EVERY 6 HOURS PRN
Status: DISCONTINUED | OUTPATIENT
Start: 2019-07-12 | End: 2019-07-13 | Stop reason: HOSPADM

## 2019-07-12 RX ORDER — TRIAMTERENE/HYDROCHLOROTHIAZID 37.5-25 MG
0.5 TABLET ORAL EVERY MORNING
Status: DISCONTINUED | OUTPATIENT
Start: 2019-07-12 | End: 2019-07-12

## 2019-07-12 RX ORDER — OXYBUTYNIN CHLORIDE 10 MG/1
10 TABLET, EXTENDED RELEASE ORAL AT BEDTIME
Status: DISCONTINUED | OUTPATIENT
Start: 2019-07-12 | End: 2019-07-13 | Stop reason: HOSPADM

## 2019-07-12 RX ORDER — ONDANSETRON 4 MG/1
4 TABLET, ORALLY DISINTEGRATING ORAL EVERY 6 HOURS PRN
Status: DISCONTINUED | OUTPATIENT
Start: 2019-07-12 | End: 2019-07-13 | Stop reason: HOSPADM

## 2019-07-12 RX ORDER — ROSUVASTATIN CALCIUM 20 MG/1
20 TABLET, COATED ORAL DAILY
COMMUNITY

## 2019-07-12 RX ADMIN — INSULIN ASPART 1 UNITS: 100 INJECTION, SOLUTION INTRAVENOUS; SUBCUTANEOUS at 17:36

## 2019-07-12 RX ADMIN — LIDOCAINE 1 PATCH: 560 PATCH PERCUTANEOUS; TOPICAL; TRANSDERMAL at 12:38

## 2019-07-12 RX ADMIN — INSULIN ASPART 2 UNITS: 100 INJECTION, SOLUTION INTRAVENOUS; SUBCUTANEOUS at 12:40

## 2019-07-12 RX ADMIN — CLONIDINE HYDROCHLORIDE 0.1 MG: 0.1 TABLET ORAL at 12:38

## 2019-07-12 RX ADMIN — CANDESARTAN CILEXETIL 16 MG: 16 TABLET ORAL at 14:24

## 2019-07-12 RX ADMIN — MELATONIN 5 MG TABLET 10 MG: at 22:10

## 2019-07-12 RX ADMIN — OXYBUTYNIN CHLORIDE 10 MG: 10 TABLET, EXTENDED RELEASE ORAL at 22:10

## 2019-07-12 RX ADMIN — CLONIDINE HYDROCHLORIDE 0.1 MG: 0.1 TABLET ORAL at 20:44

## 2019-07-12 RX ADMIN — DEXTROSE AND SODIUM CHLORIDE: 5; 450 INJECTION, SOLUTION INTRAVENOUS at 08:12

## 2019-07-12 RX ADMIN — ROSUVASTATIN CALCIUM 10 MG: 10 TABLET, FILM COATED ORAL at 23:43

## 2019-07-12 RX ADMIN — INSULIN ASPART 1 UNITS: 100 INJECTION, SOLUTION INTRAVENOUS; SUBCUTANEOUS at 22:11

## 2019-07-12 RX ADMIN — CANDESARTAN CILEXETIL 16 MG: 16 TABLET ORAL at 20:44

## 2019-07-12 RX ADMIN — RANITIDINE 150 MG: 150 TABLET ORAL at 22:10

## 2019-07-12 RX ADMIN — Medication 1 CAPSULE: at 12:38

## 2019-07-12 RX ADMIN — DEXTROSE MONOHYDRATE 50 ML: 500 INJECTION PARENTERAL at 07:57

## 2019-07-12 RX ADMIN — TIMOLOL MALEATE 1 DROP: 5 SOLUTION/ DROPS OPHTHALMIC at 22:10

## 2019-07-12 ASSESSMENT — ENCOUNTER SYMPTOMS
DIFFICULTY URINATING: 0
DIARRHEA: 0
FEVER: 0
ARTHRALGIAS: 0
DYSURIA: 0
CHILLS: 0
SPEECH DIFFICULTY: 1
CONSTIPATION: 0

## 2019-07-12 ASSESSMENT — MIFFLIN-ST. JEOR: SCORE: 1212.08

## 2019-07-12 NOTE — ED NOTES
Bed: ED13  Expected date:   Expected time:   Means of arrival:   Comments:  harley - 511 - 78 F diabetic eta now

## 2019-07-12 NOTE — ED PROVIDER NOTES
History     Chief Complaint:  Fall      HPI   Ginger Abreu is a 78 year old female who presents with fall. Per chart review, she had a right hip replacement 2 weeks ago. She notes that she was in her recliner and fell to the carpet trying to get up. She states that she did not hit her head.  She does not believe she injured herself in any way.  She denies loss of consciousness, upper extremity pain. Here, she has blood glucose of 44 and was treated by EMS with juice and toast and 2 gluco tabs. Here, she denies diarrhea, dysuria, constipation, or troubles with urination.  She states that her hip surgery was done by [facility].  She denies any new symptoms.  She does note that last night she had low blood sugar but she did take her Amaryl.  She states that the swelling in her right lower externally is actually significantly better than it had been previously.  She is not on insulin.    Allergies:  Codeine  Diatrizoate  Morphine  Atenolol  Diltiazem  Labetalol  Lipitor  Nifedipine  Sulfa drugs  Zocor  Naproxen  Amlodipine  Diovan  Ezetimibe  Hydralazine  Hydrochlorothiazide  Imdur  Lisinopril  Lovastatin  Metoprolol  Niacin  Omnicef  Prilosec  Zetia  Pravachol   Metformin      Medications:    Hiprex  Ditropanxl  Senokot  Xarelto  Furosemide  Albuterol  Xanax  Candesartan  Esomeprazole  glimepiride  Hiprex  Pravastatin  Ranitidine  Maxzide      Past Medical History:    Anxiety  Basal cell carcinoma  DVT  Esophageal reflux  General osteoarthritis  Herpes zoster  Hyperlipidemia  Lumbago  Migraine with aura  MVP  Myocardiosis  Osteopenia  PE  Peripheral neuropathy  Spinals stenosis  DM2  Hypertension  Mitral valve disorder  Chronic pain syndrome  Sleep disorder  CKD        Past Surgical History:    L2-L3 foraminotomies  Orthopedic surgery  Hysterectomy  Total hip arthroplasty  Cholecystectomy  Septoplasty  Bladder repair      Family History:    Breast cancer  Heart disease  Cancer  Hyperlipidemia        Social  History:  Smoking status: Never  Alcohol use: No  Drug use: No  PCP: Steven Fonseca  Marital Status:          Review of Systems   Constitutional: Negative for chills and fever.   Gastrointestinal: Negative for constipation and diarrhea.   Genitourinary: Negative for difficulty urinating and dysuria.   Musculoskeletal: Negative for arthralgias.   Neurological: Positive for speech difficulty. Negative for syncope.   All other systems reviewed and are negative.      Physical Exam     Patient Vitals for the past 24 hrs:   BP Temp Temp src Pulse Resp SpO2   07/12/19 0845 -- -- -- -- -- 93 %   07/12/19 0830 156/61 -- -- 66 -- --   07/12/19 0815 166/67 -- -- 82 -- 95 %   07/12/19 0729 170/65 99.1  F (37.3  C) Oral 71 16 97 %     Physical Exam  General: Well-nourished, appears to be resting comfortably when I enter the room  Eyes: PERRL, conjunctivae pink no scleral icterus or conjunctival injection  ENT:  Moist mucus membranes, posterior oropharynx clear without erythema or exudates  Respiratory:  Lungs clear to auscultation bilaterally, no crackles/rubs/wheezes.  Good air movement  CV: Normal rate and rhythm, no murmurs/rubs/gallops  GI:  Abdomen soft and non-distended.  Normoactive BS.  No tenderness, guarding or rebound  Skin: Warm, dry.  No rashes or petechiae  Musculoskeletal: No peripheral edema or calf tenderness.  No midline tenderness of the cervical/thoracic/lumbar spine.  No tenderness/crepitus/bony stepoffs over the clavicles, chest wall, pelvis, arms or legs.  Neuro: Alert and oriented to person/place/time. PERRL, EOMI no nystagmus, no aphasia/facial droop/dysarthria, tongue midline, symmetric palatal elevation, normal strength at SCM/trapezius/BUE/BLE  Psychiatric: Normal affect      Emergency Department Course     ECG:  ECG taken at 0825, ECG read at 0839  Normal sinus rhythm  Normal ECG  Rate 71 bpm. AR interval 164 ms. QRS duration 84 ms. QT/QTc 400/434 ms. P-R-T axes 72 15  72.    Laboratory:  Laboratory findings were communicated with the patient who voiced understanding of the findings.    CBC: WBC 7.7, HGB 10.3(L),   BMP: GFR 51(L) o/w WNL (Creatinine 1.04)  Glucose by meter (0736): 44(L)  Glucose by meter (0823): 177(H)    Interventions:  0757 Dextrose, 50 mL, IV    Emergency Department Course:   Nursing notes and vitals reviewed.  0750 I performed an exam of the patient as documented above.   0835 EKG was performed, see results above.  0835 IV was inserted and blood was drawn for laboratory testing, results above.  0915 I spoke to Dr. Warner of the hospitalist service who accepts the patient for admission.     Impression & Plan      Medical Decision Making:  Ginger Abreu is a 78 year old female who presents for evaluation of weakness and low blood sugar and symptoms that improved with glucose administration.  This is consistent with hypoglycemia.  The most likely etiology of the hypoglycemia is the Amaryl sulfonylurea, but nonetheless a broad differential was considered including infection, medication noncompliance, overdose of medication, other metabolic derangement, lack of adequate PO intake, etc.  The patient is not on long-acting insulin.  She is only on oral diabetes medications.    She had hypoglycemia despite treatment with dextrose as well as eating.  We started her on a dextrose drip therefore.Workup and detailed history are reassuring but given she is on a sulfonylurea, we will place in observation for continued monitoring and cares.  I spoke with the hospitalist who graciously accepted the patient to their service.      Diagnosis:    ICD-10-CM    1. Hypoglycemia E16.2    2. Hypoglycemia secondary to sulfonylurea, accidental or unintentional, initial encounter T38.3X1A     E16.0      Disposition:   The patient is admitted into the care of Dr. Warner for observation.      Scribe Disclosure:  Beena HERNANDEZ, am serving as a scribe at 7:50 AM on 7/12/2019 to  document services personally performed by Rosio Uriostegui MD based on my observations and the provider's statements to me.   EMERGENCY DEPARTMENT       Rosio Uriostegui MD  07/12/19 5003

## 2019-07-12 NOTE — H&P
Admitted:     07/12/2019      Please see History and Physical by Dr. Steven Fonseca.        PRIMARY CARE PHYSICIAN:  Dr. Steven Fonseca.      CHIEF COMPLAINT:  Fall, hypoglycemia.      HISTORY OF PRESENT ILLNESS:  Ms. Abreu is a 78-year-old  female, who lives independently, with a long medical history and a number of medications, who presented to [facility] with hypoglycemic episodes yesterday and this morning, causing a fall.      The patient had a hip elective surgery a couple weeks ago.  The patient was discharged home on oxycodone.  The patient had run out of her oxycodone and saw her primary care physician on Monday, where her oxycodone was resumed.  The patient does admit her blood pressures have been low, and she has not been eating as much as she normally would be.  She has had prior hypoglycemic episodes before, but she has been stable for quite some time.  The patient had what sounded like low blood glucose yesterday.  The patient, this morning, had another fall.  She stated that she crumpled to the ground.  EMS was contacted.  Her blood glucose was 44.  She was given blood sugars.  She was brought into [facility] Emergency Department.      In the Emergency Department, the patient was seen by Dr. Rosio Uriostegui.  She became hypoglycemic again.  She has been started on D10, and she is being admitted under observation status.  Blood work has been reviewed.  She has normal electrolytes.  She is slightly dehydrated with an elevated to BUN to creatinine ratio.  BUN was 26, creatinine 1.60, and calculated GFR 51.  Glucose was 44 initially and now 73.  CBC showed white count of 7.7, hemoglobin 10.3, and platelets of 335.  A 12-lead shows her EKG to be completely normal.  She denied any kind of bony pain or any trauma, so no further imaging has been done.      MEDICATIONS:  Reviewed.  She has been taking her Maxzide because her blood pressures have been lower.  Her Amaryl dosing,  according to the computer, has her taking 4 mg twice a day, but she tells me she takes 1 tablet twice a day, which is 2 mg as well.  The patient states that Tylenol has not been helpful.  She has been taking oxycodone, the same dose, which is 2 tablets in the morning, 1 tablet at noon, 1 tablet in the evening and 2 tablets at bedtime.  She also has Xanax onboard as well that she takes p.r.n. for anxiety and restlessness.      PAST MEDICAL HISTORY:  Anxiety disorder, basal cell cancer, history of DVT/presume patient is on anticoagulation, gastroesophageal reflux disease, osteoarthritis with recent right hip arthroplasty, herpes zoster, hyperlipidemia, chronic low back pain, migraines with aura, mitral valve prolapse, myocardosis, osteopenia, pulmonary embolism, peripheral neuropathy, spinal stenosis, type 2 diabetes, hypertension, chronic pain syndrome, sleep disorder, and chronic kidney disease stage III.      PAST SURGICAL HISTORY:  L3-L4 foraminotomies, right hip arthroplasty kess than 2 weeks ago, hysterectomy, cholecystectomy, septoplasty and bladder repair.      FAMILY HISTORY:  Positive for breast cancer, heart disease, cancer, and hyperlipidemia.      SOCIAL HISTORY:   and lives independently.  No tobacco, no alcohol.  Full code.      ALLERGIES:  NUMEROUS INCLUDING CODEINE DIET, DIATRIZOATE, MORPHINE, ATENOLOL, DILTIAZEM, LABETALOL, LIPITOR, NIFEDIPINE, SULFA, ZOCOR, NAPROSYN, AMLODIPINE, DIOVAN, ZETIA, HYDRALAZINE, HYDROCHLOROTHIAZIDE, IMDUR, LISINOPRIL, LOVASTATIN, METOPROLOL, NIACIN, OMNICEF, PRILOSEC, ZETIA, PRAVACHOL, AND METFORMIN.      CURRENT MEDICATIONS:   1.  Advair metered inhaler 2 puffs every 6 hours.   2.  Alpha lipoic acid 600 mg 3 times a day.     3.  Xanax 0.25 mg at bedtime as needed for anxiety.   4.  Vitamin C 1000 mg daily.   5.  Fioricet 1 tablet every 8 hours.     6.  Candesartan 16 mg twice a day.   7.  Clonidine 0.2 mg 3 times a day with meals.     8.  Nexium 40 mg daily.   9.   Amaryl 4 mg twice a day, but patient tells me she is taking 1 tablet or 2 mg twice a day.   10.  Lactobacillus 1 capsule daily.   11.  Melatonin 10 mg at bedtime.     12.  Methenamine 1 gram twice a day.   13.  Nitroglycerin 0.4 mg under the tongue every 5 minutes.   14.  Oxybutynin Extended Release 10 mg at bedtime.   15.  Oxycodone.  She takes 6 tablets a day for postop pain.   16.  Zantac 150 mg at bedtime.   17.  Rivaroxaban 10 mg daily.   18.  Crestor 10 mg daily.   19.  Timolol 1 drop both eyes twice a day.   20.  Maxzide 37.5/25 one tablet daily (patient normally takes only half of this tablet, but she has not been taking this due to lower blood pressure postoperatively).   21.  Vitamin D 50,000 units once a week.      REVIEW OF SYSTEMS:  Ten-point review of systems reviewed.     CONSTITUTIONAL:  The patient denies any headache, vision changes, neck pain.   PULMONARY:  Denies any cough, sputum production, or wheezing.     CARDIOVASCULAR:  She has any cardiopulmonary complaints.     GASTROINTESTINAL:  She denies any GI complaints.   GENITOURINARY:  She denies any  complaints,     EXTREMITIES:  The patient denies any bony discomfort in her extremities.  She has chronic back pain and hip pain, for which she underwent arthroplasty.   NEUROLOGIC:  She denies any focal neurologic complaints.  Positive for falls yesterday and today from hypoglycemia.     Otherwise, A 10-point review of systems was reviewed and otherwise unremarkable.      PHYSICAL EXAMINATION:   VITAL SIGNS:  Temperature 99.1, heart rate 66, respirations 16, blood pressure 156/61, sats 98% on room air.   GENERAL:  The patient is a very pleasant 78-year-old  female.  She is in no distress.   HEENT:  Head is atraumatic.  No JVP elevation.  No neck discomfort.   PULMONARY:  Lungs are clear to auscultation.   CARDIOVASCULAR:  S1, S2, regular rate and rhythm.   ABDOMEN:  Soft, obese, nontender, nondistended.   MUSCULOSKELETAL:  No edema.    NEUROLOGIC:  She is able to move all 4 extremities.  Cranial nerves grossly intact.   SKIN:  Warm, dry, well perfused.   PSYCHIATRIC:  Mood and affect, stable.      LABORATORY DATA:  As dictated in history of present illness.      ASSESSMENT:  Ginger Abreu is a 78-year-old female who presented with hypoglycemic episodes, likely from decreased oral intake.  She is on Amaryl.  The reason for poor oral intake is postop pain and taking oxycodone.      PLAN:   1.  Hypoglycemia.  The patient will receive dextrose in her IV fluids for the next 8 hours.  We are going to obviously hold her Amaryl.  We will put her on low sliding scale insulin and a moderate carbohydrate diet.  For the time being, I would ask that patient discontinue her Amaryl until she is eating her usual way.  I am afraid she is not to be doing this until she is off her narcotics.   2.  Postoperative right hip pain.  This is postoperative pain.  She ran out of her oxycodone medication.  She has chronic pain syndrome, which is not surprising.  She was restarted on oxycodone, the same amount of dosage as she was when she was postoperative.  I had told the patient that she needs to decrease this significantly.  We are decreasing her oxycodone to 1 tablet 3 times a day p.r.n.  I have offered and will place a lidocaine patch on her given that she is at least a week out of her hip surgery.  She should be weaned off her narcotic pain medications, as there are ongoing issues with poor oral intake and hypoglycemia.  Also, long-term or prolonged narcotics in this patient probably is not in her best interest.  I say this with the understanding she does need to do therapy for her postoperative hip.   3.  Hypertension.  Her blood pressures have been on the softer side.  We are going to continue to hold her Maxzide.  She is on a number of other medications as well, which we will continue, including clonidine and candesartan.   4.  History of deep venous thrombosis.   She is on Xarelto, which we will continue.   5.  Reflux.  We will continue the patient on her proton-pump inhibitor (PPI) and for hyperlipidemia, will continue the patient on Pravachol.   6.  Chronic kidney disease.  The patient's BUN to creatinine ratio is elevated.  She has not been drinking as much.  We were going to give her gentle IV fluid hydration, but she is overall at her baseline with respect to her kidney function.   7.  Deep venous thrombosis prophylaxis.  The patient is already anticoagulated.      CODE STATUS:  Full.      Observation status.         TUTU ROSALES MD             D: 2019   T: 2019   MT:       Name:     SARAY MÉNDEZ   MRN:      [id number]        Account:      [id number]   :      1940        Admitted:     2019                   Document: [id number]       cc: Steven Fonseca MD

## 2019-07-12 NOTE — PLAN OF CARE
RECEIVING UNIT ED HANDOFF REVIEW    ED Nurse Handoff Report was reviewed by: Nga Gunderson on July 12, 2019 at 11:05 AM

## 2019-07-12 NOTE — ED NOTES
[facility]  ED Nurse Handoff Report    ED Chief complaint: Fall      ED Diagnosis:   Final diagnoses:   Hypoglycemia   Hypoglycemia secondary to sulfonylurea, accidental or unintentional, initial encounter       Code Status: Full Code, to be reviewed by admitting physician     Allergies:   Allergies   Allergen Reactions     Codeine Difficulty breathing     Chest gets tight & difficulty breathing      Diatrizoate Shortness Of Breath     Other reaction(s): Shortness Of Breath  rapid heart beat  rapid heart beat       Morphine      Other reaction(s): GI Upset  Patient says she will not take because causes GI Upset     Oxycodone-Acetaminophen      Other reaction(s): GI Upset  Patient says she will not take because causes GI Upset     Atenolol Other (See Comments)     Sob w/ exertion - asthma symptoms     Diltiazem Other (See Comments)     edema - fluid retention in legs    Other reaction(s): Edema  Other reaction(s): Edema       Labetalol Other (See Comments) and Itching     Sob w/ exertion - asthma symptoms  itching     Lipitor [Atorvastatin Calcium] Other (See Comments)     Muscle weakness     Nifedipine Other (See Comments)     Sob w/ exertion - asthma symptoms, HAs    Other reaction(s): Other (See Comments)  Sob w/ exertion - asthma symptoms, HAs     Sulfa Drugs Hives     hives     Zocor [Simvastatin] Other (See Comments)     Muscle weakness     Advair Diskus Other (See Comments)     hoarseness with 500/50, not the 250/50     Aleve [Naproxen] Other (See Comments)     Raises BP     Amlodipine      Fatigue  Fatigue       Amlodipine Besylate Fatigue and Diarrhea     Blood-Group Specific Substance Other (See Comments)     Patient has anti-[place]dal[place]. Blood product orders may be delayed.  Other reaction(s): Other - Describe In Comment Field  Patient has anti-[place]. Blood product orders may be delayed.       Bystolic [Nebivolol Hcl] Other (See Comments)     headaches     Calcium Carbonate Diarrhea     Crestor  "[Rosuvastatin Calcium] Muscle Pain (Myalgia)     Diovan [Valsartan] GI Disturbance     gas       Ezetimibe Muscle Pain (Myalgia)     Other reaction(s): Muscle Weakness  Other reaction(s): Myalgia       Fluticasone-Salmeterol Other (See Comments)     Hoarseness with 50/50 not the 25/50  hoarseness with 500/50, not the 250/50  Hoarseness with 50/50 not the 25/50       Hydralazine Other (See Comments) and Nausea     Increases pulse     Hydrochlorothiazide Other (See Comments)     increasing glucose     Imdur [Isosorbide Mononitrate] Other (See Comments)     headaches     Kcl GI Disturbance     gas      Lisinopril Cough     Hoarse voice     Lovastatin Muscle Pain (Myalgia)     Metformin Diarrhea            Metoprolol Other (See Comments)     Sob w/ exertion - asthma symptoms       Niacin Other (See Comments)     hyperglycemia     No Clinical Screening - See Comments      PN: LW CM1: >>> NO CONTRAST ADVERSE REACTION <<<     Reaction :  KCL---gas sx     Omnicef Diarrhea     Pravachol [Hmg-Coa-R Inhibitors] Muscle Pain (Myalgia)     Prilosec Otc [Omeprazole Magnesium] Diarrhea     diarrhea       Zetia [Ezetimibe] Muscle Pain (Myalgia)     weakness       Activity level - Baseline/Home: Independent with walker  Activity Level - Current: Assist x1 with walker, wheelchair for distance    Patient's Preferred language: English   Needed?: NA  Isolation: NA  Infection: NA  Bariatric?: NA    Vital Signs:   Vitals:    07/12/19 0729 07/12/19 0815 07/12/19 0830 07/12/19 0845   BP: 170/65 166/67 156/61    Pulse: 71 82 66    Resp: 16      Temp: 99.1  F (37.3  C)      TempSrc: Oral      SpO2: 97% 95%  93%       Cardiac Rhythm: NSR  Pain level: 0-10 Pain Scale: 5    Is this patient confused?: No  Does this patient have a guardian? No         If yes, is there guardianship documents in the Epic \"Code/ACP\" activity? NA         Guardian Notified? NA  [place] - Suicide Severity Rating Scale Completed?  No, PSS-3  If yes, what color " did the patient score? NA    Patient Report: Initial Complaint: Fall  Focused Assessment: RLE impaired mobility d/t recent hip replacement 2 weeks ago. EMS called after fall this morning. Pt was found to have slurred speech, other neuro WDL. BS checked on scene at 42. EMS raised BS to 75 with oral glucagon on transport. Pt dropped back to 44 in ED and was given 50 mL 50% dextrose and started on D5W and 1/2 NS.   Tests Performed: CBC, BMP, BG POCT  Abnormal Results:   Abnormal Labs Reviewed   GLUCOSE BY METER - Abnormal; Notable for the following components:       Result Value    Glucose 44 (*)     All other components within normal limits   CBC WITH PLATELETS DIFFERENTIAL - Abnormal; Notable for the following components:    RBC Count 3.24 (*)     Hemoglobin 10.3 (*)     Hematocrit 31.2 (*)     All other components within normal limits   BASIC METABOLIC PANEL - Abnormal; Notable for the following components:    GFR Estimate 51 (*)     GFR Estimate If Black 59 (*)     All other components within normal limits   GLUCOSE BY METER - Abnormal; Notable for the following components:    Glucose 177 (*)     All other components within normal limits       Treatments provided: 50 mL Dextrose 50% IVP, IV D5W 1/2NS MIVF infusing.    Family Comments:  called.    OBS brochure/video discussed/provided to patient/family: NA              Name of person given brochure if not patient: NA              Relationship: NA to patient: NA    ED Medications:   Medications   glucose gel 15-30 g (has no administration in time range)     Or   dextrose 50 % injection 25-50 mL (has no administration in time range)     Or   glucagon injection 1 mg (has no administration in time range)   dextrose 5% and 0.45% NaCl infusion ( Intravenous New Bag 7/12/19 4264)   dextrose 50 % injection 50 mL (50 mLs Intravenous Given 7/12/19 0675)       Drips infusing?: Yes    For the majority of the shift this patient was Green  Interventions performed were  NA    Severe Sepsis OR Septic Shock Diagnosis Present: No    To be done/followed up on inpatient unit: Continue to monitor BS and provide treatments as appropriate.     ED NURSE PHONE NUMBER: [phone]

## 2019-07-12 NOTE — PHARMACY-ADMISSION MEDICATION HISTORY
Admission medication history interview status for the 7/12/2019  admission is complete. See EPIC admission navigator for prior to admission medications     Medication history source reliability:Good    Actions taken by pharmacist (provider contacted, etc): epic notes, care everywhere, sure scripts, interviewed patient     Additional medication history information not noted on PTA med list :None    Medication reconciliation/reorder completed by provider prior to medication history? No    Time spent in this activity: 20 min    Prior to Admission medications    Medication Sig Last Dose Taking? Auth Provider   albuterol (PROAIR HFA/PROVENTIL HFA/VENTOLIN HFA) 108 (90 BASE) MCG/ACT Inhaler Inhale 2 puffs into the lungs every 6 hours INDICATION: RESCUE INHALER FOR SHORTNESS OF BREATH, CHEST TIGHTNESS AND COUGH >year prn Yes Jerry Iniguez MD   alpha-lipoic acid 600 MG CAPS Take 600 mg by mouth 3 times daily  7/11/2019 at Unknown time Yes Reported, Patient   ALPRAZolam (XANAX) 0.25 MG tablet TAKE ONE TABLET BY MOUTH ONCE DAILY AT  NIGHT  AS  NEEDED  FOR  SEVERE  ANXIETY  ONLY prn Yes Jerry Iniguez MD   ascorbic acid (VITAMIN C) 1000 MG TABS Take 1 tablet (1,000 mg) by mouth daily VITAMIN C REPLACEMENT 7/11/2019 at Unknown time Yes Steven Fonseca MD   butalbital-acetaminophen-caffeine (FIORICET/ESGIC) -40 MG per tablet TAKE ONE TABLET BY MOUTH EVERY 8 HOURS AS NEEDED FOR  SEVERE  MIGRAINE  HEADACHE  ONLY prn Yes Jerry Iniguez MD   candesartan (ATACAND) 16 MG tablet Take 1 tablet (16 mg) by mouth 2 times daily 7/11/2019 at Unknown time Yes Vaibhav Maldonado MD   cloNIDine (CATAPRES) 0.1 MG tablet Take 2 tablets (0.2 mg) by mouth 3 times daily (with meals) INDICATION: TO LOWER BLOOD PRESSURE  Patient taking differently: Take 0.1 mg by mouth 2 times daily INDICATION: TO LOWER BLOOD PRESSURE 7/11/2019 at Unknown time Yes Steven Fonseca MD   esomeprazole (NEXIUM) 40 MG capsule Take 1 capsule (40 mg) by  mouth every morning (before breakfast) TAKE  30'-60' BEFORE 1ST MEAL 7/11/2019 at Unknown time Yes Steven Fonseca MD   glimepiride (AMARYL) 2 MG tablet Take 4 mg by mouth 2 times daily 7/11/2019 at Unknown time Yes Unknown, Entered By History   LACTOBACILLUS EXTRA STRENGTH CAPS Take 1 capsule by mouth daily INDICATION: TO RESTORE GOOD INTESTINAL BACTERIA 7/11/2019 at Unknown time Yes Steven Fonseca MD   Melatonin 10 MG TABS tablet Take 10 mg by mouth At Bedtime 7/11/2019 at Unknown time Yes Unknown, Entered By History   methenamine (HIPREX) 1 g tablet Take 1 tablet (1 g) by mouth 2 times daily 7/11/2019 at Unknown time Yes Lucía Pimentel MD   nitroglycerin (NITROSTAT) 0.4 MG SL tablet Place 1 tablet (0.4 mg) under the tongue every 5 minutes as needed for chest pain prn Yes Steven Fonseca MD   oxybutynin ER (DITROPAN-XL) 10 MG 24 hr tablet Take 1 tablet (10 mg) by mouth At Bedtime 7/11/2019 at Unknown time Yes Lucía Pimentel MD   oxyCODONE (OXY-IR) 5 MG capsule Take 5-10 mg by mouth every 4 hours as needed for severe pain 7/11/2019 at Unknown time Yes Unknown, Entered By History   ranitidine (ZANTAC) 75 MG tablet Take 150 mg by mouth At Bedtime 7/11/2019 at Unknown time Yes Unknown, Entered By History   rivaroxaban ANTICOAGULANT (XARELTO) 10 MG TABS tablet Take 10 mg by mouth daily (with dinner)  7/11/2019 at Unknown time Yes Reported, Patient   rosuvastatin (CRESTOR) 10 MG tablet Take 10 mg by mouth daily 7/11/2019 at Unknown time Yes Unknown, Entered By History   timolol maleate (TIMOPTIC) 0.5 % ophthalmic solution Place 1 drop into both eyes 2 times daily 7/11/2019 at Unknown time Yes Unknown, Entered By History   triamterene-hydrochlorothiazide (MAXZIDE-25) 37.5-25 MG per tablet Take 1 tablet by mouth every morning INDICATION:TO LOWER BLOOD PRESSURE AND CONTROL EDEMA  Patient taking differently: Take 0.5 tablets by mouth every morning INDICATION:TO LOWER BLOOD PRESSURE  AND CONTROL EDEMA 7/11/2019 at Unknown time Yes Steven Fonseca MD   BETA BLOCKER NOT PRESCRIBED, INTENTIONAL, Beta Blocker not prescribed intentionally due to Intolerance   Steven Fonseca MD   vitamin D (ERGOCALCIFEROL) 07425 UNIT capsule Take 50,000 Units by mouth once a week wednesdays 7/10/2019  Reported, Patient

## 2019-07-12 NOTE — ED NOTES
MD notified of BG 44; verbal order for 50% dextrose. RN providing juice and crackers while getting IV access established.

## 2019-07-13 VITALS
DIASTOLIC BLOOD PRESSURE: 51 MMHG | OXYGEN SATURATION: 94 % | HEART RATE: 69 BPM | HEIGHT: 63 IN | TEMPERATURE: 98.6 F | WEIGHT: 168.2 LBS | RESPIRATION RATE: 16 BRPM | SYSTOLIC BLOOD PRESSURE: 158 MMHG | BODY MASS INDEX: 29.8 KG/M2

## 2019-07-13 LAB
GLUCOSE BLDC GLUCOMTR-MCNC: 141 MG/DL (ref 70–99)
GLUCOSE BLDC GLUCOMTR-MCNC: 144 MG/DL (ref 70–99)
GLUCOSE BLDC GLUCOMTR-MCNC: 190 MG/DL (ref 70–99)

## 2019-07-13 PROCEDURE — 00000146 ZZHCL STATISTIC GLUCOSE BY METER IP

## 2019-07-13 PROCEDURE — 99217 ZZC OBSERVATION CARE DISCHARGE: CPT | Performed by: INTERNAL MEDICINE

## 2019-07-13 PROCEDURE — 25000132 ZZH RX MED GY IP 250 OP 250 PS 637: Performed by: INTERNAL MEDICINE

## 2019-07-13 PROCEDURE — 25000132 ZZH RX MED GY IP 250 OP 250 PS 637

## 2019-07-13 PROCEDURE — 96372 THER/PROPH/DIAG INJ SC/IM: CPT

## 2019-07-13 PROCEDURE — G0378 HOSPITAL OBSERVATION PER HR: HCPCS

## 2019-07-13 RX ORDER — ERGOCALCIFEROL 1.25 MG/1
50000 CAPSULE, LIQUID FILLED ORAL WEEKLY
Status: DISCONTINUED | OUTPATIENT
Start: 2019-07-17 | End: 2019-07-13 | Stop reason: HOSPADM

## 2019-07-13 RX ORDER — LIDOCAINE 4 G/G
1 PATCH TOPICAL EVERY 24 HOURS
Qty: 10 PATCH | Refills: 0 | Status: SHIPPED | OUTPATIENT
Start: 2019-07-13 | End: 2022-09-29

## 2019-07-13 RX ORDER — TRAMADOL HYDROCHLORIDE 50 MG/1
50 TABLET ORAL EVERY 6 HOURS PRN
Qty: 14 TABLET | Refills: 0 | Status: SHIPPED | OUTPATIENT
Start: 2019-07-13 | End: 2022-09-29

## 2019-07-13 RX ADMIN — TIMOLOL MALEATE 1 DROP: 5 SOLUTION/ DROPS OPHTHALMIC at 08:24

## 2019-07-13 RX ADMIN — Medication 1 CAPSULE: at 08:23

## 2019-07-13 RX ADMIN — PANTOPRAZOLE SODIUM 40 MG: 40 TABLET, DELAYED RELEASE ORAL at 06:54

## 2019-07-13 RX ADMIN — ROSUVASTATIN CALCIUM 10 MG: 10 TABLET, FILM COATED ORAL at 08:23

## 2019-07-13 RX ADMIN — CLONIDINE HYDROCHLORIDE 0.1 MG: 0.1 TABLET ORAL at 08:23

## 2019-07-13 RX ADMIN — CANDESARTAN CILEXETIL 16 MG: 16 TABLET ORAL at 08:23

## 2019-07-13 RX ADMIN — LIDOCAINE 1 PATCH: 560 PATCH PERCUTANEOUS; TOPICAL; TRANSDERMAL at 08:23

## 2019-07-13 RX ADMIN — INSULIN ASPART 1 UNITS: 100 INJECTION, SOLUTION INTRAVENOUS; SUBCUTANEOUS at 08:24

## 2019-07-13 NOTE — PLAN OF CARE
Pt a/ox4.  Denies pain.  VSS, on RA.  Right hip incision CDI, steri strips intact, bruising.  Up to BR with SBA and walker, voiding.  0200 blood sugar: 141

## 2019-07-13 NOTE — PLAN OF CARE
PRIMARY DIAGNOSIS: HYPO/HYPERGLYCEMIA    OUTPATIENT/OBSERVATION GOALS TO BE MET BEFORE DISCHARGE  BG greater than 100 and less than 250 on two consecutive readings: No  Recent Labs   Lab Test 07/12/19  1712 07/12/19  1208 07/12/19  1132   * 243* 233*         Ketones absent from urine  (hyperglycemia): Not tested     Tolerating oral intake to maintain hydration: Yes    Return to near baseline physical activity: Yes    Discharge Planner Nurse   Safe discharge environment identified: Yes  Barriers to discharge: Yes       Entered by: Loreta Ruff 07/12/2019 7:12 PM     Please review provider order for any additional goals.   Nurse to notify provider when observation goals have been met and patient is ready for discharge.

## 2019-07-13 NOTE — PLAN OF CARE
Pt observations goals met. Discharge orders placed by MD. Discharge instructions and medications reviewed with patient at bedside. Verbal understanding given. PIV removed. Discharged from unit to home.

## 2019-07-13 NOTE — DISCHARGE SUMMARY
[facility]  Discharge Summary        Ginger Abreu MRN# [id number]   YOB: 1940 Age: 78 year old     Date of Admission:  7/12/2019  Date of Discharge:  7/13/2019  Admitting Physician:  Toni Warner MD  Discharge Physician: Toni Warner MD  Discharging Service: Hospitalist     Primary Provider:  Steven Fonseca  Primary Care Physician Phone Number: [phone]         Discharge Diagnoses/Problem Oriented Hospital Course (Providers):    Ginger Abreu was admitted on 7/12/2019 by Toni Warner MD and I would refer you to their history and physical.  The following problems were addressed during her hospitalization:    ASSESSMENT:  Ginger Abreu is a 78-year-old female who presented with hypoglycemic episodes, likely from decreased oral intake.  She is on Amaryl.  The reason for poor oral intake is postop pain and taking oxycodone.      PLAN:   1.  Hypoglycemia due to low BG from eating poorly as a result of her narcotics.  -- BG improved and now elevated.  -- resume amaryl 4 mg bid, decrease to 2 mg bid in case decrase in appetite.  -- discontinued oxycodone.    2.  Postoperative right hip pain.   -- She has chronic pain syndrome, she ran out of her oxycodone from her hip surgery which was renewed by her PCP.  She told me she was taking 6 tabs per day, 2 in am, 1 in noon and evening and 2 at bedtime)  -- I have asked her to discontinue her oxycodone, she is far enough from her surgery she should be off.  -- lidocaine patch and ultram and tylenol.  These meds are also limited in number.  3.  Hypertension.   -- Her blood pressures have been on the softer side.   -- hold her Maxzide for soft blood pressures.  -- continue clonidine and candesartan.   4.  History of deep venous thrombosis.   -- continue on Xarelto   5.  Reflux/HLP.   -- continue the patient on her proton-pump inhibitor (PPI) and Pravachol.   6.  Chronic kidney disease.  The patient's BUN to  creatinine ratio is elevated.  She has not been drinking as much.  She was given IVF            Code Status:      Full Code         Important Results:      NAD  LUNGS CTA  CV RRR   GI SOFT BS +  MS NO EDEMA  NEURO NON FOCAL  DERM WARM AND PERFUSED         Pending Results:        Unresulted Labs Ordered in the Past 30 Days of this Admission     No orders found for last 61 day(s).               Discharge Instructions and Follow-Up:      Follow-up Appointments     Follow-up and recommended labs and tests       Follow up with primary care provider, Steven Fonseca, within 7 days   as scheduled.                  Discharge Disposition:      Discharged to home         Discharge Medications:        Current Discharge Medication List      START taking these medications    Details   Lidocaine (LIDOCARE) 4 % Patch Place 1 patch onto the skin every 24 hours To prevent lidocaine toxicity, patient should be patch free for 12 hrs daily.  Qty: 10 patch, Refills: 0    Associated Diagnoses: Status post hip replacement, unspecified laterality      traMADol (ULTRAM) 50 MG tablet Take 1 tablet (50 mg) by mouth every 6 hours as needed for moderate pain  Qty: 14 tablet, Refills: 0    Associated Diagnoses: Status post hip replacement, unspecified laterality         CONTINUE these medications which have NOT CHANGED    Details   albuterol (PROAIR HFA/PROVENTIL HFA/VENTOLIN HFA) 108 (90 BASE) MCG/ACT Inhaler Inhale 2 puffs into the lungs every 6 hours INDICATION: RESCUE INHALER FOR SHORTNESS OF BREATH, CHEST TIGHTNESS AND COUGH  Qty: 1 Inhaler, Refills: 11    Comments: MD aware of Advair intolerance  Associated Diagnoses: Mild persistent asthma without complication      alpha-lipoic acid 600 MG CAPS Take 600 mg by mouth 3 times daily       ALPRAZolam (XANAX) 0.25 MG tablet TAKE ONE TABLET BY MOUTH ONCE DAILY AT  NIGHT  AS  NEEDED  FOR  SEVERE  ANXIETY  ONLY  Qty: 30 tablet, Refills: 0    Associated Diagnoses: Anxiety      ascorbic acid  (VITAMIN C) 1000 MG TABS Take 1 tablet (1,000 mg) by mouth daily VITAMIN C REPLACEMENT  Qty: 100 tablet, Refills: 3    Associated Diagnoses: Scurvy      butalbital-acetaminophen-caffeine (FIORICET/ESGIC) -40 MG per tablet TAKE ONE TABLET BY MOUTH EVERY 8 HOURS AS NEEDED FOR  SEVERE  MIGRAINE  HEADACHE  ONLY  Qty: 30 tablet, Refills: 1    Associated Diagnoses: Migraine with aura and without status migrainosus, not intractable      candesartan (ATACAND) 16 MG tablet Take 1 tablet (16 mg) by mouth 2 times daily  Qty: 180 tablet, Refills: 3    Associated Diagnoses: Benign essential hypertension      cloNIDine (CATAPRES) 0.1 MG tablet Take 2 tablets (0.2 mg) by mouth 3 times daily (with meals) INDICATION: TO LOWER BLOOD PRESSURE  Qty: 540 tablet, Refills: 3    Associated Diagnoses: Essential hypertension, benign      esomeprazole (NEXIUM) 40 MG capsule Take 1 capsule (40 mg) by mouth every morning (before breakfast) TAKE  30'-60' BEFORE 1ST MEAL  Qty: 180 capsule, Refills: 2    Associated Diagnoses: Gastroesophageal reflux disease without esophagitis      glimepiride (AMARYL) 2 MG tablet Take 4 mg by mouth 2 times daily      LACTOBACILLUS EXTRA STRENGTH CAPS Take 1 capsule by mouth daily INDICATION: TO RESTORE GOOD INTESTINAL BACTERIA  Qty: 30 capsule, Refills: prn    Associated Diagnoses: Dyspepsia      Melatonin 10 MG TABS tablet Take 10 mg by mouth At Bedtime      methenamine (HIPREX) 1 g tablet Take 1 tablet (1 g) by mouth 2 times daily  Qty: 120 tablet, Refills: 3    Associated Diagnoses: Frequent UTI      nitroglycerin (NITROSTAT) 0.4 MG SL tablet Place 1 tablet (0.4 mg) under the tongue every 5 minutes as needed for chest pain  Qty: 25 tablet, Refills: 0    Associated Diagnoses: Exertional dyspnea      oxybutynin ER (DITROPAN-XL) 10 MG 24 hr tablet Take 1 tablet (10 mg) by mouth At Bedtime  Qty: 60 tablet, Refills: 3    Associated Diagnoses: Overactive bladder      ranitidine (ZANTAC) 75 MG tablet Take 150  mg by mouth At Bedtime      rivaroxaban ANTICOAGULANT (XARELTO) 10 MG TABS tablet Take 10 mg by mouth daily (with dinner)       rosuvastatin (CRESTOR) 10 MG tablet Take 10 mg by mouth daily      timolol maleate (TIMOPTIC) 0.5 % ophthalmic solution Place 1 drop into both eyes 2 times daily      triamterene-hydrochlorothiazide (MAXZIDE-25) 37.5-25 MG per tablet Take 1 tablet by mouth every morning INDICATION:TO LOWER BLOOD PRESSURE AND CONTROL EDEMA  Qty: 90 tablet, Refills: PRN    Associated Diagnoses: Benign essential hypertension      BETA BLOCKER NOT PRESCRIBED, INTENTIONAL, Beta Blocker not prescribed intentionally due to Intolerance  Refills: 0    Associated Diagnoses: Type 2 diabetes, HbA1c goal < 7% (H); Hypertension goal BP (blood pressure) < 130/80      vitamin D (ERGOCALCIFEROL) 41850 UNIT capsule Take 50,000 Units by mouth once a week wednesdays         STOP taking these medications       oxyCODONE (OXY-IR) 5 MG capsule Comments:   Reason for Stopping:                    Allergies:         Allergies   Allergen Reactions     Codeine Difficulty breathing     Chest gets tight & difficulty breathing      Diatrizoate Shortness Of Breath     Other reaction(s): Shortness Of Breath  rapid heart beat  rapid heart beat       Morphine      Other reaction(s): GI Upset  Patient says she will not take because causes GI Upset     Oxycodone-Acetaminophen      Other reaction(s): GI Upset  Patient says she will not take because causes GI Upset     Atenolol Other (See Comments)     Sob w/ exertion - asthma symptoms     Diltiazem Other (See Comments)     edema - fluid retention in legs    Other reaction(s): Edema  Other reaction(s): Edema       Labetalol Other (See Comments) and Itching     Sob w/ exertion - asthma symptoms  itching     Lipitor [Atorvastatin Calcium] Other (See Comments)     Muscle weakness     Nifedipine Other (See Comments)     Sob w/ exertion - asthma symptoms, HAs    Other reaction(s): Other (See  Comments)  Sob w/ exertion - asthma symptoms, HAs     Sulfa Drugs Hives     hives     Zocor [Simvastatin] Other (See Comments)     Muscle weakness     Advair Diskus Other (See Comments)     hoarseness with 500/50, not the 250/50     Aleve [Naproxen] Other (See Comments)     Raises BP     Amlodipine      Fatigue  Fatigue       Amlodipine Besylate Fatigue and Diarrhea     Blood-Group Specific Substance Other (See Comments)     Patient has anti-[place]. Blood product orders may be delayed.  Other reaction(s): Other - Describe In Comment Field  Patient has anti-[place]. Blood product orders may be delayed.       Bystolic [Nebivolol Hcl] Other (See Comments)     headaches     Calcium Carbonate Diarrhea     Crestor [Rosuvastatin Calcium] Muscle Pain (Myalgia)     Diovan [Valsartan] GI Disturbance     gas       Ezetimibe Muscle Pain (Myalgia)     Other reaction(s): Muscle Weakness  Other reaction(s): Myalgia       Fluticasone-Salmeterol Other (See Comments)     Hoarseness with 50/50 not the 25/50  hoarseness with 500/50, not the 250/50  Hoarseness with 50/50 not the 25/50       Hydralazine Other (See Comments) and Nausea     Increases pulse     Hydrochlorothiazide Other (See Comments)     increasing glucose     Imdur [Isosorbide Mononitrate] Other (See Comments)     headaches     Kcl GI Disturbance     gas      Lisinopril Cough     Hoarse voice     Lovastatin Muscle Pain (Myalgia)     Metformin Diarrhea            Metoprolol Other (See Comments)     Sob w/ exertion - asthma symptoms       Niacin Other (See Comments)     hyperglycemia     No Clinical Screening - See Comments      PN: LW CM1: >>> NO CONTRAST ADVERSE REACTION <<<     Reaction :  KCL---gas sx     Omnicef Diarrhea     Pravachol [Hmg-Coa-R Inhibitors] Muscle Pain (Myalgia)     Prilosec Otc [Omeprazole Magnesium] Diarrhea     diarrhea       Zetia [Ezetimibe] Muscle Pain (Myalgia)     weakness            Consultations This Hospital Stay:      No consultations were  requested during this admission          Discharge Orders       After Care Instructions     Activity      Your activity upon discharge: activity as tolerated         Diet      Follow this diet upon discharge: Orders Placed This Encounter      Moderate Consistent CHO Diet                      Discharge Time:      Less than 30 minutes.        Image Results From This Hospital Stay (For Non-EPIC Providers):        Results for orders placed or performed during the hospital encounter of 05/16/19   XR Esophagram    Narrative    ESOPHAGRAM   5/16/2019 9:25 AM    HISTORY:  78-year-old woman with dysphagia. Patient has history of  esophageal ulcers and family member with Zenker's diverticulum.  Question of esophageal dysphagia.    COMPARISON: None.    TECHNIQUE: Patient was brought to the radiology department and placed  initially in a standing position. Crystals were administered followed  by thin barium. Patient was then placed in a semiupright prone  position and thick barium administered. A total of 14 spot  fluoroscopic images and sequences obtained throughout the procedure.  Total fluoroscopic time was 2 minutes.  Total fluoroscopic dose is 16.7 mCi.    FINDINGS: Small hiatal hernia is visible. Mild dysmotility of the  distal esophagus with slight esophageal spasm periodically throughout  the exam, though only for short period of time. No diverticulum  identified. No ulceration. Small hiatal hernia. No weblike narrowing  or persistent area of narrowing.      Impression    IMPRESSION: Small hiatal hernia with minimal dysmotility of the distal  esophagus.      JLUIS BALLARD MD           Most Recent Lab Results In EPIC (For Non-EPIC Providers):    Most Recent 3 CBC's:  Recent Labs   Lab Test 07/12/19  0801 12/17/17  2353 11/08/17  1026   WBC 7.7 8.6 6.1   HGB 10.3* 13.5 12.7   MCV 96 93 95    157 149*      Most Recent 3 BMP's:  Recent Labs   Lab Test 07/12/19  0801 10/01/18 04/05/18  0929 12/17/17  2353     137 141 141   POTASSIUM 4.0 3.9 4.2 3.9   CHLORIDE 104 105 108 107   CO2 29 26 28 27   BUN 26 37  39 30 32*   CR 1.04 0.95 1.16* 1.03   ANIONGAP 8  --  5 7   ROGE 9.6 9.3 9.3 9.3   GLC 73 228 111* 186*     Most Recent 3 Troponin's:  Recent Labs   Lab Test 04/10/16  1938 09/22/14  1142 03/26/12  1500   TROPI <0.015  The 99th percentile for upper reference range is 0.045 ug/L.  Troponin values in   the range of 0.045 - 0.120 ug/L may be associated with risks of adverse   clinical events.   <0.015  The 99th percentile for upper reference range is 0.045 ug/L.  Troponin values in   the range of 0.045 - 0.120 ug/L may be associated with risks of adverse   clinical events.   Effective 7/30/2014, the reference range for this assay has changed to reflect   new instrumentation/methodology.   <0.012     Most Recent 3 INR's:  Recent Labs   Lab Test 02/27/17  0735 11/05/12 10/15/12   INR 0.92 2.2* 2.1*     Most Recent 2 LFT's:  Recent Labs   Lab Test 04/05/18  0929 12/17/17  2353   AST 22 23   ALT 25 30   ALKPHOS 64 110   BILITOTAL 0.3 0.1*     Most Recent Cholesterol Panel:  Recent Labs   Lab Test 04/05/18  0929   CHOL 179   LDL 87   HDL 51   TRIG 204*     Most Recent 6 Bacteria Isolates From Any Culture (See EPIC Reports for Culture Details):  Recent Labs   Lab Test 05/02/19  1001 04/04/19  1110 03/05/19  1014 09/08/17  1509 08/20/17  1035 01/17/15  1053   CULT 10,000 to 50,000 colonies/mL  mixed urogenital gabi  Susceptibility testing not routinely done   50,000 to 100,000 colonies/mL  Escherichia coli  * >100,000 colonies/mL  Escherichia coli  * >100,000 colonies/mL  Escherichia coli  * 50,000 to 100,000 colonies/mL  Klebsiella pneumoniae  *  50,000 to 100,000 colonies/mL  Escherichia coli  * 10,000 to 50,000 colonies/mL Escherichia coli*     Most Recent TSH, T4 and HgbA1c:  Recent Labs   Lab Test 07/12/19  1205  11/08/17  1026  06/28/16  0900   TSH  --   --  2.36  --  2.39   T4  --   --   --   --  1.12   A1C 6.0*   < >   --    < > 6.3*    < > = values in this interval not displayed.

## 2019-07-18 ENCOUNTER — TELEPHONE (OUTPATIENT)
Dept: INTERNAL MEDICINE | Facility: CLINIC | Age: 79
End: 2019-07-18

## 2019-07-18 NOTE — TELEPHONE ENCOUNTER
Patient has new PCP. Dr. Fonseca outside of [facility] clinic.   unsure if patient is still seeking care here.    ED / Discharge Outreach Protocol    Patient Contact    Attempt # 1    Was call answered?  No.  Left message on voicemail with information to call me back.    Carmenza HENDRICKS RN, BSN, PHN

## 2019-07-19 NOTE — TELEPHONE ENCOUNTER
ED / Discharge Outreach Protocol    Patient Contact    Attempt # 2    Was call answered?  No.  Left message on voicemail with information to call me back.      Carmenza HENDRICKS RN, BSN, PHN

## 2019-10-02 ENCOUNTER — HEALTH MAINTENANCE LETTER (OUTPATIENT)
Age: 79
End: 2019-10-02

## 2019-12-02 DIAGNOSIS — I10 BENIGN ESSENTIAL HYPERTENSION: ICD-10-CM

## 2019-12-02 RX ORDER — CANDESARTAN 16 MG/1
16 TABLET ORAL 2 TIMES DAILY
Qty: 180 TABLET | Refills: 3 | Status: SHIPPED | OUTPATIENT
Start: 2019-12-02 | End: 2020-12-09

## 2019-12-10 DIAGNOSIS — M85.80 OSTEOPENIA, UNSPECIFIED LOCATION: Primary | ICD-10-CM

## 2019-12-15 ENCOUNTER — HEALTH MAINTENANCE LETTER (OUTPATIENT)
Age: 79
End: 2019-12-15

## 2020-01-07 ENCOUNTER — OFFICE VISIT (OUTPATIENT)
Dept: CARDIOLOGY | Facility: CLINIC | Age: 80
End: 2020-01-07
Payer: COMMERCIAL

## 2020-01-07 VITALS
WEIGHT: 163 LBS | SYSTOLIC BLOOD PRESSURE: 136 MMHG | HEIGHT: 63 IN | BODY MASS INDEX: 28.88 KG/M2 | DIASTOLIC BLOOD PRESSURE: 78 MMHG | HEART RATE: 59 BPM

## 2020-01-07 DIAGNOSIS — I10 BENIGN ESSENTIAL HYPERTENSION: Primary | ICD-10-CM

## 2020-01-07 DIAGNOSIS — I70.1 RENAL ARTERY STENOSIS (H): ICD-10-CM

## 2020-01-07 DIAGNOSIS — E78.5 HYPERLIPIDEMIA LDL GOAL <100: ICD-10-CM

## 2020-01-07 DIAGNOSIS — R06.09 EXERTIONAL DYSPNEA: ICD-10-CM

## 2020-01-07 DIAGNOSIS — I10 BENIGN ESSENTIAL HYPERTENSION: ICD-10-CM

## 2020-01-07 LAB
ALT SERPL W P-5'-P-CCNC: 5 U/L (ref 5–30)
ANION GAP SERPL CALCULATED.3IONS-SCNC: 13.1 MMOL/L (ref 6–17)
BUN SERPL-MCNC: 37 MG/DL (ref 7–30)
CALCIUM SERPL-MCNC: 9.6 MG/DL (ref 8.5–10.5)
CHLORIDE SERPL-SCNC: 106 MMOL/L (ref 98–107)
CHOLEST SERPL-MCNC: 130 MG/DL
CO2 SERPL-SCNC: 24 MMOL/L (ref 23–29)
CREAT SERPL-MCNC: 1.26 MG/DL (ref 0.7–1.3)
GFR SERPL CREATININE-BSD FRML MDRD: 41 ML/MIN/{1.73_M2}
GLUCOSE SERPL-MCNC: 200 MG/DL (ref 70–105)
HDLC SERPL-MCNC: 43 MG/DL
LDLC SERPL CALC-MCNC: 40 MG/DL
NONHDLC SERPL-MCNC: 87 MG/DL
POTASSIUM SERPL-SCNC: 4.1 MMOL/L (ref 3.5–5.1)
SODIUM SERPL-SCNC: 139 MMOL/L (ref 136–145)
TRIGL SERPL-MCNC: 237 MG/DL

## 2020-01-07 PROCEDURE — 80048 BASIC METABOLIC PNL TOTAL CA: CPT | Performed by: INTERNAL MEDICINE

## 2020-01-07 PROCEDURE — 99214 OFFICE O/P EST MOD 30 MIN: CPT | Performed by: INTERNAL MEDICINE

## 2020-01-07 PROCEDURE — 36415 COLL VENOUS BLD VENIPUNCTURE: CPT | Performed by: INTERNAL MEDICINE

## 2020-01-07 PROCEDURE — 80061 LIPID PANEL: CPT | Performed by: INTERNAL MEDICINE

## 2020-01-07 PROCEDURE — 84460 ALANINE AMINO (ALT) (SGPT): CPT | Performed by: INTERNAL MEDICINE

## 2020-01-07 RX ORDER — CHOLECALCIFEROL (VITAMIN D3) 125 MCG
1000 CAPSULE ORAL DAILY
COMMUNITY

## 2020-01-07 RX ORDER — LACTOBACILLUS RHAMNOSUS GG 10B CELL
1 CAPSULE ORAL 2 TIMES DAILY
COMMUNITY

## 2020-01-07 RX ORDER — METHOCARBAMOL 750 MG/1
750 TABLET, FILM COATED ORAL 4 TIMES DAILY PRN
COMMUNITY

## 2020-01-07 RX ORDER — FAMOTIDINE 40 MG/1
40 TABLET, FILM COATED ORAL DAILY
COMMUNITY

## 2020-01-07 RX ORDER — ASPIRIN 81 MG/1
81 TABLET ORAL DAILY
COMMUNITY
End: 2022-09-29

## 2020-01-07 ASSESSMENT — MIFFLIN-ST. JEOR: SCORE: 1183.49

## 2020-01-07 NOTE — PROGRESS NOTES
HPI and Plan:   See dictation    Orders Placed This Encounter   Procedures     Basic metabolic panel     Lipid Profile     ALT     Follow-Up with Cardiologist       Orders Placed This Encounter   Medications     aspirin 81 MG EC tablet     Sig: Take 81 mg by mouth daily     lactobacillus rhamnosus, GG, (CULTURELL) capsule     Sig: Take 1 capsule by mouth 2 times daily     Cranberry 125 MG TABS     B-12, Methylcobalamin, 1000 MCG SUBL     vitamin D3 (CHOLECALCIFEROL) 28066 units (250 mcg) capsule     Sig: Take 1 capsule by mouth daily     empagliflozin (JARDIANCE) 10 MG TABS tablet     Sig: Take 10 mg by mouth daily     famotidine (PEPCID) 10 MG tablet     Sig: Take 10 mg by mouth 2 times daily     methocarbamol (ROBAXIN) 750 MG tablet     Sig: Take 750 mg by mouth 4 times daily as needed for muscle spasms       Medications Discontinued During This Encounter   Medication Reason     ranitidine (ZANTAC) 75 MG tablet Stopped by Patient         Encounter Diagnoses   Name Primary?     Benign essential hypertension Yes     Renal artery stenosis (H)      Hyperlipidemia LDL goal <100      Exertional dyspnea        CURRENT MEDICATIONS:  Current Outpatient Medications   Medication Sig Dispense Refill     albuterol (PROAIR HFA/PROVENTIL HFA/VENTOLIN HFA) 108 (90 BASE) MCG/ACT Inhaler Inhale 2 puffs into the lungs every 6 hours INDICATION: RESCUE INHALER FOR SHORTNESS OF BREATH, CHEST TIGHTNESS AND COUGH 1 Inhaler 11     alpha-lipoic acid 600 MG CAPS Take 600 mg by mouth 3 times daily        ALPRAZolam (XANAX) 0.25 MG tablet TAKE ONE TABLET BY MOUTH ONCE DAILY AT  NIGHT  AS  NEEDED  FOR  SEVERE  ANXIETY  ONLY 30 tablet 0     ascorbic acid (VITAMIN C) 1000 MG TABS Take 1 tablet (1,000 mg) by mouth daily VITAMIN C REPLACEMENT 100 tablet 3     aspirin 81 MG EC tablet Take 81 mg by mouth daily       B-12, Methylcobalamin, 1000 MCG SUBL        butalbital-acetaminophen-caffeine (FIORICET/ESGIC) -40 MG per tablet TAKE ONE TABLET  BY MOUTH EVERY 8 HOURS AS NEEDED FOR  SEVERE  MIGRAINE  HEADACHE  ONLY 30 tablet 1     candesartan (ATACAND) 16 MG tablet Take 1 tablet (16 mg) by mouth 2 times daily 180 tablet 3     cloNIDine (CATAPRES) 0.1 MG tablet Take 2 tablets (0.2 mg) by mouth 3 times daily (with meals) INDICATION: TO LOWER BLOOD PRESSURE (Patient taking differently: Take 0.1 mg by mouth 2 times daily INDICATION: TO LOWER BLOOD PRESSURE) 540 tablet 3     Cranberry 125 MG TABS        empagliflozin (JARDIANCE) 10 MG TABS tablet Take 10 mg by mouth daily       esomeprazole (NEXIUM) 40 MG capsule Take 1 capsule (40 mg) by mouth every morning (before breakfast) TAKE  30'-60' BEFORE 1ST MEAL 180 capsule 2     famotidine (PEPCID) 10 MG tablet Take 10 mg by mouth 2 times daily       glimepiride (AMARYL) 2 MG tablet Take 4 mg by mouth 2 times daily       lactobacillus rhamnosus, GG, (CULTURELL) capsule Take 1 capsule by mouth 2 times daily       Melatonin 10 MG TABS tablet Take 10 mg by mouth At Bedtime       methenamine (HIPREX) 1 g tablet Take 1 tablet (1 g) by mouth 2 times daily 120 tablet 3     methocarbamol (ROBAXIN) 750 MG tablet Take 750 mg by mouth 4 times daily as needed for muscle spasms       nitroglycerin (NITROSTAT) 0.4 MG SL tablet Place 1 tablet (0.4 mg) under the tongue every 5 minutes as needed for chest pain 25 tablet 0     oxybutynin ER (DITROPAN-XL) 10 MG 24 hr tablet Take 1 tablet (10 mg) by mouth At Bedtime 60 tablet 3     rosuvastatin (CRESTOR) 10 MG tablet Take 10 mg by mouth daily       timolol maleate (TIMOPTIC) 0.5 % ophthalmic solution Place 1 drop into both eyes 2 times daily       triamterene-hydrochlorothiazide (MAXZIDE-25) 37.5-25 MG per tablet Take 1 tablet by mouth every morning INDICATION:TO LOWER BLOOD PRESSURE AND CONTROL EDEMA (Patient taking differently: Take 0.5 tablets by mouth every morning INDICATION:TO LOWER BLOOD PRESSURE AND CONTROL EDEMA) 90 tablet PRN     vitamin D (ERGOCALCIFEROL) 17375 UNIT capsule  Take 50,000 Units by mouth once a week wednesdays       vitamin D3 (CHOLECALCIFEROL) 55324 units (250 mcg) capsule Take 1 capsule by mouth daily       BETA BLOCKER NOT PRESCRIBED, INTENTIONAL, Beta Blocker not prescribed intentionally due to Intolerance (Patient not taking: Reported on 1/7/2020)  0     Lidocaine (LIDOCARE) 4 % Patch Place 1 patch onto the skin every 24 hours To prevent lidocaine toxicity, patient should be patch free for 12 hrs daily. (Patient not taking: Reported on 1/7/2020) 10 patch 0     rivaroxaban ANTICOAGULANT (XARELTO) 10 MG TABS tablet Take 10 mg by mouth daily (with dinner)        traMADol (ULTRAM) 50 MG tablet Take 1 tablet (50 mg) by mouth every 6 hours as needed for moderate pain (Patient not taking: Reported on 1/7/2020) 14 tablet 0       ALLERGIES     Allergies   Allergen Reactions     Codeine Difficulty breathing     Chest gets tight & difficulty breathing      Diatrizoate Shortness Of Breath     Other reaction(s): Shortness Of Breath  rapid heart beat  rapid heart beat       Morphine      Other reaction(s): GI Upset  Patient says she will not take because causes GI Upset     Oxycodone-Acetaminophen      Other reaction(s): GI Upset  Patient says she will not take because causes GI Upset     Atenolol Other (See Comments)     Sob w/ exertion - asthma symptoms     Diltiazem Other (See Comments)     edema - fluid retention in legs    Other reaction(s): Edema  Other reaction(s): Edema       Labetalol Other (See Comments) and Itching     Sob w/ exertion - asthma symptoms  itching     Lipitor [Atorvastatin Calcium] Other (See Comments)     Muscle weakness     Nifedipine Other (See Comments)     Sob w/ exertion - asthma symptoms, HAs    Other reaction(s): Other (See Comments)  Sob w/ exertion - asthma symptoms, HAs     Sulfa Drugs Hives     hives     Zocor [Simvastatin] Other (See Comments)     Muscle weakness     Advair Diskus Other (See Comments)     hoarseness with 500/50, not the 250/50      Aleve [Naproxen] Other (See Comments)     Raises BP     Amlodipine      Fatigue  Fatigue       Amlodipine Besylate Fatigue and Diarrhea     Blood-Group Specific Substance Other (See Comments)     Patient has anti-[place]. Blood product orders may be delayed.  Other reaction(s): Other - Describe In Comment Field  Patient has anti-[place]. Blood product orders may be delayed.       Bystolic [Nebivolol Hcl] Other (See Comments)     headaches     Calcium Carbonate Diarrhea     Crestor [Rosuvastatin Calcium] Muscle Pain (Myalgia)     Diovan [Valsartan] GI Disturbance     gas       Ezetimibe Muscle Pain (Myalgia)     Other reaction(s): Muscle Weakness  Other reaction(s): Myalgia       Fluticasone-Salmeterol Other (See Comments)     Hoarseness with 50/50 not the 25/50  hoarseness with 500/50, not the 250/50  Hoarseness with 50/50 not the 25/50       Hydralazine Other (See Comments) and Nausea     Increases pulse     Hydrochlorothiazide Other (See Comments)     increasing glucose     Imdur [Isosorbide Mononitrate] Other (See Comments)     headaches     Kcl GI Disturbance     gas      Lisinopril Cough     Hoarse voice     Lovastatin Muscle Pain (Myalgia)     Metformin Diarrhea            Metoprolol Other (See Comments)     Sob w/ exertion - asthma symptoms       Niacin Other (See Comments)     hyperglycemia     No Clinical Screening - See Comments      PN: LW CM1: >>> NO CONTRAST ADVERSE REACTION <<<     Reaction :  KCL---gas sx     Omnicef Diarrhea     Pravachol [Hmg-Coa-R Inhibitors] Muscle Pain (Myalgia)     Prilosec Otc [Omeprazole Magnesium] Diarrhea     diarrhea       Zetia [Ezetimibe] Muscle Pain (Myalgia)     weakness       PAST MEDICAL HISTORY:  Past Medical History:   Diagnosis Date     Acute peptic ulcer, unspecified site, without mention of hemorrhage, perforation, or obstruction      Allergic rhinitis, cause unspecified      Anxiety      Basal cell carcinoma      DVT (deep venous thrombosis) (H) 03/2012     following spinal surgery. Saw Oncology. 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 Saw Oncology. treated 6 months     Peripheral neuropathy 6/19/2015     Pneumonia, organism unspecified(486)      Spinal stenosis      Squamous cell carcinoma      Thyroid nodule      Type II or unspecified type diabetes mellitus without mention of complication, not stated as uncontrolled      Unspecified essential hypertension        PAST SURGICAL HISTORY:  Past Surgical History:   Procedure Laterality Date     C NONSPECIFIC PROCEDURE  approx 1970's    hyst/bso     C NONSPECIFIC PROCEDURE  approx 1970's    gb     C NONSPECIFIC PROCEDURE  approx 1980's    cystocele repair     C NONSPECIFIC PROCEDURE  approx 1980's    endoscopic sinus      C NONSPECIFIC PROCEDURE      epidurals x 7     C NONSPECIFIC PROCEDURE      nl colonoscopy 1/2006     C NONSPECIFIC PROCEDURE  2011    L2-L3 foramenotomies     C NONSPECIFIC PROCEDURE  2012    lumbar fusion     NONSPECIFIC PROCEDURE  2011    Bilateral sphenoidotomy with removal of polypoid tissue.     Right frontal sinusotomy.   Bilateral total ethmoidectomy.  Bilateral maxillary antrostomy with removal of polypoid tissue.       ORTHOPEDIC SURGERY      lumbar fusion       FAMILY HISTORY:  Family History   Problem Relation Age of Onset     Breast Cancer Mother      Heart Disease  Father 25         in a fire      Heart Disease Paternal Grandfather      Breast Cancer Sister      Hyperlipidemia Sister        SOCIAL HISTORY:  Social History     Socioeconomic History     Marital status:      Spouse name: None     Number of children: None     Years of education: None     Highest education level: None   Occupational History     None   Social Needs     Financial resource strain: None     Food insecurity:     Worry: None     Inability: None     Transportation needs:     Medical: None     Non-medical: None   Tobacco Use     Smoking status: Never Smoker     Smokeless tobacco: Never Used   Substance and Sexual Activity     Alcohol use: No     Alcohol/week: 0.0 standard drinks     Comment: very rare     Drug use: No     Sexual activity: Yes   Lifestyle     Physical activity:     Days per week: None     Minutes per session: None     Stress: None   Relationships     Social connections:     Talks on phone: None     Gets together: None     Attends [employer] service: None     Active member of club or organization: None     Attends meetings of clubs or organizations: None     Relationship status: None     Intimate partner violence:     Fear of current or ex partner: None     Emotionally abused: None     Physically abused: None     Forced sexual activity: None   Other Topics Concern     Parent/sibling w/ CABG, MI or angioplasty before 65F 55M? No      Service Not Asked     Blood Transfusions Not Asked     Caffeine Concern No     Comment: soda 1 time per day     Occupational Exposure Not Asked     Hobby Hazards Not Asked     Sleep Concern Not Asked     Stress Concern Not Asked     Weight Concern Not Asked     Special Diet No     Back Care Not Asked     Exercise No     Comment: limited due to back pain      Bike Helmet Not Asked     Seat Belt Not Asked     Self-Exams Not Asked   Social History Narrative     None       Review of Systems:  Skin:  Positive for itching     Eyes:  Negative      ENT:  " Positive for nasal congestion    Respiratory:  Negative dyspnea on exertion;wheezing     Cardiovascular:  Negative Positive for;dizziness dizzy is postional  Gastroenterology: Positive for heartburn(pt. taking zantac)    Genitourinary:  Negative urinary frequency;dysuria    Musculoskeletal:  Positive for back pain;nocturnal cramping;neck pain;arthritis hx right  hip replacement 6/2019  Neurologic:  Positive for migraine headaches;headaches;numbness or tingling of feet    Psychiatric:  Negative anxiety;excessive stress;sleep disturbances    Heme/Lymph/Imm:  Positive for allergies    Endocrine:  Positive for diabetes      Physical Exam:  Vitals: /78   Pulse 59   Ht 1.6 m (5' 3\")   Wt 73.9 kg (163 lb)   BMI 28.87 kg/m      Constitutional:  cooperative, alert and oriented, well developed, well nourished, in no acute distress overweight      Skin:  warm and dry to the touch, no apparent skin lesions or masses noted          Head:  normocephalic, no masses or lesions        Eyes:  pupils equal and round        Lymph:      ENT:  no pallor or cyanosis        Neck:  carotid pulses are full and equal bilaterally, JVP normal, no carotid bruit        Respiratory:  clear to auscultation         Cardiac: regular rhythm;normal S1 and S2   S4            pulses full and equal, no bruits auscultated                                   right femoral access site clean dry and intact with small lump at the site and mild ecchymosis    GI:  not assessed this visit        Extremities and Muscular Skeletal:  no edema;no deformities, clubbing, cyanosis, erythema observed              Neurological:  no gross motor deficits;affect appropriate (Uses a walker)      Psych:  Alert and Oriented x 3        CC  Steven Fonseca MD  [facility]  [address] [address]              "

## 2020-01-07 NOTE — PROGRESS NOTES
Service Date: 01/07/2020      CARDIOLOGY FOLLOWUP VISIT      HISTORY OF PRESENT ILLNESS:  It was my pleasure to see your patient, Ginger Abreu, who is a 79-year-old patient with a history of difficult-to-control hypertension, pulmonary embolism, hyperlipidemia, diabetes mellitus. If you remember, she was found to have bilateral renal artery stenosis and she underwent stenting of the severe stenosis on the left.  The moderate stenosis of the right was left to be treated medically.  She is on an unusual pattern of antihypertensives.  As you know, she takes candesartan 16 mg twice a day.  Normally, candesartan is a once-a-day medication.  She also takes clonidine 0.2 mg 3 times a day with meals initially.  Now, she has herself changed it to 0.1 mg 2 times a day.      With that background in mind, the patient feels well.  Her blood pressure is under better control now.  She is on less medications to control her blood pressure.  Today, her blood pressure is 136/78.  She does have a history of mixed hyperlipidemia.  Lipids today showed an LDL of 40, mild HDL deficiency at 43 and triglycerides of 237.  The patient was not fasting this morning which may account for the higher triglycerides.  She is also a diabetic which may also account for the higher triglyceride levels.  Her renal function is slightly worse than previously.  BUN is 37, creatinine is 1.26 and GFR of 41.  Sodium is 139, potassium is 4.1.      IMPRESSION:   1.  Essential hypertension.  Since the stenting of the left renal artery, her blood pressure appears to be much easier to control.   2.  Mixed hyperlipidemia as described above.   3.  Diabetes mellitus.      PLAN:  We will continue the patient on her present medications.  I will see the patient back again in 1 year's time but as always, she has been told to contact us if she has any questions or any concerns.      cc:   Steven Fonseca MD    [facility]   [address]   [place]  [address]         BHAV[facility] ORTEGA MD, [facility]             D: 2020   T: 2020   MT: VERONICA      Name:     SARAY MÉNDEZ   MRN:      [id number]        Account:      [id number]   :      1940           Service Date: 2020      Document: [id number]

## 2020-01-07 NOTE — LETTER
1/7/2020    Steven Fonseca MD  [facility] [address]  [facility] [address]    RE: Ginger Abreu       Dear Colleague,    I had the pleasure of seeing Ginger Abreu in the [facility].    HPI and Plan:   See dictation    Orders Placed This Encounter   Procedures     Basic metabolic panel     Lipid Profile     ALT     Follow-Up with Cardiologist       Orders Placed This Encounter   Medications     aspirin 81 MG EC tablet     Sig: Take 81 mg by mouth daily     lactobacillus rhamnosus, GG, (CULTURELL) capsule     Sig: Take 1 capsule by mouth 2 times daily     Cranberry 125 MG TABS     B-12, Methylcobalamin, 1000 MCG SUBL     vitamin D3 (CHOLECALCIFEROL) 07624 units (250 mcg) capsule     Sig: Take 1 capsule by mouth daily     empagliflozin (JARDIANCE) 10 MG TABS tablet     Sig: Take 10 mg by mouth daily     famotidine (PEPCID) 10 MG tablet     Sig: Take 10 mg by mouth 2 times daily     methocarbamol (ROBAXIN) 750 MG tablet     Sig: Take 750 mg by mouth 4 times daily as needed for muscle spasms       Medications Discontinued During This Encounter   Medication Reason     ranitidine (ZANTAC) 75 MG tablet Stopped by Patient         Encounter Diagnoses   Name Primary?     Benign essential hypertension Yes     Renal artery stenosis (H)      Hyperlipidemia LDL goal <100      Exertional dyspnea        CURRENT MEDICATIONS:  Current Outpatient Medications   Medication Sig Dispense Refill     albuterol (PROAIR HFA/PROVENTIL HFA/VENTOLIN HFA) 108 (90 BASE) MCG/ACT Inhaler Inhale 2 puffs into the lungs every 6 hours INDICATION: RESCUE INHALER FOR SHORTNESS OF BREATH, CHEST TIGHTNESS AND COUGH 1 Inhaler 11     alpha-lipoic acid 600 MG CAPS Take 600 mg by mouth 3 times daily        ALPRAZolam (XANAX) 0.25 MG tablet TAKE ONE TABLET BY MOUTH ONCE DAILY AT  NIGHT  AS  NEEDED  FOR  SEVERE  ANXIETY  ONLY 30 tablet 0     ascorbic acid (VITAMIN C) 1000 MG TABS Take 1 tablet (1,000  mg) by mouth daily VITAMIN C REPLACEMENT 100 tablet 3     aspirin 81 MG EC tablet Take 81 mg by mouth daily       B-12, Methylcobalamin, 1000 MCG SUBL        butalbital-acetaminophen-caffeine (FIORICET/ESGIC) -40 MG per tablet TAKE ONE TABLET BY MOUTH EVERY 8 HOURS AS NEEDED FOR  SEVERE  MIGRAINE  HEADACHE  ONLY 30 tablet 1     candesartan (ATACAND) 16 MG tablet Take 1 tablet (16 mg) by mouth 2 times daily 180 tablet 3     cloNIDine (CATAPRES) 0.1 MG tablet Take 2 tablets (0.2 mg) by mouth 3 times daily (with meals) INDICATION: TO LOWER BLOOD PRESSURE (Patient taking differently: Take 0.1 mg by mouth 2 times daily INDICATION: TO LOWER BLOOD PRESSURE) 540 tablet 3     Cranberry 125 MG TABS        empagliflozin (JARDIANCE) 10 MG TABS tablet Take 10 mg by mouth daily       esomeprazole (NEXIUM) 40 MG capsule Take 1 capsule (40 mg) by mouth every morning (before breakfast) TAKE  30'-60' BEFORE 1ST MEAL 180 capsule 2     famotidine (PEPCID) 10 MG tablet Take 10 mg by mouth 2 times daily       glimepiride (AMARYL) 2 MG tablet Take 4 mg by mouth 2 times daily       lactobacillus rhamnosus, GG, (CULTURELL) capsule Take 1 capsule by mouth 2 times daily       Melatonin 10 MG TABS tablet Take 10 mg by mouth At Bedtime       methenamine (HIPREX) 1 g tablet Take 1 tablet (1 g) by mouth 2 times daily 120 tablet 3     methocarbamol (ROBAXIN) 750 MG tablet Take 750 mg by mouth 4 times daily as needed for muscle spasms       nitroglycerin (NITROSTAT) 0.4 MG SL tablet Place 1 tablet (0.4 mg) under the tongue every 5 minutes as needed for chest pain 25 tablet 0     oxybutynin ER (DITROPAN-XL) 10 MG 24 hr tablet Take 1 tablet (10 mg) by mouth At Bedtime 60 tablet 3     rosuvastatin (CRESTOR) 10 MG tablet Take 10 mg by mouth daily       timolol maleate (TIMOPTIC) 0.5 % ophthalmic solution Place 1 drop into both eyes 2 times daily       triamterene-hydrochlorothiazide (MAXZIDE-25) 37.5-25 MG per tablet Take 1 tablet by mouth every  morning INDICATION:TO LOWER BLOOD PRESSURE AND CONTROL EDEMA (Patient taking differently: Take 0.5 tablets by mouth every morning INDICATION:TO LOWER BLOOD PRESSURE AND CONTROL EDEMA) 90 tablet PRN     vitamin D (ERGOCALCIFEROL) 31247 UNIT capsule Take 50,000 Units by mouth once a week wednesdays       vitamin D3 (CHOLECALCIFEROL) 13715 units (250 mcg) capsule Take 1 capsule by mouth daily       BETA BLOCKER NOT PRESCRIBED, INTENTIONAL, Beta Blocker not prescribed intentionally due to Intolerance (Patient not taking: Reported on 1/7/2020)  0     Lidocaine (LIDOCARE) 4 % Patch Place 1 patch onto the skin every 24 hours To prevent lidocaine toxicity, patient should be patch free for 12 hrs daily. (Patient not taking: Reported on 1/7/2020) 10 patch 0     rivaroxaban ANTICOAGULANT (XARELTO) 10 MG TABS tablet Take 10 mg by mouth daily (with dinner)        traMADol (ULTRAM) 50 MG tablet Take 1 tablet (50 mg) by mouth every 6 hours as needed for moderate pain (Patient not taking: Reported on 1/7/2020) 14 tablet 0       ALLERGIES     Allergies   Allergen Reactions     Codeine Difficulty breathing     Chest gets tight & difficulty breathing      Diatrizoate Shortness Of Breath     Other reaction(s): Shortness Of Breath  rapid heart beat  rapid heart beat       Morphine      Other reaction(s): GI Upset  Patient says she will not take because causes GI Upset     Oxycodone-Acetaminophen      Other reaction(s): GI Upset  Patient says she will not take because causes GI Upset     Atenolol Other (See Comments)     Sob w/ exertion - asthma symptoms     Diltiazem Other (See Comments)     edema - fluid retention in legs    Other reaction(s): Edema  Other reaction(s): Edema       Labetalol Other (See Comments) and Itching     Sob w/ exertion - asthma symptoms  itching     Lipitor [Atorvastatin Calcium] Other (See Comments)     Muscle weakness     Nifedipine Other (See Comments)     Sob w/ exertion - asthma symptoms, HAs    Other  reaction(s): Other (See Comments)  Sob w/ exertion - asthma symptoms, HAs     Sulfa Drugs Hives     hives     Zocor [Simvastatin] Other (See Comments)     Muscle weakness     Advair Diskus Other (See Comments)     hoarseness with 500/50, not the 250/50     Aleve [Naproxen] Other (See Comments)     Raises BP     Amlodipine      Fatigue  Fatigue       Amlodipine Besylate Fatigue and Diarrhea     Blood-Group Specific Substance Other (See Comments)     Patient has anti-[place]. Blood product orders may be delayed.  Other reaction(s): Other - Describe In Comment Field  Patient has anti-[place]. Blood product orders may be delayed.       Bystolic [Nebivolol Hcl] Other (See Comments)     headaches     Calcium Carbonate Diarrhea     Crestor [Rosuvastatin Calcium] Muscle Pain (Myalgia)     Diovan [Valsartan] GI Disturbance     gas       Ezetimibe Muscle Pain (Myalgia)     Other reaction(s): Muscle Weakness  Other reaction(s): Myalgia       Fluticasone-Salmeterol Other (See Comments)     Hoarseness with 50/50 not the 25/50  hoarseness with 500/50, not the 250/50  Hoarseness with 50/50 not the 25/50       Hydralazine Other (See Comments) and Nausea     Increases pulse     Hydrochlorothiazide Other (See Comments)     increasing glucose     Imdur [Isosorbide Mononitrate] Other (See Comments)     headaches     Kcl GI Disturbance     gas      Lisinopril Cough     Hoarse voice     Lovastatin Muscle Pain (Myalgia)     Metformin Diarrhea            Metoprolol Other (See Comments)     Sob w/ exertion - asthma symptoms       Niacin Other (See Comments)     hyperglycemia     No Clinical Screening - See Comments      PN: LW CM1: >>> NO CONTRAST ADVERSE REACTION <<<     Reaction :  KCL---gas sx     Omnicef Diarrhea     Pravachol [Hmg-Coa-R Inhibitors] Muscle Pain (Myalgia)     Prilosec Otc [Omeprazole Magnesium] Diarrhea     diarrhea       Zetia [Ezetimibe] Muscle Pain (Myalgia)     weakness       PAST MEDICAL HISTORY:  Past Medical History:    Diagnosis Date     Acute peptic ulcer, unspecified site, without mention of hemorrhage, perforation, or obstruction      Allergic rhinitis, cause unspecified      Anxiety      Basal cell carcinoma      DVT (deep venous thrombosis) (H) 03/2012    following spinal surgery. Saw Oncology. 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 Saw Oncology. treated 6 months     Peripheral neuropathy 6/19/2015     Pneumonia, organism unspecified(486)      Spinal stenosis      Squamous cell carcinoma      Thyroid nodule      Type II or unspecified type diabetes mellitus without mention of complication, not stated as uncontrolled      Unspecified essential hypertension        PAST SURGICAL HISTORY:  Past Surgical History:   Procedure Laterality Date     C NONSPECIFIC PROCEDURE  approx 1970's    hyst/bso     C NONSPECIFIC PROCEDURE  approx 1970's    gb     C NONSPECIFIC PROCEDURE  approx 1980's    cystocele repair     C NONSPECIFIC PROCEDURE  approx 1980's    endoscopic sinus      C NONSPECIFIC PROCEDURE      epidurals x 7     C NONSPECIFIC PROCEDURE      nl colonoscopy 1/2006     C NONSPECIFIC PROCEDURE  2011    L2-L3 foramenotomies     C NONSPECIFIC PROCEDURE  2012    lumbar fusion     NONSPECIFIC PROCEDURE  2011    Bilateral sphenoidotomy with removal of polypoid tissue.     Right frontal sinusotomy.    Bilateral total ethmoidectomy.  Bilateral maxillary antrostomy with removal of polypoid tissue.       ORTHOPEDIC SURGERY      lumbar fusion       FAMILY HISTORY:  Family History   Problem Relation Age of Onset     Breast Cancer Mother      Heart Disease Father 25         in a fire      Heart Disease Paternal Grandfather      Breast Cancer Sister      Hyperlipidemia Sister        SOCIAL HISTORY:  Social History     Socioeconomic History     Marital status:      Spouse name: None     Number of children: None     Years of education: None     Highest education level: None   Occupational History     None   Social Needs     Financial resource strain: None     Food insecurity:     Worry: None     Inability: None     Transportation needs:     Medical: None     Non-medical: None   Tobacco Use     Smoking status: Never Smoker     Smokeless tobacco: Never Used   Substance and Sexual Activity     Alcohol use: No     Alcohol/week: 0.0 standard drinks     Comment: very rare     Drug use: No     Sexual activity: Yes   Lifestyle     Physical activity:     Days per week: None     Minutes per session: None     Stress: None   Relationships     Social connections:     Talks on phone: None     Gets together: None     Attends [employer] service: None     Active member of club or organization: None     Attends meetings of clubs or organizations: None     Relationship status: None     Intimate partner violence:     Fear of current or ex partner: None     Emotionally abused: None     Physically abused: None     Forced sexual activity: None   Other Topics Concern     Parent/sibling w/ CABG, MI or angioplasty before 65F 55M? No      Service Not Asked     Blood Transfusions Not Asked     Caffeine Concern No     Comment: soda 1 time per day     Occupational Exposure Not Asked     Hobby Hazards Not Asked     Sleep Concern Not Asked     Stress Concern Not Asked     Weight Concern Not Asked     Special Diet No     Back Care Not  "Asked     Exercise No     Comment: limited due to back pain      Bike Helmet Not Asked     Seat Belt Not Asked     Self-Exams Not Asked   Social History Narrative     None       Review of Systems:  Skin:  Positive for itching     Eyes:  Negative      ENT:  Positive for nasal congestion    Respiratory:  Negative dyspnea on exertion;wheezing     Cardiovascular:  Negative Positive for;dizziness dizzy is postional  Gastroenterology: Positive for heartburn(pt. taking zantac)    Genitourinary:  Negative urinary frequency;dysuria    Musculoskeletal:  Positive for back pain;nocturnal cramping;neck pain;arthritis hx right  hip replacement 6/2019  Neurologic:  Positive for migraine headaches;headaches;numbness or tingling of feet    Psychiatric:  Negative anxiety;excessive stress;sleep disturbances    Heme/Lymph/Imm:  Positive for allergies    Endocrine:  Positive for diabetes      Physical Exam:  Vitals: /78   Pulse 59   Ht 1.6 m (5' 3\")   Wt 73.9 kg (163 lb)   BMI 28.87 kg/m       Constitutional:  cooperative, alert and oriented, well developed, well nourished, in no acute distress overweight      Skin:  warm and dry to the touch, no apparent skin lesions or masses noted          Head:  normocephalic, no masses or lesions        Eyes:  pupils equal and round        Lymph:      ENT:  no pallor or cyanosis        Neck:  carotid pulses are full and equal bilaterally, JVP normal, no carotid bruit        Respiratory:  clear to auscultation         Cardiac: regular rhythm;normal S1 and S2   S4            pulses full and equal, no bruits auscultated                                   right femoral access site clean dry and intact with small lump at the site and mild ecchymosis    GI:  not assessed this visit        Extremities and Muscular Skeletal:  no edema;no deformities, clubbing, cyanosis, erythema observed              Neurological:  no gross motor deficits;affect appropriate (Uses a walker)      Psych:  Alert and " Oriented x 3        CC  Steven Fonseca MD  [facility]  [address]  [place] [address]                Thank you for allowing me to participate in the care of your patient.      Sincerely,     Vaibhav Maldonado MD, MD     [facility]    cc:   Steven Fonseca MD  [facility]  [address]  [place] [address]

## 2020-01-07 NOTE — LETTER
1/7/2020      Steven Fonseca MD  [facility] [address] [facility] [address]  [facility] [address]      RE: Ginger Abreu       Dear Colleague,    I had the pleasure of seeing Ginger Abreu in the [facility].    Service Date: 01/07/2020      CARDIOLOGY FOLLOWUP VISIT      HISTORY OF PRESENT ILLNESS:  It was my pleasure to see your patient, Ginger Abreu, who is a 79-year-old patient with a history of difficult-to-control hypertension, pulmonary embolism, hyperlipidemia, diabetes mellitus. If you remember, she was found to have bilateral renal artery stenosis and she underwent stenting of the severe stenosis on the left.  The moderate stenosis of the right was left to be treated medically.  She is on an unusual pattern of antihypertensives.  As you know, she takes candesartan 16 mg twice a day.  Normally, candesartan is a once-a-day medication.  She also takes clonidine 0.2 mg 3 times a day with meals initially.  Now, she has herself changed it to 0.1 mg 2 times a day.      With that background in mind, the patient feels well.  Her blood pressure is under better control now.  She is on less medications to control her blood pressure.  Today, her blood pressure is 136/78.  She does have a history of mixed hyperlipidemia.  Lipids today showed an LDL of 40, mild HDL deficiency at 43 and triglycerides of 237.  The patient was not fasting this morning which may account for the higher triglycerides.  She is also a diabetic which may also account for the higher triglyceride levels.  Her renal function is slightly worse than previously.  BUN is 37, creatinine is 1.26 and GFR of 41.  Sodium is 139, potassium is 4.1.      IMPRESSION:   1.  Essential hypertension.  Since the stenting of the left renal artery, her blood pressure appears to be much easier to control.   2.  Mixed hyperlipidemia as described above.   3.  Diabetes mellitus.      PLAN:  We will continue the patient on her  present medications.  I will see the patient back again in 1 year's time but as always, she has been told to contact us if she has any questions or any concerns.      cc:   Steven Fonseca MD    [facility]   [address]   [place] [address]         BHAVNA ORTEGA MD, [facility]             D: 2020   T: 2020   MT: DW      Name:     SARAY MÉNDEZ   MRN:      [id number]        Account:      [id number]   :      1940           Service Date: 2020      Document: [id number]         Outpatient Encounter Medications as of 2020   Medication Sig Dispense Refill     albuterol (PROAIR HFA/PROVENTIL HFA/VENTOLIN HFA) 108 (90 BASE) MCG/ACT Inhaler Inhale 2 puffs into the lungs every 6 hours INDICATION: RESCUE INHALER FOR SHORTNESS OF BREATH, CHEST TIGHTNESS AND COUGH 1 Inhaler 11     alpha-lipoic acid 600 MG CAPS Take 600 mg by mouth 3 times daily        ALPRAZolam (XANAX) 0.25 MG tablet TAKE ONE TABLET BY MOUTH ONCE DAILY AT  NIGHT  AS  NEEDED  FOR  SEVERE  ANXIETY  ONLY 30 tablet 0     ascorbic acid (VITAMIN C) 1000 MG TABS Take 1 tablet (1,000 mg) by mouth daily VITAMIN C REPLACEMENT 100 tablet 3     aspirin 81 MG EC tablet Take 81 mg by mouth daily       B-12, Methylcobalamin, 1000 MCG SUBL        butalbital-acetaminophen-caffeine (FIORICET/ESGIC) -40 MG per tablet TAKE ONE TABLET BY MOUTH EVERY 8 HOURS AS NEEDED FOR  SEVERE  MIGRAINE  HEADACHE  ONLY 30 tablet 1     candesartan (ATACAND) 16 MG tablet Take 1 tablet (16 mg) by mouth 2 times daily 180 tablet 3     cloNIDine (CATAPRES) 0.1 MG tablet Take 2 tablets (0.2 mg) by mouth 3 times daily (with meals) INDICATION: TO LOWER BLOOD PRESSURE (Patient taking differently: Take 0.1 mg by mouth 2 times daily INDICATION: TO LOWER BLOOD PRESSURE) 540 tablet 3     Cranberry 125 MG TABS        empagliflozin (JARDIANCE) 10 MG TABS tablet Take 10 mg by mouth daily       esomeprazole (NEXIUM) 40 MG capsule Take 1 capsule (40 mg)  by mouth every morning (before breakfast) TAKE  30'-60' BEFORE 1ST MEAL 180 capsule 2     famotidine (PEPCID) 10 MG tablet Take 10 mg by mouth 2 times daily       glimepiride (AMARYL) 2 MG tablet Take 4 mg by mouth 2 times daily       lactobacillus rhamnosus, GG, (CULTURELL) capsule Take 1 capsule by mouth 2 times daily       Melatonin 10 MG TABS tablet Take 10 mg by mouth At Bedtime       methenamine (HIPREX) 1 g tablet Take 1 tablet (1 g) by mouth 2 times daily 120 tablet 3     methocarbamol (ROBAXIN) 750 MG tablet Take 750 mg by mouth 4 times daily as needed for muscle spasms       nitroglycerin (NITROSTAT) 0.4 MG SL tablet Place 1 tablet (0.4 mg) under the tongue every 5 minutes as needed for chest pain 25 tablet 0     oxybutynin ER (DITROPAN-XL) 10 MG 24 hr tablet Take 1 tablet (10 mg) by mouth At Bedtime 60 tablet 3     rosuvastatin (CRESTOR) 10 MG tablet Take 10 mg by mouth daily       timolol maleate (TIMOPTIC) 0.5 % ophthalmic solution Place 1 drop into both eyes 2 times daily       triamterene-hydrochlorothiazide (MAXZIDE-25) 37.5-25 MG per tablet Take 1 tablet by mouth every morning INDICATION:TO LOWER BLOOD PRESSURE AND CONTROL EDEMA (Patient taking differently: Take 0.5 tablets by mouth every morning INDICATION:TO LOWER BLOOD PRESSURE AND CONTROL EDEMA) 90 tablet PRN     vitamin D (ERGOCALCIFEROL) 45735 UNIT capsule Take 50,000 Units by mouth once a week wednesdays       vitamin D3 (CHOLECALCIFEROL) 86454 units (250 mcg) capsule Take 1 capsule by mouth daily       BETA BLOCKER NOT PRESCRIBED, INTENTIONAL, Beta Blocker not prescribed intentionally due to Intolerance (Patient not taking: Reported on 1/7/2020)  0     Lidocaine (LIDOCARE) 4 % Patch Place 1 patch onto the skin every 24 hours To prevent lidocaine toxicity, patient should be patch free for 12 hrs daily. (Patient not taking: Reported on 1/7/2020) 10 patch 0     rivaroxaban ANTICOAGULANT (XARELTO) 10 MG TABS tablet Take 10 mg by mouth daily  (with dinner)        traMADol (ULTRAM) 50 MG tablet Take 1 tablet (50 mg) by mouth every 6 hours as needed for moderate pain (Patient not taking: Reported on 1/7/2020) 14 tablet 0     [DISCONTINUED] ranitidine (ZANTAC) 75 MG tablet Take 150 mg by mouth At Bedtime       No facility-administered encounter medications on file as of 1/7/2020.        Again, thank you for allowing me to participate in the care of your patient.      Sincerely,    Vaibhav Maldonado MD, MD     [facility]

## 2020-09-18 ENCOUNTER — OFFICE VISIT (OUTPATIENT)
Dept: DERMATOLOGY | Facility: CLINIC | Age: 80
End: 2020-09-18
Payer: COMMERCIAL

## 2020-09-18 VITALS — OXYGEN SATURATION: 95 % | HEART RATE: 58 BPM | DIASTOLIC BLOOD PRESSURE: 56 MMHG | SYSTOLIC BLOOD PRESSURE: 104 MMHG

## 2020-09-18 DIAGNOSIS — L82.1 SEBORRHEIC KERATOSIS: ICD-10-CM

## 2020-09-18 DIAGNOSIS — L82.0 INFLAMED SEBORRHEIC KERATOSIS: Primary | ICD-10-CM

## 2020-09-18 PROCEDURE — 99212 OFFICE O/P EST SF 10 MIN: CPT | Mod: 25 | Performed by: PHYSICIAN ASSISTANT

## 2020-09-18 PROCEDURE — 17110 DESTRUCTION B9 LES UP TO 14: CPT | Performed by: PHYSICIAN ASSISTANT

## 2020-09-18 NOTE — PROGRESS NOTES
HPI:   Chief complaints: Ginger Abreu is a 80 year old female who presents for evaluation of an irritated spot on the left chest. Area is itchy and irritated.   Condition present for:  years.   Previous treatments include: none    Review Of Systems  Eyes: negative  Ears/Nose/Throat: negative  Respiratory: No shortness of breath, dyspnea on exertion, cough, or hemoptysis  Cardiovascular: negative  Gastrointestinal: negative  Genitourinary: negative  Musculoskeletal: negative  Neurologic: negative  Psychiatric: negative  Skin: positive for lesion      PHYSICAL EXAM:    /56   Pulse 58   SpO2 95%   Skin exam performed as follows: Type 2 skin. Mood appropriate  Alert and Oriented X 3. Well developed, well nourished in no distress.  General appearance: Normal  Head including face: Normal  Eyes: conjunctiva and lids: Normal  Mouth: Lips, teeth, gums: Normal  Neck: Normal  Chest-breast/axillae: Normal  Back: Normal  Spleen and liver: Normal  Cardiovascular: Exam of peripheral vascular system by observation for swelling, varicosities, edema: Normal  Genitalia: groin, buttocks: Normal  Extremities: digits/nails (clubbing): Normal  Eccrine and Apocrine glands: Normal  Right upper extremity: Normal  Left upper extremity: Normal  Right lower extremity: Normal  Left lower extremity: Normal  Skin: Scalp and body hair: See below    1. Inflamed keratotic papule on the left chest x 1  2. Keratotic papule on the left jawline x 1    ASSESSMENT/PLAN:     1. Inflamed seborrheic keratosis on the left chest x 1. As physically tender cryosurgery performed. Advised on post op care.   2. Seborrheic keratosis on the left jawline - not bothersome; advised benign no treatment needed.       Follow-up: yearly FSE/PRN sooner  CC:   Scribed By: Karla Novoa, MS, PA-C

## 2020-09-18 NOTE — LETTER
9/18/2020         RE: Ginger Abreu  [address]  [facility] [id number]        Dear Colleague,    Thank you for referring your patient, Ginger Abreu, to the [facility]. Please see a copy of my visit note below.    HPI:   Chief complaints: Ginger Abreu is a 80 year old female who presents for evaluation of an irritated spot on the left chest. Area is itchy and irritated.   Condition present for:  years.   Previous treatments include: none    Review Of Systems  Eyes: negative  Ears/Nose/Throat: negative  Respiratory: No shortness of breath, dyspnea on exertion, cough, or hemoptysis  Cardiovascular: negative  Gastrointestinal: negative  Genitourinary: negative  Musculoskeletal: negative  Neurologic: negative  Psychiatric: negative  Skin: positive for lesion      PHYSICAL EXAM:    /56   Pulse 58   SpO2 95%   Skin exam performed as follows: Type 2 skin. Mood appropriate  Alert and Oriented X 3. Well developed, well nourished in no distress.  General appearance: Normal  Head including face: Normal  Eyes: conjunctiva and lids: Normal  Mouth: Lips, teeth, gums: Normal  Neck: Normal  Chest-breast/axillae: Normal  Back: Normal  Spleen and liver: Normal  Cardiovascular: Exam of peripheral vascular system by observation for swelling, varicosities, edema: Normal  Genitalia: groin, buttocks: Normal  Extremities: digits/nails (clubbing): Normal  Eccrine and Apocrine glands: Normal  Right upper extremity: Normal  Left upper extremity: Normal  Right lower extremity: Normal  Left lower extremity: Normal  Skin: Scalp and body hair: See below    1. Inflamed keratotic papule on the left chest x 1  2. Keratotic papule on the left jawline x 1    ASSESSMENT/PLAN:     1. Inflamed seborrheic keratosis on the left chest x 1. As physically tender cryosurgery performed. Advised on post op care.   2. Seborrheic keratosis on the left jawline - not bothersome; advised benign no  treatment needed.       Follow-up: yearly FSE/PRN sooner  CC:   Scribed By: Karla Novoa, MS, PASHONDA        Again, thank you for allowing me to participate in the care of your patient.        Sincerely,        Karla Novoa PA-C

## 2020-12-09 DIAGNOSIS — I10 BENIGN ESSENTIAL HYPERTENSION: ICD-10-CM

## 2020-12-09 RX ORDER — CANDESARTAN 16 MG/1
16 TABLET ORAL 2 TIMES DAILY
Qty: 180 TABLET | Refills: 0 | Status: SHIPPED | OUTPATIENT
Start: 2020-12-09 | End: 2021-03-11

## 2021-01-15 ENCOUNTER — HEALTH MAINTENANCE LETTER (OUTPATIENT)
Age: 81
End: 2021-01-15

## 2021-03-11 DIAGNOSIS — I10 BENIGN ESSENTIAL HYPERTENSION: ICD-10-CM

## 2021-03-11 RX ORDER — CANDESARTAN 16 MG/1
16 TABLET ORAL 2 TIMES DAILY
Qty: 180 TABLET | Refills: 0 | Status: SHIPPED | OUTPATIENT
Start: 2021-03-11 | End: 2021-06-17

## 2021-03-11 NOTE — TELEPHONE ENCOUNTER
Received refill request for:  Atacand 16 mg BID   Last OV was: 1/7/2020  Labs/EKG: na   F/U scheduled: overdue 1/2021. Letter sent to patient.   New script sent to: [facility] LPN  [facility]  [phone]

## 2021-04-30 ENCOUNTER — HOSPITAL ENCOUNTER (EMERGENCY)
Facility: CLINIC | Age: 81
Discharge: HOME | End: 2021-04-30
Attending: EMERGENCY MEDICINE | Admitting: EMERGENCY MEDICINE
Payer: COMMERCIAL

## 2021-04-30 VITALS
WEIGHT: 155 LBS | RESPIRATION RATE: 18 BRPM | TEMPERATURE: 97.8 F | HEIGHT: 63 IN | DIASTOLIC BLOOD PRESSURE: 86 MMHG | OXYGEN SATURATION: 98 % | SYSTOLIC BLOOD PRESSURE: 122 MMHG | HEART RATE: 71 BPM | BODY MASS INDEX: 27.46 KG/M2

## 2021-04-30 DIAGNOSIS — R73.9 HYPERGLYCEMIA: ICD-10-CM

## 2021-04-30 DIAGNOSIS — T38.0X5A STEROID-INDUCED HYPERGLYCEMIA: ICD-10-CM

## 2021-04-30 DIAGNOSIS — R73.9 STEROID-INDUCED HYPERGLYCEMIA: ICD-10-CM

## 2021-04-30 LAB
ANION GAP SERPL CALCULATED.3IONS-SCNC: 9 MMOL/L (ref 3–14)
BASE DEFICIT BLDV-SCNC: 3.5 MMOL/L
BASOPHILS # BLD AUTO: 0 10E9/L (ref 0–0.2)
BASOPHILS NFR BLD AUTO: 0.2 %
BUN SERPL-MCNC: 39 MG/DL (ref 7–30)
CALCIUM SERPL-MCNC: 8.8 MG/DL (ref 8.5–10.1)
CHLORIDE SERPL-SCNC: 107 MMOL/L (ref 94–109)
CO2 SERPL-SCNC: 23 MMOL/L (ref 20–32)
CREAT SERPL-MCNC: 1.27 MG/DL (ref 0.52–1.04)
DIFFERENTIAL METHOD BLD: ABNORMAL
EOSINOPHIL # BLD AUTO: 0 10E9/L (ref 0–0.7)
EOSINOPHIL NFR BLD AUTO: 0 %
ERYTHROCYTE [DISTWIDTH] IN BLOOD BY AUTOMATED COUNT: 14.3 % (ref 10–15)
GFR SERPL CREATININE-BSD FRML MDRD: 40 ML/MIN/{1.73_M2}
GLUCOSE BLDC GLUCOMTR-MCNC: 215 MG/DL (ref 70–99)
GLUCOSE BLDC GLUCOMTR-MCNC: 306 MG/DL (ref 70–99)
GLUCOSE BLDC GLUCOMTR-MCNC: 460 MG/DL (ref 70–99)
GLUCOSE SERPL-MCNC: 473 MG/DL (ref 70–99)
HCO3 BLDV-SCNC: 22 MMOL/L (ref 21–28)
HCT VFR BLD AUTO: 36.9 % (ref 35–47)
HGB BLD-MCNC: 11.7 G/DL (ref 11.7–15.7)
IMM GRANULOCYTES # BLD: 0 10E9/L (ref 0–0.4)
IMM GRANULOCYTES NFR BLD: 0.3 %
KETONES BLD-SCNC: 0.1 MMOL/L (ref 0–0.6)
LACTATE BLD-SCNC: 1.9 MMOL/L (ref 0.7–2)
LYMPHOCYTES # BLD AUTO: 0.6 10E9/L (ref 0.8–5.3)
LYMPHOCYTES NFR BLD AUTO: 10.7 %
MCH RBC QN AUTO: 31 PG (ref 26.5–33)
MCHC RBC AUTO-ENTMCNC: 31.7 G/DL (ref 31.5–36.5)
MCV RBC AUTO: 98 FL (ref 78–100)
MONOCYTES # BLD AUTO: 0.1 10E9/L (ref 0–1.3)
MONOCYTES NFR BLD AUTO: 2 %
NEUTROPHILS # BLD AUTO: 5.2 10E9/L (ref 1.6–8.3)
NEUTROPHILS NFR BLD AUTO: 86.8 %
NRBC # BLD AUTO: 0 10*3/UL
NRBC BLD AUTO-RTO: 0 /100
O2/TOTAL GAS SETTING VFR VENT: ABNORMAL %
PCO2 BLDV: 43 MM HG (ref 40–50)
PH BLDV: 7.33 PH (ref 7.32–7.43)
PLATELET # BLD AUTO: 142 10E9/L (ref 150–450)
PO2 BLDV: 48 MM HG (ref 25–47)
POTASSIUM SERPL-SCNC: 3.4 MMOL/L (ref 3.4–5.3)
RBC # BLD AUTO: 3.77 10E12/L (ref 3.8–5.2)
SODIUM SERPL-SCNC: 139 MMOL/L (ref 133–144)
WBC # BLD AUTO: 6 10E9/L (ref 4–11)

## 2021-04-30 PROCEDURE — 83605 ASSAY OF LACTIC ACID: CPT | Performed by: EMERGENCY MEDICINE

## 2021-04-30 PROCEDURE — 999N001017 HC STATISTIC GLUCOSE BY METER IP

## 2021-04-30 PROCEDURE — 99283 EMERGENCY DEPT VISIT LOW MDM: CPT | Mod: 25

## 2021-04-30 PROCEDURE — 80048 BASIC METABOLIC PNL TOTAL CA: CPT | Performed by: EMERGENCY MEDICINE

## 2021-04-30 PROCEDURE — 82010 KETONE BODYS QUAN: CPT | Performed by: EMERGENCY MEDICINE

## 2021-04-30 PROCEDURE — 258N000003 HC RX IP 258 OP 636: Performed by: EMERGENCY MEDICINE

## 2021-04-30 PROCEDURE — 96361 HYDRATE IV INFUSION ADD-ON: CPT

## 2021-04-30 PROCEDURE — 82803 BLOOD GASES ANY COMBINATION: CPT | Performed by: EMERGENCY MEDICINE

## 2021-04-30 PROCEDURE — 85025 COMPLETE CBC W/AUTO DIFF WBC: CPT | Performed by: EMERGENCY MEDICINE

## 2021-04-30 PROCEDURE — 96360 HYDRATION IV INFUSION INIT: CPT

## 2021-04-30 RX ADMIN — SODIUM CHLORIDE 1000 ML: 9 INJECTION, SOLUTION INTRAVENOUS at 02:11

## 2021-04-30 ASSESSMENT — MIFFLIN-ST. JEOR: SCORE: 1142.21

## 2021-04-30 ASSESSMENT — ENCOUNTER SYMPTOMS
VOMITING: 0
FEVER: 0
DIARRHEA: 0
SHORTNESS OF BREATH: 0
COUGH: 0
NAUSEA: 0
CHILLS: 0
DYSURIA: 0
BLOOD IN STOOL: 0
ABDOMINAL PAIN: 0
WEAKNESS: 0
FREQUENCY: 0
HEMATURIA: 0

## 2021-04-30 NOTE — DISCHARGE INSTRUCTIONS
Please drink plenty of fluids at home.    Please check your blood sugar levels 3 times daily.    If your blood sugar levels are persistently greater than 400, you may take 20 mg of Jardiance instead of 10 mg daily.        Discharge Instructions  Hyperglycemia, High Blood Sugar    Today we found your blood sugar (glucose) was high. This may mean that you have developed diabetes, or if you already know that you have diabetes, it may mean that your diabetes is not as well controlled as it should be. Sometimes blood sugar can be high temporarily and it is not diabetes. Signs of elevated blood sugar include increased thirst, frequent urination (peeing), blurred vision, fatigue, unexplained weight loss, poor wound healing, and frequent infections.    We sometimes give medicine in the Emergency Department to lower the blood sugar. We may also prescribe medicine for you to use at home, or increase the medicine that you already take. While we do not like to see your blood sugar high, it is much more dangerous to let your blood sugar get too low, so it is reasonable to take time to bring it down, or to wait and watch to see if it comes down on its own.    Generally, every Emergency Department visit should have a follow-up clinic visit with either a primary or a specialty clinic/provider. Please follow-up as instructed by your emergency provider today.     Return to the Emergency Department if you develop:  Vomiting (throwing up).  Confusion, disorientation, or being unable to wake up.  Severe weakness or illness.  Abdominal (belly) pain.    What can I do to help myself?  Check your blood sugar as instructed by your provider.  Take medications prescribed by your provider.  Follow a diabetic diet (low fat, low concentrated sweets, high fiber).  Exercise regularly.  Moderate or eliminate alcohol use.  Stop smoking.    Diabetes: Diabetes mellitus is a disease in which the body cannot regulate the amount of sugar (glucose) in the  blood. Insulin allows glucose to move out of the blood into cells throughout the body where it is used for fuel. People with diabetes do not produce enough insulin (type 1 diabetes), or cannot use insulin properly (type 2 diabetes), or both. This starves the cells that need the glucose for fuel, and also harms certain organs and tissues exposed to the high glucose levels.  Over a long period of time, uncontrolled diabetes can lead to heart and blood vessel disease, blindness, kidney failure, foot ulcers and many other problems.          About 17 million Americans (6.2% of adults) have diabetes. We think that about one third of adults with diabetes do not know they have diabetes.  The incidence of diabetes is increasing rapidly. This increase is due to many factors, but the most significant are our increasing weight and decreased activity levels.     Diabetes can be a very serious and life-threatening illness if not treated.  If you were given a prescription for medicine here today, be sure to read all of the information (including the package insert) that comes with your prescription.  This will include important information about the medicine, its side effects, and any warnings that you need to know about.  The pharmacist who fills the prescription can provide more information and answer questions you may have about the medicine.  If you have questions or concerns that the pharmacist cannot address, please call or return to the Emergency Department.   Remember that you can always come back to the Emergency Department if you are not able to see your regular provider in the amount of time listed above, if you get any new symptoms, or if there is anything that worries you.

## 2021-04-30 NOTE — ED PROVIDER NOTES
History   Chief Complaint:  Hyperglycemia    HPI   Ginger Abreu is a 80 year old female, anticoagulated on Xarelto with a history of DM II (insulin-dependent), pulmonary embolism, deep vein thrombosis, CKD III, lumbago, immunoglobulin A deficiency, immunoglobulin G subclass deficiency, neuropathy, chronic pain syndrome, amongst others, who presents to the ED for evaluation of hyperglycemia. The patient reports she had a steroid injection yesterday morning, for her back. She says she was then busy all of the day and was unable to check her blood glucose levels. However, when she was getting ready for bed, she checked her blood glucose and found it to be 596. She contacted her provider line and was instructed to come into the emergency department to be evaluation. Here in the emergency department, the patient denies any fever, chills, cough, shortness of breath, chest pain, abdominal pain, nausea, vomiting, urinary, or bowel symptoms. She has not had any weakness. The patient has otherwise felt at her baseline with no acute changes over the last day. She may say that her left foot is slightly more swollen than normal. She has no history of heart failure. The patient denies any tobacco, alcohol, or illicit drug use.     Review of Systems   Constitutional: Negative for chills and fever.   Respiratory: Negative for cough and shortness of breath.    Cardiovascular: Negative for chest pain.        Left foot swelling   Gastrointestinal: Negative for abdominal pain, blood in stool, diarrhea, nausea and vomiting.   Genitourinary: Negative for dysuria, frequency and hematuria.   Neurological: Negative for weakness.   All other systems reviewed and are negative.      Allergies:  Codeine  Diatrizoate  Morphine  Oxycodone-Acetaminophen  Atenolol  Diltiazem  Labetalol   Lipitor   Nifedipine  Sulfa Drugs  Zocor  Advair Diskus  Aleve  Amlodipine  Amlodipine Besylate  Bystolic  Calcium  "Carbonate  Crestor  Diovan  Ezetimibe  Fluticasone-Salmeterol  Hydralazine  Hydrochlorothiazide  Imdur  Lisinopril  Lovastatin  Metformin  Metoprolol  Niacin  Omnicef  Pravachol   Prilosec  Zetia    Medications:    Albuterol  Xanax  Aspirin  Fioricet  Candesartan  Catapres  Jardiance  Nexium  Pepcid  Amaryl  Hiprex  Robaxin  Nitrostat  Oxybutynin  Crestor  Xarelto  Ultram  Maxzide    Past Medical History:    PUD  Anxiety  Basal cell carcinoma  Deep vein thrombosis  Esophogeal reflux  Generalized osteoarthrosis  Herpes zoster  Hyperlipidemia  Immunoglobulin A deficiency  Immunoglobulin G subclass deficiency  Lumbago  Migraine  Asthma  Mitral valve prolapse  Myocarditis  Neuropathy  Osteopenia  Pulmonary embolism  DM II  Spinal stenosis  Hypertension  CKD III  Chronic pain syndrome    Past Surgical History:    Bilateral sphenoidotomy  Lumbar fusion  Hysterectomy with bilateral salpingo-oophorectomy  Cholecystectomy  Cystocele repair    Family History:    Breast cancer  Heart disease  Hyperlipidemia    Social History:  Smoking status: No  Alcohol use: No  Drug use: No  PCP: Steven Fonseca  Presents to the ED alone    Physical Exam     Patient Vitals for the past 24 hrs:   BP Temp Temp src Pulse Resp SpO2 Height Weight   04/30/21 0645 -- -- -- -- -- 98 % -- --   04/30/21 0615 -- -- -- -- -- 96 % -- --   04/30/21 0600 130/73 -- -- 56 -- 99 % -- --   04/30/21 0545 -- -- -- -- -- 97 % -- --   04/30/21 0530 134/61 -- -- 58 -- 98 % -- --   04/30/21 0500 126/59 -- -- 55 -- -- -- --   04/30/21 0430 128/59 -- -- 55 -- -- -- --   04/30/21 0400 125/58 -- -- 66 -- 94 % -- --   04/30/21 0300 129/64 -- -- 69 -- 95 % -- --   04/30/21 0230 (!) 129/94 -- -- 86 -- 95 % -- --   04/30/21 0212 -- -- -- -- -- 97 % -- --   04/30/21 0211 (!) 170/70 -- -- 82 -- -- -- --   04/30/21 0119 (!) 178/60 97.8  F (36.6  C) Oral 100 18 94 % 1.6 m (5' 3\") 70.3 kg (155 lb)       Physical Exam  Constitutional: Well developed, nontox appearance  Head: " Atraumatic.   Mouth/Throat: Oropharynx is clear and moist.   Neck:  no stridor  Eyes: no scleral icterus  Cardiovascular: RRR, 2+ bilat radial pulses  Pulmonary/Chest: nml resp effort, Clear BS bilat  Abdominal: ND, soft, NT, no rebound or guarding   Ext: Warm, well perfused, no edema  Neurological: A&O, symmetric facies, moves ext x4  Skin: Skin is warm and dry.   Psychiatric: Behavior is normal. Thought content normal.   Nursing note and vitals reviewed.    Emergency Department Course   Laboratory:  CBC: WBC 6.0, HGB 11.7,  (L)    BMP:  (H), BUN 39 (H), Creatinine 1.27 (H), GFR 40 (L), o/w WNL    Glucose by Meter (Resulted 0124): 460 (H)  Glucose by Meter (Resulted 0421): 306 (H)  Glucose by Meter (Resulted 0616): 215 (H)    Lactic Acid (Resulted 0143): 1.9    Blood gas venous: pH Venous 7.33, PCO2 Venous 43, PO2 Venous 48 (H), Bicarbonate 22, Base Deficit 3.5     Ketone Beta-Hydroxybutyrate Quantitative: 0.1     Emergency Department Course:    Reviewed:  I reviewed the patient's nursing notes, vitals, past available medical records.     Assessments:  0231: I obtained history and examined the patient as noted above.     0720: I rechecked the patient and explained findings. The patient understands the plan and is amendable to discharge.     Interventions:  0211: NS 1L IV Bolus     Disposition:  The patient was discharged to home.    Impression & Plan    Medical Decision Makin year old female presenting w/ hyperglycemia     DDx includes steroid-induced hyperglycemia, DKA, HH NS, electrolyte abnormality.  Doubt significant infection particular given the patient is asymptomatic.  Labs ordered as noted above significant for hyperglycemia.  Fluids given in the emergency department and the patient's glucose levels were monitored with gradual improvement.  I do not feel alteration of her current medications are indicated at this time particularly given this is likely a transient increase in her glucose  levels.  She was counseled on checking her glucose levels frequently at home.  At this time I feel the pt is safe for discharge.  Recommendations given regarding follow up with PCP and return to the emergency department as needed for new or worsening symptoms.  Pt counseled on all results, disposition and diagnosis.  They are understanding and agreeable to plan. Patient discharged in stable condition.       Diagnosis:    ICD-10-CM    1. Hyperglycemia  R73.9    2. Steroid-induced hyperglycemia  R73.9     T38.0X5A      Scribe Disclosure:  Sunil HERNANDEZ, am serving as a scribe at 2:31 AM on 4/30/2021 to document services personally performed by Ji Sheriff MD based on my observations and the provider's statements to me.      Ji Sheriff MD  04/30/21 2015

## 2021-06-17 DIAGNOSIS — I10 BENIGN ESSENTIAL HYPERTENSION: ICD-10-CM

## 2021-06-17 RX ORDER — CANDESARTAN 16 MG/1
16 TABLET ORAL 2 TIMES DAILY
Qty: 180 TABLET | Refills: 0 | Status: SHIPPED | OUTPATIENT
Start: 2021-06-17 | End: 2021-09-20

## 2021-06-17 NOTE — TELEPHONE ENCOUNTER
Received refill request for:  Candasartan  Last OV was: 1/2020 with Dr. Maldonado  Labs/EKG: BMP 4/30/21  F/U scheduled: none, overdue orders from 1/2021; messaged scheduling to call pt to arrange, no further refills until seen  New script sent to: [facility]

## 2021-06-22 ENCOUNTER — TELEPHONE (OUTPATIENT)
Dept: CARDIOLOGY | Facility: CLINIC | Age: 81
End: 2021-06-22

## 2021-06-22 DIAGNOSIS — E87.6 HYPOKALEMIA: Primary | ICD-10-CM

## 2021-06-22 NOTE — TELEPHONE ENCOUNTER
Attempted to call pt with recommendations from Dr. Maldonado, left message for pt to call back. Rupinder DENTON

## 2021-06-22 NOTE — TELEPHONE ENCOUNTER
Reviewed BMP/lipids/alt showing   Results for SARAY MÉNDEZ (MRN [id number]) as of 6/22/2021 11:43   Ref. Range 6/22/2021 08:33   Sodium Latest Ref Range: 133 - 144 mmol/L 140   Potassium Latest Ref Range: 3.4 - 5.3 mmol/L 3.2 (L)   Chloride Latest Ref Range: 94 - 109 mmol/L 107   Carbon Dioxide Latest Ref Range: 20 - 32 mmol/L 28   Urea Nitrogen Latest Ref Range: 7 - 30 mg/dL 26   Creatinine Latest Ref Range: 0.52 - 1.04 mg/dL 1.22 (H)   GFR Estimate Latest Ref Range: >60 mL/min/1.73_m2 42 (L)   GFR Estimate If Black Latest Ref Range: >60 mL/min/1.73_m2 48 (L)   Calcium Latest Ref Range: 8.5 - 10.1 mg/dL 9.7   Anion Gap Latest Ref Range: 3 - 14 mmol/L 5   ALT Latest Ref Range: 0 - 50 U/L 25   Cholesterol Latest Ref Range: <200 mg/dL 123   HDL Cholesterol Latest Ref Range: >49 mg/dL 48 (L)   LDL Cholesterol Calculated Latest Ref Range: <100 mg/dL 40   Non HDL Cholesterol Latest Ref Range: <130 mg/dL 75   Triglycerides Latest Ref Range: <150 mg/dL 177 (H)   Glucose Latest Ref Range: 70 - 99 mg/dL 110 (H)     Pt has office visit 7/20/21 w/ Susi Carbajal NP.   Will message Dr. Maldonado to review. Rupinder DENTON

## 2021-06-23 DIAGNOSIS — E87.6 HYPOKALEMIA: ICD-10-CM

## 2021-06-23 RX ORDER — POTASSIUM CHLORIDE 1500 MG/1
20 TABLET, EXTENDED RELEASE ORAL DAILY
Qty: 30 TABLET | Refills: 3 | Status: SHIPPED | OUTPATIENT
Start: 2021-06-23 | End: 2022-09-29

## 2021-06-23 RX ORDER — POTASSIUM CHLORIDE 1500 MG/1
20 TABLET, EXTENDED RELEASE ORAL DAILY
Qty: 30 TABLET | Refills: 3 | Status: SHIPPED | OUTPATIENT
Start: 2021-06-23 | End: 2021-06-23

## 2021-06-23 NOTE — TELEPHONE ENCOUNTER
Pt called back, informed of results & recommendations from Dr. Maldonado. Prescription escripted to [facility] as requested & order placed for BMP in one week. Pt scheduled for lab 6/30/21 for BMP. Rupinder DENTON

## 2021-07-02 DIAGNOSIS — E87.6 HYPOKALEMIA: ICD-10-CM

## 2021-07-02 LAB
ANION GAP SERPL CALCULATED.3IONS-SCNC: 4 MMOL/L (ref 3–14)
BUN SERPL-MCNC: 28 MG/DL (ref 7–30)
CALCIUM SERPL-MCNC: 9.5 MG/DL (ref 8.5–10.1)
CHLORIDE SERPL-SCNC: 110 MMOL/L (ref 94–109)
CO2 SERPL-SCNC: 26 MMOL/L (ref 20–32)
CREAT SERPL-MCNC: 1.24 MG/DL (ref 0.52–1.04)
GFR SERPL CREATININE-BSD FRML MDRD: 41 ML/MIN/{1.73_M2}
GLUCOSE SERPL-MCNC: 166 MG/DL (ref 70–99)
POTASSIUM SERPL-SCNC: 4.3 MMOL/L (ref 3.4–5.3)
SODIUM SERPL-SCNC: 140 MMOL/L (ref 133–144)

## 2021-07-02 PROCEDURE — 80048 BASIC METABOLIC PNL TOTAL CA: CPT | Performed by: INTERNAL MEDICINE

## 2021-07-02 PROCEDURE — 36415 COLL VENOUS BLD VENIPUNCTURE: CPT | Performed by: INTERNAL MEDICINE

## 2021-07-09 ENCOUNTER — OFFICE VISIT (OUTPATIENT)
Dept: CARDIOLOGY | Facility: CLINIC | Age: 81
End: 2021-07-09
Attending: INTERNAL MEDICINE
Payer: COMMERCIAL

## 2021-07-09 VITALS
OXYGEN SATURATION: 97 % | WEIGHT: 152 LBS | BODY MASS INDEX: 26.93 KG/M2 | SYSTOLIC BLOOD PRESSURE: 111 MMHG | HEART RATE: 54 BPM | DIASTOLIC BLOOD PRESSURE: 64 MMHG

## 2021-07-09 DIAGNOSIS — I10 BENIGN ESSENTIAL HYPERTENSION: ICD-10-CM

## 2021-07-09 DIAGNOSIS — E78.5 HYPERLIPIDEMIA LDL GOAL <100: ICD-10-CM

## 2021-07-09 DIAGNOSIS — I70.1 RENAL ARTERY STENOSIS (H): ICD-10-CM

## 2021-07-09 DIAGNOSIS — R06.09 EXERTIONAL DYSPNEA: ICD-10-CM

## 2021-07-09 PROCEDURE — 99214 OFFICE O/P EST MOD 30 MIN: CPT | Performed by: INTERNAL MEDICINE

## 2021-07-09 RX ORDER — DIPHENHYDRAMINE HCL 25 MG
25 TABLET ORAL EVERY 6 HOURS PRN
COMMUNITY

## 2021-07-09 RX ORDER — HYDROCHLOROTHIAZIDE 25 MG/1
12.5 TABLET ORAL EVERY OTHER DAY
COMMUNITY
End: 2024-05-08

## 2021-07-09 NOTE — LETTER
7/9/2021    Steven Fonseca MD  [address]  [address]  [facility] [address]    RE: Ginger Abreu       Dear Colleague,    I had the pleasure of seeing Ginger Abreu in the [facility].    HPI and Plan:   See dictation    Orders Placed This Encounter   Procedures     Lipid Profile     ALT     Basic metabolic panel     Follow-Up with Cardiologist       Orders Placed This Encounter   Medications     hydrochlorothiazide (HYDRODIURIL) 25 MG tablet     Sig: Take 12.5 mg by mouth daily     diphenhydrAMINE (BENADRYL) 25 MG tablet     Sig: Take 25 mg by mouth every 6 hours as needed for itching or allergies       Medications Discontinued During This Encounter   Medication Reason     triamterene-hydrochlorothiazide (MAXZIDE-25) 37.5-25 MG per tablet Alternate therapy         Encounter Diagnoses   Name Primary?     Benign essential hypertension      Renal artery stenosis (H)      Hyperlipidemia LDL goal <100      Exertional dyspnea        CURRENT MEDICATIONS:  Current Outpatient Medications   Medication Sig Dispense Refill     albuterol (PROAIR HFA/PROVENTIL HFA/VENTOLIN HFA) 108 (90 BASE) MCG/ACT Inhaler Inhale 2 puffs into the lungs every 6 hours INDICATION: RESCUE INHALER FOR SHORTNESS OF BREATH, CHEST TIGHTNESS AND COUGH 1 Inhaler 11     alpha-lipoic acid 600 MG CAPS Take 600 mg by mouth 3 times daily        ALPRAZolam (XANAX) 0.25 MG tablet TAKE ONE TABLET BY MOUTH ONCE DAILY AT  NIGHT  AS  NEEDED  FOR  SEVERE  ANXIETY  ONLY 30 tablet 0     ascorbic acid (VITAMIN C) 1000 MG TABS Take 1 tablet (1,000 mg) by mouth daily VITAMIN C REPLACEMENT 100 tablet 3     B-12, Methylcobalamin, 1000 MCG SUBL        candesartan (ATACAND) 16 MG tablet Take 1 tablet (16 mg) by mouth 2 times daily 180 tablet 0     Cranberry 125 MG TABS        diphenhydrAMINE (BENADRYL) 25 MG tablet Take 25 mg by mouth every 6 hours as needed for itching or allergies       empagliflozin  (JARDIANCE) 10 MG TABS tablet Take 10 mg by mouth daily       esomeprazole (NEXIUM) 40 MG capsule Take 1 capsule (40 mg) by mouth every morning (before breakfast) TAKE  30'-60' BEFORE 1ST MEAL 180 capsule 2     famotidine (PEPCID) 10 MG tablet Take 10 mg by mouth 2 times daily       glimepiride (AMARYL) 2 MG tablet Take 4 mg by mouth 2 times daily       hydrochlorothiazide (HYDRODIURIL) 25 MG tablet Take 12.5 mg by mouth daily       lactobacillus rhamnosus, GG, (CULTURELL) capsule Take 1 capsule by mouth 2 times daily       Melatonin 10 MG TABS tablet Take 10 mg by mouth At Bedtime       methenamine (HIPREX) 1 g tablet Take 1 tablet (1 g) by mouth 2 times daily 120 tablet 3     methocarbamol (ROBAXIN) 750 MG tablet Take 750 mg by mouth 4 times daily as needed for muscle spasms       oxybutynin ER (DITROPAN-XL) 10 MG 24 hr tablet Take 1 tablet (10 mg) by mouth At Bedtime 60 tablet 3     potassium chloride ER (KLOR-CON M) 20 MEQ CR tablet Take 1 tablet (20 mEq) by mouth daily With food 30 tablet 3     timolol maleate (TIMOPTIC) 0.5 % ophthalmic solution Place 1 drop into both eyes 2 times daily       vitamin D (ERGOCALCIFEROL) 12687 UNIT capsule Take 50,000 Units by mouth once a week wednesdays       vitamin D3 (CHOLECALCIFEROL) 72307 units (250 mcg) capsule Take 1 capsule by mouth daily       aspirin 81 MG EC tablet Take 81 mg by mouth daily       BETA BLOCKER NOT PRESCRIBED, INTENTIONAL, Beta Blocker not prescribed intentionally due to Intolerance (Patient not taking: Reported on 1/7/2020)  0     butalbital-acetaminophen-caffeine (FIORICET/ESGIC) -40 MG per tablet TAKE ONE TABLET BY MOUTH EVERY 8 HOURS AS NEEDED FOR  SEVERE  MIGRAINE  HEADACHE  ONLY 30 tablet 1     cloNIDine (CATAPRES) 0.1 MG tablet Take 2 tablets (0.2 mg) by mouth 3 times daily (with meals) INDICATION: TO LOWER BLOOD PRESSURE (Patient not taking: Reported on 7/9/2021) 540 tablet 3     Lidocaine (LIDOCARE) 4 % Patch Place 1 patch onto the skin  every 24 hours To prevent lidocaine toxicity, patient should be patch free for 12 hrs daily. (Patient not taking: Reported on 1/7/2020) 10 patch 0     nitroglycerin (NITROSTAT) 0.4 MG SL tablet Place 1 tablet (0.4 mg) under the tongue every 5 minutes as needed for chest pain (Patient not taking: Reported on 7/9/2021) 25 tablet 0     rivaroxaban ANTICOAGULANT (XARELTO) 10 MG TABS tablet Take 10 mg by mouth daily (with dinner)        rosuvastatin (CRESTOR) 10 MG tablet Take 10 mg by mouth daily       traMADol (ULTRAM) 50 MG tablet Take 1 tablet (50 mg) by mouth every 6 hours as needed for moderate pain (Patient not taking: Reported on 1/7/2020) 14 tablet 0       ALLERGIES     Allergies   Allergen Reactions     Codeine Difficulty breathing     Chest gets tight & difficulty breathing      Diatrizoate Shortness Of Breath     Other reaction(s): Shortness Of Breath  rapid heart beat  rapid heart beat       Morphine      Other reaction(s): GI Upset  Patient says she will not take because causes GI Upset     Oxycodone-Acetaminophen      Other reaction(s): GI Upset  Patient says she will not take because causes GI Upset     Atenolol Other (See Comments)     Sob w/ exertion - asthma symptoms     Diltiazem Other (See Comments)     edema - fluid retention in legs    Other reaction(s): Edema  Other reaction(s): Edema       Labetalol Other (See Comments) and Itching     Sob w/ exertion - asthma symptoms  itching     Lipitor [Atorvastatin Calcium] Other (See Comments)     Muscle weakness     Nifedipine Other (See Comments)     Sob w/ exertion - asthma symptoms, HAs    Other reaction(s): Other (See Comments)  Sob w/ exertion - asthma symptoms, HAs     Sulfa Drugs Hives     hives     Zocor [Simvastatin] Other (See Comments)     Muscle weakness     Advair Diskus Other (See Comments)     hoarseness with 500/50, not the 250/50     Aleve [Naproxen] Other (See Comments)     Raises BP     Amlodipine      Fatigue  Fatigue       Amlodipine  Besylate Fatigue and Diarrhea     Blood-Group Specific Substance Other (See Comments)     Patient has anti-Odalys. Blood product orders may be delayed.  Other reaction(s): Other - Describe In Comment Field  Patient has anti-[place]. Blood product orders may be delayed.       Bystolic [Nebivolol Hcl] Other (See Comments)     headaches     Calcium Carbonate Diarrhea     Crestor [Rosuvastatin Calcium] Muscle Pain (Myalgia)     Diovan [Valsartan] GI Disturbance     gas       Ezetimibe Muscle Pain (Myalgia)     Other reaction(s): Muscle Weakness  Other reaction(s): Myalgia       Fluticasone-Salmeterol Other (See Comments)     Hoarseness with 50/50 not the 25/50  hoarseness with 500/50, not the 250/50  Hoarseness with 50/50 not the 25/50       Hydralazine Other (See Comments) and Nausea     Increases pulse     Hydrochlorothiazide Other (See Comments)     increasing glucose     Imdur [Isosorbide Mononitrate] Other (See Comments)     headaches     Kcl GI Disturbance     gas      Lisinopril Cough     Hoarse voice     Lovastatin Muscle Pain (Myalgia)     Metformin Diarrhea            Metoprolol Other (See Comments)     Sob w/ exertion - asthma symptoms       Niacin Other (See Comments)     hyperglycemia     No Clinical Screening - See Comments      PN: LW CM1: >>> NO CONTRAST ADVERSE REACTION <<<     Reaction :  KCL---gas sx     Omnicef Diarrhea     Pravachol [Hmg-Coa-R Inhibitors] Muscle Pain (Myalgia)     Prilosec Otc [Omeprazole Magnesium] Diarrhea     diarrhea       Zetia [Ezetimibe] Muscle Pain (Myalgia)     weakness       PAST MEDICAL HISTORY:  Past Medical History:   Diagnosis Date     Acute peptic ulcer, unspecified site, without mention of hemorrhage, perforation, or obstruction      Allergic rhinitis, cause unspecified      Anxiety      Basal cell carcinoma      DVT (deep venous thrombosis) (H) 03/2012    following spinal surgery. Saw Oncology. 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 Saw Oncology. treated 6 months     Peripheral neuropathy 2015     Pneumonia, organism unspecified(486)      Spinal stenosis      Squamous cell carcinoma      Thyroid nodule      Type II or unspecified type diabetes mellitus without mention of complication, not stated as uncontrolled      Unspecified essential hypertension        PAST SURGICAL HISTORY:  Past Surgical History:   Procedure Laterality Date     NONSPECIFIC PROCEDURE      Bilateral sphenoidotomy with removal of polypoid tissue.     Right frontal sinusotomy.   Bilateral total ethmoidectomy.  Bilateral maxillary antrostomy with removal of polypoid tissue.       ORTHOPEDIC SURGERY      lumbar fusion     ZZC NONSPECIFIC PROCEDURE  approx     hyst/bso     ZZC NONSPECIFIC PROCEDURE  approx     gb     ZZC NONSPECIFIC PROCEDURE  approx     cystocele repair     ZZC NONSPECIFIC PROCEDURE  approx     endoscopic sinus      ZZC NONSPECIFIC PROCEDURE      epidurals x 7     ZZC NONSPECIFIC PROCEDURE      nl colonoscopy 2006     ZZC NONSPECIFIC PROCEDURE  2011    L2-L3 foramenotomies     ZZC NONSPECIFIC PROCEDURE      lumbar fusion       FAMILY HISTORY:  Family History   Problem Relation Age of Onset     Breast Cancer Mother      Heart Disease Father 25         in a fire      Heart Disease Paternal Grandfather      Breast Cancer  Sister      Hyperlipidemia Sister        SOCIAL HISTORY:  Social History     Socioeconomic History     Marital status:      Spouse name: Not on file     Number of children: Not on file     Years of education: Not on file     Highest education level: Not on file   Occupational History     Not on file   Social Needs     Financial resource strain: Not on file     Food insecurity     Worry: Not on file     Inability: Not on file     Transportation needs     Medical: Not on file     Non-medical: Not on file   Tobacco Use     Smoking status: Never Smoker     Smokeless tobacco: Never Used   Substance and Sexual Activity     Alcohol use: No     Alcohol/week: 0.0 standard drinks     Comment: very rare     Drug use: No     Sexual activity: Yes   Lifestyle     Physical activity     Days per week: Not on file     Minutes per session: Not on file     Stress: Not on file   Relationships     Social connections     Talks on phone: Not on file     Gets together: Not on file     Attends Denominational service: Not on file     Active member of club or organization: Not on file     Attends meetings of clubs or organizations: Not on file     Relationship status: Not on file     Intimate partner violence     Fear of current or ex partner: Not on file     Emotionally abused: Not on file     Physically abused: Not on file     Forced sexual activity: Not on file   Other Topics Concern     Parent/sibling w/ CABG, MI or angioplasty before 65F 55M? No      Service Not Asked     Blood Transfusions Not Asked     Caffeine Concern No     Comment: soda 1 time per day     Occupational Exposure Not Asked     Hobby Hazards Not Asked     Sleep Concern Not Asked     Stress Concern Not Asked     Weight Concern Not Asked     Special Diet No     Back Care Not Asked     Exercise No     Comment: limited due to back pain      Bike Helmet Not Asked     Seat Belt Not Asked     Self-Exams Not Asked   Social History Narrative     Not on file        Review of Systems:  Skin:          Eyes:         ENT:         Respiratory:          Cardiovascular:         Gastroenterology:        Genitourinary:         Musculoskeletal:         Neurologic:         Psychiatric:         Heme/Lymph/Imm:         Endocrine:           Physical Exam:  Vitals: /64   Pulse 54   Wt 68.9 kg (152 lb)   SpO2 97%   BMI 26.93 kg/m      Constitutional:  cooperative, alert and oriented, well developed, well nourished, in no acute distress overweight      Skin:  warm and dry to the touch, no apparent skin lesions or masses noted          Head:  normocephalic, no masses or lesions        Eyes:  pupils equal and round        Lymph:      ENT:  no pallor or cyanosis        Neck:  carotid pulses are full and equal bilaterally, JVP normal, no carotid bruit        Respiratory:  clear to auscultation         Cardiac: regular rhythm;normal S1 and S2;no murmurs, gallops or rubs detected   S4            pulses full and equal, no bruits auscultated                                   right femoral access site clean dry and intact with small lump at the site and mild ecchymosis    GI:  not assessed this visit        Extremities and Muscular Skeletal:  no edema;no deformities, clubbing, cyanosis, erythema observed              Neurological:  no gross motor deficits;affect appropriate (Uses a walker)      Psych:  Alert and Oriented x 3        CC  Vaibhav Maldonado MD  [address]  [place] [address]                  Thank you for allowing me to participate in the care of your patient.      Sincerely,     Vaibhav Maldonado MD, MD      [facility] Care  cc:   Vaibhav Maldonado MD  [address]  [place] [address]

## 2021-07-09 NOTE — PROGRESS NOTES
Service Date: 07/09/2021    REFERRING PHYSICIAN:  Steven Fonseca MD    HISTORY OF PRESENT ILLNESS:  It was my pleasure to see your patient, Ginger Abreu, in followup.  This is an 80-year-old patient with a history of difficult to control hypertension, multiple medication intolerances, pulmonary embolism, hyperlipidemia and diabetes mellitus.  This patient was found to have renal artery stenosis and underwent stenting of the stenosis on the left side.  The moderate stenosis on the right was treated medically.  This patient also has a history of pulmonary embolism.  Since I last saw her, she has been doing relatively well except she has been complaining of a lot of back discomfort.  She has had fusions in the past.  She saw Dr. Eduar Mackey and he thinks that possibly some of the discomfort may, in fact, be due to statin therapy.  So her statins have been held for this week and she is hoping to hold for another 3 weeks to see if the discomfort fully goes away or markedly improves.  Her lipid profile at the end of June was excellent with the exception of mild HDL deficiency and mild hypertriglyceridemia with an LDL of 40, HDL of 48 and triglycerides of 177 with a total cholesterol of 123.    With respect to her blood pressure, her blood pressure is very well-controlled at 111/64.  Her kidney function has remained stable over the last year and a half with a creatinine of 1.24, BUN of 28 and GFR of 41.  In 01/2020, the BUN was 37 and the creatinine was 1.26 with a GFR of 41.  Her ALT was normal at 24, indicating no hepatic toxicity from her statin therapy.  She is not complaining of any chest pains, chest pressure or unusual shortness of breath.    IMPRESSION:  1.  Essential hypertension.  Her blood pressure is well-controlled.  2.  Hyperlipidemia and atherosclerotic plaque in the aorta and renal arteries.  Her lipid profile is good with the exception of mild HDL deficiency and mild hypertriglyceridemia, probably  related to diabetes mellitus.  There is the possibility that she has developed statin myopathy.  She is on a 4-week drug holiday as per Dr. Eduar Mackey from Physical Medicine.  3.  Renal insufficiency, which is stable.    PLAN:  The patient is to let us know in 3 weeks if the back discomfort has significantly improved with being on a drug holiday from rosuvastatin.  Typically, patients have a dramatic response to holding statins if it is indeed due to statin myopathy.  We will see the patient back again in 1 year's time, but as always, the patient has been told to contact us if she has any questions or any concerns.    Vaibhav Maldonado MD    cc:  Steven Fonseca MD  [facility]  [address] [address]    Vaibhav Ram MD, [facility]        D: 2021   T: 2021   MT: al    Name:     SARAY MÉNDEZ  MRN:      [id number]        Account:      [id number]   :      1940           Service Date: 2021       Document: [id number]

## 2021-07-09 NOTE — PROGRESS NOTES
HPI and Plan:   See dictation    Orders Placed This Encounter   Procedures     Lipid Profile     ALT     Basic metabolic panel     Follow-Up with Cardiologist       Orders Placed This Encounter   Medications     hydrochlorothiazide (HYDRODIURIL) 25 MG tablet     Sig: Take 12.5 mg by mouth daily     diphenhydrAMINE (BENADRYL) 25 MG tablet     Sig: Take 25 mg by mouth every 6 hours as needed for itching or allergies       Medications Discontinued During This Encounter   Medication Reason     triamterene-hydrochlorothiazide (MAXZIDE-25) 37.5-25 MG per tablet Alternate therapy         Encounter Diagnoses   Name Primary?     Benign essential hypertension      Renal artery stenosis (H)      Hyperlipidemia LDL goal <100      Exertional dyspnea        CURRENT MEDICATIONS:  Current Outpatient Medications   Medication Sig Dispense Refill     albuterol (PROAIR HFA/PROVENTIL HFA/VENTOLIN HFA) 108 (90 BASE) MCG/ACT Inhaler Inhale 2 puffs into the lungs every 6 hours INDICATION: RESCUE INHALER FOR SHORTNESS OF BREATH, CHEST TIGHTNESS AND COUGH 1 Inhaler 11     alpha-lipoic acid 600 MG CAPS Take 600 mg by mouth 3 times daily        ALPRAZolam (XANAX) 0.25 MG tablet TAKE ONE TABLET BY MOUTH ONCE DAILY AT  NIGHT  AS  NEEDED  FOR  SEVERE  ANXIETY  ONLY 30 tablet 0     ascorbic acid (VITAMIN C) 1000 MG TABS Take 1 tablet (1,000 mg) by mouth daily VITAMIN C REPLACEMENT 100 tablet 3     B-12, Methylcobalamin, 1000 MCG SUBL        candesartan (ATACAND) 16 MG tablet Take 1 tablet (16 mg) by mouth 2 times daily 180 tablet 0     Cranberry 125 MG TABS        diphenhydrAMINE (BENADRYL) 25 MG tablet Take 25 mg by mouth every 6 hours as needed for itching or allergies       empagliflozin (JARDIANCE) 10 MG TABS tablet Take 10 mg by mouth daily       esomeprazole (NEXIUM) 40 MG capsule Take 1 capsule (40 mg) by mouth every morning (before breakfast) TAKE  30'-60' BEFORE 1ST MEAL 180 capsule 2     famotidine (PEPCID) 10 MG tablet Take 10 mg by  mouth 2 times daily       glimepiride (AMARYL) 2 MG tablet Take 4 mg by mouth 2 times daily       hydrochlorothiazide (HYDRODIURIL) 25 MG tablet Take 12.5 mg by mouth daily       lactobacillus rhamnosus, GG, (CULTURELL) capsule Take 1 capsule by mouth 2 times daily       Melatonin 10 MG TABS tablet Take 10 mg by mouth At Bedtime       methenamine (HIPREX) 1 g tablet Take 1 tablet (1 g) by mouth 2 times daily 120 tablet 3     methocarbamol (ROBAXIN) 750 MG tablet Take 750 mg by mouth 4 times daily as needed for muscle spasms       oxybutynin ER (DITROPAN-XL) 10 MG 24 hr tablet Take 1 tablet (10 mg) by mouth At Bedtime 60 tablet 3     potassium chloride ER (KLOR-CON M) 20 MEQ CR tablet Take 1 tablet (20 mEq) by mouth daily With food 30 tablet 3     timolol maleate (TIMOPTIC) 0.5 % ophthalmic solution Place 1 drop into both eyes 2 times daily       vitamin D (ERGOCALCIFEROL) 75242 UNIT capsule Take 50,000 Units by mouth once a week wednesdays       vitamin D3 (CHOLECALCIFEROL) 16238 units (250 mcg) capsule Take 1 capsule by mouth daily       aspirin 81 MG EC tablet Take 81 mg by mouth daily       BETA BLOCKER NOT PRESCRIBED, INTENTIONAL, Beta Blocker not prescribed intentionally due to Intolerance (Patient not taking: Reported on 1/7/2020)  0     butalbital-acetaminophen-caffeine (FIORICET/ESGIC) -40 MG per tablet TAKE ONE TABLET BY MOUTH EVERY 8 HOURS AS NEEDED FOR  SEVERE  MIGRAINE  HEADACHE  ONLY 30 tablet 1     cloNIDine (CATAPRES) 0.1 MG tablet Take 2 tablets (0.2 mg) by mouth 3 times daily (with meals) INDICATION: TO LOWER BLOOD PRESSURE (Patient not taking: Reported on 7/9/2021) 540 tablet 3     Lidocaine (LIDOCARE) 4 % Patch Place 1 patch onto the skin every 24 hours To prevent lidocaine toxicity, patient should be patch free for 12 hrs daily. (Patient not taking: Reported on 1/7/2020) 10 patch 0     nitroglycerin (NITROSTAT) 0.4 MG SL tablet Place 1 tablet (0.4 mg) under the tongue every 5 minutes as  needed for chest pain (Patient not taking: Reported on 7/9/2021) 25 tablet 0     rivaroxaban ANTICOAGULANT (XARELTO) 10 MG TABS tablet Take 10 mg by mouth daily (with dinner)        rosuvastatin (CRESTOR) 10 MG tablet Take 10 mg by mouth daily       traMADol (ULTRAM) 50 MG tablet Take 1 tablet (50 mg) by mouth every 6 hours as needed for moderate pain (Patient not taking: Reported on 1/7/2020) 14 tablet 0       ALLERGIES     Allergies   Allergen Reactions     Codeine Difficulty breathing     Chest gets tight & difficulty breathing      Diatrizoate Shortness Of Breath     Other reaction(s): Shortness Of Breath  rapid heart beat  rapid heart beat       Morphine      Other reaction(s): GI Upset  Patient says she will not take because causes GI Upset     Oxycodone-Acetaminophen      Other reaction(s): GI Upset  Patient says she will not take because causes GI Upset     Atenolol Other (See Comments)     Sob w/ exertion - asthma symptoms     Diltiazem Other (See Comments)     edema - fluid retention in legs    Other reaction(s): Edema  Other reaction(s): Edema       Labetalol Other (See Comments) and Itching     Sob w/ exertion - asthma symptoms  itching     Lipitor [Atorvastatin Calcium] Other (See Comments)     Muscle weakness     Nifedipine Other (See Comments)     Sob w/ exertion - asthma symptoms, HAs    Other reaction(s): Other (See Comments)  Sob w/ exertion - asthma symptoms, HAs     Sulfa Drugs Hives     hives     Zocor [Simvastatin] Other (See Comments)     Muscle weakness     Advair Diskus Other (See Comments)     hoarseness with 500/50, not the 250/50     Aleve [Naproxen] Other (See Comments)     Raises BP     Amlodipine      Fatigue  Fatigue       Amlodipine Besylate Fatigue and Diarrhea     Blood-Group Specific Substance Other (See Comments)     Patient has anti-[place]. Blood product orders may be delayed.  Other reaction(s): Other - Describe In Comment Field  Patient has anti-[place]. Blood product orders may be  delayed.       Bystolic [Nebivolol Hcl] Other (See Comments)     headaches     Calcium Carbonate Diarrhea     Crestor [Rosuvastatin Calcium] Muscle Pain (Myalgia)     Diovan [Valsartan] GI Disturbance     gas       Ezetimibe Muscle Pain (Myalgia)     Other reaction(s): Muscle Weakness  Other reaction(s): Myalgia       Fluticasone-Salmeterol Other (See Comments)     Hoarseness with 50/50 not the 25/50  hoarseness with 500/50, not the 250/50  Hoarseness with 50/50 not the 25/50       Hydralazine Other (See Comments) and Nausea     Increases pulse     Hydrochlorothiazide Other (See Comments)     increasing glucose     Imdur [Isosorbide Mononitrate] Other (See Comments)     headaches     Kcl GI Disturbance     gas      Lisinopril Cough     Hoarse voice     Lovastatin Muscle Pain (Myalgia)     Metformin Diarrhea            Metoprolol Other (See Comments)     Sob w/ exertion - asthma symptoms       Niacin Other (See Comments)     hyperglycemia     No Clinical Screening - See Comments      PN: LW CM1: >>> NO CONTRAST ADVERSE REACTION <<<     Reaction :  KCL---gas sx     Omnicef Diarrhea     Pravachol [Hmg-Coa-R Inhibitors] Muscle Pain (Myalgia)     Prilosec Otc [Omeprazole Magnesium] Diarrhea     diarrhea       Zetia [Ezetimibe] Muscle Pain (Myalgia)     weakness       PAST MEDICAL HISTORY:  Past Medical History:   Diagnosis Date     Acute peptic ulcer, unspecified site, without mention of hemorrhage, perforation, or obstruction      Allergic rhinitis, cause unspecified      Anxiety      Basal cell carcinoma      DVT (deep venous thrombosis) (H) 03/2012    following spinal surgery. Saw Oncology. 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 Saw Oncology. treated 6 months     Peripheral neuropathy 2015     Pneumonia, organism unspecified(486)      Spinal stenosis      Squamous cell carcinoma      Thyroid nodule      Type II or unspecified type diabetes mellitus without mention of complication, not stated as uncontrolled      Unspecified essential hypertension        PAST SURGICAL HISTORY:  Past Surgical History:   Procedure Laterality Date     NONSPECIFIC PROCEDURE      Bilateral sphenoidotomy with removal of polypoid tissue.     Right frontal sinusotomy.   Bilateral total ethmoidectomy.  Bilateral maxillary antrostomy with removal of polypoid tissue.       ORTHOPEDIC SURGERY      lumbar fusion     ZZC NONSPECIFIC PROCEDURE  approx     hyst/bso     ZZC NONSPECIFIC PROCEDURE  approx     gb     ZZC NONSPECIFIC PROCEDURE  approx     cystocele repair     ZZC NONSPECIFIC PROCEDURE  approx     endoscopic sinus      ZZC NONSPECIFIC PROCEDURE      epidurals x 7     ZZC NONSPECIFIC PROCEDURE      nl colonoscopy 2006     ZZC NONSPECIFIC PROCEDURE      L2-L3 foramenotomies     ZZC NONSPECIFIC PROCEDURE  2012    lumbar fusion       FAMILY HISTORY:  Family History   Problem Relation Age of Onset     Breast Cancer Mother      Heart Disease Father 25         in a fire      Heart Disease Paternal Grandfather      Breast Cancer Sister      Hyperlipidemia Sister        SOCIAL HISTORY:  Social History     Socioeconomic History     Marital status:      Spouse name: Not on file     Number of children: Not on file     Years of education: Not on file     Highest education level: Not on  file   Occupational History     Not on file   Social Needs     Financial resource strain: Not on file     Food insecurity     Worry: Not on file     Inability: Not on file     Transportation needs     Medical: Not on file     Non-medical: Not on file   Tobacco Use     Smoking status: Never Smoker     Smokeless tobacco: Never Used   Substance and Sexual Activity     Alcohol use: No     Alcohol/week: 0.0 standard drinks     Comment: very rare     Drug use: No     Sexual activity: Yes   Lifestyle     Physical activity     Days per week: Not on file     Minutes per session: Not on file     Stress: Not on file   Relationships     Social connections     Talks on phone: Not on file     Gets together: Not on file     Attends [employer] service: Not on file     Active member of club or organization: Not on file     Attends meetings of clubs or organizations: Not on file     Relationship status: Not on file     Intimate partner violence     Fear of current or ex partner: Not on file     Emotionally abused: Not on file     Physically abused: Not on file     Forced sexual activity: Not on file   Other Topics Concern     Parent/sibling w/ CABG, MI or angioplasty before 65F 55M? No      Service Not Asked     Blood Transfusions Not Asked     Caffeine Concern No     Comment: soda 1 time per day     Occupational Exposure Not Asked     Hobby Hazards Not Asked     Sleep Concern Not Asked     Stress Concern Not Asked     Weight Concern Not Asked     Special Diet No     Back Care Not Asked     Exercise No     Comment: limited due to back pain      Bike Helmet Not Asked     Seat Belt Not Asked     Self-Exams Not Asked   Social History Narrative     Not on file       Review of Systems:  Skin:          Eyes:         ENT:         Respiratory:          Cardiovascular:         Gastroenterology:        Genitourinary:         Musculoskeletal:         Neurologic:         Psychiatric:         Heme/Lymph/Imm:         Endocrine:            Physical Exam:  Vitals: /64   Pulse 54   Wt 68.9 kg (152 lb)   SpO2 97%   BMI 26.93 kg/m      Constitutional:  cooperative, alert and oriented, well developed, well nourished, in no acute distress overweight      Skin:  warm and dry to the touch, no apparent skin lesions or masses noted          Head:  normocephalic, no masses or lesions        Eyes:  pupils equal and round        Lymph:      ENT:  no pallor or cyanosis        Neck:  carotid pulses are full and equal bilaterally, JVP normal, no carotid bruit        Respiratory:  clear to auscultation         Cardiac: regular rhythm;normal S1 and S2;no murmurs, gallops or rubs detected   S4            pulses full and equal, no bruits auscultated                                   right femoral access site clean dry and intact with small lump at the site and mild ecchymosis    GI:  not assessed this visit        Extremities and Muscular Skeletal:  no edema;no deformities, clubbing, cyanosis, erythema observed              Neurological:  no gross motor deficits;affect appropriate (Uses a walker)      Psych:  Alert and Oriented x 3        CC  Vaibhav Maldonado MD  [id number]  [place] [id number]

## 2021-07-29 ENCOUNTER — OFFICE VISIT (OUTPATIENT)
Dept: URGENT CARE | Facility: URGENT CARE | Age: 81
End: 2021-07-29
Payer: COMMERCIAL

## 2021-07-29 VITALS
SYSTOLIC BLOOD PRESSURE: 158 MMHG | TEMPERATURE: 98.1 F | OXYGEN SATURATION: 99 % | DIASTOLIC BLOOD PRESSURE: 67 MMHG | HEIGHT: 63 IN | WEIGHT: 150 LBS | HEART RATE: 55 BPM | BODY MASS INDEX: 26.58 KG/M2

## 2021-07-29 DIAGNOSIS — S70.369A INSECT BITE OF THIGH, UNSPECIFIED LATERALITY, INITIAL ENCOUNTER: Primary | ICD-10-CM
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
## 2021-07-29 PROCEDURE — 99213 OFFICE O/P EST LOW 20 MIN: CPT | Performed by: NURSE PRACTITIONER

## 2021-07-29 ASSESSMENT — MIFFLIN-ST. JEOR: SCORE: 1119.53

## 2021-07-29 NOTE — PATIENT INSTRUCTIONS
Take a daily antihistamine until these are gone. (Claritin, Allegra or Zyrtec)    Spray yourself with bug spray before going to to your sisters house again.    Patient Education     Bedbug Bites   Bedbugs are tiny insects, about the size of an apple seed. They are reddish-brown and slightly flattened and oval. They feed on human blood, often at night while people are sleeping. Bedbugs are attracted to the warmth of your body, and also to your breath. Unlike some other parasites, they can live up to a year without eating. They don t have wings and don t jump. But they are fast crawlers.  Bedbugs are not dangerous and don t often spread disease.  Bite symptoms  The symptoms of bedbug bites can be different for each person. Bites can be found anywhere on your body. But they are more common on skin that is exposed. Look for these symptoms from 1 to several days after the first bite:    Itching    Red rash, which can start small and get larger    Hives, red swollen marks (welts), or raised red itchy areas (wheals). These may be in spots or cover a large area.    Small, firm, flat bumps    Blisters    Cluster of bites in a line, or in a curved or zigzag row    Allergic reaction    Skin infection from scratching the bites  Where bedbugs hide out  Bedbugs can be found in almost any place you spend time, both at home and away from home. This includes hotels, buses, trains, ships, nursing homes, and apartments. Bed bugs can be carried from place to place in the seams and folds of luggage, overnight bags, folded clothes.  Bedbugs can be in clean or dirty places. Because of their size and shape, bedbugs can get into small places where you wouldn t think to look. They tend to be found mostly in furniture, furnishings around the home, clothing, and cracks and crevices. Here are some areas where they can be found:    Beds and mattresses, especially in the seams    Joints of bed frames, or the headboard    Sheets and  blankets    Couches, fabric-covered chairs, and other furniture with fabric    Rugs, especially along the edges    Luggage or boxes    Clothing    Behind wall decorations, pictures, mirrors, and smoke alarms, and in electric outlets  How to find them  Bedbugs are big enough to be seen. But they also may leave some traces:    Black spots (feces) on a bed mattress, especially around the seams    Blood spots on the sheets    Shells they may have shed  Home care    Bite symptoms often go away on their own in 1 to 2 weeks.    To help prevent infection, avoid scratching the bites as much as possible.    To relieve itching and swelling, use an over-the-counter (OTC) hydrocortisone ointment or cream    If you need more relief, put an ice pack on the bites. Use the ice pack for up to 20 minutes at a time. To make an ice pack, put ice cubes in a plastic bag that seals at the top. Wrap the bag in a clean, thin towel or cloth. Never put ice or an ice pack directly on the skin.    If you have many bites or severe itching, take an OTC oral antihistamine. Follow the directions on the package.    If a bite becomes infected, your healthcare provider can prescribe an antibiotic. This may be a pill you take by mouth. Or it may be a cream you put on your skin.    If you were bitten in your home, talk with a licensed pest-control company. Bedbugs don't live on you. They live in cracks in your house. The company can inspect your home and help you get rid of the bugs safely.    Follow-up care  Follow up with your healthcare provider, or as advised.  When to get medical advice  Call your healthcare provider right away if any of these occur:    Fever of 100.4 F (38 C) or higher, or as directed by your healthcare provider    Signs of infection in the bites, such as swelling and pain that gets worse, warmth in the area, or drainage from the bites    Signs of allergic reaction, such as hives or a spreading rash  Call 911  Call 911 if any of  the following occur:    Throat itching or swelling    Wheezing  Dee last reviewed this educational content on 7/1/2019 2000-2021 [employer]. All rights reserved. This information is not intended as a substitute for professional medical care. Always follow your healthcare professional's instructions.           Patient Education     Insect Bite  Some insects sting to protect themselves or their nests. These include bees, wasps, ants, and hornets. A sting causes a sharp burning pain. Other insects bite to feed. These include fleas, bedbugs, and mosquitoes. In some cases, the actual bite causes no pain. But after the bite there may be a local reaction. Both bites and stings can cause a local reaction that is an itchy red welt or swelling at the site. Most insect bites and stings don't cause illness. And the itching and swelling most often go away without treatment. But an infection can develop if the bite is scratched and the skin broken.   If a stinger is visible at the bite spot, remove it as quickly as possible. This can reduce the amount of venom that gets into your body. Scrape it out with a dull edge, such as the edge of a credit card. Try not to squeeze it. Don't try to dig it out. You may damage the skin and also increase the chance of infection.   In rare cases, a person may have an allergic reaction to an insect bite or sting. You can have a severe allergic reaction the first time you are bitten or stung. Severe allergic reactions are called anaphylaxis. This is a medical emergency. If you have symptoms listed below after a bite or a sting, have someone call 911.   Symptoms of an allergic reaction often develop quickly and include:     Skin symptoms, such as hives, redness, or swelling away from the area that was stung. For example, the face or lips may swell after being stung on the hand.    Belly cramps, nausea, vomiting, or diarrhea    Hoarse voice, shortness of breath, and trouble  breathing    Lightheadedness, dizziness, or passing out     To help reduce swelling and itching, apply a cold pack or ice in a zip-top plastic bag wrapped in a thin towel.   Home care    For local reactions to stings or bites, your healthcare provider may prescribe over-the-counter medicines such as calamine lotion or antihistamines. These can help ease itching and swelling. Use each medicine according to the directions on the package. If the sting or bite gets infected, you will need an antibiotic. This may be in pill form taken by mouth. Or it may be as an ointment or cream put directly on the skin. Be sure to use them exactly as prescribed.    Bite symptoms often go away on their own in a week or two.    To help prevent infection, don't scratch or pick at the bite.    To help ease itching and swelling, apply ice to the bites. Do this for up to 10 minutes at a time. Don't take hot showers or baths. These often make itching worse. To make an ice pack, put ice cubes in a zip-top plastic bag that seals at the top. Wrap the bag in a clean, thin towel or cloth. Never put ice or an ice pack directly on the skin.    If you think you have insects in your home, talk with a licensed pest-control professional. He or she can inspect your home and tell you how to get rid of bugs safely.    Follow-up care  Follow up with your healthcare provider, or as advised.  Call 911  Call 911 if any of these occur:     Trouble breathing or swallowing    Wheezing    Feeling like your throat is closing up    Fainting, loss of consciousness    Swelling around the face or mouth  When to get medical advice  Call your healthcare provider right away if any of these occur:    Fever of 100.4 F (38 C) or higher, or as directed by your healthcare provider    Signs of infection, such as increased swelling and pain, warmth, red streaks, or drainage from the skin    Signs of allergic reaction, such as hives, a spreading rash, or throat itching  [employer]  last reviewed this educational content on 8/1/2019 2000-2021 [employer]. All rights reserved. This information is not intended as a substitute for professional medical care. Always follow your healthcare professional's instructions.

## 2021-07-29 NOTE — PROGRESS NOTES
"Chief Complaint   Patient presents with     Urgent Care     insect bite all over the body and legs, possible mites          ICD-10-CM    1. Insect bite of thigh, unspecified laterality, initial encounter  S70.369A betamethasone valerate (VALISONE) 0.1 % external ointment    W57.XXXA    Apply prescriptions as directed. Return if signs of infection.    Subjective     Ginger Abreu is an 80 year old female who presents to clinic today for lesions on both upper legs and behind the knees. She was at her sister's house and  Two days later developed these lesions. Other sister had similar lesions appear after she visited, but the sister who lives here has no bites.     ROS: 10 point ROS neg other than the symptoms noted above in the HPI.       Objective    BP (!) 158/67   Pulse 55   Temp 98.1  F (36.7  C)   Ht 1.6 m (5' 3\")   Wt 68 kg (150 lb)   SpO2 99%   BMI 26.57 kg/m      Physical Exam       GENERAL APPEARANCE: healthy appearing, alert     MS: extremities normal- no gross deformities noted; normal muscle tone.     SKIN: multiple lesion about 1.5 cm each with central opening and erythema surrounding them all on upper legs and behind knees. No vesicles, no drainage     NEURO: Normal strength and tone, mentation intact and speech normal    Patient Instructions     Take a daily antihistamine until these are gone. (Claritin, Allegra or Zyrtec)    Spray yourself with bug spray before going to to your sisters house again.    Patient Education     Bedbug Bites   Bedbugs are tiny insects, about the size of an apple seed. They are reddish-brown and slightly flattened and oval. They feed on human blood, often at night while people are sleeping. Bedbugs are attracted to the warmth of your body, and also to your breath. Unlike some other parasites, they can live up to a year without eating. They don t have wings and don t jump. But they are fast crawlers.  Bedbugs are not dangerous and don t often spread disease.  Bite " symptoms  The symptoms of bedbug bites can be different for each person. Bites can be found anywhere on your body. But they are more common on skin that is exposed. Look for these symptoms from 1 to several days after the first bite:    Itching    Red rash, which can start small and get larger    Hives, red swollen marks (welts), or raised red itchy areas (wheals). These may be in spots or cover a large area.    Small, firm, flat bumps    Blisters    Cluster of bites in a line, or in a curved or zigzag row    Allergic reaction    Skin infection from scratching the bites  Where bedbugs hide out  Bedbugs can be found in almost any place you spend time, both at home and away from home. This includes hotels, buses, trains, ships, nursing homes, and apartments. Bed bugs can be carried from place to place in the seams and folds of luggage, overnight bags, folded clothes.  Bedbugs can be in clean or dirty places. Because of their size and shape, bedbugs can get into small places where you wouldn t think to look. They tend to be found mostly in furniture, furnishings around the home, clothing, and cracks and crevices. Here are some areas where they can be found:    Beds and mattresses, especially in the seams    Joints of bed frames, or the headboard    Sheets and blankets    Couches, fabric-covered chairs, and other furniture with fabric    Rugs, especially along the edges    Luggage or boxes    Clothing    Behind wall decorations, pictures, mirrors, and smoke alarms, and in electric outlets  How to find them  Bedbugs are big enough to be seen. But they also may leave some traces:    Black spots (feces) on a bed mattress, especially around the seams    Blood spots on the sheets    Shells they may have shed  Home care    Bite symptoms often go away on their own in 1 to 2 weeks.    To help prevent infection, avoid scratching the bites as much as possible.    To relieve itching and swelling, use an over-the-counter (OTC)  hydrocortisone ointment or cream    If you need more relief, put an ice pack on the bites. Use the ice pack for up to 20 minutes at a time. To make an ice pack, put ice cubes in a plastic bag that seals at the top. Wrap the bag in a clean, thin towel or cloth. Never put ice or an ice pack directly on the skin.    If you have many bites or severe itching, take an OTC oral antihistamine. Follow the directions on the package.    If a bite becomes infected, your healthcare provider can prescribe an antibiotic. This may be a pill you take by mouth. Or it may be a cream you put on your skin.    If you were bitten in your home, talk with a licensed pest-control company. Bedbugs don't live on you. They live in cracks in your house. The company can inspect your home and help you get rid of the bugs safely.    Follow-up care  Follow up with your healthcare provider, or as advised.  When to get medical advice  Call your healthcare provider right away if any of these occur:    Fever of 100.4 F (38 C) or higher, or as directed by your healthcare provider    Signs of infection in the bites, such as swelling and pain that gets worse, warmth in the area, or drainage from the bites    Signs of allergic reaction, such as hives or a spreading rash  Call 911  Call 911 if any of the following occur:    Throat itching or swelling    Wheezing  Dee last reviewed this educational content on 7/1/2019 2000-2021 [employer]. All rights reserved. This information is not intended as a substitute for professional medical care. Always follow your healthcare professional's instructions.           Patient Education     Insect Bite  Some insects sting to protect themselves or their nests. These include bees, wasps, ants, and hornets. A sting causes a sharp burning pain. Other insects bite to feed. These include fleas, bedbugs, and mosquitoes. In some cases, the actual bite causes no pain. But after the bite there may be a local  reaction. Both bites and stings can cause a local reaction that is an itchy red welt or swelling at the site. Most insect bites and stings don't cause illness. And the itching and swelling most often go away without treatment. But an infection can develop if the bite is scratched and the skin broken.   If a stinger is visible at the bite spot, remove it as quickly as possible. This can reduce the amount of venom that gets into your body. Scrape it out with a dull edge, such as the edge of a credit card. Try not to squeeze it. Don't try to dig it out. You may damage the skin and also increase the chance of infection.   In rare cases, a person may have an allergic reaction to an insect bite or sting. You can have a severe allergic reaction the first time you are bitten or stung. Severe allergic reactions are called anaphylaxis. This is a medical emergency. If you have symptoms listed below after a bite or a sting, have someone call 911.   Symptoms of an allergic reaction often develop quickly and include:     Skin symptoms, such as hives, redness, or swelling away from the area that was stung. For example, the face or lips may swell after being stung on the hand.    Belly cramps, nausea, vomiting, or diarrhea    Hoarse voice, shortness of breath, and trouble breathing    Lightheadedness, dizziness, or passing out     To help reduce swelling and itching, apply a cold pack or ice in a zip-top plastic bag wrapped in a thin towel.   Home care    For local reactions to stings or bites, your healthcare provider may prescribe over-the-counter medicines such as calamine lotion or antihistamines. These can help ease itching and swelling. Use each medicine according to the directions on the package. If the sting or bite gets infected, you will need an antibiotic. This may be in pill form taken by mouth. Or it may be as an ointment or cream put directly on the skin. Be sure to use them exactly as prescribed.    Bite symptoms  often go away on their own in a week or two.    To help prevent infection, don't scratch or pick at the bite.    To help ease itching and swelling, apply ice to the bites. Do this for up to 10 minutes at a time. Don't take hot showers or baths. These often make itching worse. To make an ice pack, put ice cubes in a zip-top plastic bag that seals at the top. Wrap the bag in a clean, thin towel or cloth. Never put ice or an ice pack directly on the skin.    If you think you have insects in your home, talk with a licensed pest-control professional. He or she can inspect your home and tell you how to get rid of bugs safely.    Follow-up care  Follow up with your healthcare provider, or as advised.  Call 911  Call 911 if any of these occur:     Trouble breathing or swallowing    Wheezing    Feeling like your throat is closing up    Fainting, loss of consciousness    Swelling around the face or mouth  When to get medical advice  Call your healthcare provider right away if any of these occur:    Fever of 100.4 F (38 C) or higher, or as directed by your healthcare provider    Signs of infection, such as increased swelling and pain, warmth, red streaks, or drainage from the skin    Signs of allergic reaction, such as hives, a spreading rash, or throat itching  Hired last reviewed this educational content on 8/1/2019 2000-2021 [employer]. All rights reserved. This information is not intended as a substitute for professional medical care. Always follow your healthcare professional's instructions.               RAMY Nam, CNP  [facility]

## 2021-09-04 ENCOUNTER — HEALTH MAINTENANCE LETTER (OUTPATIENT)
Age: 81
End: 2021-09-04

## 2021-09-20 DIAGNOSIS — I10 BENIGN ESSENTIAL HYPERTENSION: ICD-10-CM

## 2021-09-20 RX ORDER — CANDESARTAN 16 MG/1
16 TABLET ORAL 2 TIMES DAILY
Qty: 180 TABLET | Refills: 3 | Status: SHIPPED | OUTPATIENT
Start: 2021-09-20 | End: 2022-09-29

## 2021-09-20 NOTE — TELEPHONE ENCOUNTER
Received refill request for:  Candesartan  Last OV was: 7/9/2021 with Dr. SRIVASTAVA  Labs/EKG: last BMP 7/2/2021  F/U scheduled: orders in Epic for 7/2022  New script sent to: [facility]

## 2022-02-05 ENCOUNTER — OFFICE VISIT (OUTPATIENT)
Dept: URGENT CARE | Facility: URGENT CARE | Age: 82
End: 2022-02-05
Payer: COMMERCIAL

## 2022-02-05 VITALS
OXYGEN SATURATION: 97 % | DIASTOLIC BLOOD PRESSURE: 78 MMHG | RESPIRATION RATE: 17 BRPM | HEART RATE: 65 BPM | TEMPERATURE: 97.6 F | SYSTOLIC BLOOD PRESSURE: 145 MMHG

## 2022-02-05 DIAGNOSIS — J02.9 VIRAL PHARYNGITIS: Primary | ICD-10-CM

## 2022-02-05 DIAGNOSIS — R07.0 THROAT PAIN: ICD-10-CM

## 2022-02-05 LAB
DEPRECATED S PYO AG THROAT QL EIA: NEGATIVE
GROUP A STREP BY PCR: NOT DETECTED

## 2022-02-05 PROCEDURE — U0005 INFEC AGEN DETEC AMPLI PROBE: HCPCS | Performed by: INTERNAL MEDICINE

## 2022-02-05 PROCEDURE — 99213 OFFICE O/P EST LOW 20 MIN: CPT | Performed by: INTERNAL MEDICINE

## 2022-02-05 PROCEDURE — 87651 STREP A DNA AMP PROBE: CPT | Performed by: INTERNAL MEDICINE

## 2022-02-05 PROCEDURE — U0003 INFECTIOUS AGENT DETECTION BY NUCLEIC ACID (DNA OR RNA); SEVERE ACUTE RESPIRATORY SYNDROME CORONAVIRUS 2 (SARS-COV-2) (CORONAVIRUS DISEASE [COVID-19]), AMPLIFIED PROBE TECHNIQUE, MAKING USE OF HIGH THROUGHPUT TECHNOLOGIES AS DESCRIBED BY CMS-2020-01-R: HCPCS | Performed by: INTERNAL MEDICINE

## 2022-02-05 RX ORDER — LIDOCAINE HYDROCHLORIDE 20 MG/ML
15 SOLUTION OROPHARYNGEAL
Qty: 100 ML | Refills: 0 | Status: SHIPPED | OUTPATIENT
Start: 2022-02-05 | End: 2022-09-29

## 2022-02-05 NOTE — PROGRESS NOTES
Assessment & Plan     Viral pharyngitis  Considered range of possible etiologies including rhinovirus, enterovirus, adenovirus, influenza virus, seasonal coronavirus, human metapneumovirus, parainfluenza virus, RSV and COVID-19.  Based upon the current COVID-19 rates in the community, the pre-test probability of COVID-19 is perhaps 10%.  We'll go ahead and test.  In the meantime, recommend social isolation pending test results and supportive care.     Throat pain  - Streptococcus A Rapid Screen w/Reflex to PCR - Clinic Collect  - Symptomatic; Unknown COVID-19 Virus (Coronavirus) by PCR Nose  - Group A Streptococcus PCR Throat Swab    Dwayne More MD  [facility]    Subjective     HPI   Chief complaint of sore throat.  This has been worsening for two days.  She has some chronic rhinitis which doesn't seem worse. Denies fevers, chills.  A little cough and some throat clearing.  Denies shortness of breath, chest tightness, wheezing.  Pain with swallowing. Voice is ok.  She has had primary series and booster for COVID and had a positive case about one month ago.      Review of Systems   Constitutional, HEENT, cardiovascular, pulmonary, gi and gu systems are negative, except as otherwise noted.      Objective    BP (!) 145/78   Pulse 65   Temp 97.6  F (36.4  C)   Resp 17   SpO2 97%   There is no height or weight on file to calculate BMI.  Physical Exam   GENERAL APPEARANCE: healthy, alert and no distress  HENT: ear canals and TM's normal and cobblestoning of the posterior pharynx with post-nasal drainage   NECK: no adenopathy, no asymmetry, masses, or scars and thyroid normal to palpation  RESP: lungs clear to auscultation - no rales, rhonchi or wheezes  CV: regular rates and rhythm, normal S1 S2, no S3 or S4 and no murmur, click or rub    Results for orders placed or performed in visit on 02/05/22 (from the past 24 hour(s))   Streptococcus A Rapid Screen w/Reflex to PCR - Clinic  Collect    Specimen: Throat; Swab   Result Value Ref Range    Group A Strep antigen Negative Negative

## 2022-02-06 LAB — SARS-COV-2 RNA RESP QL NAA+PROBE: NEGATIVE

## 2022-02-19 ENCOUNTER — HEALTH MAINTENANCE LETTER (OUTPATIENT)
Age: 82
End: 2022-02-19

## 2022-06-20 ENCOUNTER — TELEPHONE (OUTPATIENT)
Dept: CARDIOLOGY | Facility: CLINIC | Age: 82
End: 2022-06-20
Payer: COMMERCIAL

## 2022-06-20 DIAGNOSIS — E78.5 HYPERLIPIDEMIA LDL GOAL <100: ICD-10-CM

## 2022-06-20 DIAGNOSIS — I10 BENIGN ESSENTIAL HYPERTENSION: Primary | ICD-10-CM

## 2022-06-20 NOTE — TELEPHONE ENCOUNTER
Health Call Center    Phone Message    May a detailed message be left on voicemail: yes     Reason for Call: Order(s): Other:   Reason for requested: Pt has lab appt and follow up with Dr. Maldonado on .  The lab orders  in July.  Please replace/extend the lab orders.  Thank you!  Date needed: 2022  Provider name: Dr. Maldonado      Action Taken: Message routed to:  Other: Cardiology    Travel Screening: Not Applicable

## 2022-06-28 ENCOUNTER — TRANSFERRED RECORDS (OUTPATIENT)
Dept: HEALTH INFORMATION MANAGEMENT | Facility: CLINIC | Age: 82
End: 2022-06-28

## 2022-07-23 ENCOUNTER — OFFICE VISIT (OUTPATIENT)
Dept: URGENT CARE | Facility: URGENT CARE | Age: 82
End: 2022-07-23
Payer: COMMERCIAL

## 2022-07-23 VITALS
HEART RATE: 89 BPM | SYSTOLIC BLOOD PRESSURE: 133 MMHG | TEMPERATURE: 97.3 F | OXYGEN SATURATION: 100 % | DIASTOLIC BLOOD PRESSURE: 54 MMHG

## 2022-07-23 DIAGNOSIS — E11.9 TYPE 2 DIABETES MELLITUS WITHOUT COMPLICATION, WITHOUT LONG-TERM CURRENT USE OF INSULIN (H): ICD-10-CM

## 2022-07-23 DIAGNOSIS — R35.0 INCREASED FREQUENCY OF URINATION: Primary | ICD-10-CM

## 2022-07-23 DIAGNOSIS — B37.31 YEAST VAGINITIS: ICD-10-CM

## 2022-07-23 DIAGNOSIS — N39.0 URINARY TRACT INFECTION WITHOUT HEMATURIA, SITE UNSPECIFIED: ICD-10-CM

## 2022-07-23 LAB
ALBUMIN UR-MCNC: NEGATIVE MG/DL
APPEARANCE UR: ABNORMAL
BACTERIA #/AREA URNS HPF: ABNORMAL /HPF
BILIRUB UR QL STRIP: NEGATIVE
COLOR UR AUTO: YELLOW
GLUCOSE UR STRIP-MCNC: >=1000 MG/DL
HGB UR QL STRIP: ABNORMAL
KETONES UR STRIP-MCNC: NEGATIVE MG/DL
LEUKOCYTE ESTERASE UR QL STRIP: ABNORMAL
NITRATE UR QL: POSITIVE
PH UR STRIP: 5.5 [PH] (ref 5–7)
RBC #/AREA URNS AUTO: ABNORMAL /HPF
SP GR UR STRIP: 1.01 (ref 1–1.03)
SQUAMOUS #/AREA URNS AUTO: ABNORMAL /LPF
UROBILINOGEN UR STRIP-ACNC: 0.2 E.U./DL
WBC #/AREA URNS AUTO: >100 /HPF
WBC CLUMPS #/AREA URNS HPF: PRESENT /HPF

## 2022-07-23 PROCEDURE — 81001 URINALYSIS AUTO W/SCOPE: CPT

## 2022-07-23 PROCEDURE — 87186 SC STD MICRODIL/AGAR DIL: CPT | Performed by: FAMILY MEDICINE

## 2022-07-23 PROCEDURE — 99213 OFFICE O/P EST LOW 20 MIN: CPT | Performed by: FAMILY MEDICINE

## 2022-07-23 PROCEDURE — 87086 URINE CULTURE/COLONY COUNT: CPT | Performed by: FAMILY MEDICINE

## 2022-07-23 RX ORDER — FLUCONAZOLE 150 MG/1
150 TABLET ORAL
Qty: 3 TABLET | Refills: 0 | Status: SHIPPED | OUTPATIENT
Start: 2022-07-23 | End: 2022-07-30

## 2022-07-23 RX ORDER — NITROFURANTOIN 25; 75 MG/1; MG/1
100 CAPSULE ORAL 2 TIMES DAILY
Qty: 14 CAPSULE | Refills: 0 | Status: SHIPPED | OUTPATIENT
Start: 2022-07-23 | End: 2022-07-30

## 2022-07-23 NOTE — PROGRESS NOTES
Ginger Abreu is a 81 year old female who comes in today for one week of urinary symptoms    Worse yest    Pressure, frequency    History of frequent  uti    On once daily cephaxin for  Preventive    Staying hydrated    No fever/ chills    No nausea  Or vomting    Full physical not done     Mentation and affect are fine    No tremor of speech or extremity     abd  Soft nontender    No costovertebral angle tenderness    ua shows lots  Of wbc, bacteria, and  Sugar         ASSESSMENT / PLAN:  (R35.0) Increased frequency of urination  (primary encounter diagnosis)  Comment: likely uti based on symptoms and  Ua.    Plan: UA macro with reflex to Microscopic and Culture        - Clinc Collect, Urine Microscopic Exam, Urine         Culture             (N39.0) Urinary tract infection without hematuria, site unspecified  Comment: will treat.  The last 3 urine cultures in chart showed sensitivity to this, and patient on cephaslosporin chronically.  Hold that while  On this.    Plan: nitroFURantoin macrocrystal-monohydrate         (MACROBID) 100 MG capsule             (B37.3) Yeast vaginitis  Comment: prone to yeast infection after antibiotics.   Plan: fluconazole (DIFLUCAN) 150 MG tablet        Use this prn     (E11.9) Type 2 diabetes mellitus without complication, without long-term current use of insulin (H)  Comment: lots of sugar in urine contributing to frequency  Plan: increase fluid intake and monitor sugars.     Follow up as needed based on symptoms    Be seen promptly if symptoms acutely worsen       I reviewed the patient's medications, allergies, medical history, family history, and social history.    Leandro Moon MD

## 2022-07-23 NOTE — PATIENT INSTRUCTIONS
Increase fluid intake    Take new prescription    Hold cephalexin while on new one    Follow up as needed based on symptoms      Diflucan as  needed

## 2022-07-24 LAB — BACTERIA UR CULT: ABNORMAL

## 2022-08-01 ENCOUNTER — NURSE TRIAGE (OUTPATIENT)
Dept: NURSING | Facility: CLINIC | Age: 82
End: 2022-08-01

## 2022-08-01 NOTE — TELEPHONE ENCOUNTER
TRIAGE CALL - Is this a 2nd Level Triage? NO  PCP not at     Nurse Triage SBAR - Caller  Patient   Situation: Frequency with urination again - worse than the first time  Background:  Was seen on 07/23/22 at the [facility] for a UTI -  nitroFURantoin macrocrystal-monohydrate         (MACROBID) 100 MG capsule -   CULTURE back - bacteria susceptible to Antibx    Assessment:  Frequency and discomfort urination   Patients denies fever or lower back pain   Measures taken: None  [place] [facility] [place]    Recommended Disposition per protocol to call her PCP clinic for an antibiotic and or go back [place] [facility] [place]   Patient verbalized understanding and agrees with plan    Lalitha Ramos RN Nurse Triage Advisor 5:49 PM 8/1/2022

## 2022-08-01 NOTE — TELEPHONE ENCOUNTER
Reason for Disposition    Urinating more frequently than usual (i.e., frequency)    Fever > 100.4 F (38.0 C)    Protocols used: URINARY SYMPTOMS-A-AH

## 2022-08-02 ENCOUNTER — TELEPHONE (OUTPATIENT)
Dept: CARDIOLOGY | Facility: CLINIC | Age: 82
End: 2022-08-02

## 2022-08-02 NOTE — TELEPHONE ENCOUNTER
[facility] Call Center    Phone Message    May a detailed message be left on voicemail: yes     Reason for Call: Symptoms or Concerns     If patient has red-flag symptoms, warm transfer to triage line    Current symptom or concern: Bradycardia in the morning    Symptoms have been present for:  6 month(s)    Has patient previously been seen for this? No    Are there any new or worsening symptoms? Yes: Bradycardia in the morning    Ginger called and stated that she has been experiencing Bradycardia in the morning. Her PCP is concerned that she needs to be seen sooner than her appointment with Dr Maldonado on 09.29.22. Please give Ginger a call back at [phone]. Thank you!    Action Taken: Other: Cardiology    Travel Screening: Not Applicable

## 2022-08-03 NOTE — TELEPHONE ENCOUNTER
Pt called back, states her heart rate is running in the 40s in the morning, usually increases to the 50's by 8 AM. Pt denies feeling lightheaded or dizzy. Pt states her PCP wanted her to see Dr. Maldonado sooner than 9/29/22 as scheduled. Will message Dr. Maldonado to review. Rupinder DENTON

## 2022-08-08 NOTE — TELEPHONE ENCOUNTER
Called pt with recommendations from Dr. Maldonado. Pt scheduled to see Kassandra Nichols NP tomorrow with EKG prior to the visit. Pt aware of appt date, time & locations. . Pt continues to deny feeling lightheaded, dizzy & denies feeling like she could pass out. Rupinder DENTON

## 2022-08-09 ENCOUNTER — HOSPITAL ENCOUNTER (OUTPATIENT)
Dept: CARDIOLOGY | Facility: CLINIC | Age: 82
Discharge: HOME | End: 2022-08-09
Attending: NURSE PRACTITIONER
Payer: COMMERCIAL

## 2022-08-09 ENCOUNTER — OFFICE VISIT (OUTPATIENT)
Dept: CARDIOLOGY | Facility: CLINIC | Age: 82
End: 2022-08-09
Payer: COMMERCIAL

## 2022-08-09 VITALS
SYSTOLIC BLOOD PRESSURE: 128 MMHG | HEART RATE: 50 BPM | DIASTOLIC BLOOD PRESSURE: 70 MMHG | BODY MASS INDEX: 26.63 KG/M2 | HEIGHT: 63 IN | WEIGHT: 150.3 LBS | OXYGEN SATURATION: 97 %

## 2022-08-09 DIAGNOSIS — R42 LIGHTHEADEDNESS: ICD-10-CM

## 2022-08-09 DIAGNOSIS — R00.1 SINUS BRADYCARDIA: ICD-10-CM

## 2022-08-09 DIAGNOSIS — R06.09 EXERTIONAL DYSPNEA: Primary | ICD-10-CM

## 2022-08-09 DIAGNOSIS — I15.0 RENOVASCULAR HYPERTENSION: ICD-10-CM

## 2022-08-09 LAB — LVEF ECHO: NORMAL

## 2022-08-09 PROCEDURE — 93000 ELECTROCARDIOGRAM COMPLETE: CPT | Performed by: NURSE PRACTITIONER

## 2022-08-09 PROCEDURE — 93306 TTE W/DOPPLER COMPLETE: CPT

## 2022-08-09 PROCEDURE — 93244 EXT ECG>48HR<7D REV&INTERPJ: CPT | Performed by: INTERNAL MEDICINE

## 2022-08-09 PROCEDURE — 99213 OFFICE O/P EST LOW 20 MIN: CPT | Performed by: NURSE PRACTITIONER

## 2022-08-09 PROCEDURE — 93242 EXT ECG>48HR<7D RECORDING: CPT

## 2022-08-09 PROCEDURE — 93306 TTE W/DOPPLER COMPLETE: CPT | Mod: 26 | Performed by: INTERNAL MEDICINE

## 2022-08-09 RX ORDER — SUMATRIPTAN 25 MG/1
25 TABLET, FILM COATED ORAL
COMMUNITY

## 2022-08-09 NOTE — PATIENT INSTRUCTIONS
Today's Recommendations    Update echocardiogram and get 7 day heart monitor  Continue all medications without changes.  Please follow up with Dr. Maldonado in September as scheduled.    Please send a [employer] message or call [phone] to the RN team with questions or concerns.     Scheduling number [phone]    RAMY Penn, CNP

## 2022-08-09 NOTE — PROGRESS NOTES
"  General Cardiology Clinic Progress Note  Ginger Abreu MRN# [id number]   YOB: 1940 Age: 82 year old     Primary cardiologist: Dr. Maldonado    Reason for visit: Bradycardia    History of presenting illness:    Ginger Abreu, a pleasant 82 year old patient who has a past medical history significant for:   1. Hypertension, difficult to control  2. Hyperlipidemia  3. Renal insufficiency   4. Renal artery stenosis s/p left renal artery stenting with moderate stenosis on the right   5. Pulmonary embolism on Xarelto  6. DM    She recently called our clinic with concerns for HR in the 40-50's in the mornings with lightheadedness and dizziness and was directed by her primary care provider to follow-up with cardiology.  Today she brings in a home BP log that show her home blood pressure runs between 130 over 60s to 70s and heart rates ranged between 44 and 64 bpm.  Occasionally she will become lightheaded and describes a \"weird\" feeling that occurs once weekly or once every 2 weeks.  She states she will shut her eyes to take a drink of water and then the symptoms resolve.  There are no complaints of chest discomfort on exertion and shortness of breath on exertion is stable.  Also, no concerns for syncope, presyncope, PND orthopnea.    Diagnostic Studies:    EKG (8/9/2022): Sinus bradycardia at 50 bpm    Peripheral angiogram (2017): S/p PTCA and stent to proximal left renal artery with right renal artery of proximal 30-40%     Echocardiogram (2014): LVEF 55-60% with concentric LVH and normal RV size and function.         Assessment and Plan:     ASSESSMENT:    1. Bradycardia    Intermittent episodes of bradycardia particularly in the mornings where it often in the 40s.    Some symptoms of occasional lightheadedness and fatigue.    Home heart rates varied between 44 and 64 bpm on most recent logs    No on AV elvia blocking agents    2. Hypertension    hydrochlorothiazide 12.5 mg daily, candesartan 16 " mg BID,     Well-controlled    3. Hyperlipidemia    Tolerating rosuvastatin 10 mg daily without myalgias    Most recent LDL was 40 from 6/22/2021    4. Renal artery stenosis and renal insufficiency    Status post PTCA and stenting to proximal left renal artery with residual stenosis of the right renal artery that is medically managed    Most recent creatinine was 1.24    PLAN:     1. Echcocardiogram  2. 7 day Zio Patch  3. Follow up with Dr. Maldonado as previously scheduled       Orders this Visit:  Orders Placed This Encounter   Procedures     EKG 12-lead complete w/read - Clinics (performed today)     Leadless EKG Monitor 3 to 7 Days     Echocardiogram Complete     Orders Placed This Encounter   Medications     SUMAtriptan (IMITREX) 25 MG tablet     Sig: Take 25 mg by mouth at onset of headache for migraine     Medications Discontinued During This Encounter   Medication Reason     rivaroxaban ANTICOAGULANT (XARELTO) 10 MG TABS tablet      cloNIDine (CATAPRES) 0.1 MG tablet        Today's clinic visit entailed:  Review of prior external note(s) from - Outside records from [facility]  15 minutes spent on the date of the encounter doing chart review, history and exam, documentation and further activities per the note  Provider  Link to [facility] Help Grid     The level of medical decision making during this visit was of moderate complexity.           Review of Systems:     Review of Systems:  Skin:  Negative     Eyes:  Positive for glaucoma  ENT:  Negative    Respiratory:  Positive for cough;dyspnea on exertion  Cardiovascular:    dizziness;Positive for  Gastroenterology: Positive for heartburn  Genitourinary:  Positive for urinary tract infection  Musculoskeletal:  Positive for arthritis;back pain  Neurologic:  Positive for migraine headaches  Psychiatric:  Positive for anxiety  Heme/Lymph/Imm:  Negative    Endocrine:  Positive for diabetes            Physical Exam:     Vitals: /70   Pulse 50   Ht 1.6  "m (5' 3\")   Wt 68.2 kg (150 lb 4.8 oz)   SpO2 97%   BMI 26.62 kg/m    Constitutional: Well nourished and in no apparent distress.  Eyes: Pupils equal, round. Sclerae anicteric.   HEENT: Normocephalic, atraumatic.   Neck: Supple. JVD   Respiratory: Breathing non-labored. Lungs clear to auscultation bilaterally. No crackles, wheezes, rhonchi, or rales.  Cardiovascular:  Regular rate and rhythm, normal S1 and S2. No murmur, rub, or gallop.  Skin: Warm, dry. No rashes, cyanosis, or xanthelasma.  Extremities: No edema.  Neurologic: No gross motor deficits. Alert, awake, and oriented to person, place and time.  Psychiatric: Affect appropriate.             Medications:     Current Outpatient Medications   Medication Sig Dispense Refill     albuterol (PROAIR HFA/PROVENTIL HFA/VENTOLIN HFA) 108 (90 BASE) MCG/ACT Inhaler Inhale 2 puffs into the lungs every 6 hours INDICATION: RESCUE INHALER FOR SHORTNESS OF BREATH, CHEST TIGHTNESS AND COUGH 1 Inhaler 11     alpha-lipoic acid 600 MG CAPS Take 600 mg by mouth 3 times daily        ALPRAZolam (XANAX) 0.25 MG tablet TAKE ONE TABLET BY MOUTH ONCE DAILY AT  NIGHT  AS  NEEDED  FOR  SEVERE  ANXIETY  ONLY 30 tablet 0     ascorbic acid (VITAMIN C) 1000 MG TABS Take 1 tablet (1,000 mg) by mouth daily VITAMIN C REPLACEMENT 100 tablet 3     B-12, Methylcobalamin, 1000 MCG SUBL        candesartan (ATACAND) 16 MG tablet Take 1 tablet (16 mg) by mouth 2 times daily 180 tablet 3     Cranberry 125 MG TABS        diphenhydrAMINE (BENADRYL) 25 MG tablet Take 25 mg by mouth every 6 hours as needed for itching or allergies       empagliflozin (JARDIANCE) 10 MG TABS tablet Take 10 mg by mouth daily       esomeprazole (NEXIUM) 40 MG capsule Take 1 capsule (40 mg) by mouth every morning (before breakfast) TAKE  30'-60' BEFORE 1ST MEAL 180 capsule 2     famotidine (PEPCID) 10 MG tablet Take 10 mg by mouth 2 times daily       glimepiride (AMARYL) 2 MG tablet Take 4 mg by mouth 2 times daily       " lactobacillus rhamnosus, GG, (CULTURELL) capsule Take 1 capsule by mouth 2 times daily       Melatonin 10 MG TABS tablet Take 10 mg by mouth At Bedtime       nitroglycerin (NITROSTAT) 0.4 MG SL tablet Place 1 tablet (0.4 mg) under the tongue every 5 minutes as needed for chest pain 25 tablet 0     oxybutynin ER (DITROPAN-XL) 10 MG 24 hr tablet Take 1 tablet (10 mg) by mouth At Bedtime 60 tablet 3     rosuvastatin (CRESTOR) 10 MG tablet Take 10 mg by mouth daily       SUMAtriptan (IMITREX) 25 MG tablet Take 25 mg by mouth at onset of headache for migraine       timolol maleate (TIMOPTIC) 0.5 % ophthalmic solution Place 1 drop into both eyes 2 times daily       aspirin 81 MG EC tablet Take 81 mg by mouth daily (Patient not taking: Reported on 8/9/2022)       BETA BLOCKER NOT PRESCRIBED, INTENTIONAL, Beta Blocker not prescribed intentionally due to Intolerance (Patient not taking: Reported on 8/9/2022)  0     butalbital-acetaminophen-caffeine (FIORICET/ESGIC) -40 MG per tablet TAKE ONE TABLET BY MOUTH EVERY 8 HOURS AS NEEDED FOR  SEVERE  MIGRAINE  HEADACHE  ONLY (Patient not taking: Reported on 8/9/2022) 30 tablet 1     hydrochlorothiazide (HYDRODIURIL) 25 MG tablet Take 12.5 mg by mouth daily       Lidocaine (LIDOCARE) 4 % Patch Place 1 patch onto the skin every 24 hours To prevent lidocaine toxicity, patient should be patch free for 12 hrs daily. (Patient not taking: Reported on 8/9/2022) 10 patch 0     lidocaine, viscous, (XYLOCAINE) 2 % solution Take 15 mLs by mouth every 2 hours as needed for moderate pain (Patient not taking: Reported on 8/9/2022) 100 mL 0     methenamine (HIPREX) 1 g tablet Take 1 tablet (1 g) by mouth 2 times daily (Patient not taking: Reported on 8/9/2022) 120 tablet 3     methocarbamol (ROBAXIN) 750 MG tablet Take 750 mg by mouth 4 times daily as needed for muscle spasms (Patient not taking: Reported on 8/9/2022)       potassium chloride ER (KLOR-CON M) 20 MEQ CR tablet Take 1 tablet  (20 mEq) by mouth daily With food (Patient not taking: Reported on 2022) 30 tablet 3     traMADol (ULTRAM) 50 MG tablet Take 1 tablet (50 mg) by mouth every 6 hours as needed for moderate pain (Patient not taking: Reported on 2022) 14 tablet 0     vitamin D (ERGOCALCIFEROL) 32310 UNIT capsule Take 50,000 Units by mouth once a week  (Patient not taking: Reported on 2022)       vitamin D3 (CHOLECALCIFEROL) 68029 units (250 mcg) capsule Take 1 capsule by mouth daily (Patient not taking: Reported on 2022)         Family History   Problem Relation Age of Onset     Breast Cancer Mother      Heart Disease Father 25         in a fire      Heart Disease Paternal Grandfather      Breast Cancer Sister      Hyperlipidemia Sister        Social History     Socioeconomic History     Marital status:      Spouse name: Not on file     Number of children: Not on file     Years of education: Not on file     Highest education level: Not on file   Occupational History     Not on file   Tobacco Use     Smoking status: Never Smoker     Smokeless tobacco: Never Used   Substance and Sexual Activity     Alcohol use: No     Alcohol/week: 0.0 standard drinks     Comment: very rare     Drug use: No     Sexual activity: Yes   Other Topics Concern     Parent/sibling w/ CABG, MI or angioplasty before 65F 55M? No      Service Not Asked     Blood Transfusions Not Asked     Caffeine Concern No     Comment: soda 1 time per day     Occupational Exposure Not Asked     Hobby Hazards Not Asked     Sleep Concern Not Asked     Stress Concern Not Asked     Weight Concern Not Asked     Special Diet No     Back Care Not Asked     Exercise No     Comment: limited due to back pain      Bike Helmet Not Asked     Seat Belt Not Asked     Self-Exams Not Asked   Social History Narrative     Not on file     Social Determinants of Health     Financial Resource Strain: Not on file   Food Insecurity: Not on file    Transportation Needs: Not on file   Physical Activity: Not on file   Stress: Not on file   Social Connections: Not on file   Intimate Partner Violence: Not on file   Housing Stability: Not on file            Past Medical History:     Past Medical History:   Diagnosis Date     Acute peptic ulcer, unspecified site, without mention of hemorrhage, perforation, or obstruction      Allergic rhinitis, cause unspecified      Anxiety      Basal cell carcinoma      DVT (deep venous thrombosis) (H) 03/2012    following spinal surgery. Saw Oncology. 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 Saw Oncology. treated 6 months     Peripheral neuropathy 6/19/2015     Pneumonia, organism unspecified(486)      Spinal stenosis      Squamous cell carcinoma      Thyroid nodule      Type II or unspecified type diabetes mellitus without mention of complication, not stated as uncontrolled      Unspecified essential hypertension               Past Surgical History:     Past Surgical History:   Procedure Laterality Date     NONSPECIFIC PROCEDURE  2011    Bilateral sphenoidotomy with removal of polypoid tissue.     Right frontal sinusotomy.   Bilateral total ethmoidectomy.  Bilateral maxillary antrostomy with removal of polypoid tissue.       ORTHOPEDIC SURGERY      lumbar fusion     [facility] NONSPECIFIC PROCEDURE  approx  1970's    hyst/bso     ZZC NONSPECIFIC PROCEDURE  approx 1970's    gb     ZZC NONSPECIFIC PROCEDURE  approx 1980's    cystocele repair     ZZC NONSPECIFIC PROCEDURE  approx 1980's    endoscopic sinus      ZZC NONSPECIFIC PROCEDURE      epidurals x 7     ZZC NONSPECIFIC PROCEDURE      nl colonoscopy 1/2006     ZZC NONSPECIFIC PROCEDURE  2011    L2-L3 foramenotomies     ZZC NONSPECIFIC PROCEDURE  2012    lumbar fusion              Allergies:   Codeine, Diatrizoate, Morphine, Oxycodone-acetaminophen, Atenolol, Diltiazem, Labetalol, Lipitor [atorvastatin calcium], Nifedipine, Sulfa drugs, Zocor [simvastatin], Advair diskus, Aleve [naproxen], Amlodipine, Amlodipine besylate, Blood-group specific substance, Bystolic [nebivolol hcl], Calcium carbonate, Crestor [rosuvastatin calcium], Diovan [valsartan], Ezetimibe, Fluticasone-salmeterol, Hydralazine, Hydrochlorothiazide, Imdur [isosorbide mononitrate], Kcl, Lisinopril, Lovastatin, Metformin, Metoprolol, Niacin, No clinical screening - see comments, Omnicef, Pravachol [hmg-coa-r inhibitors], Prilosec otc [omeprazole magnesium], and Zetia [ezetimibe]       Data:   All laboratory data reviewed:    Recent Labs   Lab Test 06/22/21  0833 01/07/20  0916 04/05/18  0929 11/08/17  1026 12/15/16  0856 06/28/16  0900 04/10/16  1938 03/28/16  0857 10/24/14  0943 09/22/14  1142   LDL 40 40 87  --    < >  --   --  83   < >  --    HDL 48* 43* 51  --    < >  --   --  56   < >  --    NHDL 75 87 128  --    < >  --   --  116  --   --    CHOL 123 130 179  --    < >  --   --  172   < >  --    TRIG 177* 237* 204*  --    < >  --   --  166*   < >  --    TSH  --   --   --  2.36  --  2.39 1.66  --    < >  --    NTBNP  --   --   --   --   --   --   --   --   --  375*   IRON  --   --   --   --   --   --   --  78   < >  --    FEB  --   --   --   --   --   --   --  416   < >  --    IRONSAT  --   --   --   --   --   --   --  19   < >  --    PADMINI  --   --   --   --   --   --   --  123   < >  --     < > =  values in this interval not displayed.       Lab Results   Component Value Date    WBC 6.0 04/30/2021    RBC 3.77 (L) 04/30/2021    HGB 11.7 04/30/2021    HCT 36.9 04/30/2021    MCV 98 04/30/2021    MCH 31.0 04/30/2021    MCHC 31.7 04/30/2021    RDW 14.3 04/30/2021     (L) 04/30/2021       Lab Results   Component Value Date     07/02/2021    POTASSIUM 4.3 07/02/2021    CHLORIDE 110 (H) 07/02/2021    CO2 26 07/02/2021    ANIONGAP 4 07/02/2021     (H) 07/02/2021    BUN 28 07/02/2021    CR 1.24 (H) 07/02/2021    GFRESTIMATED 41 (L) 07/02/2021    GFRESTBLACK 47 (L) 07/02/2021    ROGE 9.5 07/02/2021      Lab Results   Component Value Date    AST 22 04/05/2018    ALT 25 06/22/2021       Lab Results   Component Value Date    A1C 6.0 (H) 07/12/2019       Lab Results   Component Value Date    INR 0.92 02/27/2017    INR 2.2 (A) 11/05/2012    INR 2.1 (A) 10/15/2012    INR 2.64 (H) 04/27/2012         RAMY RUIZ [facility]  Pager: [phone]  RN phone: [phone]

## 2022-08-09 NOTE — LETTER
"8/9/2022    Steven Fonseca MD  [address]  [address]  [facility] [address]    RE: Ginger Abreu       Dear Colleague,     I had the pleasure of seeing Ginger Abreu in the [facility].    General Cardiology Clinic Progress Note  Gingre Abreu MRN# [id number]   YOB: 1940 Age: 82 year old     Primary cardiologist: Dr. Maldonado    Reason for visit: Bradycardia    History of presenting illness:    Ginger Abreu, a pleasant 82 year old patient who has a past medical history significant for:   1. Hypertension, difficult to control  2. Hyperlipidemia  3. Renal insufficiency   4. Renal artery stenosis s/p left renal artery stenting with moderate stenosis on the right   5. Pulmonary embolism on Xarelto  6. DM    She recently called our clinic with concerns for HR in the 40-50's in the mornings with lightheadedness and dizziness and was directed by her primary care provider to follow-up with cardiology.  Today she brings in a home BP log that show her home blood pressure runs between 130 over 60s to 70s and heart rates ranged between 44 and 64 bpm.  Occasionally she will become lightheaded and describes a \"weird\" feeling that occurs once weekly or once every 2 weeks.  She states she will shut her eyes to take a drink of water and then the symptoms resolve.  There are no complaints of chest discomfort on exertion and shortness of breath on exertion is stable.  Also, no concerns for syncope, presyncope, PND orthopnea.    Diagnostic Studies:    EKG (8/9/2022): Sinus bradycardia at 50 bpm    Peripheral angiogram (2017): S/p PTCA and stent to proximal left renal artery with right renal artery of proximal 30-40%     Echocardiogram (2014): LVEF 55-60% with concentric LVH and normal RV size and function.         Assessment and Plan:     ASSESSMENT:    1. Bradycardia    Intermittent episodes of bradycardia particularly in the mornings where it often in the 40s.    Some symptoms " of occasional lightheadedness and fatigue.    Home heart rates varied between 44 and 64 bpm on most recent logs    No on AV elvia blocking agents    2. Hypertension    hydrochlorothiazide 12.5 mg daily, candesartan 16 mg BID,     Well-controlled    3. Hyperlipidemia    Tolerating rosuvastatin 10 mg daily without myalgias    Most recent LDL was 40 from 6/22/2021    4. Renal artery stenosis and renal insufficiency    Status post PTCA and stenting to proximal left renal artery with residual stenosis of the right renal artery that is medically managed    Most recent creatinine was 1.24    PLAN:     1. Echcocardiogram  2. 7 day Zio Patch  3. Follow up with Dr. Maldonado as previously scheduled       Orders this Visit:  Orders Placed This Encounter   Procedures     EKG 12-lead complete w/read - Clinics (performed today)     Leadless EKG Monitor 3 to 7 Days     Echocardiogram Complete     Orders Placed This Encounter   Medications     SUMAtriptan (IMITREX) 25 MG tablet     Sig: Take 25 mg by mouth at onset of headache for migraine     Medications Discontinued During This Encounter   Medication Reason     rivaroxaban ANTICOAGULANT (XARELTO) 10 MG TABS tablet      cloNIDine (CATAPRES) 0.1 MG tablet        Today's clinic visit entailed:  Review of prior external note(s) from - Outside records from [facility]  15 minutes spent on the date of the encounter doing chart review, history and exam, documentation and further activities per the note  Provider  Link to [facility] Help Grid     The level of medical decision making during this visit was of moderate complexity.           Review of Systems:     Review of Systems:  Skin:  Negative     Eyes:  Positive for glaucoma  ENT:  Negative    Respiratory:  Positive for cough;dyspnea on exertion  Cardiovascular:    dizziness;Positive for  Gastroenterology: Positive for heartburn  Genitourinary:  Positive for urinary tract infection  Musculoskeletal:  Positive for arthritis;back  "pain  Neurologic:  Positive for migraine headaches  Psychiatric:  Positive for anxiety  Heme/Lymph/Imm:  Negative    Endocrine:  Positive for diabetes            Physical Exam:     Vitals: /70   Pulse 50   Ht 1.6 m (5' 3\")   Wt 68.2 kg (150 lb 4.8 oz)   SpO2 97%   BMI 26.62 kg/m    Constitutional: Well nourished and in no apparent distress.  Eyes: Pupils equal, round. Sclerae anicteric.   HEENT: Normocephalic, atraumatic.   Neck: Supple. JVD   Respiratory: Breathing non-labored. Lungs clear to auscultation bilaterally. No crackles, wheezes, rhonchi, or rales.  Cardiovascular:  Regular rate and rhythm, normal S1 and S2. No murmur, rub, or gallop.  Skin: Warm, dry. No rashes, cyanosis, or xanthelasma.  Extremities: No edema.  Neurologic: No gross motor deficits. Alert, awake, and oriented to person, place and time.  Psychiatric: Affect appropriate.             Medications:     Current Outpatient Medications   Medication Sig Dispense Refill     albuterol (PROAIR HFA/PROVENTIL HFA/VENTOLIN HFA) 108 (90 BASE) MCG/ACT Inhaler Inhale 2 puffs into the lungs every 6 hours INDICATION: RESCUE INHALER FOR SHORTNESS OF BREATH, CHEST TIGHTNESS AND COUGH 1 Inhaler 11     alpha-lipoic acid 600 MG CAPS Take 600 mg by mouth 3 times daily        ALPRAZolam (XANAX) 0.25 MG tablet TAKE ONE TABLET BY MOUTH ONCE DAILY AT  NIGHT  AS  NEEDED  FOR  SEVERE  ANXIETY  ONLY 30 tablet 0     ascorbic acid (VITAMIN C) 1000 MG TABS Take 1 tablet (1,000 mg) by mouth daily VITAMIN C REPLACEMENT 100 tablet 3     B-12, Methylcobalamin, 1000 MCG SUBL        candesartan (ATACAND) 16 MG tablet Take 1 tablet (16 mg) by mouth 2 times daily 180 tablet 3     Cranberry 125 MG TABS        diphenhydrAMINE (BENADRYL) 25 MG tablet Take 25 mg by mouth every 6 hours as needed for itching or allergies       empagliflozin (JARDIANCE) 10 MG TABS tablet Take 10 mg by mouth daily       esomeprazole (NEXIUM) 40 MG capsule Take 1 capsule (40 mg) by mouth every " morning (before breakfast) TAKE  30'-60' BEFORE 1ST MEAL 180 capsule 2     famotidine (PEPCID) 10 MG tablet Take 10 mg by mouth 2 times daily       glimepiride (AMARYL) 2 MG tablet Take 4 mg by mouth 2 times daily       lactobacillus rhamnosus, GG, (CULTURELL) capsule Take 1 capsule by mouth 2 times daily       Melatonin 10 MG TABS tablet Take 10 mg by mouth At Bedtime       nitroglycerin (NITROSTAT) 0.4 MG SL tablet Place 1 tablet (0.4 mg) under the tongue every 5 minutes as needed for chest pain 25 tablet 0     oxybutynin ER (DITROPAN-XL) 10 MG 24 hr tablet Take 1 tablet (10 mg) by mouth At Bedtime 60 tablet 3     rosuvastatin (CRESTOR) 10 MG tablet Take 10 mg by mouth daily       SUMAtriptan (IMITREX) 25 MG tablet Take 25 mg by mouth at onset of headache for migraine       timolol maleate (TIMOPTIC) 0.5 % ophthalmic solution Place 1 drop into both eyes 2 times daily       aspirin 81 MG EC tablet Take 81 mg by mouth daily (Patient not taking: Reported on 8/9/2022)       BETA BLOCKER NOT PRESCRIBED, INTENTIONAL, Beta Blocker not prescribed intentionally due to Intolerance (Patient not taking: Reported on 8/9/2022)  0     butalbital-acetaminophen-caffeine (FIORICET/ESGIC) -40 MG per tablet TAKE ONE TABLET BY MOUTH EVERY 8 HOURS AS NEEDED FOR  SEVERE  MIGRAINE  HEADACHE  ONLY (Patient not taking: Reported on 8/9/2022) 30 tablet 1     hydrochlorothiazide (HYDRODIURIL) 25 MG tablet Take 12.5 mg by mouth daily       Lidocaine (LIDOCARE) 4 % Patch Place 1 patch onto the skin every 24 hours To prevent lidocaine toxicity, patient should be patch free for 12 hrs daily. (Patient not taking: Reported on 8/9/2022) 10 patch 0     lidocaine, viscous, (XYLOCAINE) 2 % solution Take 15 mLs by mouth every 2 hours as needed for moderate pain (Patient not taking: Reported on 8/9/2022) 100 mL 0     methenamine (HIPREX) 1 g tablet Take 1 tablet (1 g) by mouth 2 times daily (Patient not taking: Reported on 8/9/2022) 120 tablet 3      methocarbamol (ROBAXIN) 750 MG tablet Take 750 mg by mouth 4 times daily as needed for muscle spasms (Patient not taking: Reported on 2022)       potassium chloride ER (KLOR-CON M) 20 MEQ CR tablet Take 1 tablet (20 mEq) by mouth daily With food (Patient not taking: Reported on 2022) 30 tablet 3     traMADol (ULTRAM) 50 MG tablet Take 1 tablet (50 mg) by mouth every 6 hours as needed for moderate pain (Patient not taking: Reported on 2022) 14 tablet 0     vitamin D (ERGOCALCIFEROL) 21955 UNIT capsule Take 50,000 Units by mouth once a week  (Patient not taking: Reported on 2022)       vitamin D3 (CHOLECALCIFEROL) 45526 units (250 mcg) capsule Take 1 capsule by mouth daily (Patient not taking: Reported on 2022)         Family History   Problem Relation Age of Onset     Breast Cancer Mother      Heart Disease Father 25         in a fire      Heart Disease Paternal Grandfather      Breast Cancer Sister      Hyperlipidemia Sister        Social History     Socioeconomic History     Marital status:      Spouse name: Not on file     Number of children: Not on file     Years of education: Not on file     Highest education level: Not on file   Occupational History     Not on file   Tobacco Use     Smoking status: Never Smoker     Smokeless tobacco: Never Used   Substance and Sexual Activity     Alcohol use: No     Alcohol/week: 0.0 standard drinks     Comment: very rare     Drug use: No     Sexual activity: Yes   Other Topics Concern     Parent/sibling w/ CABG, MI or angioplasty before 65F 55M? No      Service Not Asked     Blood Transfusions Not Asked     Caffeine Concern No     Comment: soda 1 time per day     Occupational Exposure Not Asked     Hobby Hazards Not Asked     Sleep Concern Not Asked     Stress Concern Not Asked     Weight Concern Not Asked     Special Diet No     Back Care Not Asked     Exercise No     Comment: limited due to back pain      Bike Helmet Not  Asked     Seat Belt Not Asked     Self-Exams Not Asked   Social History Narrative     Not on file     Social Determinants of Health     Financial Resource Strain: Not on file   Food Insecurity: Not on file   Transportation Needs: Not on file   Physical Activity: Not on file   Stress: Not on file   Social Connections: Not on file   Intimate Partner Violence: Not on file   Housing Stability: Not on file            Past Medical History:     Past Medical History:   Diagnosis Date     Acute peptic ulcer, unspecified site, without mention of hemorrhage, perforation, or obstruction      Allergic rhinitis, cause unspecified      Anxiety      Basal cell carcinoma      DVT (deep venous thrombosis) (H) 03/2012    following spinal surgery. Saw Oncology. 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 Saw Oncology. treated 6 months     Peripheral neuropathy 6/19/2015     Pneumonia, organism unspecified(486)      Spinal stenosis      Squamous cell carcinoma      Thyroid nodule      Type II or unspecified type diabetes mellitus without mention of complication, not stated as uncontrolled      Unspecified essential hypertension               Past Surgical History:     Past Surgical History:   Procedure Laterality Date     NONSPECIFIC PROCEDURE  2011    Bilateral sphenoidotomy with removal of polypoid tissue.      Right frontal sinusotomy.   Bilateral total ethmoidectomy.  Bilateral maxillary antrostomy with removal of polypoid tissue.       ORTHOPEDIC SURGERY      lumbar fusion     ZZC NONSPECIFIC PROCEDURE  approx 1970's    hyst/bso     ZZC NONSPECIFIC PROCEDURE  approx 1970's    gb     ZZC NONSPECIFIC PROCEDURE  approx 1980's    cystocele repair     ZZC NONSPECIFIC PROCEDURE  approx 1980's    endoscopic sinus      ZZC NONSPECIFIC PROCEDURE      epidurals x 7     ZZC NONSPECIFIC PROCEDURE      nl colonoscopy 1/2006     ZZC NONSPECIFIC PROCEDURE  2011    L2-L3 foramenotomies     ZZC NONSPECIFIC PROCEDURE  2012    lumbar fusion              Allergies:   Codeine, Diatrizoate, Morphine, Oxycodone-acetaminophen, Atenolol, Diltiazem, Labetalol, Lipitor [atorvastatin calcium], Nifedipine, Sulfa drugs, Zocor [simvastatin], Advair diskus, Aleve [naproxen], Amlodipine, Amlodipine besylate, Blood-group specific substance, Bystolic [nebivolol hcl], Calcium carbonate, Crestor [rosuvastatin calcium], Diovan [valsartan], Ezetimibe, Fluticasone-salmeterol, Hydralazine, Hydrochlorothiazide, Imdur [isosorbide mononitrate], Kcl, Lisinopril, Lovastatin, Metformin, Metoprolol, Niacin, No clinical screening - see comments, Omnicef, Pravachol [hmg-coa-r inhibitors], Prilosec otc [omeprazole magnesium], and Zetia [ezetimibe]       Data:   All laboratory data reviewed:    Recent Labs   Lab Test 06/22/21  0833 01/07/20  0916 04/05/18  0929 11/08/17  1026 12/15/16  0856 06/28/16  0900 04/10/16  1938 03/28/16  0857 10/24/14  0943 09/22/14  1142   LDL 40 40 87  --    < >  --   --  83   < >  --    HDL 48* 43* 51  --    < >  --   --  56   < >  --    NHDL 75 87 128  --    < >  --   --  116  --   --    CHOL 123 130 179  --    < >  --   --  172   < >  --    TRIG 177* 237* 204*  --    < >  --   --  166*   < >  --    TSH  --   --   --  2.36  --  2.39 1.66  --    < >  --    NTBNP  --   --   --   --   --   --   --   --   --  375*   IRON  --   --   --   --   --    --   --  78   < >  --    FEB  --   --   --   --   --   --   --  416   < >  --    IRONSAT  --   --   --   --   --   --   --  19   < >  --    PADMINI  --   --   --   --   --   --   --  123   < >  --     < > = values in this interval not displayed.       Lab Results   Component Value Date    WBC 6.0 04/30/2021    RBC 3.77 (L) 04/30/2021    HGB 11.7 04/30/2021    HCT 36.9 04/30/2021    MCV 98 04/30/2021    MCH 31.0 04/30/2021    MCHC 31.7 04/30/2021    RDW 14.3 04/30/2021     (L) 04/30/2021       Lab Results   Component Value Date     07/02/2021    POTASSIUM 4.3 07/02/2021    CHLORIDE 110 (H) 07/02/2021    CO2 26 07/02/2021    ANIONGAP 4 07/02/2021     (H) 07/02/2021    BUN 28 07/02/2021    CR 1.24 (H) 07/02/2021    GFRESTIMATED 41 (L) 07/02/2021    GFRESTBLACK 47 (L) 07/02/2021    ROGE 9.5 07/02/2021      Lab Results   Component Value Date    AST 22 04/05/2018    ALT 25 06/22/2021       Lab Results   Component Value Date    A1C 6.0 (H) 07/12/2019       Lab Results   Component Value Date    INR 0.92 02/27/2017    INR 2.2 (A) 11/05/2012    INR 2.1 (A) 10/15/2012    INR 2.64 (H) 04/27/2012         RAMY RUIZ CNP  [facility]  Pager: [phone]  RN phone: [phone]      Thank you for allowing me to participate in the care of your patient.      Sincerely,     RAMY RUIZ CNP     [facility]  cc:   No referring provider defined for this encounter.

## 2022-09-29 ENCOUNTER — LAB (OUTPATIENT)
Dept: LAB | Facility: CLINIC | Age: 82
End: 2022-09-29
Payer: COMMERCIAL

## 2022-09-29 ENCOUNTER — OFFICE VISIT (OUTPATIENT)
Dept: CARDIOLOGY | Facility: CLINIC | Age: 82
End: 2022-09-29

## 2022-09-29 VITALS
HEIGHT: 63 IN | DIASTOLIC BLOOD PRESSURE: 62 MMHG | OXYGEN SATURATION: 96 % | WEIGHT: 150.5 LBS | BODY MASS INDEX: 26.67 KG/M2 | SYSTOLIC BLOOD PRESSURE: 104 MMHG | HEART RATE: 61 BPM

## 2022-09-29 DIAGNOSIS — I70.1 RENAL ARTERY STENOSIS (H): ICD-10-CM

## 2022-09-29 DIAGNOSIS — E78.5 HYPERLIPIDEMIA LDL GOAL <100: ICD-10-CM

## 2022-09-29 DIAGNOSIS — I25.10 CORONARY ARTERY DISEASE INVOLVING NATIVE CORONARY ARTERY OF NATIVE HEART WITHOUT ANGINA PECTORIS: Primary | ICD-10-CM

## 2022-09-29 DIAGNOSIS — R00.1 SINUS BRADYCARDIA: ICD-10-CM

## 2022-09-29 DIAGNOSIS — E78.2 MIXED HYPERLIPIDEMIA: ICD-10-CM

## 2022-09-29 DIAGNOSIS — I10 BENIGN ESSENTIAL HYPERTENSION: ICD-10-CM

## 2022-09-29 LAB
ALT SERPL W P-5'-P-CCNC: 31 U/L (ref 0–50)
ANION GAP SERPL CALCULATED.3IONS-SCNC: 7 MMOL/L (ref 3–14)
BUN SERPL-MCNC: 41 MG/DL (ref 7–30)
CALCIUM SERPL-MCNC: 9.3 MG/DL (ref 8.5–10.1)
CHLORIDE BLD-SCNC: 107 MMOL/L (ref 94–109)
CHOLEST SERPL-MCNC: 117 MG/DL
CO2 SERPL-SCNC: 25 MMOL/L (ref 20–32)
CREAT SERPL-MCNC: 1.22 MG/DL (ref 0.52–1.04)
FASTING STATUS PATIENT QL REPORTED: YES
GFR SERPL CREATININE-BSD FRML MDRD: 44 ML/MIN/1.73M2
GLUCOSE BLD-MCNC: 131 MG/DL (ref 70–99)
HDLC SERPL-MCNC: 45 MG/DL
LDLC SERPL CALC-MCNC: 39 MG/DL
NONHDLC SERPL-MCNC: 72 MG/DL
POTASSIUM BLD-SCNC: 4 MMOL/L (ref 3.4–5.3)
SODIUM SERPL-SCNC: 139 MMOL/L (ref 133–144)
TRIGL SERPL-MCNC: 164 MG/DL

## 2022-09-29 PROCEDURE — 84460 ALANINE AMINO (ALT) (SGPT): CPT

## 2022-09-29 PROCEDURE — 80048 BASIC METABOLIC PNL TOTAL CA: CPT

## 2022-09-29 PROCEDURE — 99214 OFFICE O/P EST MOD 30 MIN: CPT | Performed by: INTERNAL MEDICINE

## 2022-09-29 PROCEDURE — 80061 LIPID PANEL: CPT

## 2022-09-29 PROCEDURE — 36415 COLL VENOUS BLD VENIPUNCTURE: CPT

## 2022-09-29 RX ORDER — LEVOTHYROXINE SODIUM 25 UG/1
TABLET ORAL
COMMUNITY
Start: 2022-08-30

## 2022-09-29 RX ORDER — NIACIN 500 MG
500 TABLET ORAL
COMMUNITY

## 2022-09-29 RX ORDER — CANDESARTAN 16 MG/1
16 TABLET ORAL DAILY
Qty: 90 TABLET | Refills: 3 | Status: ON HOLD | OUTPATIENT
Start: 2022-09-29 | End: 2023-07-07

## 2022-09-29 RX ORDER — PNV NO.95/FERROUS FUM/FOLIC AC 28MG-0.8MG
1 TABLET ORAL DAILY
COMMUNITY

## 2022-09-29 NOTE — PROGRESS NOTES
HPI and Plan:   See dictation          Orders Placed This Encounter   Procedures     Basic metabolic panel     Lipid Profile     ALT     Follow-Up with Cardiology       Orders Placed This Encounter   Medications     levothyroxine (SYNTHROID/LEVOTHROID) 25 MCG tablet     Sig: TAKE 1/2 (ONE-HALF) TABLET BY MOUTH FIRST THING IN THE MORNING TO STOMACH. NO FOOD FOR AN HOUR TO TREAT UNDERACTIVE THYROID.     cephALEXin (KEFLEX) 250 MG capsule     Sig: Take 250 mg by mouth daily     aspirin (ASA) 81 MG EC tablet     Sig: Take 1 tablet (81 mg) by mouth daily     candesartan (ATACAND) 16 MG tablet     Sig: Take 1 tablet (16 mg) by mouth daily     Dispense:  90 tablet     Refill:  3       Medications Discontinued During This Encounter   Medication Reason     Lidocaine (LIDOCARE) 4 % Patch Therapy completed     lidocaine, viscous, (XYLOCAINE) 2 % solution Therapy completed     methenamine (HIPREX) 1 g tablet Stopped by Patient     potassium chloride ER (KLOR-CON M) 20 MEQ CR tablet Stopped by Patient     traMADol (ULTRAM) 50 MG tablet Therapy completed     vitamin D3 (CHOLECALCIFEROL) 18797 units (250 mcg) capsule Dose adjustment     candesartan (ATACAND) 16 MG tablet          Encounter Diagnoses   Name Primary?     Benign essential hypertension      Hyperlipidemia LDL goal <100      Coronary artery disease involving native coronary artery of native heart without angina pectoris Yes     Sinus bradycardia      Renal artery stenosis (H)      Mixed hyperlipidemia        CURRENT MEDICATIONS:  Current Outpatient Medications   Medication Sig Dispense Refill     albuterol (PROAIR HFA/PROVENTIL HFA/VENTOLIN HFA) 108 (90 BASE) MCG/ACT Inhaler Inhale 2 puffs into the lungs every 6 hours INDICATION: RESCUE INHALER FOR SHORTNESS OF BREATH, CHEST TIGHTNESS AND COUGH 1 Inhaler 11     alpha-lipoic acid 600 MG CAPS Take 600 mg by mouth 3 times daily        aspirin (ASA) 81 MG EC tablet Take 1 tablet (81 mg) by mouth daily       candesartan  (ATACAND) 16 MG tablet Take 1 tablet (16 mg) by mouth daily 90 tablet 3     cephALEXin (KEFLEX) 250 MG capsule Take 250 mg by mouth daily       Cranberry 125 MG TABS        diphenhydrAMINE (BENADRYL) 25 MG tablet Take 25 mg by mouth every 6 hours as needed for itching or allergies       empagliflozin (JARDIANCE) 10 MG TABS tablet Take 10 mg by mouth daily       esomeprazole (NEXIUM) 40 MG capsule Take 1 capsule (40 mg) by mouth every morning (before breakfast) TAKE  30'-60' BEFORE 1ST MEAL 180 capsule 2     famotidine (PEPCID) 10 MG tablet Take 10 mg by mouth 2 times daily       glimepiride (AMARYL) 2 MG tablet Take 4 mg by mouth 2 times daily       hydrochlorothiazide (HYDRODIURIL) 25 MG tablet Take 12.5 mg by mouth daily       lactobacillus rhamnosus, GG, (CULTURELL) capsule Take 1 capsule by mouth 2 times daily       levothyroxine (SYNTHROID/LEVOTHROID) 25 MCG tablet TAKE 1/2 (ONE-HALF) TABLET BY MOUTH FIRST THING IN THE MORNING TO STOMACH. NO FOOD FOR AN HOUR TO TREAT UNDERACTIVE THYROID.       Melatonin 10 MG TABS tablet Take 10 mg by mouth At Bedtime       methocarbamol (ROBAXIN) 750 MG tablet Take 750 mg by mouth 4 times daily as needed for muscle spasms       nitroglycerin (NITROSTAT) 0.4 MG SL tablet Place 1 tablet (0.4 mg) under the tongue every 5 minutes as needed for chest pain 25 tablet 0     oxybutynin ER (DITROPAN-XL) 10 MG 24 hr tablet Take 1 tablet (10 mg) by mouth At Bedtime 60 tablet 3     rosuvastatin (CRESTOR) 10 MG tablet Take 10 mg by mouth daily       SUMAtriptan (IMITREX) 25 MG tablet Take 25 mg by mouth at onset of headache for migraine       timolol maleate (TIMOPTIC) 0.5 % ophthalmic solution Place 1 drop into both eyes 2 times daily       vitamin D (ERGOCALCIFEROL) 56817 UNIT capsule Take 50,000 Units by mouth once a week wednesdays       ALPRAZolam (XANAX) 0.25 MG tablet TAKE ONE TABLET BY MOUTH ONCE DAILY AT  NIGHT  AS  NEEDED  FOR  SEVERE  ANXIETY  ONLY 30 tablet 0     ascorbic acid  (VITAMIN C) 1000 MG TABS Take 1 tablet (1,000 mg) by mouth daily VITAMIN C REPLACEMENT 100 tablet 3     aspirin 81 MG EC tablet Take 81 mg by mouth daily (Patient not taking: Reported on 8/9/2022)       B-12, Methylcobalamin, 1000 MCG SUBL        BETA BLOCKER NOT PRESCRIBED, INTENTIONAL, Beta Blocker not prescribed intentionally due to Intolerance (Patient not taking: Reported on 8/9/2022)  0     butalbital-acetaminophen-caffeine (FIORICET/ESGIC) -40 MG per tablet TAKE ONE TABLET BY MOUTH EVERY 8 HOURS AS NEEDED FOR  SEVERE  MIGRAINE  HEADACHE  ONLY (Patient not taking: Reported on 8/9/2022) 30 tablet 1       ALLERGIES     Allergies   Allergen Reactions     Codeine Difficulty breathing     Chest gets tight & difficulty breathing      Diatrizoate Shortness Of Breath     Other reaction(s): Shortness Of Breath  rapid heart beat  rapid heart beat       Morphine      Other reaction(s): GI Upset  Patient says she will not take because causes GI Upset     Oxycodone-Acetaminophen      Other reaction(s): GI Upset  Patient says she will not take because causes GI Upset     Atenolol Other (See Comments)     Sob w/ exertion - asthma symptoms     Diltiazem Other (See Comments)     edema - fluid retention in legs    Other reaction(s): Edema  Other reaction(s): Edema       Labetalol Other (See Comments) and Itching     Sob w/ exertion - asthma symptoms  itching     Lipitor [Atorvastatin Calcium] Other (See Comments)     Muscle weakness     Nifedipine Other (See Comments)     Sob w/ exertion - asthma symptoms, HAs    Other reaction(s): Other (See Comments)  Sob w/ exertion - asthma symptoms, HAs     Sulfa Drugs Hives     hives     Zocor [Simvastatin] Other (See Comments)     Muscle weakness     Advair Diskus Other (See Comments)     hoarseness with 500/50, not the 250/50     Aleve [Naproxen] Other (See Comments)     Raises BP     Amlodipine      Fatigue  Fatigue       Amlodipine Besylate Fatigue and Diarrhea     Blood-Group  Specific Substance Other (See Comments)     Patient has anti-Odalys. Blood product orders may be delayed.  Other reaction(s): Other - Describe In Comment Field  Patient has anti-Odalys. Blood product orders may be delayed.       Bystolic [Nebivolol Hcl] Other (See Comments)     headaches     Calcium Carbonate Diarrhea     Crestor [Rosuvastatin Calcium] Muscle Pain (Myalgia)     Diovan [Valsartan] GI Disturbance     gas       Ezetimibe Muscle Pain (Myalgia)     Other reaction(s): Muscle Weakness  Other reaction(s): Myalgia       Fluticasone-Salmeterol Other (See Comments)     Hoarseness with 50/50 not the 25/50  hoarseness with 500/50, not the 250/50  Hoarseness with 50/50 not the 25/50       Hydralazine Other (See Comments) and Nausea     Increases pulse     Hydrochlorothiazide Other (See Comments)     increasing glucose     Imdur [Isosorbide Mononitrate] Other (See Comments)     headaches     Kcl GI Disturbance     gas      Lisinopril Cough     Hoarse voice     Lovastatin Muscle Pain (Myalgia)     Metformin Diarrhea            Metoprolol Other (See Comments)     Sob w/ exertion - asthma symptoms       Niacin Other (See Comments)     hyperglycemia     No Clinical Screening - See Comments      PN: LW CM1: >>> NO CONTRAST ADVERSE REACTION <<<     Reaction :  KCL---gas sx     Omnicef Diarrhea     Pravachol [Hmg-Coa-R Inhibitors] Muscle Pain (Myalgia)     Prilosec Otc [Omeprazole Magnesium] Diarrhea     diarrhea       Zetia [Ezetimibe] Muscle Pain (Myalgia)     weakness       PAST MEDICAL HISTORY:  Past Medical History:   Diagnosis Date     Acute peptic ulcer, unspecified site, without mention of hemorrhage, perforation, or obstruction      Allergic rhinitis, cause unspecified      Anxiety      Basal cell carcinoma      DVT (deep venous thrombosis) (H) 03/2012    following spinal surgery. Saw Oncology. 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 Saw Oncology. treated 6 months     Peripheral neuropathy 2015     Pneumonia, organism unspecified(486)      Spinal stenosis      Squamous cell carcinoma      Thyroid nodule      Type II or unspecified type diabetes mellitus without mention of complication, not stated as uncontrolled      Unspecified essential hypertension        PAST SURGICAL HISTORY:  Past Surgical History:   Procedure Laterality Date     NONSPECIFIC PROCEDURE      Bilateral sphenoidotomy with removal of polypoid tissue.     Right frontal sinusotomy.   Bilateral total ethmoidectomy.  Bilateral maxillary antrostomy with removal of polypoid tissue.       ORTHOPEDIC SURGERY      lumbar fusion     ZZC NONSPECIFIC PROCEDURE  approx     hyst/bso     ZZC NONSPECIFIC PROCEDURE  approx     gb     ZZC NONSPECIFIC PROCEDURE  approx     cystocele repair     ZZC NONSPECIFIC PROCEDURE  approx     endoscopic sinus      ZZC NONSPECIFIC PROCEDURE      epidurals x 7     ZZC NONSPECIFIC PROCEDURE      nl colonoscopy 2006     ZZC NONSPECIFIC PROCEDURE  2011    L2-L3 foramenotomies     ZZC NONSPECIFIC PROCEDURE  2012    lumbar fusion       FAMILY HISTORY:  Family History   Problem Relation Age of Onset     Breast Cancer Mother      Heart Disease Father 25         in a fire      Heart Disease Paternal Grandfather      Breast Cancer Sister      Hyperlipidemia Sister   "      SOCIAL HISTORY:  Social History     Socioeconomic History     Marital status:      Spouse name: None     Number of children: None     Years of education: None     Highest education level: None   Tobacco Use     Smoking status: Never Smoker     Smokeless tobacco: Never Used   Substance and Sexual Activity     Alcohol use: No     Alcohol/week: 0.0 standard drinks     Comment: very rare     Drug use: No     Sexual activity: Yes   Other Topics Concern     Parent/sibling w/ CABG, MI or angioplasty before 65F 55M? No     Caffeine Concern No     Comment: soda 1 time per day     Special Diet No     Exercise No     Comment: limited due to back pain        Review of Systems:  Skin:          Eyes:         ENT:         Respiratory:          Cardiovascular:         Gastroenterology:        Genitourinary:         Musculoskeletal:         Neurologic:         Psychiatric:         Heme/Lymph/Imm:         Endocrine:           Physical Exam:  Vitals: /62   Pulse 61   Ht 1.6 m (5' 3\")   Wt 68.3 kg (150 lb 8 oz)   SpO2 96%   BMI 26.66 kg/m      Constitutional:  cooperative, alert and oriented, well developed, well nourished, in no acute distress overweight      Skin:  warm and dry to the touch, no apparent skin lesions or masses noted          Head:  normocephalic, no masses or lesions        Eyes:  pupils equal and round        Lymph:      ENT:  no pallor or cyanosis        Neck:  carotid pulses are full and equal bilaterally, JVP normal, no carotid bruit        Respiratory:  clear to auscultation         Cardiac: regular rhythm;normal S1 and S2;no murmurs, gallops or rubs detected   S4            pulses full and equal, no bruits auscultated                                   right femoral access site clean dry and intact with small lump at the site and mild ecchymosis    GI:  not assessed this visit        Extremities and Muscular Skeletal:  no edema;no deformities, clubbing, cyanosis, erythema observed          "     Neurological:  no gross motor deficits;affect appropriate  (Uses a walker)      Psych:  Alert and Oriented x 3        CC  Vaibhav Maldonado MD  [id number]  [place] [id number]

## 2022-09-29 NOTE — LETTER
9/29/2022    Steven Fonseca MD  [address]  [address]  [facility] [address]    RE: Ginger Abreu       Dear Colleague,     I had the pleasure of seeing Ginger Abreu in the [facility].  HPI and Plan:   See dictation          Orders Placed This Encounter   Procedures     Basic metabolic panel     Lipid Profile     ALT     Follow-Up with Cardiology       Orders Placed This Encounter   Medications     levothyroxine (SYNTHROID/LEVOTHROID) 25 MCG tablet     Sig: TAKE 1/2 (ONE-HALF) TABLET BY MOUTH FIRST THING IN THE MORNING TO STOMACH. NO FOOD FOR AN HOUR TO TREAT UNDERACTIVE THYROID.     cephALEXin (KEFLEX) 250 MG capsule     Sig: Take 250 mg by mouth daily     aspirin (ASA) 81 MG EC tablet     Sig: Take 1 tablet (81 mg) by mouth daily     candesartan (ATACAND) 16 MG tablet     Sig: Take 1 tablet (16 mg) by mouth daily     Dispense:  90 tablet     Refill:  3       Medications Discontinued During This Encounter   Medication Reason     Lidocaine (LIDOCARE) 4 % Patch Therapy completed     lidocaine, viscous, (XYLOCAINE) 2 % solution Therapy completed     methenamine (HIPREX) 1 g tablet Stopped by Patient     potassium chloride ER (KLOR-CON M) 20 MEQ CR tablet Stopped by Patient     traMADol (ULTRAM) 50 MG tablet Therapy completed     vitamin D3 (CHOLECALCIFEROL) 18936 units (250 mcg) capsule Dose adjustment     candesartan (ATACAND) 16 MG tablet          Encounter Diagnoses   Name Primary?     Benign essential hypertension      Hyperlipidemia LDL goal <100      Coronary artery disease involving native coronary artery of native heart without angina pectoris Yes     Sinus bradycardia      Renal artery stenosis (H)      Mixed hyperlipidemia        CURRENT MEDICATIONS:  Current Outpatient Medications   Medication Sig Dispense Refill     albuterol (PROAIR HFA/PROVENTIL HFA/VENTOLIN HFA) 108 (90 BASE) MCG/ACT Inhaler Inhale 2 puffs into the lungs every 6 hours INDICATION: RESCUE INHALER FOR  SHORTNESS OF BREATH, CHEST TIGHTNESS AND COUGH 1 Inhaler 11     alpha-lipoic acid 600 MG CAPS Take 600 mg by mouth 3 times daily        aspirin (ASA) 81 MG EC tablet Take 1 tablet (81 mg) by mouth daily       candesartan (ATACAND) 16 MG tablet Take 1 tablet (16 mg) by mouth daily 90 tablet 3     cephALEXin (KEFLEX) 250 MG capsule Take 250 mg by mouth daily       Cranberry 125 MG TABS        diphenhydrAMINE (BENADRYL) 25 MG tablet Take 25 mg by mouth every 6 hours as needed for itching or allergies       empagliflozin (JARDIANCE) 10 MG TABS tablet Take 10 mg by mouth daily       esomeprazole (NEXIUM) 40 MG capsule Take 1 capsule (40 mg) by mouth every morning (before breakfast) TAKE  30'-60' BEFORE 1ST MEAL 180 capsule 2     famotidine (PEPCID) 10 MG tablet Take 10 mg by mouth 2 times daily       glimepiride (AMARYL) 2 MG tablet Take 4 mg by mouth 2 times daily       hydrochlorothiazide (HYDRODIURIL) 25 MG tablet Take 12.5 mg by mouth daily       lactobacillus rhamnosus, GG, (CULTURELL) capsule Take 1 capsule by mouth 2 times daily       levothyroxine (SYNTHROID/LEVOTHROID) 25 MCG tablet TAKE 1/2 (ONE-HALF) TABLET BY MOUTH FIRST THING IN THE MORNING TO STOMACH. NO FOOD FOR AN HOUR TO TREAT UNDERACTIVE THYROID.       Melatonin 10 MG TABS tablet Take 10 mg by mouth At Bedtime       methocarbamol (ROBAXIN) 750 MG tablet Take 750 mg by mouth 4 times daily as needed for muscle spasms       nitroglycerin (NITROSTAT) 0.4 MG SL tablet Place 1 tablet (0.4 mg) under the tongue every 5 minutes as needed for chest pain 25 tablet 0     oxybutynin ER (DITROPAN-XL) 10 MG 24 hr tablet Take 1 tablet (10 mg) by mouth At Bedtime 60 tablet 3     rosuvastatin (CRESTOR) 10 MG tablet Take 10 mg by mouth daily       SUMAtriptan (IMITREX) 25 MG tablet Take 25 mg by mouth at onset of headache for migraine       timolol maleate (TIMOPTIC) 0.5 % ophthalmic solution Place 1 drop into both eyes 2 times daily       vitamin D (ERGOCALCIFEROL) 90549  UNIT capsule Take 50,000 Units by mouth once a week wednesdays       ALPRAZolam (XANAX) 0.25 MG tablet TAKE ONE TABLET BY MOUTH ONCE DAILY AT  NIGHT  AS  NEEDED  FOR  SEVERE  ANXIETY  ONLY 30 tablet 0     ascorbic acid (VITAMIN C) 1000 MG TABS Take 1 tablet (1,000 mg) by mouth daily VITAMIN C REPLACEMENT 100 tablet 3     aspirin 81 MG EC tablet Take 81 mg by mouth daily (Patient not taking: Reported on 8/9/2022)       B-12, Methylcobalamin, 1000 MCG SUBL        BETA BLOCKER NOT PRESCRIBED, INTENTIONAL, Beta Blocker not prescribed intentionally due to Intolerance (Patient not taking: Reported on 8/9/2022)  0     butalbital-acetaminophen-caffeine (FIORICET/ESGIC) -40 MG per tablet TAKE ONE TABLET BY MOUTH EVERY 8 HOURS AS NEEDED FOR  SEVERE  MIGRAINE  HEADACHE  ONLY (Patient not taking: Reported on 8/9/2022) 30 tablet 1       ALLERGIES     Allergies   Allergen Reactions     Codeine Difficulty breathing     Chest gets tight & difficulty breathing      Diatrizoate Shortness Of Breath     Other reaction(s): Shortness Of Breath  rapid heart beat  rapid heart beat       Morphine      Other reaction(s): GI Upset  Patient says she will not take because causes GI Upset     Oxycodone-Acetaminophen      Other reaction(s): GI Upset  Patient says she will not take because causes GI Upset     Atenolol Other (See Comments)     Sob w/ exertion - asthma symptoms     Diltiazem Other (See Comments)     edema - fluid retention in legs    Other reaction(s): Edema  Other reaction(s): Edema       Labetalol Other (See Comments) and Itching     Sob w/ exertion - asthma symptoms  itching     Lipitor [Atorvastatin Calcium] Other (See Comments)     Muscle weakness     Nifedipine Other (See Comments)     Sob w/ exertion - asthma symptoms, HAs    Other reaction(s): Other (See Comments)  Sob w/ exertion - asthma symptoms, HAs     Sulfa Drugs Hives     hives     Zocor [Simvastatin] Other (See Comments)     Muscle weakness     Advair Diskus  Other (See Comments)     hoarseness with 500/50, not the 250/50     Aleve [Naproxen] Other (See Comments)     Raises BP     Amlodipine      Fatigue  Fatigue       Amlodipine Besylate Fatigue and Diarrhea     Blood-Group Specific Substance Other (See Comments)     Patient has anti-Odalys. Blood product orders may be delayed.  Other reaction(s): Other - Describe In Comment Field  Patient has anti-Odalys. Blood product orders may be delayed.       Bystolic [Nebivolol Hcl] Other (See Comments)     headaches     Calcium Carbonate Diarrhea     Crestor [Rosuvastatin Calcium] Muscle Pain (Myalgia)     Diovan [Valsartan] GI Disturbance     gas       Ezetimibe Muscle Pain (Myalgia)     Other reaction(s): Muscle Weakness  Other reaction(s): Myalgia       Fluticasone-Salmeterol Other (See Comments)     Hoarseness with 50/50 not the 25/50  hoarseness with 500/50, not the 250/50  Hoarseness with 50/50 not the 25/50       Hydralazine Other (See Comments) and Nausea     Increases pulse     Hydrochlorothiazide Other (See Comments)     increasing glucose     Imdur [Isosorbide Mononitrate] Other (See Comments)     headaches     Kcl GI Disturbance     gas      Lisinopril Cough     Hoarse voice     Lovastatin Muscle Pain (Myalgia)     Metformin Diarrhea            Metoprolol Other (See Comments)     Sob w/ exertion - asthma symptoms       Niacin Other (See Comments)     hyperglycemia     No Clinical Screening - See Comments      PN: LW CM1: >>> NO CONTRAST ADVERSE REACTION <<<     Reaction :  KCL---gas sx     Omnicef Diarrhea     Pravachol [Hmg-Coa-R Inhibitors] Muscle Pain (Myalgia)     Prilosec Otc [Omeprazole Magnesium] Diarrhea     diarrhea       Zetia [Ezetimibe] Muscle Pain (Myalgia)     weakness       PAST MEDICAL HISTORY:  Past Medical History:   Diagnosis Date     Acute peptic ulcer, unspecified site, without mention of hemorrhage, perforation, or obstruction      Allergic rhinitis, cause unspecified      Anxiety      Basal cell  carcinoma      DVT (deep venous thrombosis) (H) 03/2012    following spinal surgery. Saw Oncology. 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 Saw Oncology. treated 6 months     Peripheral neuropathy 6/19/2015     Pneumonia, organism unspecified(486)      Spinal stenosis      Squamous cell carcinoma      Thyroid nodule      Type II or unspecified type diabetes mellitus without mention of complication, not stated as uncontrolled      Unspecified essential hypertension        PAST SURGICAL HISTORY:  Past Surgical History:   Procedure Laterality Date     NONSPECIFIC PROCEDURE  2011    Bilateral sphenoidotomy with removal of polypoid tissue.     Right frontal sinusotomy.   Bilateral total ethmoidectomy.  Bilateral maxillary antrostomy with removal of polypoid tissue.       ORTHOPEDIC SURGERY      lumbar fusion     ZZC NONSPECIFIC PROCEDURE  approx 1970's    hyst/bso     ZZC NONSPECIFIC PROCEDURE  approx 1970's    gb     ZZC NONSPECIFIC PROCEDURE  approx 1980's    cystocele repair     ZZC NONSPECIFIC PROCEDURE  approx 1980's    endoscopic sinus      ZZC NONSPECIFIC PROCEDURE      epidurals x 7     ZZC NONSPECIFIC PROCEDURE      nl colonoscopy 1/2006     ZZC NONSPECIFIC PROCEDURE  2011    L2-L3 foramenotomies     ZZC NONSPECIFIC PROCEDURE  2012    lumbar fusion       FAMILY HISTORY:  Family History  "  Problem Relation Age of Onset     Breast Cancer Mother      Heart Disease Father 25         in a fire      Heart Disease Paternal Grandfather      Breast Cancer Sister      Hyperlipidemia Sister        SOCIAL HISTORY:  Social History     Socioeconomic History     Marital status:      Spouse name: None     Number of children: None     Years of education: None     Highest education level: None   Tobacco Use     Smoking status: Never Smoker     Smokeless tobacco: Never Used   Substance and Sexual Activity     Alcohol use: No     Alcohol/week: 0.0 standard drinks     Comment: very rare     Drug use: No     Sexual activity: Yes   Other Topics Concern     Parent/sibling w/ CABG, MI or angioplasty before 65F 55M? No     Caffeine Concern No     Comment: soda 1 time per day     Special Diet No     Exercise No     Comment: limited due to back pain        Review of Systems:  Skin:          Eyes:         ENT:         Respiratory:          Cardiovascular:         Gastroenterology:        Genitourinary:         Musculoskeletal:         Neurologic:         Psychiatric:         Heme/Lymph/Imm:         Endocrine:           Physical Exam:  Vitals: /62   Pulse 61   Ht 1.6 m (5' 3\")   Wt 68.3 kg (150 lb 8 oz)   SpO2 96%   BMI 26.66 kg/m      Constitutional:  cooperative, alert and oriented, well developed, well nourished, in no acute distress overweight      Skin:  warm and dry to the touch, no apparent skin lesions or masses noted          Head:  normocephalic, no masses or lesions        Eyes:  pupils equal and round        Lymph:      ENT:  no pallor or cyanosis        Neck:  carotid pulses are full and equal bilaterally, JVP normal, no carotid bruit        Respiratory:  clear to auscultation         Cardiac: regular rhythm;normal S1 and S2;no murmurs, gallops or rubs detected   S4            pulses full and equal, no bruits auscultated                                   right femoral access site clean dry and " intact with small lump at the site and mild ecchymosis    GI:  not assessed this visit        Extremities and Muscular Skeletal:  no edema;no deformities, clubbing, cyanosis, erythema observed              Neurological:  no gross motor deficits;affect appropriate  (Uses a walker)      Psych:  Alert and Oriented x 3        CC  Vaibhav Maldonado MD  [address]  [place] [id number]                Service Date: 09/29/2022    CARDIOLOGY FOLLOWUP VISIT    REFERRING PHYSICIAN:  Dr. Steven Fonseca    HISTORY OF PRESENT ILLNESS:  It is my pleasure to see your patient, Ginger Abreu, who is a very pleasant 82-year-old patient with a history of difficult-to-control hypertension who was found to have renal artery stenosis and underwent stenting of the left renal artery.  The moderate stenosis on the right was felt to be best treated medically.  This patient also has a history of pulmonary embolism.  She has mild coronary artery disease based upon CT angiography performed in the past.  She also has a history of diabetes mellitus and mixed hyperlipidemia.  She has multiple medication intolerances which amount to 36 meds.    Since I last saw her, she is doing fairly well.  Her blood pressure is well controlled and in fact, it is possibly too well controlled.  She has lost about 30 pounds in weight since last year and that probably has made a significant difference to her blood pressure.  This morning, her blood pressure is 104/62 which is not uncommon for her.  She is not complaining of any dizziness or lightheadedness at this particular time.  She does fall within secondary prevention guidelines for statin therapy given that she had renal artery stenosis which is peripheral arterial disease but she also has some LAD disease on CT angiography, so again, falls within secondary prevention guidelines for that.  She is taking rosuvastatin 10 mg per day and her lipid profile today showed that her LDL was excellent  at 39.  Her HDL is mildly reduced at 45 and her triglycerides are mildly raised at 164 but significantly better than they were in 2020 when they were 237.  She has mild renal insufficiency, similar to what it has been previously with a sodium of 139, potassium of 4.0, BUN of 41 and creatinine of 1.22 with a GFR of 44.  The patient is not taking aspirin for some reason and that is definitely indicated in her case, given that she has evidence of coronary artery disease on CT angiography and she has peripheral arterial disease based upon renal artery stenosis.  Her liver function tests were normal with an ALT of 31, indicating she has no hepatic toxicity from her statin therapy.    IMPRESSION:    1.  Essential hypertension.  Her blood pressure is probably over treated at this stage because of the fact that she lost 30 pounds in weight.  2.  Asymptomatic with respect to coronary artery disease.  3.  Mixed hyperlipidemia but improved from previously.  4.  Mild renal insufficiency, unchanged or, in fact, slightly improved from this time last year.  5.  Type 2 diabetes mellitus.  6.  No evidence of hepatic toxicity from statin therapy.    PLAN:    1.  I will reduce the dose of candesartan from 16 mg twice a day to 16 mg once a day so that she has more blood pressure.  2.  She will restart baby aspirin 81 mg per day.  3.  I will have the patient return to see us in 1 year's time but as always, she has been told to contact us if she has any questions or any concerns.    It is my pleasure to be involved the care of this nice patient.  I look forward to seeing her again.    Vaibhav Ram MD, FACC    cc:  Steven Fonseca MD   [facility]  [address] [facility] [address] [address] [address]    Vaibhav Ram MD, FACC        D: 2022   T: 2022   MT: [email or web address] j    Name:     SARAY MÉNDEZ  MRN:      [id number]        Account:      [id number]   :      1940           Service Date: 2022        Document: [id number]      Thank you for allowing me to participate in the care of your patient.      Sincerely,     Vaibhav Maldonado MD, MD     [facility]  cc:   Vaibhav Maldonado MD  [address] [place] [id number]

## 2022-09-29 NOTE — PROGRESS NOTES
Service Date: 09/29/2022    CARDIOLOGY FOLLOWUP VISIT    REFERRING PHYSICIAN:  Dr. Steven Fonseca    HISTORY OF PRESENT ILLNESS:  It is my pleasure to see your patient, Ginger Abreu, who is a very pleasant 82-year-old patient with a history of difficult-to-control hypertension who was found to have renal artery stenosis and underwent stenting of the left renal artery.  The moderate stenosis on the right was felt to be best treated medically.  This patient also has a history of pulmonary embolism.  She has mild coronary artery disease based upon CT angiography performed in the past.  She also has a history of diabetes mellitus and mixed hyperlipidemia.  She has multiple medication intolerances which amount to 36 meds.    Since I last saw her, she is doing fairly well.  Her blood pressure is well controlled and in fact, it is possibly too well controlled.  She has lost about 30 pounds in weight since last year and that probably has made a significant difference to her blood pressure.  This morning, her blood pressure is 104/62 which is not uncommon for her.  She is not complaining of any dizziness or lightheadedness at this particular time.  She does fall within secondary prevention guidelines for statin therapy given that she had renal artery stenosis which is peripheral arterial disease but she also has some LAD disease on CT angiography, so again, falls within secondary prevention guidelines for that.  She is taking rosuvastatin 10 mg per day and her lipid profile today showed that her LDL was excellent at 39.  Her HDL is mildly reduced at 45 and her triglycerides are mildly raised at 164 but significantly better than they were in 2020 when they were 237.  She has mild renal insufficiency, similar to what it has been previously with a sodium of 139, potassium of 4.0, BUN of 41 and creatinine of 1.22 with a GFR of 44.  The patient is not taking aspirin for some reason and that is definitely indicated in her  case, given that she has evidence of coronary artery disease on CT angiography and she has peripheral arterial disease based upon renal artery stenosis.  Her liver function tests were normal with an ALT of 31, indicating she has no hepatic toxicity from her statin therapy.    IMPRESSION:    1.  Essential hypertension.  Her blood pressure is probably over treated at this stage because of the fact that she lost 30 pounds in weight.  2.  Asymptomatic with respect to coronary artery disease.  3.  Mixed hyperlipidemia but improved from previously.  4.  Mild renal insufficiency, unchanged or, in fact, slightly improved from this time last year.  5.  Type 2 diabetes mellitus.  6.  No evidence of hepatic toxicity from statin therapy.    PLAN:    1.  I will reduce the dose of candesartan from 16 mg twice a day to 16 mg once a day so that she has more blood pressure.  2.  She will restart baby aspirin 81 mg per day.  3.  I will have the patient return to see us in 1 year's time but as always, she has been told to contact us if she has any questions or any concerns.    It is my pleasure to be involved the care of this nice patient.  I look forward to seeing her again.    Vaibhav Ram MD, FACC    cc:  Steven Fonseca MD   [facility]  [address] [facility] [address], [address] [place] [address]    Vaibhav Ram MD, FACC        D: 2022   T: 2022   MT: [email or web address] j    Name:     SARAY MÉNDEZ  MRN:      [id number]        Account:      [id number]   :      1940           Service Date: 2022       Document: [id number]

## 2022-10-22 ENCOUNTER — HEALTH MAINTENANCE LETTER (OUTPATIENT)
Age: 82
End: 2022-10-22

## 2022-10-27 ENCOUNTER — ANCILLARY PROCEDURE (OUTPATIENT)
Dept: ULTRASOUND IMAGING | Facility: CLINIC | Age: 82
End: 2022-10-27
Attending: INTERNAL MEDICINE
Payer: COMMERCIAL

## 2022-10-27 ENCOUNTER — HOSPITAL ENCOUNTER (OUTPATIENT)
Dept: BONE DENSITY | Facility: CLINIC | Age: 82
Discharge: HOME | End: 2022-10-27
Attending: INTERNAL MEDICINE
Payer: COMMERCIAL

## 2022-10-27 ENCOUNTER — ANCILLARY PROCEDURE (OUTPATIENT)
Dept: GENERAL RADIOLOGY | Facility: CLINIC | Age: 82
End: 2022-10-27
Attending: INTERNAL MEDICINE
Payer: COMMERCIAL

## 2022-10-27 DIAGNOSIS — M85.89 OSTEOPENIA OF MULTIPLE SITES: ICD-10-CM

## 2022-10-27 DIAGNOSIS — R05.9 COUGH, UNSPECIFIED: ICD-10-CM

## 2022-10-27 DIAGNOSIS — E04.2 NON-TOXIC MULTINODULAR GOITER: ICD-10-CM

## 2022-10-27 PROCEDURE — 77080 DXA BONE DENSITY AXIAL: CPT

## 2022-10-27 PROCEDURE — 76536 US EXAM OF HEAD AND NECK: CPT

## 2022-10-27 PROCEDURE — 71046 X-RAY EXAM CHEST 2 VIEWS: CPT

## 2023-04-01 ENCOUNTER — HEALTH MAINTENANCE LETTER (OUTPATIENT)
Age: 83
End: 2023-04-01

## 2023-05-22 NOTE — PLAN OF CARE
PRIMARY DIAGNOSIS: HYPO/HYPERGLYCEMIA    OUTPATIENT/OBSERVATION GOALS TO BE MET BEFORE DISCHARGE  1. BG greater than 100 and less than 250 on two consecutive readings: No  Recent Labs   Lab Test 07/12/19  2137 07/12/19  1712 07/12/19  1208   * 151* 243*         2. Ketones absent from urine  (hyperglycemia): Not tested     3. Tolerating oral intake to maintain hydration: Yes    4. Return to near baseline physical activity: Yes    Discharge Planner Nurse   Safe discharge environment identified: Yes  Barriers to discharge: Yes       Entered by: Loreta Ruff 07/12/2019 11:52 PM     Please review provider order for any additional goals.   Nurse to notify provider when observation goals have been met and patient is ready for discharge.   Pt A&O x4, VSS on RA. Right hip incision c/d/i, steri strips intact, bruising. Lidocaine patch in place. IV site SL. /222. No nausea. Pain 3/10, no medication required. Pt ambulates 1x and walker. Tolerates mod cho diet, poor appetite. Will continue to monitor.   Opt out

## 2023-06-13 ENCOUNTER — OFFICE VISIT (OUTPATIENT)
Dept: DERMATOLOGY | Facility: CLINIC | Age: 83
End: 2023-06-13
Payer: COMMERCIAL

## 2023-06-13 DIAGNOSIS — L82.1 SEBORRHEIC KERATOSIS: Primary | ICD-10-CM

## 2023-06-13 DIAGNOSIS — F45.8 NEUROPATHIC PRURITUS: ICD-10-CM

## 2023-06-13 PROCEDURE — 99212 OFFICE O/P EST SF 10 MIN: CPT | Performed by: PHYSICIAN ASSISTANT

## 2023-06-13 NOTE — LETTER
6/13/2023         RE: Ginger Abreu  [address]  [facility] [address]        Dear Colleague,    Thank you for referring your patient, Ginger Abreu, to the [facility]. Please see a copy of my visit note below.    HPI:   Chief complaints: Ginger Abreu is a pleasant 82 year old female who presents for evaluation of several spots of concern. She has a raised spot on the left chest and on the top of the scalp. She also has intermittent itching on the back. She describes the itch as a deep itch which will come and go. No other issues of concern today. She declines a FBSE.       PHYSICAL EXAM:    There were no vitals taken for this visit.  Skin exam performed as follows: Type 2 skin. Mood appropriate  Alert and Oriented X 3. Well developed, well nourished in no distress.  General appearance: Normal  Head including face: Normal  Eyes: conjunctiva and lids: Normal  Mouth: Lips, teeth, gums: Normal  Neck: Normal  Skin: Scalp and body hair: See below    Keratotic papules on the left chest, scalp, arms  Back clear    ASSESSMENT/PLAN:     1. Seborrheic keratoses - advised benign no treatment needed  2. Neuropathic itch on the back likely secondary to arthritis. Not overly bothersome at this point.           Follow-up: PRN  CC:   Scribed By: Karla Novoa MS, PASHONDA          Again, thank you for allowing me to participate in the care of your patient.        Sincerely,        Karla Novoa PA-C

## 2023-06-13 NOTE — PROGRESS NOTES
HPI:   Chief complaints: Ginger Abreu is a pleasant 82 year old female who presents for evaluation of several spots of concern. She has a raised spot on the left chest and on the top of the scalp. She also has intermittent itching on the back. She describes the itch as a deep itch which will come and go. No other issues of concern today. She declines a FBSE.       PHYSICAL EXAM:    There were no vitals taken for this visit.  Skin exam performed as follows: Type 2 skin. Mood appropriate  Alert and Oriented X 3. Well developed, well nourished in no distress.  General appearance: Normal  Head including face: Normal  Eyes: conjunctiva and lids: Normal  Mouth: Lips, teeth, gums: Normal  Neck: Normal  Skin: Scalp and body hair: See below    Keratotic papules on the left chest, scalp, arms  Back clear    ASSESSMENT/PLAN:     1. Seborrheic keratoses - advised benign no treatment needed  2. Neuropathic itch on the back likely secondary to arthritis. Not overly bothersome at this point.           Follow-up: PRN  CC:   Scribed By: Karla Novoa, MS, PAAngyC

## 2023-07-06 ENCOUNTER — HOSPITAL ENCOUNTER (INPATIENT)
Facility: CLINIC | Age: 83
LOS: 1 days | Discharge: HOME | DRG: 281 | End: 2023-07-07
Attending: EMERGENCY MEDICINE | Admitting: INTERNAL MEDICINE
Payer: COMMERCIAL

## 2023-07-06 ENCOUNTER — APPOINTMENT (OUTPATIENT)
Dept: GENERAL RADIOLOGY | Facility: CLINIC | Age: 83
DRG: 281 | End: 2023-07-06
Attending: EMERGENCY MEDICINE
Payer: COMMERCIAL

## 2023-07-06 DIAGNOSIS — I51.81 TAKOTSUBO CARDIOMYOPATHY: Primary | ICD-10-CM

## 2023-07-06 DIAGNOSIS — I21.4 NSTEMI (NON-ST ELEVATED MYOCARDIAL INFARCTION) (H): ICD-10-CM

## 2023-07-06 DIAGNOSIS — R79.89 ELEVATED TROPONIN: ICD-10-CM

## 2023-07-06 LAB
ACT BLD: 197 SECONDS (ref 74–150)
ALBUMIN SERPL BCG-MCNC: 3.8 G/DL (ref 3.5–5.2)
ALP SERPL-CCNC: 79 U/L (ref 35–104)
ALT SERPL W P-5'-P-CCNC: 21 U/L (ref 0–50)
ANION GAP SERPL CALCULATED.3IONS-SCNC: 12 MMOL/L (ref 7–15)
AST SERPL W P-5'-P-CCNC: 19 U/L (ref 0–45)
ATRIAL RATE - MUSE: 60 BPM
BASOPHILS # BLD AUTO: 0 10E3/UL (ref 0–0.2)
BASOPHILS NFR BLD AUTO: 0 %
BILIRUB DIRECT SERPL-MCNC: <0.2 MG/DL (ref 0–0.3)
BILIRUB SERPL-MCNC: 0.4 MG/DL
BUN SERPL-MCNC: 21.8 MG/DL (ref 8–23)
CALCIUM SERPL-MCNC: 9.4 MG/DL (ref 8.8–10.2)
CATH EF ESTIMATED: 35 %
CHLORIDE SERPL-SCNC: 104 MMOL/L (ref 98–107)
CHOLEST SERPL-MCNC: 111 MG/DL
CREAT SERPL-MCNC: 0.86 MG/DL (ref 0.51–0.95)
DEPRECATED HCO3 PLAS-SCNC: 26 MMOL/L (ref 22–29)
DIASTOLIC BLOOD PRESSURE - MUSE: NORMAL MMHG
EOSINOPHIL # BLD AUTO: 0.3 10E3/UL (ref 0–0.7)
EOSINOPHIL NFR BLD AUTO: 5 %
ERYTHROCYTE [DISTWIDTH] IN BLOOD BY AUTOMATED COUNT: 13.2 % (ref 10–15)
GFR SERPL CREATININE-BSD FRML MDRD: 67 ML/MIN/1.73M2
GLUCOSE BLDC GLUCOMTR-MCNC: 156 MG/DL (ref 70–99)
GLUCOSE BLDC GLUCOMTR-MCNC: 76 MG/DL (ref 70–99)
GLUCOSE SERPL-MCNC: 145 MG/DL (ref 70–99)
HBA1C MFR BLD: 6 %
HCT VFR BLD AUTO: 43.9 % (ref 35–47)
HDLC SERPL-MCNC: 46 MG/DL
HGB BLD-MCNC: 14.1 G/DL (ref 11.7–15.7)
HOLD SPECIMEN: NORMAL
HOLD SPECIMEN: NORMAL
IMM GRANULOCYTES # BLD: 0 10E3/UL
IMM GRANULOCYTES NFR BLD: 0 %
INTERPRETATION ECG - MUSE: NORMAL
LDLC SERPL CALC-MCNC: 41 MG/DL
LIPASE SERPL-CCNC: 21 U/L (ref 13–60)
LYMPHOCYTES # BLD AUTO: 1.4 10E3/UL (ref 0.8–5.3)
LYMPHOCYTES NFR BLD AUTO: 25 %
MCH RBC QN AUTO: 30.9 PG (ref 26.5–33)
MCHC RBC AUTO-ENTMCNC: 32.1 G/DL (ref 31.5–36.5)
MCV RBC AUTO: 96 FL (ref 78–100)
MONOCYTES # BLD AUTO: 0.4 10E3/UL (ref 0–1.3)
MONOCYTES NFR BLD AUTO: 7 %
NEUTROPHILS # BLD AUTO: 3.5 10E3/UL (ref 1.6–8.3)
NEUTROPHILS NFR BLD AUTO: 63 %
NONHDLC SERPL-MCNC: 65 MG/DL
NRBC # BLD AUTO: 0 10E3/UL
NRBC BLD AUTO-RTO: 0 /100
P AXIS - MUSE: 39 DEGREES
PLATELET # BLD AUTO: 118 10E3/UL (ref 150–450)
POTASSIUM SERPL-SCNC: 3.9 MMOL/L (ref 3.4–5.3)
PR INTERVAL - MUSE: 174 MS
PROT SERPL-MCNC: 6.2 G/DL (ref 6.4–8.3)
QRS DURATION - MUSE: 90 MS
QT - MUSE: 414 MS
QTC - MUSE: 414 MS
R AXIS - MUSE: 1 DEGREES
RBC # BLD AUTO: 4.57 10E6/UL (ref 3.8–5.2)
SODIUM SERPL-SCNC: 142 MMOL/L (ref 136–145)
SYSTOLIC BLOOD PRESSURE - MUSE: NORMAL MMHG
T AXIS - MUSE: 52 DEGREES
TRIGL SERPL-MCNC: 118 MG/DL
TROPONIN T SERPL HS-MCNC: 323 NG/L
TROPONIN T SERPL HS-MCNC: 36 NG/L
TROPONIN T SERPL HS-MCNC: 480 NG/L
TROPONIN T SERPL HS-MCNC: 514 NG/L
UFH PPP CHRO-ACNC: 0.43 IU/ML
VENTRICULAR RATE- MUSE: 60 BPM
WBC # BLD AUTO: 5.5 10E3/UL (ref 4–11)

## 2023-07-06 PROCEDURE — 4A023N7 MEASUREMENT OF CARDIAC SAMPLING AND PRESSURE, LEFT HEART, PERCUTANEOUS APPROACH: ICD-10-PCS | Performed by: INTERNAL MEDICINE

## 2023-07-06 PROCEDURE — 99152 MOD SED SAME PHYS/QHP 5/>YRS: CPT | Performed by: INTERNAL MEDICINE

## 2023-07-06 PROCEDURE — 250N000011 HC RX IP 250 OP 636: Mod: JZ | Performed by: INTERNAL MEDICINE

## 2023-07-06 PROCEDURE — 83036 HEMOGLOBIN GLYCOSYLATED A1C: CPT | Performed by: PHYSICIAN ASSISTANT

## 2023-07-06 PROCEDURE — 210N000002 HC R&B HEART CARE

## 2023-07-06 PROCEDURE — 36415 COLL VENOUS BLD VENIPUNCTURE: CPT | Performed by: EMERGENCY MEDICINE

## 2023-07-06 PROCEDURE — 250N000011 HC RX IP 250 OP 636: Performed by: INTERNAL MEDICINE

## 2023-07-06 PROCEDURE — 99153 MOD SED SAME PHYS/QHP EA: CPT | Performed by: INTERNAL MEDICINE

## 2023-07-06 PROCEDURE — B2151ZZ FLUOROSCOPY OF LEFT HEART USING LOW OSMOLAR CONTRAST: ICD-10-PCS | Performed by: INTERNAL MEDICINE

## 2023-07-06 PROCEDURE — 99223 1ST HOSP IP/OBS HIGH 75: CPT | Performed by: PHYSICIAN ASSISTANT

## 2023-07-06 PROCEDURE — 250N000013 HC RX MED GY IP 250 OP 250 PS 637: Performed by: PHYSICIAN ASSISTANT

## 2023-07-06 PROCEDURE — 85520 HEPARIN ASSAY: CPT | Performed by: PHYSICIAN ASSISTANT

## 2023-07-06 PROCEDURE — 85025 COMPLETE CBC W/AUTO DIFF WBC: CPT | Performed by: EMERGENCY MEDICINE

## 2023-07-06 PROCEDURE — 82310 ASSAY OF CALCIUM: CPT | Performed by: EMERGENCY MEDICINE

## 2023-07-06 PROCEDURE — 99285 EMERGENCY DEPT VISIT HI MDM: CPT | Mod: 25

## 2023-07-06 PROCEDURE — 250N000011 HC RX IP 250 OP 636: Mod: JZ | Performed by: PHYSICIAN ASSISTANT

## 2023-07-06 PROCEDURE — B2111ZZ FLUOROSCOPY OF MULTIPLE CORONARY ARTERIES USING LOW OSMOLAR CONTRAST: ICD-10-PCS | Performed by: INTERNAL MEDICINE

## 2023-07-06 PROCEDURE — 84484 ASSAY OF TROPONIN QUANT: CPT | Performed by: EMERGENCY MEDICINE

## 2023-07-06 PROCEDURE — 71046 X-RAY EXAM CHEST 2 VIEWS: CPT

## 2023-07-06 PROCEDURE — 84484 ASSAY OF TROPONIN QUANT: CPT | Performed by: PHYSICIAN ASSISTANT

## 2023-07-06 PROCEDURE — 36415 COLL VENOUS BLD VENIPUNCTURE: CPT | Performed by: PHYSICIAN ASSISTANT

## 2023-07-06 PROCEDURE — 85347 COAGULATION TIME ACTIVATED: CPT

## 2023-07-06 PROCEDURE — G0378 HOSPITAL OBSERVATION PER HR: HCPCS

## 2023-07-06 PROCEDURE — 80061 LIPID PANEL: CPT | Performed by: PHYSICIAN ASSISTANT

## 2023-07-06 PROCEDURE — 250N000013 HC RX MED GY IP 250 OP 250 PS 637: Performed by: EMERGENCY MEDICINE

## 2023-07-06 PROCEDURE — 82248 BILIRUBIN DIRECT: CPT | Performed by: PHYSICIAN ASSISTANT

## 2023-07-06 PROCEDURE — 99223 1ST HOSP IP/OBS HIGH 75: CPT | Mod: 25 | Performed by: PHYSICIAN ASSISTANT

## 2023-07-06 PROCEDURE — 93458 L HRT ARTERY/VENTRICLE ANGIO: CPT | Mod: 26 | Performed by: INTERNAL MEDICINE

## 2023-07-06 PROCEDURE — 250N000009 HC RX 250: Performed by: INTERNAL MEDICINE

## 2023-07-06 PROCEDURE — 83690 ASSAY OF LIPASE: CPT | Performed by: PHYSICIAN ASSISTANT

## 2023-07-06 PROCEDURE — 93458 L HRT ARTERY/VENTRICLE ANGIO: CPT | Performed by: INTERNAL MEDICINE

## 2023-07-06 PROCEDURE — C1760 CLOSURE DEV, VASC: HCPCS | Performed by: INTERNAL MEDICINE

## 2023-07-06 PROCEDURE — 272N000001 HC OR GENERAL SUPPLY STERILE: Performed by: INTERNAL MEDICINE

## 2023-07-06 PROCEDURE — 99152 MOD SED SAME PHYS/QHP 5/>YRS: CPT | Mod: GC | Performed by: INTERNAL MEDICINE

## 2023-07-06 PROCEDURE — 258N000003 HC RX IP 258 OP 636: Performed by: INTERNAL MEDICINE

## 2023-07-06 PROCEDURE — 93005 ELECTROCARDIOGRAM TRACING: CPT

## 2023-07-06 DEVICE — CLOSURE ANGIOSEAL 6FR 610130: Type: IMPLANTABLE DEVICE | Status: FUNCTIONAL

## 2023-07-06 RX ORDER — AMOXICILLIN 250 MG
2 CAPSULE ORAL 2 TIMES DAILY PRN
Status: DISCONTINUED | OUTPATIENT
Start: 2023-07-06 | End: 2023-07-07 | Stop reason: HOSPADM

## 2023-07-06 RX ORDER — HYDROCHLOROTHIAZIDE 12.5 MG/1
12.5 TABLET ORAL EVERY OTHER DAY
Status: DISCONTINUED | OUTPATIENT
Start: 2023-07-07 | End: 2023-07-07 | Stop reason: HOSPADM

## 2023-07-06 RX ORDER — LORAZEPAM 0.5 MG/1
0.5 TABLET ORAL
Status: CANCELLED | OUTPATIENT
Start: 2023-07-06

## 2023-07-06 RX ORDER — LIDOCAINE 40 MG/G
CREAM TOPICAL
Status: CANCELLED | OUTPATIENT
Start: 2023-07-06

## 2023-07-06 RX ORDER — FAMOTIDINE 20 MG/1
20 TABLET, FILM COATED ORAL DAILY
Status: DISCONTINUED | OUTPATIENT
Start: 2023-07-06 | End: 2023-07-07 | Stop reason: HOSPADM

## 2023-07-06 RX ORDER — PANTOPRAZOLE SODIUM 40 MG/1
40 TABLET, DELAYED RELEASE ORAL
Status: DISCONTINUED | OUTPATIENT
Start: 2023-07-07 | End: 2023-07-07 | Stop reason: HOSPADM

## 2023-07-06 RX ORDER — SODIUM CHLORIDE 9 MG/ML
INJECTION, SOLUTION INTRAVENOUS CONTINUOUS
Status: CANCELLED | OUTPATIENT
Start: 2023-07-06

## 2023-07-06 RX ORDER — NITROGLYCERIN 0.4 MG/1
0.4 TABLET SUBLINGUAL EVERY 5 MIN PRN
Status: DISCONTINUED | OUTPATIENT
Start: 2023-07-06 | End: 2023-07-07 | Stop reason: HOSPADM

## 2023-07-06 RX ORDER — METOPROLOL TARTRATE 1 MG/ML
INJECTION, SOLUTION INTRAVENOUS
Status: DISCONTINUED | OUTPATIENT
Start: 2023-07-06 | End: 2023-07-06 | Stop reason: HOSPADM

## 2023-07-06 RX ORDER — ONDANSETRON 2 MG/ML
4 INJECTION INTRAMUSCULAR; INTRAVENOUS EVERY 6 HOURS PRN
Status: DISCONTINUED | OUTPATIENT
Start: 2023-07-06 | End: 2023-07-07 | Stop reason: HOSPADM

## 2023-07-06 RX ORDER — NITROGLYCERIN 0.4 MG/1
0.4 TABLET SUBLINGUAL EVERY 5 MIN PRN
Status: DISCONTINUED | OUTPATIENT
Start: 2023-07-06 | End: 2023-07-06

## 2023-07-06 RX ORDER — ASPIRIN 81 MG/1
81 TABLET, CHEWABLE ORAL DAILY
Status: DISCONTINUED | OUTPATIENT
Start: 2023-07-07 | End: 2023-07-07 | Stop reason: HOSPADM

## 2023-07-06 RX ORDER — OXYBUTYNIN CHLORIDE 15 MG/1
15 TABLET, EXTENDED RELEASE ORAL
COMMUNITY

## 2023-07-06 RX ORDER — ATROPINE SULFATE 0.1 MG/ML
0.5 INJECTION INTRAVENOUS
Status: ACTIVE | OUTPATIENT
Start: 2023-07-06 | End: 2023-07-07

## 2023-07-06 RX ORDER — NALOXONE HYDROCHLORIDE 0.4 MG/ML
0.4 INJECTION, SOLUTION INTRAMUSCULAR; INTRAVENOUS; SUBCUTANEOUS
Status: DISCONTINUED | OUTPATIENT
Start: 2023-07-06 | End: 2023-07-07 | Stop reason: HOSPADM

## 2023-07-06 RX ORDER — ACETAMINOPHEN 650 MG/1
650 SUPPOSITORY RECTAL EVERY 6 HOURS PRN
Status: DISCONTINUED | OUTPATIENT
Start: 2023-07-06 | End: 2023-07-07 | Stop reason: HOSPADM

## 2023-07-06 RX ORDER — LIDOCAINE 40 MG/G
CREAM TOPICAL
Status: DISCONTINUED | OUTPATIENT
Start: 2023-07-06 | End: 2023-07-07 | Stop reason: HOSPADM

## 2023-07-06 RX ORDER — ACETAMINOPHEN 325 MG/1
650 TABLET ORAL EVERY 6 HOURS PRN
Status: DISCONTINUED | OUTPATIENT
Start: 2023-07-06 | End: 2023-07-07 | Stop reason: HOSPADM

## 2023-07-06 RX ORDER — OXYBUTYNIN CHLORIDE 5 MG/1
15 TABLET, EXTENDED RELEASE ORAL
Status: DISCONTINUED | OUTPATIENT
Start: 2023-07-07 | End: 2023-07-07 | Stop reason: HOSPADM

## 2023-07-06 RX ORDER — VALSARTAN 160 MG/1
160 TABLET ORAL DAILY
Status: DISCONTINUED | OUTPATIENT
Start: 2023-07-07 | End: 2023-07-06

## 2023-07-06 RX ORDER — NICOTINE POLACRILEX 4 MG
15-30 LOZENGE BUCCAL
Status: DISCONTINUED | OUTPATIENT
Start: 2023-07-06 | End: 2023-07-07 | Stop reason: HOSPADM

## 2023-07-06 RX ORDER — LATANOPROST 50 UG/ML
1 SOLUTION/ DROPS OPHTHALMIC AT BEDTIME
COMMUNITY

## 2023-07-06 RX ORDER — LORAZEPAM 2 MG/ML
0.5 INJECTION INTRAMUSCULAR
Status: CANCELLED | OUTPATIENT
Start: 2023-07-06

## 2023-07-06 RX ORDER — ONDANSETRON 4 MG/1
4 TABLET, ORALLY DISINTEGRATING ORAL EVERY 6 HOURS PRN
Status: DISCONTINUED | OUTPATIENT
Start: 2023-07-06 | End: 2023-07-07 | Stop reason: HOSPADM

## 2023-07-06 RX ORDER — ASPIRIN 81 MG/1
243 TABLET, CHEWABLE ORAL ONCE
Status: CANCELLED | OUTPATIENT
Start: 2023-07-06

## 2023-07-06 RX ORDER — ASPIRIN 325 MG
325 TABLET ORAL ONCE
Status: CANCELLED | OUTPATIENT
Start: 2023-07-06 | End: 2023-07-06

## 2023-07-06 RX ORDER — IOPAMIDOL 755 MG/ML
INJECTION, SOLUTION INTRAVASCULAR
Status: DISCONTINUED | OUTPATIENT
Start: 2023-07-06 | End: 2023-07-06 | Stop reason: HOSPADM

## 2023-07-06 RX ORDER — ROSUVASTATIN CALCIUM 20 MG/1
20 TABLET, COATED ORAL DAILY
Status: DISCONTINUED | OUTPATIENT
Start: 2023-07-06 | End: 2023-07-07 | Stop reason: HOSPADM

## 2023-07-06 RX ORDER — CLONAZEPAM 0.5 MG/1
.5-1 TABLET ORAL 2 TIMES DAILY PRN
COMMUNITY
End: 2024-05-08

## 2023-07-06 RX ORDER — CLONAZEPAM 0.5 MG/1
.5-1 TABLET ORAL 2 TIMES DAILY PRN
Status: DISCONTINUED | OUTPATIENT
Start: 2023-07-06 | End: 2023-07-07 | Stop reason: HOSPADM

## 2023-07-06 RX ORDER — NALOXONE HYDROCHLORIDE 0.4 MG/ML
0.2 INJECTION, SOLUTION INTRAMUSCULAR; INTRAVENOUS; SUBCUTANEOUS
Status: DISCONTINUED | OUTPATIENT
Start: 2023-07-06 | End: 2023-07-07 | Stop reason: HOSPADM

## 2023-07-06 RX ORDER — HEPARIN SODIUM 10000 [USP'U]/100ML
0-5000 INJECTION, SOLUTION INTRAVENOUS CONTINUOUS
Status: DISCONTINUED | OUTPATIENT
Start: 2023-07-06 | End: 2023-07-06

## 2023-07-06 RX ORDER — POLYETHYLENE GLYCOL 3350 17 G/17G
17 POWDER, FOR SOLUTION ORAL DAILY PRN
Status: DISCONTINUED | OUTPATIENT
Start: 2023-07-06 | End: 2023-07-07 | Stop reason: HOSPADM

## 2023-07-06 RX ORDER — DEXTROSE MONOHYDRATE 25 G/50ML
25-50 INJECTION, SOLUTION INTRAVENOUS
Status: DISCONTINUED | OUTPATIENT
Start: 2023-07-06 | End: 2023-07-07 | Stop reason: HOSPADM

## 2023-07-06 RX ORDER — POTASSIUM CHLORIDE 1500 MG/1
20 TABLET, EXTENDED RELEASE ORAL
Status: CANCELLED | OUTPATIENT
Start: 2023-07-06

## 2023-07-06 RX ORDER — VALSARTAN 160 MG/1
160 TABLET ORAL DAILY
Status: DISCONTINUED | OUTPATIENT
Start: 2023-07-06 | End: 2023-07-07 | Stop reason: HOSPADM

## 2023-07-06 RX ORDER — ASPIRIN 325 MG
325 TABLET ORAL ONCE
Status: COMPLETED | OUTPATIENT
Start: 2023-07-06 | End: 2023-07-06

## 2023-07-06 RX ORDER — TIRZEPATIDE 2.5 MG/.5ML
2.5 INJECTION, SOLUTION SUBCUTANEOUS
COMMUNITY
End: 2024-05-08

## 2023-07-06 RX ORDER — AMOXICILLIN 250 MG
1 CAPSULE ORAL 2 TIMES DAILY PRN
Status: DISCONTINUED | OUTPATIENT
Start: 2023-07-06 | End: 2023-07-07 | Stop reason: HOSPADM

## 2023-07-06 RX ORDER — FLUMAZENIL 0.1 MG/ML
0.2 INJECTION, SOLUTION INTRAVENOUS
Status: ACTIVE | OUTPATIENT
Start: 2023-07-06 | End: 2023-07-07

## 2023-07-06 RX ORDER — FENTANYL CITRATE 50 UG/ML
25 INJECTION, SOLUTION INTRAMUSCULAR; INTRAVENOUS
Status: DISCONTINUED | OUTPATIENT
Start: 2023-07-06 | End: 2023-07-07 | Stop reason: HOSPADM

## 2023-07-06 RX ORDER — SODIUM CHLORIDE 9 MG/ML
75 INJECTION, SOLUTION INTRAVENOUS CONTINUOUS
Status: ACTIVE | OUTPATIENT
Start: 2023-07-06 | End: 2023-07-06

## 2023-07-06 RX ORDER — BISACODYL 10 MG
10 SUPPOSITORY, RECTAL RECTAL DAILY PRN
Status: DISCONTINUED | OUTPATIENT
Start: 2023-07-06 | End: 2023-07-07 | Stop reason: HOSPADM

## 2023-07-06 RX ADMIN — HEPARIN SODIUM 800 UNITS/HR: 10000 INJECTION, SOLUTION INTRAVENOUS at 12:34

## 2023-07-06 RX ADMIN — ROSUVASTATIN CALCIUM 20 MG: 20 TABLET, FILM COATED ORAL at 18:40

## 2023-07-06 RX ADMIN — SODIUM CHLORIDE 75 ML/HR: 9 INJECTION, SOLUTION INTRAVENOUS at 18:35

## 2023-07-06 RX ADMIN — FAMOTIDINE 20 MG: 20 TABLET ORAL at 18:41

## 2023-07-06 RX ADMIN — NITROGLYCERIN 0.4 MG: 0.4 TABLET SUBLINGUAL at 12:35

## 2023-07-06 RX ADMIN — NITROGLYCERIN 0.4 MG: 0.4 TABLET SUBLINGUAL at 10:05

## 2023-07-06 RX ADMIN — ASPIRIN 325 MG ORAL TABLET 325 MG: 325 PILL ORAL at 10:05

## 2023-07-06 RX ADMIN — VALSARTAN 160 MG: 160 TABLET, FILM COATED ORAL at 18:46

## 2023-07-06 ASSESSMENT — ACTIVITIES OF DAILY LIVING (ADL)
ADLS_ACUITY_SCORE: 43
ADLS_ACUITY_SCORE: 39
ADLS_ACUITY_SCORE: 43
ADLS_ACUITY_SCORE: 39
ADLS_ACUITY_SCORE: 39

## 2023-07-06 NOTE — CONSULTS
[facility] Cardiology Consultation     Date of Admission:  7/6/2023  Date of Consult: 07/06/23  Requesting Physician: Elke Le PA-C  Primary care physician: Steven Fonseca  Primary cardiologist: Dr. Maldonado  Staff Cardiologist:  Dr. Sequeira     Reason for consult: Chest pain    Assessment:   Ginger Abreu is a 82 year old female with a hx of non-obstructive CAD (per 2012 CTA with mild LAD dz), GERD, chronic migraines, provoked DVT/PE (2012), thyroid disease, DMII, renal artery stenosis (s/p L artery stenting, moderate stenosis on R managing medically), HTN and multiple medication intolerances who was admitted on 7/6/2023 with acute onset chest pressure earlier today, which has improved with sublingual nitroglycerin but not resolved, and a rising troponin.  Coronary angiogram with possible PCI has been recommended.  She has been n.p.o. since last evening.   Risks and benefits of left heart catheterization and coronary angiogram were discussed with the patient in detail. 0.1-0.3% (for diagnostic angio) and 1-2% (for PCI)  risk of stroke, MI, death, emergent bypass for diagnostic angio, risk of contrast induced allergic reaction, renal dysfunction, vascular complications were discussed. Patient understands and wishes to proceed. The patient would be an appropriate candidate for DAPT, if required.    In the meantime, continue heparin (stop on arrival to Cath Lab), and aspirin.  Continue PTA statin, hydrochlorothiazide, candesartan.  No beta-blocker due to history of intolerance and sinus bradycardia.  Echocardiogram.     Thank you very much for this consultation.     Dasha Harvey PA-C  [facility]  Pager: [phone]  Text Page  (7:30am - 4pm M-F)   ________________________________________________________________________  History of Present Illness   Ginger Abreu is a 82 year old female with a hx of non-obstructive CAD (per 2012 CTA with mild  "LAD dz), GERD, chronic migraines, provoked DVT/PE (2012), thyroid disease, DMII, renal artery stenosis (s/p L artery stenting, moderate stenosis on R managing medically), HTN and multiple medication intolerances who presents with chest pressure.  This started shortly after she got up and started getting ready for the day, described as a 10 out of 10 chest pressure across the precordium, without radiation or other associated symptoms, significantly improved with sublingual nitroglycerin in the ER, but not resolved, currently ongoing and a 2-3 out of 10 in severity.  She states that she has been in her usual state of health up until this time, save for a probably 30-minute episode of what felt like \"gas in my chest\" earlier this week.  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      EKG shows no ST/T wave abnormalities.  Initial troponin was just slightly elevated at 36, but charles to 323 two hours later.  Hemoglobin A1c is controlled at 6%.  She has normal renal function, and a normal H/H, but does have a mildly reduced platelet count of 118.  Her LDL is 39.  She denies any issues with bleeding or GI ulcerations. Her blood pressure has been under decent control.  ________________________________________________________  Code Status    Full Code    Past Medical History   I have reviewed this patient's medical history and updated it with pertinent information if needed.   Past Medical History:   Diagnosis Date     Acute peptic ulcer, unspecified site, without mention of hemorrhage, perforation, or obstruction      Allergic rhinitis, cause unspecified      Anxiety      Basal cell carcinoma      DVT (deep venous thrombosis) (H) 03/2012    following spinal surgery. Saw Oncology. 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 Saw Oncology. treated 6 months     Peripheral neuropathy 6/19/2015     Pneumonia, organism unspecified(486)      Spinal stenosis      Squamous cell carcinoma      Thyroid nodule      Type II or unspecified type diabetes mellitus without mention of complication, not stated as uncontrolled      Unspecified essential hypertension        Past Surgical History   I have reviewed this patient's surgical history and updated it with pertinent information if needed.  Past Surgical History:   Procedure Laterality Date     NONSPECIFIC PROCEDURE  2011    Bilateral sphenoidotomy with removal of polypoid tissue.     Right frontal sinusotomy.   Bilateral total ethmoidectomy.  Bilateral maxillary antrostomy with removal of polypoid tissue.       ORTHOPEDIC SURGERY      lumbar fusion     ZZC NONSPECIFIC PROCEDURE  approx 1970's    hyst/bso     ZZC NONSPECIFIC PROCEDURE  approx 1970's    gb     ZZC NONSPECIFIC PROCEDURE  approx 1980's    cystocele repair     ZZC NONSPECIFIC PROCEDURE  approx 1980's    endoscopic sinus      ZZC NONSPECIFIC PROCEDURE      epidurals x 7     ZZC NONSPECIFIC PROCEDURE      nl colonoscopy 1/2006     ZZC NONSPECIFIC PROCEDURE  2011    L2-L3 foramenotomies     ZZC NONSPECIFIC PROCEDURE  2012    lumbar fusion       Prior to Admission Medications   Prior to Admission Medications   Prescriptions Last Dose Informant Patient Reported? Taking?   APPLE CIDER VINEGAR PO 7/5/2023 Self Yes Yes   Sig: Take 1 capsule by mouth daily   B-12 (methylcobalamin) 1000  MCG SUBL sublingual tablet 7/5/2023 Self Yes Yes   Sig: Place 1,000 mcg under the tongue daily   BETA BLOCKER NOT PRESCRIBED, INTENTIONAL,  Self No No   Sig: Beta Blocker not prescribed intentionally due to Intolerance   Cranberry 125 MG TABS 7/5/2023 Self Yes Yes   Sig: Take 1 tablet by mouth daily   Ferrous Sulfate (IRON) 325 (65 Fe) MG tablet 7/5/2023 Self Yes Yes   Sig: Take 1 tablet by mouth daily   Melatonin 10 MG TABS tablet 7/5/2023 Self Yes Yes   Sig: Take 10 mg by mouth At Bedtime   SUMAtriptan (IMITREX) 25 MG tablet  Self Yes Yes   Sig: Take 25 mg by mouth at onset of headache for migraine   UNABLE TO FIND 7/5/2023 Self Yes Yes   Sig: Salt Iodine 1000mg - Take 1 (one) tablet Monday throught Saturday and 2 (two) tablets on Sunday.   ZINC SULFATE PO 7/5/2023 Self Yes Yes   Sig: Take 1 tablet by mouth daily   albuterol (PROAIR HFA/PROVENTIL HFA/VENTOLIN HFA) 108 (90 BASE) MCG/ACT Inhaler  at PRN Self No Yes   Sig: Inhale 2 puffs into the lungs every 6 hours INDICATION: RESCUE INHALER FOR SHORTNESS OF BREATH, CHEST TIGHTNESS AND COUGH   Patient taking differently: Inhale 2 puffs into the lungs every 6 hours as needed INDICATION: RESCUE INHALER FOR SHORTNESS OF BREATH, CHEST TIGHTNESS AND COUGH   alpha-lipoic acid 600 MG CAPS 7/5/2023 Self Yes Yes   Sig: Take 600 mg by mouth 3 times daily    ascorbic acid (VITAMIN C) 1000 MG TABS 7/5/2023 Self No Yes   Sig: Take 1 tablet (1,000 mg) by mouth daily VITAMIN C REPLACEMENT   aspirin (ASA) 81 MG EC tablet 7/5/2023 Self No Yes   Sig: Take 1 tablet (81 mg) by mouth daily   candesartan (ATACAND) 16 MG tablet 7/5/2023 Self No Yes   Sig: Take 1 tablet (16 mg) by mouth daily   clonazePAM (KLONOPIN) 0.5 MG tablet  Self Yes Yes   Sig: Take 0.5-1 mg by mouth 2 times daily as needed for anxiety   diphenhydrAMINE (BENADRYL) 25 MG tablet  Self Yes Yes   Sig: Take 25 mg by mouth every 6 hours as needed for itching or allergies   empagliflozin (JARDIANCE) 10 MG TABS tablet  7/5/2023 Self Yes Yes   Sig: Take 10 mg by mouth daily   esomeprazole (NEXIUM) 40 MG capsule 7/5/2023 Self No Yes   Sig: Take 1 capsule (40 mg) by mouth every morning (before breakfast) TAKE  30'-60' BEFORE 1ST MEAL   famotidine (PEPCID) 40 MG tablet 7/5/2023 Self Yes Yes   Sig: Take 40 mg by mouth daily   glimepiride (AMARYL) 4 MG tablet 7/5/2023 Self Yes Yes   Sig: Take 4 mg by mouth 2 times daily   hydrochlorothiazide (HYDRODIURIL) 25 MG tablet 7/5/2023 Self Yes Yes   Sig: Take 12.5 mg by mouth every other day   lactobacillus rhamnosus, GG, (CULTURELL) capsule 7/5/2023 Self Yes Yes   Sig: Take 1 capsule by mouth 2 times daily   latanoprost (XALATAN) 0.005 % ophthalmic solution 7/5/2023 Self Yes Yes   Sig: Place 1 drop into both eyes At Bedtime   levothyroxine (SYNTHROID/LEVOTHROID) 25 MCG tablet 7/5/2023 Self Yes Yes   Sig: TAKE 1/2 (ONE-HALF) TABLET BY MOUTH FIRST THING IN THE MORNING TO STOMACH. NO FOOD FOR AN HOUR TO TREAT UNDERACTIVE THYROID.   methocarbamol (ROBAXIN) 750 MG tablet  Self Yes Yes   Sig: Take 750 mg by mouth 4 times daily as needed for muscle spasms   niacin 500 MG tablet 7/5/2023 Self Yes Yes   Sig: Take 500 mg by mouth daily (with breakfast)   nitroglycerin (NITROSTAT) 0.4 MG SL tablet  Self No Yes   Sig: Place 1 tablet (0.4 mg) under the tongue every 5 minutes as needed for chest pain   oxybutynin ER (DITROPAN XL) 15 MG 24 hr tablet 7/5/2023 Self Yes Yes   Sig: Take 15 mg by mouth daily (with lunch)   rosuvastatin (CRESTOR) 20 MG tablet 7/5/2023 Self Yes Yes   Sig: Take 20 mg by mouth daily   timolol maleate (TIMOPTIC) 0.5 % ophthalmic solution 7/5/2023 Self Yes Yes   Sig: Place 1 drop into both eyes 2 times daily   tirzepatide (MOUNJARO) 2.5 MG/0.5ML pen 6/29/2023 Self Yes Yes   Sig: Inject 2.5 mg Subcutaneous every 7 days Takes on Thursdays   vitamin D (ERGOCALCIFEROL) 46950 UNIT capsule 6/30/2023 Self Yes Yes   Sig: Take 50,000 Units by mouth once a week Takes on the 10th, 20th, and 30th  of each month      Facility-Administered Medications: None     Allergies   Allergies   Allergen Reactions     Diatrizoate Shortness Of Breath     Other reaction(s): Shortness Of Breath  rapid heart beat  rapid heart beat       Morphine      Other reaction(s): GI Upset  Patient says she will not take because causes GI Upset     Morphine And Related Difficulty breathing     Chest gets tight & difficulty breathing      Oxycodone-Acetaminophen      Other reaction(s): GI Upset  Patient says she will not take because causes GI Upset     Atenolol Other (See Comments)     Sob w/ exertion - asthma symptoms     Diltiazem Other (See Comments)     edema - fluid retention in legs    Other reaction(s): Edema  Other reaction(s): Edema       Labetalol Other (See Comments) and Itching     Sob w/ exertion - asthma symptoms  itching     Lipitor [Atorvastatin Calcium] Other (See Comments)     Muscle weakness     Nifedipine Other (See Comments)     Sob w/ exertion - asthma symptoms, HAs    Other reaction(s): Other (See Comments)  Sob w/ exertion - asthma symptoms, HAs     Sulfa Antibiotics Hives     hives     Zocor [Simvastatin] Other (See Comments)     Muscle weakness     Aleve [Naproxen] Other (See Comments)     Raises BP     Amlodipine Fatigue and Diarrhea            Blood-Group Specific Substance Other (See Comments)     Patient has anti-Odalys. Blood product orders may be delayed.  Other reaction(s): Other - Describe In Comment Field  Patient has anti-Manly. Blood product orders may be delayed.       Bystolic [Nebivolol Hcl] Other (See Comments)     headaches     Calcium Carbonate Diarrhea     Cefdinir Diarrhea     Crestor [Rosuvastatin Calcium] Muscle Pain (Myalgia)     Diovan [Valsartan] GI Disturbance     gas       Ezetimibe Muscle Pain (Myalgia)     Other reaction(s): Muscle Weakness  Other reaction(s): Myalgia       Fluticasone-Salmeterol Other (See Comments)     hoarseness with 500/50, not the 250/50     Hydralazine Other (See  Comments) and Nausea     Increases pulse     Hydrochlorothiazide Other (See Comments)     increasing glucose     Imdur [Isosorbide Mononitrate] Other (See Comments)     headaches     Lisinopril Cough     Hoarse voice     Lovastatin Muscle Pain (Myalgia)     Metformin Diarrhea            Metoprolol Other (See Comments)     Sob w/ exertion - asthma symptoms       Niacin Other (See Comments)     hyperglycemia     No Clinical Screening - See Comments      PN: LW CM1: >>> NO CONTRAST ADVERSE REACTION <<<     Reaction :  KCL---gas sx     Potassium Chloride In Nacl GI Disturbance     gas      Pravachol [Statins] Muscle Pain (Myalgia)     Prilosec Otc [Omeprazole Magnesium] Diarrhea     diarrhea         Social History   I have reviewed this patient's social history and updated it with pertinent information if needed. Ginger Abreu  reports that she has never smoked. She has never used smokeless tobacco. She reports that she does not drink alcohol and does not use drugs.    Family History   I have reviewed this patient's family history and updated it with pertinent information if needed.   Family History   Problem Relation Age of Onset     Breast Cancer Mother      Heart Disease Father 25         in a fire      Heart Disease Paternal Grandfather      Breast Cancer Sister      Hyperlipidemia Sister        Review of Systems   A comprehensive review of systems is negative, except for as noted in the HPI.    Physical Exam   Temp: 98.1  F (36.7  C) Temp src: Temporal BP: (!) 143/76 Pulse: 57   Resp: 16 SpO2: 100 %      Vital Signs with Ranges  Temp:  [98.1  F (36.7  C)] 98.1  F (36.7  C)  Pulse:  [53-62] 57  Resp:  [14-17] 16  BP: (115-195)/(57-95) 143/76  SpO2:  [96 %-100 %] 100 %  143 lbs 0 oz    General - Alert and oriented to time place and person in no acute distress  Eyes - No scleral icterus  HEENT - Neck supple, moist mucous membranes  Cardiovascular - RRR, no murmurs appreciated.   Extremities - There is No  "edema  Respiratory - CTAB  Skin - No pallor or cyanosis  Gastrointestinal - Non tender and non distended without rebound or guarding  Psych - Appropriate affect   Neurological - No gross motor neurological focal deficits    Data   Most Recent 3 CBC's:Recent Labs   Lab Test 07/06/23  0852 04/30/21  0131 07/12/19  0801   WBC 5.5 6.0 7.7   HGB 14.1 11.7 10.3*   MCV 96 98 96   * 142* 335     Most Recent 3 BMP's:Recent Labs   Lab Test 07/06/23  0852 09/29/22  0907 07/02/21  1207    139 140   POTASSIUM 3.9 4.0 4.3   CHLORIDE 104 107 110*   CO2 26 25 26   BUN 21.8 41* 28   CR 0.86 1.22* 1.24*   ANIONGAP 12 7 4   ROGE 9.4 9.3 9.5   * 131* 166*     Most Recent 3 BNP's:No lab results found.  Most Recent Cholesterol Panel:Recent Labs   Lab Test 09/29/22  0907   CHOL 117   LDL 39   HDL 45*   TRIG 164*     Most Recent TSH and T4:Recent Labs   Lab Test 11/08/17  1026 06/28/16  0900   TSH 2.36 2.39   T4  --  1.12     Most Recent Hemoglobin A1c:Recent Labs   Lab Test 07/06/23  0852   A1C 6.0*       Clinically Significant Risk Factors Present on Admission                # Drug Induced Platelet Defect: home medication list includes an antiplatelet medication   # Hypertension: Noted on problem list      # Overweight: Estimated body mass index is 26.16 kg/m  as calculated from the following:    Height as of this encounter: 1.575 m (5' 2\").    Weight as of this encounter: 64.9 kg (143 lb).         "

## 2023-07-06 NOTE — ED NOTES
[facility]  ED Nurse Handoff Report    ED Chief complaint: Chest Pain      ED Diagnosis:   Final diagnoses:   None       Code Status: Full Code    Allergies:   Allergies   Allergen Reactions     Diatrizoate Shortness Of Breath     Other reaction(s): Shortness Of Breath  rapid heart beat  rapid heart beat       Morphine      Other reaction(s): GI Upset  Patient says she will not take because causes GI Upset     Morphine And Related Difficulty breathing     Chest gets tight & difficulty breathing      Oxycodone-Acetaminophen      Other reaction(s): GI Upset  Patient says she will not take because causes GI Upset     Atenolol Other (See Comments)     Sob w/ exertion - asthma symptoms     Diltiazem Other (See Comments)     edema - fluid retention in legs    Other reaction(s): Edema  Other reaction(s): Edema       Labetalol Other (See Comments) and Itching     Sob w/ exertion - asthma symptoms  itching     Lipitor [Atorvastatin Calcium] Other (See Comments)     Muscle weakness     Nifedipine Other (See Comments)     Sob w/ exertion - asthma symptoms, HAs    Other reaction(s): Other (See Comments)  Sob w/ exertion - asthma symptoms, HAs     Sulfa Antibiotics Hives     hives     Zocor [Simvastatin] Other (See Comments)     Muscle weakness     Aleve [Naproxen] Other (See Comments)     Raises BP     Amlodipine      Fatigue  Fatigue       Amlodipine Besylate Fatigue and Diarrhea     Blood-Group Specific Substance Other (See Comments)     Patient has anti-Wright. Blood product orders may be delayed.  Other reaction(s): Other - Describe In Comment Field  Patient has anti-Odalys. Blood product orders may be delayed.       Bystolic [Nebivolol Hcl] Other (See Comments)     headaches     Calcium Carbonate Diarrhea     Cefdinir Diarrhea     Crestor [Rosuvastatin Calcium] Muscle Pain (Myalgia)     Diovan [Valsartan] GI Disturbance     gas       Ezetimibe Muscle Pain (Myalgia)     Other reaction(s): Muscle Weakness  Other  reaction(s): Myalgia       Fluticasone-Salmeterol Other (See Comments)     hoarseness with 500/50, not the 250/50     Fluticasone-Salmeterol Other (See Comments)     Hoarseness with 50/50 not the 25/50  hoarseness with 500/50, not the 250/50  Hoarseness with 50/50 not the 25/50       Hydralazine Other (See Comments) and Nausea     Increases pulse     Hydrochlorothiazide Other (See Comments)     increasing glucose     Imdur [Isosorbide Mononitrate] Other (See Comments)     headaches     Lisinopril Cough     Hoarse voice     Lovastatin Muscle Pain (Myalgia)     Metformin Diarrhea            Metoprolol Other (See Comments)     Sob w/ exertion - asthma symptoms       Niacin Other (See Comments)     hyperglycemia     No Clinical Screening - See Comments      PN: LW CM1: >>> NO CONTRAST ADVERSE REACTION <<<     Reaction :  KCL---gas sx     Potassium Chloride In Nacl GI Disturbance     gas      Pravachol [Statins] Muscle Pain (Myalgia)     Prilosec Otc [Omeprazole Magnesium] Diarrhea     diarrhea       Zetia [Ezetimibe] Muscle Pain (Myalgia)     weakness       Patient Story: Ginger comes to the ER today for chest pain that started today. Her initial troponin is 36. She was given nitroglycerin and asa. Her cxr was negative.       Treatments and/or interventions provided: nitroglycerin/asa  Patient's response to treatments and/or interventions: good    To be done/followed up on inpatient unit:  cardiac consult    Does this patient have any cognitive concerns?: no    Activity level - Baseline/Home:  Stand with Assist uses walker  Activity Level - Current:   Stand with Assist    Patient's Preferred language: English   Needed?: No    Isolation: None  Infection: Not Applicable  Patient tested for COVID 19 prior to admission: NO  Bariatric?: No    Vital Signs:   Vitals:    07/06/23 0845 07/06/23 0940   BP: (!) 195/64 128/57   Pulse: 62 53   Resp: 16 15   Temp: 98.1  F (36.7  C)    TempSrc: Temporal    SpO2: 96%   "  Weight: 64.9 kg (143 lb)    Height: 1.575 m (5' 2\")        Cardiac Rhythm:     Was the PSS-3 completed:   Yes  What interventions are required if any?               Family Comments: none  OBS brochure/video discussed/provided to patient/family: N/A              Name of person given brochure if not patient: na              Relationship to patient: na    For the majority of the shift this patient's behavior was Green.   Behavioral interventions performed were na.    ED NURSE PHONE NUMBER: [phone]         "

## 2023-07-06 NOTE — ED PROVIDER NOTES
"  History     Chief Complaint:  Chest Pain       The history is provided by the patient.      Ginger Abreu is a 82 year old female with a remote history of myocarditis who presents with constant chest pains since 0745 this morning. She describes the pain as \"if someone is sitting on her\", and nothing alleviates or exacerbates it. Additionally, the pain is not exertional. She has a history of acid reflux, but her current pain feels different. She did not sustain any lasting damage from her prior bout of myocarditis in the 1970s. She is not anticoagulated on blood thinners, but takes a daily low dose Asprin. She denies shortness of breath, diaphoresis, pain with breathing, worsening leg swelling, cough, fever, abdominal pain, nausea, or vomiting.     Independent Historian:   None - Patient Only    Review of External Notes:   None     Medications:    Albuterol   Aspirin   Esgic   Candesartan   Cephalexin    Diphenhydramine    Empagliflozin   Esomeprazole   Glimepiride   Hydrochlorothiazide   Levothyroxine   Methocarbamol   Nitroglycerin   Oxybutynin   Rosuvastatin   Sumatriptan    Clonazepam     Past Medical History:     Anxiety   BCC   DVT (deep venous thrombosis) (H)   Esophageal reflux   Generalized osteoarthrosis, unspecified site   Herpes zoster   Hyperlipidaemia   Immunoglobulin A deficiency (H)   Immunoglobulin G subclass deficiency (H)   Lumbago   Migraine with aura, without mention of intractable migraine without mention of status migrainosus  Mild persistent asthma   MVP (mitral valve prolapse)   Myocarditis (H)   Neuropathy   Osteoarthritis   Osteopenia    Other specified disorders of bladder   Palpitations   PE (pulmonary embolism)   Peripheral neuropathy   Spinal stenosis   Squamous cell carcinoma   Thyroid nodule   Type II or unspecified type diabetes mellitus without mention of complication, not stated as uncontrolled   Unspecified essential hypertension   Anti-cardiolipin antibody positive  GERD " "    Past Surgical History:    B/L sphenoidotomy w/ R front sinusotomy and B/L ethmoidectomy   B/L maxillary antrostomy w/ polypoid tissue excision   Lumbar fusion   DREW w/ BSO  Cystocele repair   Lumbar foraminotomies    R total hip arthroplasty     Physical Exam     Patient Vitals for the past 24 hrs:   BP Temp Temp src Pulse Resp SpO2 Height Weight   07/06/23 1045 139/74 -- -- -- -- -- -- --   07/06/23 1015 115/57 -- -- 56 16 -- -- --   07/06/23 0940 128/57 -- -- 53 15 -- -- --   07/06/23 0930 (!) 136/95 -- -- 61 17 -- -- --   07/06/23 0845 (!) 195/64 98.1  F (36.7  C) Temporal 62 16 96 % 1.575 m (5' 2\") 64.9 kg (143 lb)        Physical Exam  General: Well appearing, nontoxic. Resting comfortably  Head:  Scalp, face, and head appear normal  Eyes:  Pupils are equal, round    Conjunctivae non-injected and sclerae white  ENT:    The external nose is normal    Pinnae are normal  Neck:  Normal range of motion    There is no rigidity noted    Trachea is in the midline  CV:  Regular rate and rhythm     Normal S1/S2, no S3/S4    No murmur or rub. Radial pulses 2+ bilaterally.  Resp:  Lungs are clear and equal bilaterally  There is no tachypnea    No increased work of breathing    No rales, wheezing, or rhonchi  GI:  Abdomen is soft, no rigidity or guarding    No distension, or mass    No tenderness or rebound tenderness   MS:  Normal muscular tone. Chest wall non tender to palpation    Symmetric motor strength    No lower extremity edema. No calf swelling or tenderness.  Skin:  No rash or acute skin lesions noted  Neuro: Awake and alert  Speech is normal and fluent  Moves all extremities spontaneously  Psych:  Normal affect. Appropriate interactions.      Emergency Department Course   ECG  ECG taken at 0841, ECG read at 0937  Normal sinus rhythm with sinus arrhythmia   Normal ECG  Rate 60 bpm. TX interval 174 ms. QRS duration 90 ms. QT/QTc 414/414 ms. P-R-T axes 39 1 52.     Imaging:  Chest XR,  PA & LAT  Preliminary " Result  IMPRESSION: No acute cardiopulmonary disease.     Report per radiology    Laboratory:  Labs Ordered and Resulted from Time of ED Arrival to Time of ED Departure   BASIC METABOLIC PANEL - Abnormal       Result Value    Sodium 142      Potassium 3.9      Chloride 104      Carbon Dioxide (CO2) 26      Anion Gap 12      Urea Nitrogen 21.8      Creatinine 0.86      Calcium 9.4      Glucose 145 (*)     GFR Estimate 67     TROPONIN T, HIGH SENSITIVITY - Abnormal    Troponin T, High Sensitivity 36 (*)    CBC WITH PLATELETS AND DIFFERENTIAL - Abnormal    WBC Count 5.5      RBC Count 4.57      Hemoglobin 14.1      Hematocrit 43.9      MCV 96      MCH 30.9      MCHC 32.1      RDW 13.2      Platelet Count 118 (*)     % Neutrophils 63      % Lymphocytes 25      % Monocytes 7      % Eosinophils 5      % Basophils 0      % Immature Granulocytes 0      NRBCs per 100 WBC 0      Absolute Neutrophils 3.5      Absolute Lymphocytes 1.4      Absolute Monocytes 0.4      Absolute Eosinophils 0.3      Absolute Basophils 0.0      Absolute Immature Granulocytes 0.0      Absolute NRBCs 0.0     TROPONIN T, HIGH SENSITIVITY      Emergency Department Course & Assessments:    Interventions:  Medications   nitroGLYcerin (NITROSTAT) sublingual tablet 0.4 mg (0.4 mg Sublingual $Given 7/6/23 1005)   aspirin (ASA) tablet 325 mg (325 mg Oral $Given 7/6/23 1005)      Assessments:  0926 I obtained history and examined the patient as noted above.  1039 I rechecked the patient and explained findings. I discussed plan for admission to the hospital.    Independent Interpretation (X-rays, CTs, rhythm strip):  ED Course as of 07/06/23 1113   Thu Jul 06, 2023   1007 I performed an independent interpretation of the patient's chest radiographs.  There is no pneumothorax, focal pulmonary consolidation or large pleural effusions.        Consultations/Discussion of Management or Tests:  1107 I spoke with Dr. Warner from [facility], who accepts the  patient for admission.    Social Determinants of Health affecting care:   None    Disposition:  The patient was admitted to the hospital under the care of Dr. Warner.     Impression & Plan      Medical Decision Making:  Ginger Abreu is a 82 year old female who presents with chest heaviness. Well appearing, nontoxic. Chest pain improved with 1 SL nitro. ASA given. Initial ECG with mild ST segment changes in the septal V1, V2 leads not meeting criteria for STEMI. No ST depression. CXR without acute findings. Initial hsTN elevated at 36. Findings are concerning for NSTEMI. Patient will be admitted for ongoing monitoring evaluation and treatment. Clinical findings no consistent with PE, infection, or other causes. Mediastinum appears normal on CXR. Findings and plan of care discussed with the patient.  Case discussed with the hospitalist and the patient was admitted in stable condition.  Repeat troponin is pending at the time of admission. Patient now pain free.    Diagnosis:    ICD-10-CM    1. Elevated troponin  R77.8       2. NSTEMI (non-ST elevated myocardial infarction) (H)  I21.4              Scribe Disclosure:  Phil HERNANDEZ, am serving as a scribe at 9:42 AM on 7/6/2023 to document services personally performed by Carlos Griggs MD based on my observations and the provider's statements to me.   7/6/2023   Carlos Griggs MD Daro, Ryan Clay, MD  07/06/23 1111

## 2023-07-06 NOTE — PROVIDER NOTIFICATION
MD Notification    Notified Person: MD    Notified Person Name: Elke Le     /ZEE otification Date/Time:  7/6 1811     Notification Interaction: text page    Purpose of Notification: SBP has been 170-180 post procedure. Can we get a PRN? She said she hasn't been taking her candesartan at home because she hasn't been able to get a refill.     Orders Received: valsartan ordered    Comments:

## 2023-07-06 NOTE — H&P
[facility]    History and Physical - Hospitalist Service       Date of Admission:  7/6/2023    Assessment & Plan   Ginger Abreu is a 82 year old female with below PMHx admitted on 7/6/2023 after presenting with acute chest pain, found to have NSTEMI     NSTEMI  Nonobstructive CAD (per CT cor angio 2012)  Hx of myocarditis (1970s)  Hypertension  Renal artery stenosis s/p stenting on the left, moderate on the right (medically managed)  Hyperlipidemia: Follows with Dr. Maldonado of Cardiology, last seen 9/29/23. + HTN, hyperlipidemia, PAD. No known family hx of CAD. No tobacco use.   [ASA 81 mg po every day, Candesartan 16 mg po every day, Crestor 10 mg po qd]  * BMP WNL, CBC unremarkable aside from platelets 118. Trop 36>323. CXR negative for acute pathology. EKG with NSR with sinus arrhythmia, no overt ST depression, elevation or T wave abnormalities.   *  mg X1 given in the ED.   * Ziopatch 8/2022 with 2 VT up to 7 beats and 22 SVT up to 18 beats  * Echo 8/2022 with preserved EF, grade 1 or early DD. No RWMA.   * Lexiscan 9/2014 negative for inducible ischemia   * CT coronary angiogram 12/2012 with total Agatston calcium score is 10.16, Left main: 0, left anterior descending: 10.16,  circumflex: 0, right coronary artery: 0. This places the patient in the 25th to the 50th percentile when compared to age and gender matched control group.   - Admit under inpatient status   - Cardiology consulted, appreciate input   - Telemetry   - Echo   - Start heparin gtt   - Continue ASA 81 mg po every day, statin, ARB (sub valsartan for candesartan per formulary)  - Continue hydrochlorothiazide (pt takes every 3 days)   - NPO until Cardiology assesses     Chronic thrombocytopenia: Platelets 118 on admission, chronic issue dating back to 4/2021.   - Monitor     T2DM:  [Empagliflozin 10 mg po every day, Monjaro]  - Hold Abhishekdiance, due for Monjaro 7/6   - Medium sliding scale insulin ordered  "  - BS per protocol  - Monitor for hypoglycemia     Hx of VTE: Occurred post-op a few years ago. No recurrence. Not on chronic anticoagulation.     UTI prophylaxis: Takes Keflex 250 mg po every day. No urinary sx on admission.    Overactive bladder: Continue oxybutynin ER 10 mg po at bedtime   GERD: Continue Nexium 40 mg po every day (sub Protonix per formulary), Pepcid 10 mg BID  Hypothyroidism: Continue Synthroid 12.5 mg po every day      Diet: NPO per Anesthesia Guidelines for Procedure/Surgery Except for: Meds  DVT Prophylaxis: Heparin gtt   Buckley Catheter: Not present  Lines: None     Cardiac Monitoring: None  Code Status: Full Code    Clinically Significant Risk Factors Present on Admission                # Drug Induced Platelet Defect: home medication list includes an antiplatelet medication   # Hypertension: Noted on problem list      # Overweight: Estimated body mass index is 26.16 kg/m  as calculated from the following:    Height as of this encounter: 1.575 m (5' 2\").    Weight as of this encounter: 64.9 kg (143 lb).            Disposition Plan      Expected Discharge Date: 07/08/2023                The patient's care was discussed with the Attending Physician, Dr. Warner, Bedside Nurse and Patient.    Elke Le PA-C  Hospitalist Service  [facility]  Securely message with [employer] (more info)  Text page via [employer] Paging/Directory     ______________________________________________________________________    Chief Complaint   Chest pain    History is obtained from the patient    History of Present Illness   Ginger Abreu is a 82 year old female with below PMHx admitted on 7/6/2023 after presenting with acute chest pain, found to have NSTEMI.    Reports acute onset central chest pressure without radiation upon awakening this AM. No associated SOB, dizziness, lightheadedness. No recent change in exercise tolerance. No prior episodes of recent pain. Reports " she has been out of candesartan (takes 16 mg BID--prescribed 16 mg po every day) for 2 weeks. SBP has been in the 130-140 range, but this AM with the pain was up to 180. Prescribed hydrochlorothiazide daily, but takes every third day. Due to persistent pain, went to the ER.     In the ER, pt was seen by Dr. Griggs. CMP and CBC were unremarkable aside from mild thrombocytopenia (chronic). Trop 36>323. EKG with NSR, no acute ischemic findings. Pt initially received  mg X1 and SL nitroglycerin which almost completely resolved her pain. Due to trop elevation, pt was started on heparin gtt.     Follows with Dr. Maldonado of Cardiology, last seen 9/29/23. Ziopatch 8/2022 with 2 VT up to 7 beats and 22 SVT up to 18 beats. Echo 8/2022 with preserved EF, grade 1 or early DD. No RWMA. Lexiscan 9/2014 negative for inducible ischemia. CT coronary angiogram 12/2012 with total Agatston calcium score is 10.16, Left main: 0, left anterior descending: 10.16,  circumflex: 0, right coronary artery: 0. This places the patient in the 25th to the 50th percentile when compared to age and gender matched control group.     Pt also has hx of migraines which she gets every other day. Prescribed 15 triptans per month, but runs out every month. Takes every other day, typically 2 daily.     Hx of GERD, but this pain is different. No recent musculoskeletal strain. 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 Hx of myocarditis in the 70s. Reports sinus congestion, but no additional URI sx or fevers.     Past Medical History    Past Medical History:   Diagnosis Date     Acute peptic ulcer, unspecified site, without mention of hemorrhage, perforation, or obstruction      Allergic rhinitis, cause unspecified      Anxiety      Basal cell carcinoma      DVT (deep venous thrombosis) (H) 03/2012    following spinal surgery. Saw Oncology. 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 Saw Oncology. treated 6 months     Peripheral neuropathy 6/19/2015     Pneumonia, organism unspecified(486)      Spinal stenosis      Squamous cell carcinoma      Thyroid nodule      Type II or unspecified type diabetes mellitus without mention of complication, not stated as uncontrolled      Unspecified essential hypertension        Past Surgical History   Past Surgical History:   Procedure Laterality Date     NONSPECIFIC PROCEDURE  2011    Bilateral sphenoidotomy with removal of polypoid tissue.     Right frontal sinusotomy.   Bilateral total ethmoidectomy.  Bilateral maxillary antrostomy with removal of polypoid tissue.       ORTHOPEDIC SURGERY      lumbar fusion     ZZC NONSPECIFIC PROCEDURE  approx 1970's    hyst/bso     ZZC NONSPECIFIC PROCEDURE  approx 1970's    gb     ZZC NONSPECIFIC PROCEDURE  approx 1980's    cystocele repair     ZZC NONSPECIFIC PROCEDURE  approx 1980's    endoscopic sinus      ZZC NONSPECIFIC PROCEDURE      epidurals x 7     ZZC NONSPECIFIC PROCEDURE      nl colonoscopy 1/2006     ZZC NONSPECIFIC PROCEDURE  2011    L2-L3 foramenotomies     ZZC NONSPECIFIC PROCEDURE  2012    lumbar fusion       Prior to Admission Medications   Prior to Admission Medications   Prescriptions Last Dose Informant Patient Reported? Taking?   ALPRAZolam (XANAX) 0.25 MG tablet  Self No No   Sig: TAKE ONE TABLET BY  MOUTH ONCE DAILY AT  NIGHT  AS  NEEDED  FOR  SEVERE  ANXIETY  ONLY   APPLE CIDER VINEGAR PO   Yes No   Sig: Take by mouth daily   B-12, Methylcobalamin, 1000 MCG SUBL   Yes No   BETA BLOCKER NOT PRESCRIBED, INTENTIONAL,   No No   Sig: Beta Blocker not prescribed intentionally due to Intolerance   Cranberry 125 MG TABS   Yes No   Ferrous Sulfate (IRON) 325 (65 Fe) MG tablet   Yes No   Sig: Take 1 tablet by mouth daily   Melatonin 10 MG TABS tablet  Self Yes No   Sig: Take 10 mg by mouth At Bedtime   Multiple Vitamins-Minerals (ZINC PO)   Yes No   Sig: Take by mouth daily   SUMAtriptan (IMITREX) 25 MG tablet   Yes No   Sig: Take 25 mg by mouth at onset of headache for migraine   UNABLE TO FIND   Yes No   Sig: Salt Iodine 1000mg - Take 1 (one) tablet Monday throught Saturday and 2 (two) tablets on Sunday.   albuterol (PROAIR HFA/PROVENTIL HFA/VENTOLIN HFA) 108 (90 BASE) MCG/ACT Inhaler  Self No No   Sig: Inhale 2 puffs into the lungs every 6 hours INDICATION: RESCUE INHALER FOR SHORTNESS OF BREATH, CHEST TIGHTNESS AND COUGH   alpha-lipoic acid 600 MG CAPS  Self Yes No   Sig: Take 600 mg by mouth 3 times daily    ascorbic acid (VITAMIN C) 1000 MG TABS  Self No No   Sig: Take 1 tablet (1,000 mg) by mouth daily VITAMIN C REPLACEMENT   aspirin (ASA) 81 MG EC tablet   No No   Sig: Take 1 tablet (81 mg) by mouth daily   butalbital-acetaminophen-caffeine (FIORICET/ESGIC) -40 MG per tablet   No No   Sig: TAKE ONE TABLET BY MOUTH EVERY 8 HOURS AS NEEDED FOR  SEVERE  MIGRAINE  HEADACHE  ONLY   candesartan (ATACAND) 16 MG tablet   No No   Sig: Take 1 tablet (16 mg) by mouth daily   cephALEXin (KEFLEX) 250 MG capsule   Yes No   Sig: Take 250 mg by mouth daily   diphenhydrAMINE (BENADRYL) 25 MG tablet   Yes No   Sig: Take 25 mg by mouth every 6 hours as needed for itching or allergies   empagliflozin (JARDIANCE) 10 MG TABS tablet   Yes No   Sig: Take 10 mg by mouth daily   esomeprazole (NEXIUM) 40 MG capsule  Self No No    Sig: Take 1 capsule (40 mg) by mouth every morning (before breakfast) TAKE  30'-60' BEFORE 1ST MEAL   famotidine (PEPCID) 10 MG tablet   Yes No   Sig: Take 10 mg by mouth 2 times daily   glimepiride (AMARYL) 2 MG tablet  Self Yes No   Sig: Take 4 mg by mouth 2 times daily   hydrochlorothiazide (HYDRODIURIL) 25 MG tablet   Yes No   Sig: Take 12.5 mg by mouth daily   lactobacillus rhamnosus, GG, (CULTURELL) capsule   Yes No   Sig: Take 1 capsule by mouth 2 times daily   levothyroxine (SYNTHROID/LEVOTHROID) 25 MCG tablet   Yes No   Sig: TAKE 1/2 (ONE-HALF) TABLET BY MOUTH FIRST THING IN THE MORNING TO STOMACH. NO FOOD FOR AN HOUR TO TREAT UNDERACTIVE THYROID.   methocarbamol (ROBAXIN) 750 MG tablet   Yes No   Sig: Take 750 mg by mouth 4 times daily as needed for muscle spasms   niacin 500 MG tablet   Yes No   Sig: Take by mouth daily (with breakfast)   nitroglycerin (NITROSTAT) 0.4 MG SL tablet  Self No No   Sig: Place 1 tablet (0.4 mg) under the tongue every 5 minutes as needed for chest pain   oxybutynin ER (DITROPAN-XL) 10 MG 24 hr tablet  Self No No   Sig: Take 1 tablet (10 mg) by mouth At Bedtime   rosuvastatin (CRESTOR) 10 MG tablet  Self Yes No   Sig: Take 10 mg by mouth daily   timolol maleate (TIMOPTIC) 0.5 % ophthalmic solution  Self Yes No   Sig: Place 1 drop into both eyes 2 times daily   vitamin D (ERGOCALCIFEROL) 22557 UNIT capsule   Yes No   Sig: Take 50,000 Units by mouth once a week wednesdays      Facility-Administered Medications: None        Review of Systems    The 10 point Review of Systems is negative other than noted in the HPI.    Social History   I have reviewed this patient's social history and updated it with pertinent information if needed.  Social History     Tobacco Use     Smoking status: Never     Smokeless tobacco: Never   Substance Use Topics     Alcohol use: No     Alcohol/week: 0.0 standard drinks of alcohol     Comment: very rare     Drug use: No       Family History   I have  reviewed this patient's family history and updated it with pertinent information if needed.  Family History   Problem Relation Age of Onset     Breast Cancer Mother      Heart Disease Father 25         in a fire      Heart Disease Paternal Grandfather      Breast Cancer Sister      Hyperlipidemia Sister        Allergies   Allergies   Allergen Reactions     Diatrizoate Shortness Of Breath     Other reaction(s): Shortness Of Breath  rapid heart beat  rapid heart beat       Morphine      Other reaction(s): GI Upset  Patient says she will not take because causes GI Upset     Morphine And Related Difficulty breathing     Chest gets tight & difficulty breathing      Oxycodone-Acetaminophen      Other reaction(s): GI Upset  Patient says she will not take because causes GI Upset     Atenolol Other (See Comments)     Sob w/ exertion - asthma symptoms     Diltiazem Other (See Comments)     edema - fluid retention in legs    Other reaction(s): Edema  Other reaction(s): Edema       Labetalol Other (See Comments) and Itching     Sob w/ exertion - asthma symptoms  itching     Lipitor [Atorvastatin Calcium] Other (See Comments)     Muscle weakness     Nifedipine Other (See Comments)     Sob w/ exertion - asthma symptoms, HAs    Other reaction(s): Other (See Comments)  Sob w/ exertion - asthma symptoms, HAs     Sulfa Antibiotics Hives     hives     Zocor [Simvastatin] Other (See Comments)     Muscle weakness     Aleve [Naproxen] Other (See Comments)     Raises BP     Amlodipine      Fatigue  Fatigue       Amlodipine Besylate Fatigue and Diarrhea     Blood-Group Specific Substance Other (See Comments)     Patient has anti-Cassoday. Blood product orders may be delayed.  Other reaction(s): Other - Describe In Comment Field  Patient has anti-Odalys. Blood product orders may be delayed.       Bystolic [Nebivolol Hcl] Other (See Comments)     headaches     Calcium Carbonate Diarrhea     Cefdinir Diarrhea     Crestor [Rosuvastatin Calcium]  Muscle Pain (Myalgia)     Diovan [Valsartan] GI Disturbance     gas       Ezetimibe Muscle Pain (Myalgia)     Other reaction(s): Muscle Weakness  Other reaction(s): Myalgia       Fluticasone-Salmeterol Other (See Comments)     hoarseness with 500/50, not the 250/50     Fluticasone-Salmeterol Other (See Comments)     Hoarseness with 50/50 not the 25/50  hoarseness with 500/50, not the 250/50  Hoarseness with 50/50 not the 25/50       Hydralazine Other (See Comments) and Nausea     Increases pulse     Hydrochlorothiazide Other (See Comments)     increasing glucose     Imdur [Isosorbide Mononitrate] Other (See Comments)     headaches     Lisinopril Cough     Hoarse voice     Lovastatin Muscle Pain (Myalgia)     Metformin Diarrhea            Metoprolol Other (See Comments)     Sob w/ exertion - asthma symptoms       Niacin Other (See Comments)     hyperglycemia     No Clinical Screening - See Comments      PN: LW CM1: >>> NO CONTRAST ADVERSE REACTION <<<     Reaction :  KCL---gas sx     Potassium Chloride In Nacl GI Disturbance     gas      Pravachol [Statins] Muscle Pain (Myalgia)     Prilosec Otc [Omeprazole Magnesium] Diarrhea     diarrhea       Zetia [Ezetimibe] Muscle Pain (Myalgia)     weakness        Physical Exam   Vital Signs: Temp: 98.1  F (36.7  C) Temp src: Temporal BP: 139/64 Pulse: 57   Resp: 15 SpO2: 100 %      Weight: 143 lbs 0 oz    CONSTITUTIONAL: Pt laying in bed, dressed in hospital garb. Appears comfortable. Cooperative with interview.   HEENT: Normocephalic, atraumatic.    CARDIOVASCULAR: RRR, no murmurs, rubs, or extra heart sounds appreciated. Pulses +2/4 and regular in upper and lower extremities, bilaterally.   RESPIRATORY: No increased work of breathing. CTA, bilat; no wheezes, rales, or rhonchi appreciated.  GASTROINTESTINAL:  Abdomen soft, non-distended. BS auscultated in all four quadrants. Negative for tenderness to palpation.  No masses or organomegaly noted.  MUSCULOSKELETAL: No gross  deformities noted. Normal muscle tone.   HEMATOLOGIC/LYMPHATIC/IMMUNOLOGIC: Negative for lower extremity edema, bilaterally.  NEUROLOGIC: Alert and oriented to person, place, and time. No focal neuro deficits.   SKIN: Warm, dry, intact.     Medical Decision Making   75 MINUTES SPENT BY ME on the date of service doing chart review, history, exam, documentation & further activities per the note.      Data     I have personally reviewed the following data over the past 24 hrs:    5.5  \   14.1   / 118 (L)     142 104 21.8 /  145 (H)   3.9 26 0.86 \       ALT: 21 AST: 19 AP: 79 TBILI: 0.4   ALB: 3.8 TOT PROTEIN: 6.2 (L) LIPASE: 21       Trop: 323 (HH) BNP: N/A       TSH: N/A T4: N/A A1C: 6.0 (H)       Imaging results reviewed over the past 24 hrs:   Recent Results (from the past 24 hour(s))   Chest XR,  PA & LAT    Narrative    CHEST TWO VIEWS    7/6/2023 10:02 AM     HISTORY: Chest pain    COMPARISON: 10/27/2022.      Impression    IMPRESSION: No acute cardiopulmonary disease.

## 2023-07-06 NOTE — PHARMACY-ADMISSION MEDICATION HISTORY
Pharmacist Admission Medication History    Admission medication history is complete. The information provided in this note is only as accurate as the sources available at the time of the update.    Medication reconciliation/reorder completed by provider prior to medication history? Yes    Information Source(s): Patient and CareEverywhere/SureScripts via in-person    Pertinent Information: None    Changes made to PTA medication list:    Added: latanoprost, Mounjaro, clonazepam    Deleted: alprazolam, Fioricet. Keflex    Changed:   o Famotidine 10mg BID to 40mg daily  o Hydrochlorothiazide 12.5mg daily to every other day  o Oxybutynin ER 10mg to 15mg daily  o Crestor 10mg to 20mg daily  o Vitamin D weekly on Wednesdays to every 10th, 20th, and 30th of each month    Allergies reviewed with patient and updates made in EHR: yes    Medication History Completed By: Cassi Fuentes RPH 7/6/2023 2:18 PM    Prior to Admission medications    Medication Sig Last Dose Taking? Auth Provider Long Term End Date   albuterol (PROAIR HFA/PROVENTIL HFA/VENTOLIN HFA) 108 (90 BASE) MCG/ACT Inhaler Inhale 2 puffs into the lungs every 6 hours INDICATION: RESCUE INHALER FOR SHORTNESS OF BREATH, CHEST TIGHTNESS AND COUGH  Patient taking differently: Inhale 2 puffs into the lungs every 6 hours as needed INDICATION: RESCUE INHALER FOR SHORTNESS OF BREATH, CHEST TIGHTNESS AND COUGH  at PRN Yes Jerry Iniguez MD Yes    alpha-lipoic acid 600 MG CAPS Take 600 mg by mouth 3 times daily  7/5/2023 Yes Reported, Patient     APPLE CIDER VINEGAR PO Take 1 capsule by mouth daily 7/5/2023 Yes Reported, Patient     ascorbic acid (VITAMIN C) 1000 MG TABS Take 1 tablet (1,000 mg) by mouth daily VITAMIN C REPLACEMENT 7/5/2023 Yes Steven Fonseca MD     aspirin (ASA) 81 MG EC tablet Take 1 tablet (81 mg) by mouth daily 7/5/2023 Yes Vaibhav Maldonado MD     B-12 (methylcobalamin) 1000 MCG SUBL sublingual tablet Place 1,000 mcg under the tongue  daily 7/5/2023 Yes Reported, Patient     candesartan (ATACAND) 16 MG tablet Take 1 tablet (16 mg) by mouth daily 7/5/2023 Yes Vaibhav Maldonado MD Yes    clonazePAM (KLONOPIN) 0.5 MG tablet Take 0.5-1 mg by mouth 2 times daily as needed for anxiety  Yes Unknown, Entered By History Yes    Cranberry 125 MG TABS Take 1 tablet by mouth daily 7/5/2023 Yes Reported, Patient     diphenhydrAMINE (BENADRYL) 25 MG tablet Take 25 mg by mouth every 6 hours as needed for itching or allergies  Yes Reported, Patient     empagliflozin (JARDIANCE) 10 MG TABS tablet Take 10 mg by mouth daily 7/5/2023 Yes Reported, Patient     esomeprazole (NEXIUM) 40 MG capsule Take 1 capsule (40 mg) by mouth every morning (before breakfast) TAKE  30'-60' BEFORE 1ST MEAL 7/5/2023 Yes Steven Fonseca MD     famotidine (PEPCID) 40 MG tablet Take 40 mg by mouth daily 7/5/2023 Yes Reported, Patient     Ferrous Sulfate (IRON) 325 (65 Fe) MG tablet Take 1 tablet by mouth daily 7/5/2023 Yes Reported, Patient     glimepiride (AMARYL) 4 MG tablet Take 4 mg by mouth 2 times daily 7/5/2023 Yes Unknown, Entered By History No    hydrochlorothiazide (HYDRODIURIL) 25 MG tablet Take 12.5 mg by mouth every other day 7/5/2023 Yes Reported, Patient No    lactobacillus rhamnosus, GG, (CULTURELL) capsule Take 1 capsule by mouth 2 times daily 7/5/2023 Yes Reported, Patient     latanoprost (XALATAN) 0.005 % ophthalmic solution Place 1 drop into the left eye At Bedtime 7/5/2023 Yes Unknown, Entered By History Yes    levothyroxine (SYNTHROID/LEVOTHROID) 25 MCG tablet TAKE 1/2 (ONE-HALF) TABLET BY MOUTH FIRST THING IN THE MORNING TO STOMACH. NO FOOD FOR AN HOUR TO TREAT UNDERACTIVE THYROID. 7/5/2023 Yes Reported, Patient Yes    Melatonin 10 MG TABS tablet Take 10 mg by mouth At Bedtime 7/5/2023 Yes Unknown, Entered By History     methocarbamol (ROBAXIN) 750 MG tablet Take 750 mg by mouth 4 times daily as needed for muscle spasms  Yes Reported, Patient     niacin  500 MG tablet Take 500 mg by mouth daily (with breakfast) 7/5/2023 Yes Reported, Patient     nitroglycerin (NITROSTAT) 0.4 MG SL tablet Place 1 tablet (0.4 mg) under the tongue every 5 minutes as needed for chest pain  Yes Steven Fonseca MD Yes    oxybutynin ER (DITROPAN XL) 15 MG 24 hr tablet Take 15 mg by mouth daily (with lunch) 7/5/2023 Yes Unknown, Entered By History     rosuvastatin (CRESTOR) 20 MG tablet Take 20 mg by mouth daily 7/5/2023 Yes Unknown, Entered By History     SUMAtriptan (IMITREX) 25 MG tablet Take 25 mg by mouth at onset of headache for migraine  Yes Reported, Patient     timolol maleate (TIMOPTIC) 0.5 % ophthalmic solution Place 1 drop into both eyes 2 times daily 7/5/2023 Yes Unknown, Entered By History     tirzepatide (MOUNJARO) 2.5 MG/0.5ML pen Inject 2.5 mg Subcutaneous every 7 days Takes on Thursdays 6/29/2023 Yes Unknown, Entered By History     UNABLE TO FIND Salt Iodine 1000mg - Take 1 (one) tablet Monday throught Saturday and 2 (two) tablets on Sunday. 7/5/2023 Yes Reported, Patient     vitamin D (ERGOCALCIFEROL) 25838 UNIT capsule Take 50,000 Units by mouth once a week Takes on the 10th, 20th, and 30th of each month 6/30/2023 Yes Reported, Patient     ZINC SULFATE PO Take 1 tablet by mouth daily 7/5/2023 Yes Unknown, Entered By History     BETA BLOCKER NOT PRESCRIBED, INTENTIONAL, Beta Blocker not prescribed intentionally due to Intolerance   Steven Fonseca MD     fluticasone (FLOVENT HFA) 110 MCG/ACT inhaler Inhale 2 puffs into the lungs 2 times daily rinse mouth after each use   Connor Manrique MD Yes 2/3/14

## 2023-07-06 NOTE — ED TRIAGE NOTES
Patient presents with complaints of chest pressure that started this morning after she woke up. Patient denied shortness of breath, dizziness and nausea.      Triage Assessment     Row Name 07/06/23 0844       Triage Assessment (Adult)    Airway WDL WDL       Respiratory WDL    Respiratory WDL WDL       Skin Circulation/Temperature WDL    Skin Circulation/Temperature WDL WDL       Cardiac WDL    Cardiac WDL X;chest pain       Chest Pain Assessment    Chest Pain Location anterior chest, left;anterior chest, right    Chest Pain Radiation neck       Peripheral/Neurovascular WDL    Peripheral Neurovascular WDL WDL       Cognitive/Neuro/Behavioral WDL    Cognitive/Neuro/Behavioral WDL WDL

## 2023-07-07 ENCOUNTER — APPOINTMENT (OUTPATIENT)
Dept: PHYSICAL THERAPY | Facility: CLINIC | Age: 83
DRG: 281 | End: 2023-07-07
Attending: PHYSICIAN ASSISTANT
Payer: COMMERCIAL

## 2023-07-07 ENCOUNTER — APPOINTMENT (OUTPATIENT)
Dept: CARDIOLOGY | Facility: CLINIC | Age: 83
DRG: 281 | End: 2023-07-07
Attending: PHYSICIAN ASSISTANT
Payer: COMMERCIAL

## 2023-07-07 VITALS
OXYGEN SATURATION: 96 % | HEART RATE: 78 BPM | RESPIRATION RATE: 18 BRPM | SYSTOLIC BLOOD PRESSURE: 162 MMHG | DIASTOLIC BLOOD PRESSURE: 82 MMHG | WEIGHT: 142.2 LBS | BODY MASS INDEX: 26.17 KG/M2 | HEIGHT: 62 IN | TEMPERATURE: 98.2 F

## 2023-07-07 LAB
ANION GAP SERPL CALCULATED.3IONS-SCNC: 12 MMOL/L (ref 7–15)
BASOPHILS # BLD AUTO: 0 10E3/UL (ref 0–0.2)
BASOPHILS NFR BLD AUTO: 0 %
BUN SERPL-MCNC: 21.6 MG/DL (ref 8–23)
CALCIUM SERPL-MCNC: 9.4 MG/DL (ref 8.8–10.2)
CHLORIDE SERPL-SCNC: 105 MMOL/L (ref 98–107)
CREAT SERPL-MCNC: 0.94 MG/DL (ref 0.51–0.95)
DEPRECATED HCO3 PLAS-SCNC: 25 MMOL/L (ref 22–29)
EOSINOPHIL # BLD AUTO: 0.3 10E3/UL (ref 0–0.7)
EOSINOPHIL NFR BLD AUTO: 5 %
ERYTHROCYTE [DISTWIDTH] IN BLOOD BY AUTOMATED COUNT: 13.2 % (ref 10–15)
GFR SERPL CREATININE-BSD FRML MDRD: 60 ML/MIN/1.73M2
GLUCOSE BLDC GLUCOMTR-MCNC: 116 MG/DL (ref 70–99)
GLUCOSE BLDC GLUCOMTR-MCNC: 123 MG/DL (ref 70–99)
GLUCOSE BLDC GLUCOMTR-MCNC: 198 MG/DL (ref 70–99)
GLUCOSE SERPL-MCNC: 122 MG/DL (ref 70–99)
HCT VFR BLD AUTO: 40.1 % (ref 35–47)
HGB BLD-MCNC: 13.1 G/DL (ref 11.7–15.7)
IMM GRANULOCYTES # BLD: 0 10E3/UL
IMM GRANULOCYTES NFR BLD: 0 %
LVEF ECHO: NORMAL
LYMPHOCYTES # BLD AUTO: 1.6 10E3/UL (ref 0.8–5.3)
LYMPHOCYTES NFR BLD AUTO: 25 %
MCH RBC QN AUTO: 30.9 PG (ref 26.5–33)
MCHC RBC AUTO-ENTMCNC: 32.7 G/DL (ref 31.5–36.5)
MCV RBC AUTO: 95 FL (ref 78–100)
MONOCYTES # BLD AUTO: 0.5 10E3/UL (ref 0–1.3)
MONOCYTES NFR BLD AUTO: 9 %
NEUTROPHILS # BLD AUTO: 3.7 10E3/UL (ref 1.6–8.3)
NEUTROPHILS NFR BLD AUTO: 61 %
NRBC # BLD AUTO: 0 10E3/UL
NRBC BLD AUTO-RTO: 0 /100
PLATELET # BLD AUTO: 112 10E3/UL (ref 150–450)
POTASSIUM SERPL-SCNC: 3.6 MMOL/L (ref 3.4–5.3)
RBC # BLD AUTO: 4.24 10E6/UL (ref 3.8–5.2)
SODIUM SERPL-SCNC: 142 MMOL/L (ref 136–145)
WBC # BLD AUTO: 6.1 10E3/UL (ref 4–11)

## 2023-07-07 PROCEDURE — 97530 THERAPEUTIC ACTIVITIES: CPT | Mod: GP

## 2023-07-07 PROCEDURE — 80048 BASIC METABOLIC PNL TOTAL CA: CPT | Performed by: PHYSICIAN ASSISTANT

## 2023-07-07 PROCEDURE — 99239 HOSP IP/OBS DSCHRG MGMT >30: CPT | Performed by: INTERNAL MEDICINE

## 2023-07-07 PROCEDURE — 36415 COLL VENOUS BLD VENIPUNCTURE: CPT | Performed by: PHYSICIAN ASSISTANT

## 2023-07-07 PROCEDURE — 97161 PT EVAL LOW COMPLEX 20 MIN: CPT | Mod: GP

## 2023-07-07 PROCEDURE — 999N000208 ECHOCARDIOGRAM COMPLETE

## 2023-07-07 PROCEDURE — 250N000013 HC RX MED GY IP 250 OP 250 PS 637: Performed by: PHYSICIAN ASSISTANT

## 2023-07-07 PROCEDURE — 85025 COMPLETE CBC W/AUTO DIFF WBC: CPT | Performed by: PHYSICIAN ASSISTANT

## 2023-07-07 PROCEDURE — 93306 TTE W/DOPPLER COMPLETE: CPT | Mod: 26 | Performed by: INTERNAL MEDICINE

## 2023-07-07 PROCEDURE — 255N000002 HC RX 255 OP 636: Performed by: INTERNAL MEDICINE

## 2023-07-07 PROCEDURE — 97110 THERAPEUTIC EXERCISES: CPT | Mod: GP

## 2023-07-07 PROCEDURE — 99233 SBSQ HOSP IP/OBS HIGH 50: CPT | Mod: FS | Performed by: PHYSICIAN ASSISTANT

## 2023-07-07 RX ORDER — SACUBITRIL AND VALSARTAN 24; 26 MG/1; MG/1
1 TABLET, FILM COATED ORAL 2 TIMES DAILY
Qty: 60 TABLET | Refills: 0 | Status: SHIPPED | OUTPATIENT
Start: 2023-07-07 | End: 2023-08-17

## 2023-07-07 RX ADMIN — VALSARTAN 160 MG: 160 TABLET, FILM COATED ORAL at 09:49

## 2023-07-07 RX ADMIN — PANTOPRAZOLE SODIUM 40 MG: 40 TABLET, DELAYED RELEASE ORAL at 09:50

## 2023-07-07 RX ADMIN — HYDROCHLOROTHIAZIDE 12.5 MG: 12.5 TABLET ORAL at 09:49

## 2023-07-07 RX ADMIN — FAMOTIDINE 20 MG: 20 TABLET ORAL at 09:50

## 2023-07-07 RX ADMIN — ROSUVASTATIN CALCIUM 20 MG: 20 TABLET, FILM COATED ORAL at 09:50

## 2023-07-07 RX ADMIN — HUMAN ALBUMIN MICROSPHERES AND PERFLUTREN 9 ML: 10; .22 INJECTION, SOLUTION INTRAVENOUS at 09:09

## 2023-07-07 RX ADMIN — ASPIRIN 81 MG 81 MG: 81 TABLET ORAL at 09:50

## 2023-07-07 RX ADMIN — OXYBUTYNIN CHLORIDE 15 MG: 5 TABLET, EXTENDED RELEASE ORAL at 13:25

## 2023-07-07 RX ADMIN — LEVOTHYROXINE SODIUM 12.5 MCG: 25 TABLET ORAL at 09:49

## 2023-07-07 ASSESSMENT — ACTIVITIES OF DAILY LIVING (ADL)
ADLS_ACUITY_SCORE: 28
ADLS_ACUITY_SCORE: 45
ADLS_ACUITY_SCORE: 28
ADLS_ACUITY_SCORE: 28
ADLS_ACUITY_SCORE: 45

## 2023-07-07 NOTE — PROGRESS NOTES
A&O x4. VSS on room air. Tele SR. Denies CP/SOB/pain. Up with standby assist. IVF infusing at 75mL/hr. Plan for ECHO tomorrow.

## 2023-07-07 NOTE — DISCHARGE INSTRUCTIONS
Cardiac Angiogram Discharge Instructions - Femoral    After you go home:    Have an adult stay with you until tomorrow.  Drink extra fluids for 2 days.  You may resume your normal diet.  No smoking       For 24 hours - due to the sedation you received:  Relax and take it easy.  Do NOT make any important or legal decisions.  Do NOT drive or operate machines at home or at work.  Do NOT drink alcohol.    Care of Groin Puncture Site:    For the first 24 hrs - check the puncture site every 1-2 hours while awake.  For 2 days, when you cough, sneeze, laugh or move your bowels, hold your hand over the puncture site and press firmly.  Remove the bandaid after 24 hours. If there is minor oozing, apply another bandaid and remove it after 12 hours.  It is normal to have a small bruise or pea size lump at the site.  You may shower tomorrow. Do NOT take a bath, or use a hot tub or pool for at least 3 days. Do NOT scrub the site. Do not use lotion or powder near the puncture site.    Activity:            For 2 days:  No stooping or squatting  Do NOT do any heavy activity such as exercise, lifting, or straining.   No housework, yard work or any activity that make you sweat  Do NOT lift more than 10 pounds    Bleeding:    If you start bleeding from the site in your groin, lie down flat and press firmly on/above the site for 10 minutes.   Once bleeding stops, lay flat for 2 hours.   Call [facility] as soon as you can.       Call 911 right away if you have heavy bleeding or bleeding that does not stop.      Medicines:    If you are taking an antiplatelet medication such as Plavix, Brilinta or Effient, do not stop taking it until you talk to your cardiologist.    If you are on Metformin (Glucophage), do not restart it until you have blood tests (within 2 to 3 days after discharge).  After you have your blood drawn, you may restart the Metformin.   Take your medications, including blood thinners, unless your provider tells you not to.     If you take Coumadin (Warfarin), have your INR checked by your provider in  3-5 days. Call your clinic to schedule this.  If you have stopped any medicines, check with your provider about when to restart them.    Follow Up Appointments:    Follow up with [facility] Nurse Practitioner at [facility] of patient preference in 7-10 days.    Call the clinic if:    You have increased pain or a large or growing hard lump around the site.  The site is red, swollen, hot or tender.  Blood or fluid is draining from the site.  You have chills or a fever greater than 101 F (38 C).  Your leg feels numb, cool or changes color.  You have hives, a rash or unusual itching.  New pain in the back or belly that you cannot control with Tylenol.  Any questions or concerns.          [facility] [place]:    [phone] [facility] (7 days a week)

## 2023-07-07 NOTE — CONSULTS
Discharge Pharmacy Test Claim    Patient is currently in the coverage gap, also known as the donut hole, phase of her [facility] Medicare advantage plan. Entresto is covered with a monthly copay of $161.58. Once patient reaches a total of $7400 in out of pocket drug costs, patient's copay will reduce to 5% of drug costs in the catastrophic phase.    Test Claim Coverage Gap Phase Catastrophic Phase   entresto 161.58 31.80     [facility] pharmacy has a 30-day free trial voucher available for entresto.    If patient cannot afford the copay above, patient may be eligible for the [employer] cardiomyopathy faheem. Contact pharmacy liaison to initiate an application.  Faheem information: [email or web address]        Dasha Mcbride 7/7  [facility] Pharmacy Liaison  Ph: [phone] Pager: [phone]   Securely message with the Vocera Web Console (learn more here)

## 2023-07-07 NOTE — PROGRESS NOTES
[facility] Progress Note  Date of Service: 07/07/2023  Primary Cardiologist: Dr. Erica Dinhodalis Abreu is a 82 year old female with a hx of non-obstructive CAD (per 2012 CTA with mild LAD dz), GERD, chronic migraines, provoked DVT/PE (2012), thyroid disease, DMII, renal artery stenosis (s/p L artery stenting, moderate stenosis on R managing medically), HTN and multiple medication intolerances who was admitted on 7/6/2023 with acute-onset chest pressure, found to have NSTEMI.     Interim Hx: 7/6 angiogram showed angiographically normal cors. LV gram/TTE consistent with Takotsubo CMP. Normal LV filling pressures.  Subjective: CP resolved. Ambulating halls without issue. 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    Assessment:  1. Takotsubo CMP. EF 45-50%.   2. Sinus bradycardia.   3. Multiple medication intolerances.    Plan:   1. Pharmacy liaison for Entresto. Will change her from PTA ARB if reasonably priced.   2. No BB d/t resting SB and intolerance to metoprolol, nebivolol, atenolol, labetalol.   3. Continue other PTA cardiac meds.   4. Advised to avoid significant physical exertion until seen in follow-up, avoid high sodium foods, weigh daily and call with rapid fluctuations.   5. Follow up with [facility] arranged in 4 weeks.   6. Repeat TTE in ~2 months. If no EF improvement obtain cMRI.   7. Cardiology will sign off.     Plan was formulated under direction of Dr. Sequeira.   Dasha Harvey PA-C  [facility]  Pager: [phone]  Text Page  (7:30am - 4pm M-F)   __________________________________________________________________________  Physical Exam   Temp: 98.1  F (36.7  C) Temp src: Oral BP: (!) 140/74 Pulse: 65   Resp: 18 SpO2: 97 % O2 Device: None (Room air)    Vitals:    07/06/23 0845 07/06/23 1156 07/07/23 0430   Weight: 64.9 kg (143 lb) 64.9 kg (143 lb) 64.5 kg (142 lb 3.2 oz)       GENERAL:  The patient is in no apparent distress.    NECK: CVP appears normal, no masses or thyromegaly.  PULMONARY:  There is a normal respiratory effort. Clear lungs to auscultation bilaterally.   CARDIOVASCULAR:  RRR, normal S1 S2, no m/r/g.  GI:  Non tender abdomen with normoactive bowel sounds and no hepatosplenomegaly. There are no masses palpable.   EXTREMITIES:  No clubbing, cyanosis or edema.  VASCULAR: 2+ Pulses bilaterally in upper and lower extremities. RfA access site C/D/I, no hematoma.    Medications     Continuing ACE inhibitor/ARB/ARNI from home medication list OR ACE inhibitor/ARB order already placed during this visit       - MEDICATION INSTRUCTIONS -       - MEDICATION INSTRUCTIONS -       - MEDICATION INSTRUCTIONS -       BETA BLOCKER NOT PRESCRIBED         aspirin  81 mg Oral Daily     famotidine  20 mg Oral Daily     hydrochlorothiazide  12.5 mg Oral Every Other Day     insulin aspart  1-7 Units Subcutaneous TID AC     insulin aspart  1-5 Units Subcutaneous At Bedtime     levothyroxine  12.5 mcg Oral QAM AC     oxybutynin ER  15 mg Oral Daily with lunch     pantoprazole  40 mg Oral QAM AC     rosuvastatin  20 mg Oral Daily     sodium chloride (PF)  3 mL Intracatheter Q8H     valsartan  160 mg Oral Daily       Data   Most Recent 3 CBC's:Recent Labs   Lab Test 07/07/23  0546 07/06/23  0852 04/30/21  0131   WBC 6.1 5.5 6.0   HGB 13.1 14.1 11.7   MCV 95 96 98   * 118* 142*     Most Recent 3 BMP's:Recent Labs   Lab Test 07/07/23  0753 07/07/23  0546 07/07/23  0411 07/06/23  1741 07/06/23  0852 09/29/22  0907   NA  --  142  --   --  142 139   POTASSIUM  --  3.6  --   --  3.9 4.0   CHLORIDE  --  105  --   --  104 107   CO2  --  25  --   --  26 25   BUN  --  21.6  --   --  21.8 41*   CR  --  0.94  --   --  0.86 1.22*   ANIONGAP  --  12  --   --  12 7   ROGE  --  9.4  --   --  9.4 9.3   * 122* 116*   < > 145* 131*    < > = values in this interval not displayed.     Most Recent 3 BNP's:No lab results found.  Most Recent Cholesterol  Panel:Recent Labs   Lab Test 07/06/23  1734   CHOL 111   LDL 41   HDL 46*   TRIG 118

## 2023-07-07 NOTE — PLAN OF CARE
A&OX4, RA, complained of left neck pain after transferring from bed to chair, pain subsided without intervention. BP improved overnight, up with SBA, Tele- SB HR in the 50s.  and  116. Right groin C/D/I CMS intact

## 2023-07-07 NOTE — DISCHARGE SUMMARY
[facility]  Discharge Summary        Ginger Abreu MRN# [id number]   YOB: 1940 Age: 82 year old     Date of Admission:  7/6/2023  Date of Discharge:  7/7/2023  Admitting Physician:  Toni Warner MD  Discharge Physician: Toni Warner MD  Discharging Service: Hospitalist     Primary Provider:  Steven Fonseca  Primary Care Physician Phone Number: [phone]         Discharge Diagnoses/Problem Oriented Hospital Course (Providers):    Ginger Abreu was admitted on 7/6/2023 by Toni Warner MD and I would refer you to their history and physical.  The following problems were addressed during her hospitalization:  Ginger Abreu is a 82 year old female with below PMHx admitted on 7/6/2023 after presenting with acute chest pain, found to have elevated troponin enzymes     Principal Diagnosis: Takosubocardiomyopathy  NSTEMI  Nonobstructive CAD (per CT cor angio 2012)  Hx of myocarditis (1970s)  Hypertension  Renal artery stenosis s/p stenting on the left, moderate on the right (medically managed)  Hyperlipidemia: Follows with Dr. Maldonado of Cardiology, last seen 9/29/23. + HTN, hyperlipidemia, PAD. No known family hx of CAD. No tobacco use.   [ASA 81 mg po every day, Candesartan 16 mg po every day, Crestor 10 mg po qd]  * BMP WNL, CBC unremarkable aside from platelets 118. Trop 36>323. CXR negative for acute pathology. EKG with NSR with sinus arrhythmia, no overt ST depression, elevation or T wave abnormalities.   *  mg X1 given in the ED.   * Ziopatch 8/2022 with 2 VT up to 7 beats and 22 SVT up to 18 beats  * Echo 8/2022 with preserved EF, grade 1 or early DD. No RWMA.   * Lexiscan 9/2014 negative for inducible ischemia   * CT coronary angiogram 12/2012 with total Agatston calcium score is 10.16, Left main: 0, left anterior descending: 10.16,  circumflex: 0, right coronary artery: 0. This places the patient in the 25th to the 50th percentile when  "compared to age and gender matched control group.   - Cardiology consulted, appreciate input   - Echo showed LVEF 40-45% with multiple WMA consistent with takosubo cardiomyopathy  - Continue ASA 81 mg po every day  - start on entresto, no BB due to sinus bradycardia  - follow up with cardiology with TTE in 2 months, if LVEF not improved than cMRI        Chronic thrombocytopenia: Platelets 118 on admission, chronic issue dating back to 4/2021.   - Monitor      T2DM:  [Empagliflozin 10 mg po every day, Monjaro]  - resume Jardiance and Odalisro 7/6        Hx of VTE: Occurred post-op a few years ago. No recurrence. Not on chronic anticoagulation.      UTI prophylaxis: Takes Keflex 250 mg po every day. No urinary sx on admission.    Overactive bladder: Continue oxybutynin ER 10 mg po at bedtime   GERD: Continue Nexium 40 mg po every day (sub Protonix per formulary), Pepcid 10 mg BID  Hypothyroidism: Continue Synthroid 12.5 mg po every day      Clinically Significant Risk Factors Present on Admission     # Drug Induced Platelet Defect: home medication list includes an antiplatelet medication   # Hypertension: Noted on problem list      # Overweight: Estimated body mass index is 26.16 kg/m  as calculated from the following:    Height as of this encounter: 1.575 m (5' 2\").    Weight as of this encounter: 64.9 kg (143 lb).         Disposition Plan  -- see by PT and ok for home     Expected Discharge Date: 07/07/2023                      Code Status:      Full Code         Important Results:      See below         Pending Results:        Unresulted Labs Ordered in the Past 30 Days of this Admission     No orders found for last 31 day(s).               Discharge Instructions and Follow-Up:      Follow-up Appointments     Follow-up and recommended labs and tests       Follow up with PCP in 2-3 weeks    Follow up with cardiology as directed with repeat Echo                 Discharge Disposition:      Discharged to home         " Discharge Medications:        Current Discharge Medication List      START taking these medications    Details   sacubitril-valsartan (ENTRESTO) 24-26 MG per tablet Take 1 tablet by mouth 2 times daily for 30 days  Qty: 60 tablet, Refills: 0    Associated Diagnoses: Takotsubo cardiomyopathy         CONTINUE these medications which have NOT CHANGED    Details   albuterol (PROAIR HFA/PROVENTIL HFA/VENTOLIN HFA) 108 (90 BASE) MCG/ACT Inhaler Inhale 2 puffs into the lungs every 6 hours INDICATION: RESCUE INHALER FOR SHORTNESS OF BREATH, CHEST TIGHTNESS AND COUGH  Qty: 1 Inhaler, Refills: 11    Comments: MD aware of Advair intolerance  Associated Diagnoses: Mild persistent asthma without complication      alpha-lipoic acid 600 MG CAPS Take 600 mg by mouth 3 times daily       APPLE CIDER VINEGAR PO Take 1 capsule by mouth daily      ascorbic acid (VITAMIN C) 1000 MG TABS Take 1 tablet (1,000 mg) by mouth daily VITAMIN C REPLACEMENT  Qty: 100 tablet, Refills: 3    Associated Diagnoses: Scurvy      aspirin (ASA) 81 MG EC tablet Take 1 tablet (81 mg) by mouth daily    Associated Diagnoses: Coronary artery disease involving native coronary artery of native heart without angina pectoris      B-12 (methylcobalamin) 1000 MCG SUBL sublingual tablet Place 1,000 mcg under the tongue daily      clonazePAM (KLONOPIN) 0.5 MG tablet Take 0.5-1 mg by mouth 2 times daily as needed for anxiety      Cranberry 125 MG TABS Take 1 tablet by mouth daily      diphenhydrAMINE (BENADRYL) 25 MG tablet Take 25 mg by mouth every 6 hours as needed for itching or allergies      empagliflozin (JARDIANCE) 10 MG TABS tablet Take 10 mg by mouth daily      esomeprazole (NEXIUM) 40 MG capsule Take 1 capsule (40 mg) by mouth every morning (before breakfast) TAKE  30'-60' BEFORE 1ST MEAL  Qty: 180 capsule, Refills: 2    Associated Diagnoses: Gastroesophageal reflux disease without esophagitis      famotidine (PEPCID) 40 MG tablet Take 40 mg by mouth daily       Ferrous Sulfate (IRON) 325 (65 Fe) MG tablet Take 1 tablet by mouth daily      glimepiride (AMARYL) 4 MG tablet Take 4 mg by mouth 2 times daily      hydrochlorothiazide (HYDRODIURIL) 25 MG tablet Take 12.5 mg by mouth every other day      lactobacillus rhamnosus, GG, (CULTURELL) capsule Take 1 capsule by mouth 2 times daily      latanoprost (XALATAN) 0.005 % ophthalmic solution Place 1 drop into both eyes At Bedtime      levothyroxine (SYNTHROID/LEVOTHROID) 25 MCG tablet TAKE 1/2 (ONE-HALF) TABLET BY MOUTH FIRST THING IN THE MORNING TO STOMACH. NO FOOD FOR AN HOUR TO TREAT UNDERACTIVE THYROID.      Melatonin 10 MG TABS tablet Take 10 mg by mouth At Bedtime      methocarbamol (ROBAXIN) 750 MG tablet Take 750 mg by mouth 4 times daily as needed for muscle spasms      niacin 500 MG tablet Take 500 mg by mouth daily (with breakfast)      nitroglycerin (NITROSTAT) 0.4 MG SL tablet Place 1 tablet (0.4 mg) under the tongue every 5 minutes as needed for chest pain  Qty: 25 tablet, Refills: 0    Associated Diagnoses: Exertional dyspnea      oxybutynin ER (DITROPAN XL) 15 MG 24 hr tablet Take 15 mg by mouth daily (with lunch)      rosuvastatin (CRESTOR) 20 MG tablet Take 20 mg by mouth daily      SUMAtriptan (IMITREX) 25 MG tablet Take 25 mg by mouth at onset of headache for migraine      timolol maleate (TIMOPTIC) 0.5 % ophthalmic solution Place 1 drop into both eyes 2 times daily      tirzepatide (MOUNJARO) 2.5 MG/0.5ML pen Inject 2.5 mg Subcutaneous every 7 days Takes on Thursdays      UNABLE TO FIND Salt Iodine 1000mg - Take 1 (one) tablet Monday throught Saturday and 2 (two) tablets on Sunday.      vitamin D (ERGOCALCIFEROL) 08602 UNIT capsule Take 50,000 Units by mouth once a week Takes on the 10th, 20th, and 30th of each month      ZINC SULFATE PO Take 1 tablet by mouth daily      BETA BLOCKER NOT PRESCRIBED, INTENTIONAL, Beta Blocker not prescribed intentionally due to Intolerance  Refills: 0    Associated  Diagnoses: Type 2 diabetes, HbA1c goal < 7% (H); Hypertension goal BP (blood pressure) < 130/80         STOP taking these medications       candesartan (ATACAND) 16 MG tablet Comments:   Reason for Stopping:                    Allergies:         Allergies   Allergen Reactions     Diatrizoate Shortness Of Breath     Other reaction(s): Shortness Of Breath  rapid heart beat  rapid heart beat       Morphine      Other reaction(s): GI Upset  Patient says she will not take because causes GI Upset     Morphine And Related Difficulty breathing     Chest gets tight & difficulty breathing      Oxycodone-Acetaminophen      Other reaction(s): GI Upset  Patient says she will not take because causes GI Upset     Atenolol Other (See Comments)     Sob w/ exertion - asthma symptoms     Diltiazem Other (See Comments)     edema - fluid retention in legs    Other reaction(s): Edema  Other reaction(s): Edema       Labetalol Other (See Comments) and Itching     Sob w/ exertion - asthma symptoms  itching     Lipitor [Atorvastatin Calcium] Other (See Comments)     Muscle weakness     Nifedipine Other (See Comments)     Sob w/ exertion - asthma symptoms, HAs    Other reaction(s): Other (See Comments)  Sob w/ exertion - asthma symptoms, HAs     Sulfa Antibiotics Hives     hives     Zocor [Simvastatin] Other (See Comments)     Muscle weakness     Aleve [Naproxen] Other (See Comments)     Raises BP     Amlodipine Fatigue and Diarrhea            Blood-Group Specific Substance Other (See Comments)     Patient has anti-Shacklefords. Blood product orders may be delayed.  Other reaction(s): Other - Describe In Comment Field  Patient has anti-Odalys. Blood product orders may be delayed.       Bystolic [Nebivolol Hcl] Other (See Comments)     headaches     Calcium Carbonate Diarrhea     Cefdinir Diarrhea     Crestor [Rosuvastatin Calcium] Muscle Pain (Myalgia)     Diovan [Valsartan] GI Disturbance     gas       Ezetimibe Muscle Pain (Myalgia)     Other  reaction(s): Muscle Weakness  Other reaction(s): Myalgia       Fluticasone-Salmeterol Other (See Comments)     hoarseness with 500/50, not the 250/50     Hydralazine Other (See Comments) and Nausea     Increases pulse     Hydrochlorothiazide Other (See Comments)     increasing glucose     Imdur [Isosorbide Mononitrate] Other (See Comments)     headaches     Lisinopril Cough     Hoarse voice     Lovastatin Muscle Pain (Myalgia)     Metformin Diarrhea            Metoprolol Other (See Comments)     Sob w/ exertion - asthma symptoms       Niacin Other (See Comments)     hyperglycemia     No Clinical Screening - See Comments      PN: LW CM1: >>> NO CONTRAST ADVERSE REACTION <<<     Reaction :  KCL---gas sx     Potassium Chloride In Nacl GI Disturbance     gas      Pravachol [Statins] Muscle Pain (Myalgia)     Prilosec Otc [Omeprazole Magnesium] Diarrhea     diarrhea              Consultations This Hospital Stay:      Consultation during this admission received from cardiology          Discharge Orders       After Care Instructions     Activity      Your activity upon discharge: activity as tolerated, see instructions        Activity - Cardiac Rehab      You are encouraged to enroll in an Outpatient Cardiac Rehab program after discharge from the hospital.  Our Cardiac Rehab staff may visit briefly with you while you're in the hospital.  If they miss you, someone will contact you after you are home.        Activity - Daily Walking      During the day get up and walk around every 2 hours.        Activity - Elevate Legs      Elevate legs in between all activities.        Activity - Light Household Activities      Light household activities are ok.        Brief Discharge Instructions      Do NOT stop your aspirin or platelet inhibitor unless directed by your Cardiologist.  These medications help to prevent platelets in your blood from sticking together and forming a clot.  Examples of these medications are:  Ticagrelor  (Brilinta), Clopidigrel (Plavix), Prasugrel (Effient)        Comfort and Pain Management - Bruising after Surgery      Bruising around the groin area is normal.  It may take 2-3 weeks for this to go away.  It is normal for the bruised area to turn green and/or yellow as it is healing.  A small lump may also be present and may last 2-3 months.        Comfort and Pain Management - Pain after Surgery      Pain after surgery is normal and expected.  Your leg may be sore or stiff for a few days, and your pain will improve with time. You may take Tylenol or a pain medicine recommended by your Cardiologist.        Diet      Follow this diet upon discharge: Orders Placed This Encounter      Regular Diet Adult        Dressing Removal      You may take off the dressing on your groin the day after your procedure.        Incision Care      Keep the incision area dry and clean.  You do not need to use a bandage on your incision.        Precautions - Active Sports Activities      Avoid any tiring sports activities.  This includes, yard work, jogging, biking, bowling, swimming, tennis or golf, and sexual activity.        Precautions - Elective Dental Work      NO elective dental work for 6 weeks after receiving a stent.        Precautions - Household Activities      Avoid any hard work or tiring activities.  NO physical activity such as mowing the lawn, raking, vacuuming, changing sheets on your bed, snow shoveling, or using a .        Precautions - Lifting      NO lifting of more than 10 pounds for at least 3 days.  If you usually lift 50 pounds or more daily, talk with your Cardiologist.        Return to Driving      Driving is NOT permitted for 24 hours after surgery        Return to work      You may return to work after 72 hours if you are feeling well and your job does not involve heavy lifting.        Shower / Bathing      It is ok to shower with regular soap. Pat dry, do not rub. No tub bath for 3 days. No  swimming in a pool or hot tub immersion for 1 week        When to call - Contact the Heart Clinic      You may experience symptoms that require follow-up before your scheduled appointment. Contact the Heart Clinic if you develop: Fever over 100.4o Fahrenheit, that lasts more than one day; Redness, heat, or pus at the puncture site; Change in color or temperature in your groin or leg.        When to call - Reasons to Call 911      If your groin starts to bleed or begins to swell suddenly after leaving the hospital, lie flat and apply firm pressure just above the puncture site for 15 minutes.  If bleeding continues, call -.                   Discharge Time:       Greater than 30 minutes.        Image Results From This Hospital Stay (For Non-EPIC Providers):        Results for orders placed or performed during the hospital encounter of 23   Chest XR,  PA & LAT    Narrative    CHEST TWO VIEWS    2023 10:02 AM     HISTORY: Chest pain    COMPARISON: 10/27/2022.      Impression    IMPRESSION: No acute cardiopulmonary disease.    DONTRELL HAMM MD         SYSTEM ID:  [id number]   Echocardiogram Complete     Value    LVEF  45-50%    Narrative    [id number]  [id number]  [id number]  [id number]^HALLE^GABE^JAMILAH     [facility]  Echocardiography Laboratory  [address] [place] [address]     Name: SARAY MÉNDEZ  MRN: [id number]  : 1940  Study Date: 2023 08:47 AM  Age: 82 yrs  Gender: Female  Patient Location: [facility]  Reason For Study: MI  Ordering Physician: GABE NERI  Referring Physician: Steven Fonseca  Performed By: Danelle Kamara     BSA: 1.7 m2  Height: 62 in  Weight: 142 lb  HR: 67  BP: 140/74 mmHg  ______________________________________________________________________________  Procedure  Complete Portable Echo Adult. Optison (NDC #[id number]) given  intravenously.  ______________________________________________________________________________  Interpretation Summary     Severe hypokinesis of the distal anterior, distal anterolateral and apical  walls.  Left ventricular systolic function is mildly reduced.  The visual ejection fraction is 45-50%.     Compared to the previous study, the wall motion abnormality is new.  ______________________________________________________________________________  Left Ventricle  The left ventricle is normal in size. There is normal left ventricular wall  thickness. Grade I or early diastolic dysfunction. Diastolic Doppler findings  (E/E' ratio and/or other parameters) suggest left ventricular filling  pressures are indeterminate. Left ventricular systolic function is mildly  reduced. The visual ejection fraction is 45-50%. Severe hypokinesis of the  distal anterior, distal anterolateral and apical walls.     Right Ventricle  The right ventricle is normal in structure, function and size.     Atria  The left atrium is mildly dilated. Right atrial size is normal. There is no  atrial shunt seen.     Mitral Valve  The mitral valve is normal in structure and function. There is trace mitral  regurgitation.     Tricuspid Valve  The tricuspid valve is normal in structure and function. There is trace  tricuspid regurgitation. Right ventricular systolic pressure could not be  approximated due to inadequate tricuspid regurgitation. IVC diameter <2.1 cm  collapsing >50% with sniff suggests a normal RA pressure of 3 mmHg.     Aortic Valve  The aortic valve is normal in structure and function. No aortic regurgitation  is present. No aortic stenosis is present.     Pulmonic Valve  The pulmonic valve is not well seen, but is grossly normal. There is trace  pulmonic valvular regurgitation.     Vessels  Normal size aorta. The inferior vena cava was normal in size with preserved  respiratory variability.     Pericardium  There is no pericardial  effusion.     Rhythm  Sinus rhythm was noted.  ______________________________________________________________________________  MMode/2D Measurements & Calculations  IVSd: 1.0 cm     LVIDd: 5.2 cm  LVIDs: 4.0 cm  LVPWd: 1.0 cm  FS: 23.1 %  LV mass(C)d: 194.2 grams  LV mass(C)dI: 117.5 grams/m2  Ao root diam: 2.8 cm  LA dimension: 4.2 cm  asc Aorta Diam: 3.2 cm  LA/Ao: 1.5  LVOT diam: 2.0 cm  LVOT area: 3.1 cm2  LA Volume (BP): 62.7 ml  LA Volume Index (BP): 38.0 ml/m2  RWT: 0.38  TAPSE: 2.1 cm     Doppler Measurements & Calculations  MV E max leon: 60.3 cm/sec  MV A max leon: 82.8 cm/sec  MV E/A: 0.73  MV dec time: 0.26 sec  Ao V2 max: 147.0 cm/sec  Ao max P.0 mmHg  Ao V2 mean: 101.0 cm/sec  Ao mean P.0 mmHg  Ao V2 VTI: 27.7 cm  RICHA(I,D): 2.4 cm2  RICHA(V,D): 2.4 cm2  LV V1 max P.9 mmHg  LV V1 max: 111.0 cm/sec  LV V1 VTI: 21.5 cm  SV(LVOT): 67.5 ml  SI(LVOT): 40.9 ml/m2  PA acc time: 0.12 sec  AV Leon Ratio (DI): 0.76  RICHA Index (cm2/m2): 1.5  E/E' avg: 10.2  Lateral E/e': 9.1  Medial E/e': 11.3  RV S Leon: 12.2 cm/sec     ______________________________________________________________________________  Report approved by: Gregorio Bruce 2023 10:04 AM         Cardiac Catheterization     Value    Cath EF Estimated 35    Narrative       The ejection fraction is 30-40% by visual estimate.     Left ventricular filling pressures are normal.    1.  Angiographically normal coronary arteries  2.  Normal left-sided filling pressures  3.  Left ventriculography notable for left ventricular dysfunction with   apical ballooning and akinesia along with basal hyperkinesia consistent   with Takotsubo cardiomyopathy             Most Recent Lab Results In EPIC (For Non-EPIC Providers):    Most Recent 3 CBC's:  Recent Labs   Lab Test 23  0546 23  0852 21  0131   WBC 6.1 5.5 6.0   HGB 13.1 14.1 11.7   MCV 95 96 98   * 118* 142*      Most Recent 3 BMP's:  Recent Labs   Lab Test 23  1136  07/07/23  0753 07/07/23  0546 07/06/23  1741 07/06/23  0852 09/29/22  0907   0000   NA  --   --  142  --  142 139  --    POTASSIUM  --   --  3.6  --  3.9 4.0  --    CHLORIDE  --   --  105  --  104 107  --    CO2  --   --  25  --  26 25  --    BUN  --   --  21.6  --  21.8 41*  --    CR  --   --  0.94  --  0.86 1.22*  --    ANIONGAP  --   --  12  --  12 7  --    ROGE  --   --  9.4  --  9.4 9.3  --    * 123* 122*   < > 145* 131*   < >    < > = values in this interval not displayed.     Most Recent 3 Troponin's:  Recent Labs   Lab Test 04/10/16  1938   TROPI <0.015  The 99th percentile for upper reference range is 0.045 ug/L.  Troponin values in   the range of 0.045 - 0.120 ug/L may be associated with risks of adverse   clinical events.       Most Recent 3 INR's:  Recent Labs   Lab Test 02/27/17  0735   INR 0.92     Most Recent 2 LFT's:  Recent Labs   Lab Test 07/06/23  1106 09/29/22  0907 01/07/20  0916 04/05/18  0929   AST 19  --   --  22   ALT 21 31   < > 25   ALKPHOS 79  --   --  64   BILITOTAL 0.4  --   --  0.3    < > = values in this interval not displayed.     Most Recent Cholesterol Panel:  Recent Labs   Lab Test 07/06/23  1734   CHOL 111   LDL 41   HDL 46*   TRIG 118     Most Recent 6 Bacteria Isolates From Any Culture (See EPIC Reports for Culture Details):  Recent Labs   Lab Test 05/02/19  1001 04/04/19  1110 03/05/19  1014 09/08/17  1509 08/20/17  1035   CULT 10,000 to 50,000 colonies/mL  mixed urogenital gabi  Susceptibility testing not routinely done   50,000 to 100,000 colonies/mL  Escherichia coli  * >100,000 colonies/mL  Escherichia coli  * >100,000 colonies/mL  Escherichia coli  * 50,000 to 100,000 colonies/mL  Klebsiella pneumoniae  *  50,000 to 100,000 colonies/mL  Escherichia coli  *     Most Recent TSH, T4 and HgbA1c:  Recent Labs   Lab Test 07/06/23  0852 12/21/17  0915 11/08/17  1026 12/15/16  0856 06/28/16  0900   TSH  --   --  2.36  --  2.39   T4  --   --   --   --  1.12   A1C 6.0*   <  >  --    < > 6.3*    < > = values in this interval not displayed.

## 2023-07-07 NOTE — PROGRESS NOTES
07/07/23 1041   Appointment Info   Signing Clinician's Name / Credentials (PT) Rosie Amezcua, PT, DPT   Living Environment   People in Home spouse   Current Living Arrangements condominium   Home Accessibility no concerns   Transportation Anticipated car, drives self   Living Environment Comments Lives in [facility], has elevator access   Self-Care   Usual Activity Tolerance good   Current Activity Tolerance moderate   Regular Exercise No   Equipment Currently Used at Home walker, rolling   Fall history within last six months no   Activity/Exercise/Self-Care Comment Pt reports she uses a 4WW at baseline, does not do any formal exercise but does have long hallways in [facility] building. Pt's activity tolerance is limited by back pain mostly.   General Information   Onset of Illness/Injury or Date of Surgery 07/06/23   Referring Physician Elke Le PA-C   Patient/Family Therapy Goals Statement (PT) To go home   Pertinent History of Current Problem (include personal factors and/or comorbidities that impact the POC) Pt is 82 year old female adm on 7/6/23 with acute onset chest pressure, found to have NSTEMI. Pt underwent angio with no obstructive disease found, diagnosed with Takotsubo CMP with EF 45-50%. PMH includes non-obstructive CAD, GERD, chronic migraines, provoked DVT/PE in 2012, thyroid disease, DM II, renal artery stenosis (s/p L artery stenting, moderate stenosis on right managing medically), HTN, and multiple medication intolerances.   Existing Precautions/Restrictions no known precautions/restrictions   Cognition   Affect/Mental Status (Cognition) WFL   Orientation Status (Cognition) oriented x 4   Follows Commands (Cognition) WFL   Pain Assessment   Patient Currently in Pain No   Integumentary/Edema   Integumentary/Edema no deficits were identifed   Posture    Posture Forward head position;Protracted shoulders   Range of Motion (ROM)   Range of Motion ROM is WFL   Strength (Manual Muscle  Testing)   Strength (Manual Muscle Testing) strength is WFL   Bed Mobility   Bed Mobility no deficits identified   Transfers   Transfers no deficits identified   Gait/Stairs (Locomotion)   [place] Level (Gait) modified independence   Balance   Balance Comments No LOB with functional tasks while using 4WW   Clinical Impression   Criteria for Skilled Therapeutic Intervention Yes, treatment indicated   PT Diagnosis (PT) Impaired activity tolerance   Influenced by the following impairments Decreased endurance   Functional limitations due to impairments Decreased ability to participate in daily tasks   Clinical Presentation (PT Evaluation Complexity) Stable/Uncomplicated   Clinical Presentation Rationale Current presentation, [facility]   Clinical Decision Making (Complexity) low complexity   Planned Therapy Interventions (PT) progressive activity/exercise;risk factor education;home program guidelines   Risk & Benefits of therapy have been explained evaluation/treatment results reviewed;care plan/treatment goals reviewed;risks/benefits reviewed;current/potential barriers reviewed;participants voiced agreement with care plan;participants included;patient   PT Total Evaluation Time   PT Eval, Low Complexity Minutes (65379) 10   Physical Therapy Goals   PT Frequency One time eval and treatment only   PT Predicted Duration/Target Date for Goal Attainment 07/07/23   PT Goals Cardiac Phase 1   PT: Understanding of cardiac education to maximize quality of life, condition management, and health outcomes Patient;Verbalize;Demonstrate   PT: Perform aerobic activity with stable cardiovascular response continuous;10 minutes;ambulation   PT: Functional/aerobic ambulation tolerance with stable cardiovascular response in order to return to home and community environment Modified independent;Greater than 300 feet   Interventions   Interventions Quick Adds Therapeutic Activity;Therapeutic Procedure;Cardiac Rehab   Therapeutic  Procedure/Exercise   Ther. Procedure: strength, endurance, ROM, flexibillity Minutes (55306) 10   Symptoms Noted During/After Treatment none   Treatment Time Includes (CR Only) See specific exercise details intervention group(s);Monitoring of vital signs (see vital signs flowsheet for details)   Therapeutic Activity   Therapeutic Activities: dynamic activities to improve functional performance Minutes (70781) 15   Symptoms Noted During/After Treatment None   Treatment Detail/Skilled Intervention Pt independent with mobility, time spent in education on signs/symptoms of intolerance, daily weights, HEP recommendations. Pt states understanding. Cardiac rehab will sign off.   Ambulation   Workload Hallway ambulation with 4WW   Duration (minutes) 8 minutes   Effort Scale 3   Symptoms Denies symptoms   Cardiovascular Response Hypertensive response to exercise   Exercise Details Pt ambulates 400' with 4WW mod I, no LOB, good pace   Vital Signs Details 185/85 after activity, RN aware   Cardiac Education   Education Provided Diet;Daily weights;Home exercise program;OMNI Scale;Precautions;Resuming home activities;Signs and symptoms   Cardiac Rehab Phase II Plan   Phase II Order Received No   PT Discharge Planning   PT Plan Sign off for cardiac rehab as pt can complete mobility necessary for discharge to home, per cardiology, pt to avoid intense physical activity until cardiology follow up. If needing outpatient cardiac rehab at that time, cardiologist can order then.   PT Discharge Recommendation (DC Rec) home   PT Rationale for DC Rec Pt is able to complete mobility necessary for discharge to home, education provided on gentle exercise program and signs/symptoms of intolerance, pt demonstrates understanding.   PT Brief overview of current status Mod I with 4WW   Total Session Time   Timed Code Treatment Minutes 25   Total Session Time (sum of timed and untimed services) 35     Cardiac Rehab Discharge Summary    Reason for  therapy discharge:    All goals and outcomes met, no further needs identified.    Progress towards therapy goal(s). See goals on Care Plan in [facility] electronic health record for goal details.  Goals met    Therapy recommendation(s):    Continue home exercise program.

## 2023-07-07 NOTE — PLAN OF CARE
Alert and oriented x 4. VS stable, on RA and no complaints of pain. Patient was discharged this afternoon with family as transport home.1 new medication was filled here and sent home with the patient. I reviewed all DC paperwork, new medications, orders and appointments with the patient and family, all were able to verbalize understanding of teaching. All questions answered. All IVs were removed and patient had all belongings at the time of DC.

## 2023-07-12 ENCOUNTER — TELEPHONE (OUTPATIENT)
Dept: CARDIOLOGY | Facility: CLINIC | Age: 83
End: 2023-07-12
Payer: COMMERCIAL

## 2023-07-12 NOTE — TELEPHONE ENCOUNTER
Patient was admitted to [facility] on 7/6/23 with chest pain and NSTEMI.    PMH: non-obstructive CAD (per 2012 CTA with mild LAD dz), GERD, chronic migraines, provoked DVT/PE (2012), thyroid disease, DMII, renal artery stenosis (s/p L artery stenting, moderate stenosis on R managing medically), HTN and multiple medication intolerances.    7/7/23: Echo showed EF of 45-50%. Severe hypokinesis of the distal anterior, distal anterolateral and apical walls.    7/6/23: Coronary angiogram via RFA showed angiographically normal coronary arteries. Normal left-sided filling pressures. Left ventriculography notable for left ventricular dysfunction with apical ballooning and akinesia along with basal hyperkinesia consistent with Takotsubo cardiomyopathy.    No BB d/t resting SB and intolerance to Metoprolol, Nebivolol, Atenolol, Labetalol.     Pt was started on Entresto. PTA Atacand was discontinued at time of discharge.    Called patient to discuss any post hospital d/c questions she may have, review medication changes, and confirm f/u appts. Patient denied any questions regarding new medications or changes with their PTA medications.    Patient denied any SOB, chest pain or light headedness.     RFA cardiac cath site is without bleeding, swelling, redness or signs of infection.     RN confirmed with patient that she is scheduled for an OV on 8/10/23 at 1420 with MARY KAY Lesa Espinosa at our [place] Office.    Patient advised to call clinic with any cardiac related questions or concerns prior to this mary kay't. Patient verbalized understanding and agreed with plan. ARISTIDES Zamudio RN.

## 2023-08-02 ENCOUNTER — TRANSFERRED RECORDS (OUTPATIENT)
Dept: HEALTH INFORMATION MANAGEMENT | Facility: CLINIC | Age: 83
End: 2023-08-02
Payer: COMMERCIAL

## 2023-08-17 ENCOUNTER — OFFICE VISIT (OUTPATIENT)
Dept: CARDIOLOGY | Facility: CLINIC | Age: 83
End: 2023-08-17
Payer: COMMERCIAL

## 2023-08-17 VITALS
OXYGEN SATURATION: 97 % | HEART RATE: 60 BPM | HEIGHT: 62 IN | DIASTOLIC BLOOD PRESSURE: 66 MMHG | BODY MASS INDEX: 26.76 KG/M2 | SYSTOLIC BLOOD PRESSURE: 138 MMHG | WEIGHT: 145.4 LBS

## 2023-08-17 DIAGNOSIS — I51.81 TAKOTSUBO CARDIOMYOPATHY: ICD-10-CM

## 2023-08-17 DIAGNOSIS — I15.0 RENOVASCULAR HYPERTENSION: Primary | ICD-10-CM

## 2023-08-17 PROCEDURE — 99214 OFFICE O/P EST MOD 30 MIN: CPT | Performed by: NURSE PRACTITIONER

## 2023-08-17 RX ORDER — SACUBITRIL AND VALSARTAN 24; 26 MG/1; MG/1
1 TABLET, FILM COATED ORAL 2 TIMES DAILY
Qty: 60 TABLET | Refills: 11 | Status: SHIPPED | OUTPATIENT
Start: 2023-08-17 | End: 2024-09-26

## 2023-08-17 NOTE — PATIENT INSTRUCTIONS
Today's Recommendations    Resume taking your entresto, I refilled this today  Your labs looked good from your follow up visit with Dr. Fonseca on 7/18/23  Continue all other medications without changes.  We will repeat your heart ultrasound in 1 month to recheck your pumping function   Please follow up with Dr. Maldonado in 2 months.    Please send a [employer] message or call [phone] to the RN team with questions or concerns.     Scheduling number [phone]  RAMY Encinas, CNP

## 2023-08-17 NOTE — PROGRESS NOTES
Cardiology Clinic Progress Note  Ginger Abreu MRN# [id number]   YOB: 1940 Age: 83 year old   Primary Cardiologist: Dr. Maldonado Reason for visit: Post hospitalization follow up            Assessment and Plan:     1.  NSTEMI       Takotsubo cardiomyopathy  -Hospitalized 7/2023 with chest pain and elevated troponin, coronary angiogram negative, echocardiogram findings consistent with Takotsubo: Distal anterior, distal anterolateral, apical wall severe hypokinesis, ejection fraction 45 to 50%  -GDMT: Initiated on Entresto 24-26 twice daily and continued on Jardiance    2.  Hypertension, borderline controlled, currently off entresto  -Candesartan changed to Entresto 24-26 during 7/2023 hospitalization    3.  Normal coronary arteries   -2012 CT coronary artery with mild LAD disease  -7/2023 coronary angiogram showed angiographically normal coronary arteries    4.  Mixed hyperlipidemia, controlled  -/2023 LDL 41  -Continue rosuvastatin 20 mg daily    5.  Mixed renal insufficiency, stable  -Baseline creatinine 0.8-1.2    6.  Type 2 diabetes, controlled  -Hemoglobin A1c 6%    Patient has been doing very well since her hospitalization.  She denies any side effects after starting Entresto.  Her labs were repeated at her primary care provider on 7/18/2023 which showed normal electrolytes and stable renal function with a creatinine of 0.99 and GFR 57.  Unfortunately she ran out of her Entresto approximately 5 days ago.  I provided her with a refill today.  She appears euvolemic and does not wear addition of a diuretic today.  Her blood pressure slightly on the high side at 138/66, I suspect this is secondary to not being on her Entresto.  Her previous readings her PCP following hospitalization was 120/51.  Continue her current medication regimen and repeat her echocardiogram in 1 month.    Changes today: No current medication changes, resume Entresto    Follow up plan: Follow-up with Dr. Edwin Ram in 2  months        History of Presenting Illness:    Ginger Abreu is a very pleasant 83 year old female with a history of resistant hypertension who was found to have renal artery stenosis and underwent stenting of the left renal artery, moderate right renal artery stenosis (medically managed), provoked pulmonary embolism [place] 2012), mild coronary artery disease (CTA with mild LAD disease), type 2 diabetes, mixed hyperlipidemia, multiple medication intolerances.    Patient was hospitalized 7/6/2023 with acute onset chest pressure and diagnosed with NSTEMI.  Troponin was elevated up to 323 and underwent coronary angiography which showed angiographically normal coronary arteries.  LV gram showed left ventricular dysfunction with apical ballooning and akinesia basal hypokinesia consistent with Takotsubo cardiomyopathy.  Ejection fraction was estimated at 30 to 40%.  Subsequent transthoracic echocardiogram showed severe hypokinesis of the distal anterior, distal anterolateral, and apical walls, mild reduction in ejection fraction to 45 to 50%.  Plan is to initiate guideline directed medical therapy and repeat echocardiogram in 2 months.  She was started on Entresto 24-26 mg twice daily and continued on Jardiance 10 mg daily.    Patient is here today for a post hospitalization follow up. She ran out of entresto 5 days ago and waited until this visit to request a refill. Patient reports feeling good. She has been avoiding strenuous activity but has had more life stress. Following her hospitalization her  fell and broke his hip and femur.     Patient denies chest pain or chest tightness. Denies dizziness, lightheadedness or other presyncopal symptoms. Denies tachycardia or palpitations.  Denies weight gain, shortness of breath, orthopnea, or PND.    Blood pressure 138/66 and HR 60 in clinic today.          Recent Hospitalizations   As above          Social History      Social History     Socioeconomic History     "Marital status:      Spouse name: Not on file    Number of children: Not on file    Years of education: Not on file    Highest education level: Not on file   Occupational History    Not on file   Tobacco Use    Smoking status: Never    Smokeless tobacco: Never   Substance and Sexual Activity    Alcohol use: No     Alcohol/week: 0.0 standard drinks of alcohol     Comment: very rare    Drug use: No    Sexual activity: Yes   Other Topics Concern    Parent/sibling w/ CABG, MI or angioplasty before 65F 55M? No     Service Not Asked    Blood Transfusions Not Asked    Caffeine Concern No     Comment: soda 1 time per day    Occupational Exposure Not Asked    Hobby Hazards Not Asked    Sleep Concern Not Asked    Stress Concern Not Asked    Weight Concern Not Asked    Special Diet No    Back Care Not Asked    Exercise No     Comment: limited due to back pain     Bike Helmet Not Asked    Seat Belt Not Asked    Self-Exams Not Asked   Social History Narrative    Not on file     Social Determinants of Health     Financial Resource Strain: Not on file   Food Insecurity: Not on file   Transportation Needs: Not on file   Physical Activity: Not on file   Stress: Not on file   Social Connections: Not on file   Intimate Partner Violence: Not on file   Housing Stability: Not on file            Review of Systems:   Skin:        Eyes:       ENT:       Respiratory:  Negative    Cardiovascular:  Negative    Gastroenterology:      Genitourinary:       Musculoskeletal:       Neurologic:       Psychiatric:       Heme/Lymph/Imm:       Endocrine:  Positive for diabetes;thyroid disorder         Physical Exam:   Vitals: /66   Pulse 60   Ht 1.575 m (5' 2\")   Wt 66 kg (145 lb 6.4 oz)   SpO2 97%   BMI 26.59 kg/m     Wt Readings from Last 4 Encounters:   08/17/23 66 kg (145 lb 6.4 oz)   07/07/23 64.5 kg (142 lb 3.2 oz)   09/29/22 68.3 kg (150 lb 8 oz)   08/09/22 68.2 kg (150 lb 4.8 oz)     GEN: well nourished, in no acute " distress.  HEENT:  Pupils equal, round. Sclerae nonicteric.   NECK: Supple, no masses appreciated. No JVD with patient supine.  C/V:  Regular rate and rhythm, no murmur, rub or gallop.    RESP: Respirations are unlabored. Clear to auscultation bilaterally without wheezing, rales, or rhonchi.  GI: Abdomen soft, nontender.  EXTREM: No LE edema.  NEURO: Alert and oriented, cooperative.  SKIN: Warm and dry.        Data:       LIPID RESULTS:  Lab Results   Component Value Date    CHOL 111 07/06/2023    CHOL 123 06/22/2021    HDL 46 (L) 07/06/2023    HDL 48 (L) 06/22/2021    LDL 41 07/06/2023    LDL 40 06/22/2021    TRIG 118 07/06/2023    TRIG 177 (H) 06/22/2021    CHOLHDLRATIO 4.2 04/03/2015     LIVER ENZYME RESULTS:  Lab Results   Component Value Date    AST 19 07/06/2023    AST 22 04/05/2018    ALT 21 07/06/2023    ALT 25 06/22/2021     CBC RESULTS:  Lab Results   Component Value Date    WBC 6.1 07/07/2023    WBC 6.0 04/30/2021    RBC 4.24 07/07/2023    RBC 3.77 (L) 04/30/2021    HGB 13.1 07/07/2023    HGB 11.7 04/30/2021    HCT 40.1 07/07/2023    HCT 36.9 04/30/2021    MCV 95 07/07/2023    MCV 98 04/30/2021    MCH 30.9 07/07/2023    MCH 31.0 04/30/2021    MCHC 32.7 07/07/2023    MCHC 31.7 04/30/2021    RDW 13.2 07/07/2023    RDW 14.3 04/30/2021     (L) 07/07/2023     (L) 04/30/2021     BMP RESULTS:  Lab Results   Component Value Date     07/07/2023     07/02/2021    POTASSIUM 3.6 07/07/2023    POTASSIUM 4.0 09/29/2022    POTASSIUM 4.3 07/02/2021    CHLORIDE 105 07/07/2023    CHLORIDE 107 09/29/2022    CHLORIDE 110 (H) 07/02/2021    CO2 25 07/07/2023    CO2 25 09/29/2022    CO2 26 07/02/2021    ANIONGAP 12 07/07/2023    ANIONGAP 7 09/29/2022    ANIONGAP 4 07/02/2021     (H) 07/07/2023     (H) 09/29/2022     (H) 07/02/2021    BUN 21.6 07/07/2023    BUN 41 (H) 09/29/2022    BUN 28 07/02/2021    CR 0.94 07/07/2023    CR 1.24 (H) 07/02/2021    GFRESTIMATED 60 (L) 07/07/2023     GFRESTIMATED 41 (L) 07/02/2021    GFRESTBLACK 47 (L) 07/02/2021    ROGE 9.4 07/07/2023    ROGE 9.5 07/02/2021      A1C RESULTS:  Lab Results   Component Value Date    A1C 6.0 (H) 07/06/2023    A1C 6.0 (H) 07/12/2019     INR RESULTS:  Lab Results   Component Value Date    INR 0.92 02/27/2017    INR 2.2 (A) 11/05/2012    INR 2.1 (A) 10/15/2012    INR 2.64 (H) 04/27/2012            Medications     Current Outpatient Medications   Medication Sig Dispense Refill    albuterol (PROAIR HFA/PROVENTIL HFA/VENTOLIN HFA) 108 (90 BASE) MCG/ACT Inhaler Inhale 2 puffs into the lungs every 6 hours INDICATION: RESCUE INHALER FOR SHORTNESS OF BREATH, CHEST TIGHTNESS AND COUGH (Patient taking differently: Inhale 2 puffs into the lungs every 6 hours as needed INDICATION: RESCUE INHALER FOR SHORTNESS OF BREATH, CHEST TIGHTNESS AND COUGH) 1 Inhaler 11    alpha-lipoic acid 600 MG CAPS Take 600 mg by mouth 3 times daily       APPLE CIDER VINEGAR PO Take 1 capsule by mouth daily      ascorbic acid (VITAMIN C) 1000 MG TABS Take 1 tablet (1,000 mg) by mouth daily VITAMIN C REPLACEMENT 100 tablet 3    aspirin (ASA) 81 MG EC tablet Take 1 tablet (81 mg) by mouth daily      B-12 (methylcobalamin) 1000 MCG SUBL sublingual tablet Place 1,000 mcg under the tongue daily      clonazePAM (KLONOPIN) 0.5 MG tablet Take 0.5-1 mg by mouth 2 times daily as needed for anxiety      Cranberry 125 MG TABS Take 1 tablet by mouth daily      diphenhydrAMINE (BENADRYL) 25 MG tablet Take 25 mg by mouth every 6 hours as needed for itching or allergies      empagliflozin (JARDIANCE) 10 MG TABS tablet Take 10 mg by mouth daily      esomeprazole (NEXIUM) 40 MG capsule Take 1 capsule (40 mg) by mouth every morning (before breakfast) TAKE  30'-60' BEFORE 1ST MEAL 180 capsule 2    famotidine (PEPCID) 40 MG tablet Take 40 mg by mouth daily      Ferrous Sulfate (IRON) 325 (65 Fe) MG tablet Take 1 tablet by mouth daily      hydrochlorothiazide (HYDRODIURIL) 25 MG tablet Take  12.5 mg by mouth every other day      lactobacillus rhamnosus, GG, (CULTURELL) capsule Take 1 capsule by mouth 2 times daily      latanoprost (XALATAN) 0.005 % ophthalmic solution Place 1 drop into both eyes At Bedtime      levothyroxine (SYNTHROID/LEVOTHROID) 25 MCG tablet TAKE 1/2 (ONE-HALF) TABLET BY MOUTH FIRST THING IN THE MORNING TO STOMACH. NO FOOD FOR AN HOUR TO TREAT UNDERACTIVE THYROID.      Melatonin 10 MG TABS tablet Take 10 mg by mouth At Bedtime      methocarbamol (ROBAXIN) 750 MG tablet Take 750 mg by mouth 4 times daily as needed for muscle spasms      niacin 500 MG tablet Take 500 mg by mouth daily (with breakfast)      nitroglycerin (NITROSTAT) 0.4 MG SL tablet Place 1 tablet (0.4 mg) under the tongue every 5 minutes as needed for chest pain 25 tablet 0    oxybutynin ER (DITROPAN XL) 15 MG 24 hr tablet Take 15 mg by mouth daily (with lunch)      rosuvastatin (CRESTOR) 20 MG tablet Take 20 mg by mouth daily      sacubitril-valsartan (ENTRESTO) 24-26 MG per tablet Take 1 tablet by mouth 2 times daily 60 tablet 11    SUMAtriptan (IMITREX) 25 MG tablet Take 25 mg by mouth at onset of headache for migraine      timolol maleate (TIMOPTIC) 0.5 % ophthalmic solution Place 1 drop into both eyes 2 times daily      tirzepatide (MOUNJARO) 2.5 MG/0.5ML pen Inject 2.5 mg Subcutaneous every 7 days Takes on Thursdays      UNABLE TO FIND Salt Iodine 1000mg - Take 1 (one) tablet Monday throught Saturday and 2 (two) tablets on Sunday.      vitamin D (ERGOCALCIFEROL) 99646 UNIT capsule Take 50,000 Units by mouth once a week Takes on the 10th, 20th, and 30th of each month      ZINC SULFATE PO Take 1 tablet by mouth daily      BETA BLOCKER NOT PRESCRIBED, INTENTIONAL, Beta Blocker not prescribed intentionally due to Intolerance  0    glimepiride (AMARYL) 4 MG tablet Take 4 mg by mouth 2 times daily (Patient not taking: Reported on 8/17/2023)            Past Medical History     Past Medical History:   Diagnosis Date     Acute peptic ulcer, unspecified site, without mention of hemorrhage, perforation, or obstruction     Allergic rhinitis, cause unspecified     Anxiety     Basal cell carcinoma     DVT (deep venous thrombosis) (H) 03/2012    following spinal surgery. Saw Oncology. 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 Saw Oncology. treated 6 months    Peripheral neuropathy 6/19/2015    Pneumonia, organism unspecified(486)     Spinal stenosis     Squamous cell carcinoma     Thyroid nodule     Type II or unspecified type diabetes mellitus without mention of complication, not stated as uncontrolled     Unspecified essential hypertension      Past Surgical History:   Procedure Laterality Date    CV CORONARY ANGIOGRAM N/A 7/6/2023    Procedure: Coronary Angiogram;  Surgeon: Papa Schilling MD;  Location: [facility] CARDIAC CATH LAB    CV LEFT HEART CATH N/A 7/6/2023    Procedure: Left Heart Catheterization;  Surgeon: Papa Schilling MD;  Location: [facility] CARDIAC CATH LAB    CV LEFT VENTRICULOGRAM N/A 7/6/2023    Procedure: Left Ventriculogram;  Surgeon: Papa Schilling MD;  Location:  HEART CARDIAC CATH LAB    NONSPECIFIC PROCEDURE  2011    Bilateral sphenoidotomy with removal of polypoid tissue.     Right frontal sinusotomy.   Bilateral total ethmoidectomy.  Bilateral maxillary antrostomy with removal of polypoid  tissue.      ORTHOPEDIC SURGERY      lumbar fusion    ZZC NONSPECIFIC PROCEDURE  approx     hyst/bso    ZZC NONSPECIFIC PROCEDURE  approx     gb    ZZC NONSPECIFIC PROCEDURE  approx     cystocele repair    ZZC NONSPECIFIC PROCEDURE  approx     endoscopic sinus     ZZC NONSPECIFIC PROCEDURE      epidurals x 7    ZZC NONSPECIFIC PROCEDURE      nl colonoscopy 2006    ZZC NONSPECIFIC PROCEDURE  2011    L2-L3 foramenotomies    ZZC NONSPECIFIC PROCEDURE  2012    lumbar fusion     Family History   Problem Relation Age of Onset    Breast Cancer Mother     Heart Disease Father 25         in a fire     Heart Disease Paternal Grandfather     Breast Cancer Sister     Hyperlipidemia Sister             Allergies   Diatrizoate, Morphine, Morphine and related, Oxycodone-acetaminophen, Atenolol, Diltiazem, Labetalol, Lipitor [atorvastatin calcium], Nifedipine, Sulfa antibiotics, Zocor [simvastatin], Aleve [naproxen], Amlodipine, Blood-group specific substance, Bystolic [nebivolol hcl], Calcium carbonate, Cefdinir, Crestor [rosuvastatin calcium], Diovan [valsartan], Ezetimibe, Fluticasone-salmeterol, Hydralazine, Hydrochlorothiazide, Imdur [isosorbide mononitrate], Lisinopril, Lovastatin, Metformin, Metoprolol, Niacin, No clinical screening - see comments, Potassium chloride in nacl, Pravachol [statins], and Prilosec otc [omeprazole magnesium]        Beena Newman NP  [facility] CARE  Pager: [phone]

## 2023-08-17 NOTE — LETTER
8/17/2023    Steven Fonseca MD  [address]  [address]  [facility] [address]    RE: Ginger Abreu       Dear Colleague,     I had the pleasure of seeing Ginger Abreu in the [facility].  Cardiology Clinic Progress Note  Ginger Abreu MRN# [id number]   YOB: 1940 Age: 83 year old   Primary Cardiologist: Dr. Maldonado Reason for visit: Post hospitalization follow up            Assessment and Plan:     1.  NSTEMI       Takotsubo cardiomyopathy  -Hospitalized 7/2023 with chest pain and elevated troponin, coronary angiogram negative, echocardiogram findings consistent with Takotsubo: Distal anterior, distal anterolateral, apical wall severe hypokinesis, ejection fraction 45 to 50%  -GDMT: Initiated on Entresto 24-26 twice daily and continued on Jardiance    2.  Hypertension, borderline controlled, currently off entresto  -Candesartan changed to Entresto 24-26 during 7/2023 hospitalization    3.  Normal coronary arteries   -2012 CT coronary artery with mild LAD disease  -7/2023 coronary angiogram showed angiographically normal coronary arteries    4.  Mixed hyperlipidemia, controlled  -/2023 LDL 41  -Continue rosuvastatin 20 mg daily    5.  Mixed renal insufficiency, stable  -Baseline creatinine 0.8-1.2    6.  Type 2 diabetes, controlled  -Hemoglobin A1c 6%    Patient has been doing very well since her hospitalization.  She denies any side effects after starting Entresto.  Her labs were repeated at her primary care provider on 7/18/2023 which showed normal electrolytes and stable renal function with a creatinine of 0.99 and GFR 57.  Unfortunately she ran out of her Entresto approximately 5 days ago.  I provided her with a refill today.  She appears euvolemic and does not wear addition of a diuretic today.  Her blood pressure slightly on the high side at 138/66, I suspect this is secondary to not being on her Entresto.  Her previous readings her PCP following  hospitalization was 120/51.  Continue her current medication regimen and repeat her echocardiogram in 1 month.    Changes today: No current medication changes, resume Entresto    Follow up plan: Follow-up with Dr. Edwin Ram in 2 months        History of Presenting Illness:    Ginger Abreu is a very pleasant 83 year old female with a history of resistant hypertension who was found to have renal artery stenosis and underwent stenting of the left renal artery, moderate right renal artery stenosis (medically managed), provoked pulmonary embolism [place] 2012), mild coronary artery disease (CTA with mild LAD disease), type 2 diabetes, mixed hyperlipidemia, multiple medication intolerances.    Patient was hospitalized 7/6/2023 with acute onset chest pressure and diagnosed with NSTEMI.  Troponin was elevated up to 323 and underwent coronary angiography which showed angiographically normal coronary arteries.  LV gram showed left ventricular dysfunction with apical ballooning and akinesia basal hypokinesia consistent with Takotsubo cardiomyopathy.  Ejection fraction was estimated at 30 to 40%.  Subsequent transthoracic echocardiogram showed severe hypokinesis of the distal anterior, distal anterolateral, and apical walls, mild reduction in ejection fraction to 45 to 50%.  Plan is to initiate guideline directed medical therapy and repeat echocardiogram in 2 months.  She was started on Entresto 24-26 mg twice daily and continued on Jardiance 10 mg daily.    Patient is here today for a post hospitalization follow up. She ran out of entresto 5 days ago and waited until this visit to request a refill. Patient reports feeling good. She has been avoiding strenuous activity but has had more life stress. Following her hospitalization her  fell and broke his hip and femur.     Patient denies chest pain or chest tightness. Denies dizziness, lightheadedness or other presyncopal symptoms. Denies tachycardia or palpitations.   "Denies weight gain, shortness of breath, orthopnea, or PND.    Blood pressure 138/66 and HR 60 in clinic today.          Recent Hospitalizations   As above          Social History      Social History     Socioeconomic History    Marital status:      Spouse name: Not on file    Number of children: Not on file    Years of education: Not on file    Highest education level: Not on file   Occupational History    Not on file   Tobacco Use    Smoking status: Never    Smokeless tobacco: Never   Substance and Sexual Activity    Alcohol use: No     Alcohol/week: 0.0 standard drinks of alcohol     Comment: very rare    Drug use: No    Sexual activity: Yes   Other Topics Concern    Parent/sibling w/ CABG, MI or angioplasty before 65F 55M? No     Service Not Asked    Blood Transfusions Not Asked    Caffeine Concern No     Comment: soda 1 time per day    Occupational Exposure Not Asked    Hobby Hazards Not Asked    Sleep Concern Not Asked    Stress Concern Not Asked    Weight Concern Not Asked    Special Diet No    Back Care Not Asked    Exercise No     Comment: limited due to back pain     Bike Helmet Not Asked    Seat Belt Not Asked    Self-Exams Not Asked   Social History Narrative    Not on file     Social Determinants of Health     Financial Resource Strain: Not on file   Food Insecurity: Not on file   Transportation Needs: Not on file   Physical Activity: Not on file   Stress: Not on file   Social Connections: Not on file   Intimate Partner Violence: Not on file   Housing Stability: Not on file            Review of Systems:   Skin:        Eyes:       ENT:       Respiratory:  Negative    Cardiovascular:  Negative    Gastroenterology:      Genitourinary:       Musculoskeletal:       Neurologic:       Psychiatric:       Heme/Lymph/Imm:       Endocrine:  Positive for diabetes;thyroid disorder         Physical Exam:   Vitals: /66   Pulse 60   Ht 1.575 m (5' 2\")   Wt 66 kg (145 lb 6.4 oz)   SpO2 97%   " BMI 26.59 kg/m     Wt Readings from Last 4 Encounters:   08/17/23 66 kg (145 lb 6.4 oz)   07/07/23 64.5 kg (142 lb 3.2 oz)   09/29/22 68.3 kg (150 lb 8 oz)   08/09/22 68.2 kg (150 lb 4.8 oz)     GEN: well nourished, in no acute distress.  HEENT:  Pupils equal, round. Sclerae nonicteric.   NECK: Supple, no masses appreciated. No JVD with patient supine.  C/V:  Regular rate and rhythm, no murmur, rub or gallop.    RESP: Respirations are unlabored. Clear to auscultation bilaterally without wheezing, rales, or rhonchi.  GI: Abdomen soft, nontender.  EXTREM: No LE edema.  NEURO: Alert and oriented, cooperative.  SKIN: Warm and dry.        Data:       LIPID RESULTS:  Lab Results   Component Value Date    CHOL 111 07/06/2023    CHOL 123 06/22/2021    HDL 46 (L) 07/06/2023    HDL 48 (L) 06/22/2021    LDL 41 07/06/2023    LDL 40 06/22/2021    TRIG 118 07/06/2023    TRIG 177 (H) 06/22/2021    CHOLHDLRATIO 4.2 04/03/2015     LIVER ENZYME RESULTS:  Lab Results   Component Value Date    AST 19 07/06/2023    AST 22 04/05/2018    ALT 21 07/06/2023    ALT 25 06/22/2021     CBC RESULTS:  Lab Results   Component Value Date    WBC 6.1 07/07/2023    WBC 6.0 04/30/2021    RBC 4.24 07/07/2023    RBC 3.77 (L) 04/30/2021    HGB 13.1 07/07/2023    HGB 11.7 04/30/2021    HCT 40.1 07/07/2023    HCT 36.9 04/30/2021    MCV 95 07/07/2023    MCV 98 04/30/2021    MCH 30.9 07/07/2023    MCH 31.0 04/30/2021    MCHC 32.7 07/07/2023    MCHC 31.7 04/30/2021    RDW 13.2 07/07/2023    RDW 14.3 04/30/2021     (L) 07/07/2023     (L) 04/30/2021     BMP RESULTS:  Lab Results   Component Value Date     07/07/2023     07/02/2021    POTASSIUM 3.6 07/07/2023    POTASSIUM 4.0 09/29/2022    POTASSIUM 4.3 07/02/2021    CHLORIDE 105 07/07/2023    CHLORIDE 107 09/29/2022    CHLORIDE 110 (H) 07/02/2021    CO2 25 07/07/2023    CO2 25 09/29/2022    CO2 26 07/02/2021    ANIONGAP 12 07/07/2023    ANIONGAP 7 09/29/2022    ANIONGAP 4 07/02/2021      (H) 07/07/2023     (H) 09/29/2022     (H) 07/02/2021    BUN 21.6 07/07/2023    BUN 41 (H) 09/29/2022    BUN 28 07/02/2021    CR 0.94 07/07/2023    CR 1.24 (H) 07/02/2021    GFRESTIMATED 60 (L) 07/07/2023    GFRESTIMATED 41 (L) 07/02/2021    GFRESTBLACK 47 (L) 07/02/2021    ROGE 9.4 07/07/2023    ORGE 9.5 07/02/2021      A1C RESULTS:  Lab Results   Component Value Date    A1C 6.0 (H) 07/06/2023    A1C 6.0 (H) 07/12/2019     INR RESULTS:  Lab Results   Component Value Date    INR 0.92 02/27/2017    INR 2.2 (A) 11/05/2012    INR 2.1 (A) 10/15/2012    INR 2.64 (H) 04/27/2012            Medications     Current Outpatient Medications   Medication Sig Dispense Refill    albuterol (PROAIR HFA/PROVENTIL HFA/VENTOLIN HFA) 108 (90 BASE) MCG/ACT Inhaler Inhale 2 puffs into the lungs every 6 hours INDICATION: RESCUE INHALER FOR SHORTNESS OF BREATH, CHEST TIGHTNESS AND COUGH (Patient taking differently: Inhale 2 puffs into the lungs every 6 hours as needed INDICATION: RESCUE INHALER FOR SHORTNESS OF BREATH, CHEST TIGHTNESS AND COUGH) 1 Inhaler 11    alpha-lipoic acid 600 MG CAPS Take 600 mg by mouth 3 times daily       APPLE CIDER VINEGAR PO Take 1 capsule by mouth daily      ascorbic acid (VITAMIN C) 1000 MG TABS Take 1 tablet (1,000 mg) by mouth daily VITAMIN C REPLACEMENT 100 tablet 3    aspirin (ASA) 81 MG EC tablet Take 1 tablet (81 mg) by mouth daily      B-12 (methylcobalamin) 1000 MCG SUBL sublingual tablet Place 1,000 mcg under the tongue daily      clonazePAM (KLONOPIN) 0.5 MG tablet Take 0.5-1 mg by mouth 2 times daily as needed for anxiety      Cranberry 125 MG TABS Take 1 tablet by mouth daily      diphenhydrAMINE (BENADRYL) 25 MG tablet Take 25 mg by mouth every 6 hours as needed for itching or allergies      empagliflozin (JARDIANCE) 10 MG TABS tablet Take 10 mg by mouth daily      esomeprazole (NEXIUM) 40 MG capsule Take 1 capsule (40 mg) by mouth every morning (before breakfast) TAKE  30'-60'  BEFORE 1ST MEAL 180 capsule 2    famotidine (PEPCID) 40 MG tablet Take 40 mg by mouth daily      Ferrous Sulfate (IRON) 325 (65 Fe) MG tablet Take 1 tablet by mouth daily      hydrochlorothiazide (HYDRODIURIL) 25 MG tablet Take 12.5 mg by mouth every other day      lactobacillus rhamnosus, GG, (CULTURELL) capsule Take 1 capsule by mouth 2 times daily      latanoprost (XALATAN) 0.005 % ophthalmic solution Place 1 drop into both eyes At Bedtime      levothyroxine (SYNTHROID/LEVOTHROID) 25 MCG tablet TAKE 1/2 (ONE-HALF) TABLET BY MOUTH FIRST THING IN THE MORNING TO STOMACH. NO FOOD FOR AN HOUR TO TREAT UNDERACTIVE THYROID.      Melatonin 10 MG TABS tablet Take 10 mg by mouth At Bedtime      methocarbamol (ROBAXIN) 750 MG tablet Take 750 mg by mouth 4 times daily as needed for muscle spasms      niacin 500 MG tablet Take 500 mg by mouth daily (with breakfast)      nitroglycerin (NITROSTAT) 0.4 MG SL tablet Place 1 tablet (0.4 mg) under the tongue every 5 minutes as needed for chest pain 25 tablet 0    oxybutynin ER (DITROPAN XL) 15 MG 24 hr tablet Take 15 mg by mouth daily (with lunch)      rosuvastatin (CRESTOR) 20 MG tablet Take 20 mg by mouth daily      sacubitril-valsartan (ENTRESTO) 24-26 MG per tablet Take 1 tablet by mouth 2 times daily 60 tablet 11    SUMAtriptan (IMITREX) 25 MG tablet Take 25 mg by mouth at onset of headache for migraine      timolol maleate (TIMOPTIC) 0.5 % ophthalmic solution Place 1 drop into both eyes 2 times daily      tirzepatide (MOUNJARO) 2.5 MG/0.5ML pen Inject 2.5 mg Subcutaneous every 7 days Takes on Thursdays      UNABLE TO FIND Salt Iodine 1000mg - Take 1 (one) tablet Monday throught Saturday and 2 (two) tablets on Sunday.      vitamin D (ERGOCALCIFEROL) 69120 UNIT capsule Take 50,000 Units by mouth once a week Takes on the 10th, 20th, and 30th of each month      ZINC SULFATE PO Take 1 tablet by mouth daily      BETA BLOCKER NOT PRESCRIBED, INTENTIONAL, Beta Blocker not prescribed  intentionally due to Intolerance  0    glimepiride (AMARYL) 4 MG tablet Take 4 mg by mouth 2 times daily (Patient not taking: Reported on 8/17/2023)            Past Medical History     Past Medical History:   Diagnosis Date    Acute peptic ulcer, unspecified site, without mention of hemorrhage, perforation, or obstruction     Allergic rhinitis, cause unspecified     Anxiety     Basal cell carcinoma     DVT (deep venous thrombosis) (H) 03/2012    following spinal surgery. Saw Oncology. 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 Saw Oncology. treated 6 months    Peripheral neuropathy 6/19/2015    Pneumonia, organism unspecified(486)     Spinal stenosis     Squamous cell carcinoma     Thyroid nodule     Type II or unspecified type diabetes mellitus without mention of complication, not stated as uncontrolled     Unspecified essential hypertension      Past Surgical History:   Procedure Laterality Date    CV CORONARY ANGIOGRAM N/A 7/6/2023    Procedure: Coronary Angiogram;  Surgeon: Papa Schilling MD;  Location: [facility] CARDIAC CATH LAB    CV LEFT HEART CATH N/A 7/6/2023    Procedure: Left Heart Catheterization;  Surgeon: Papa Schilling MD;  Location: [facility] CARDIAC CATH LAB    CV LEFT VENTRICULOGRAM N/A 7/6/2023    Procedure: Left Ventriculogram;  Surgeon: Papa Schilling MD;  Location: [facility]  CARDIAC CATH LAB    NONSPECIFIC PROCEDURE      Bilateral sphenoidotomy with removal of polypoid tissue.     Right frontal sinusotomy.   Bilateral total ethmoidectomy.  Bilateral maxillary antrostomy with removal of polypoid tissue.      ORTHOPEDIC SURGERY      lumbar fusion    ZZC NONSPECIFIC PROCEDURE  approx     hyst/bso    ZZC NONSPECIFIC PROCEDURE  approx     gb    ZZC NONSPECIFIC PROCEDURE  approx     cystocele repair    ZZC NONSPECIFIC PROCEDURE  approx     endoscopic sinus     ZZC NONSPECIFIC PROCEDURE      epidurals x 7    ZZC NONSPECIFIC PROCEDURE      nl colonoscopy 2006    ZZC NONSPECIFIC PROCEDURE      L2-L3 foramenotomies    ZZC NONSPECIFIC PROCEDURE  2012    lumbar fusion     Family History   Problem Relation Age of Onset    Breast Cancer Mother     Heart Disease Father 25         in a fire     Heart Disease Paternal Grandfather     Breast Cancer Sister     Hyperlipidemia Sister             Allergies   Diatrizoate, Morphine, Morphine and related, Oxycodone-acetaminophen, Atenolol, Diltiazem, Labetalol, Lipitor [atorvastatin calcium], Nifedipine, Sulfa antibiotics, Zocor [simvastatin], Aleve [naproxen], Amlodipine, Blood-group specific substance, Bystolic [nebivolol hcl], Calcium carbonate, Cefdinir, Crestor [rosuvastatin calcium], Diovan [valsartan], Ezetimibe, Fluticasone-salmeterol, Hydralazine, Hydrochlorothiazide, Imdur [isosorbide mononitrate], Lisinopril, Lovastatin, Metformin, Metoprolol, Niacin, No clinical screening - see comments, Potassium chloride in nacl, Pravachol [statins], and Prilosec otc [omeprazole magnesium]        Beena Newman NP  [facility]  Pager: [phone]       Thank you for allowing me to participate in the care of your patient.      Sincerely,     Beena Newman NP     [facility]  cc:   No referring provider defined for this encounter.

## 2023-09-18 ENCOUNTER — LAB (OUTPATIENT)
Dept: LAB | Facility: CLINIC | Age: 83
End: 2023-09-18
Payer: COMMERCIAL

## 2023-09-18 ENCOUNTER — HOSPITAL ENCOUNTER (OUTPATIENT)
Dept: CARDIOLOGY | Facility: CLINIC | Age: 83
Discharge: HOME | End: 2023-09-18
Attending: NURSE PRACTITIONER | Admitting: NURSE PRACTITIONER
Payer: COMMERCIAL

## 2023-09-18 DIAGNOSIS — I25.10 CORONARY ARTERY DISEASE INVOLVING NATIVE CORONARY ARTERY OF NATIVE HEART WITHOUT ANGINA PECTORIS: ICD-10-CM

## 2023-09-18 DIAGNOSIS — I10 BENIGN ESSENTIAL HYPERTENSION: ICD-10-CM

## 2023-09-18 DIAGNOSIS — I51.81 TAKOTSUBO CARDIOMYOPATHY: ICD-10-CM

## 2023-09-18 LAB
ALT SERPL W P-5'-P-CCNC: 21 U/L (ref 0–50)
ANION GAP SERPL CALCULATED.3IONS-SCNC: 9 MMOL/L (ref 7–15)
BUN SERPL-MCNC: 23.7 MG/DL (ref 8–23)
CALCIUM SERPL-MCNC: 9.3 MG/DL (ref 8.8–10.2)
CHLORIDE SERPL-SCNC: 107 MMOL/L (ref 98–107)
CHOLEST SERPL-MCNC: 87 MG/DL
CREAT SERPL-MCNC: 1.12 MG/DL (ref 0.51–0.95)
DEPRECATED HCO3 PLAS-SCNC: 27 MMOL/L (ref 22–29)
EGFRCR SERPLBLD CKD-EPI 2021: 49 ML/MIN/1.73M2
GLUCOSE SERPL-MCNC: 142 MG/DL (ref 70–99)
HDLC SERPL-MCNC: 38 MG/DL
LDLC SERPL CALC-MCNC: 27 MG/DL
LVEF ECHO: NORMAL
NONHDLC SERPL-MCNC: 49 MG/DL
POTASSIUM SERPL-SCNC: 3.8 MMOL/L (ref 3.4–5.3)
SODIUM SERPL-SCNC: 143 MMOL/L (ref 136–145)
TRIGL SERPL-MCNC: 111 MG/DL

## 2023-09-18 PROCEDURE — 80048 BASIC METABOLIC PNL TOTAL CA: CPT | Performed by: INTERNAL MEDICINE

## 2023-09-18 PROCEDURE — 80061 LIPID PANEL: CPT | Performed by: INTERNAL MEDICINE

## 2023-09-18 PROCEDURE — 93325 DOPPLER ECHO COLOR FLOW MAPG: CPT | Mod: 26 | Performed by: INTERNAL MEDICINE

## 2023-09-18 PROCEDURE — 93325 DOPPLER ECHO COLOR FLOW MAPG: CPT

## 2023-09-18 PROCEDURE — 84460 ALANINE AMINO (ALT) (SGPT): CPT | Performed by: INTERNAL MEDICINE

## 2023-09-18 PROCEDURE — 93321 DOPPLER ECHO F-UP/LMTD STD: CPT

## 2023-09-18 PROCEDURE — 93321 DOPPLER ECHO F-UP/LMTD STD: CPT | Mod: 26 | Performed by: INTERNAL MEDICINE

## 2023-09-18 PROCEDURE — 36415 COLL VENOUS BLD VENIPUNCTURE: CPT | Performed by: INTERNAL MEDICINE

## 2023-09-18 PROCEDURE — 93308 TTE F-UP OR LMTD: CPT | Mod: 26 | Performed by: INTERNAL MEDICINE

## 2023-09-22 ENCOUNTER — ANCILLARY PROCEDURE (OUTPATIENT)
Dept: ULTRASOUND IMAGING | Facility: CLINIC | Age: 83
End: 2023-09-22
Attending: INTERNAL MEDICINE
Payer: COMMERCIAL

## 2023-09-22 DIAGNOSIS — E04.2 NON-TOXIC MULTINODULAR GOITER: ICD-10-CM

## 2023-09-22 PROCEDURE — 76536 US EXAM OF HEAD AND NECK: CPT

## 2023-10-16 ENCOUNTER — OFFICE VISIT (OUTPATIENT)
Dept: CARDIOLOGY | Facility: CLINIC | Age: 83
End: 2023-10-16
Attending: NURSE PRACTITIONER
Payer: COMMERCIAL

## 2023-10-16 VITALS
HEIGHT: 62 IN | DIASTOLIC BLOOD PRESSURE: 66 MMHG | SYSTOLIC BLOOD PRESSURE: 136 MMHG | OXYGEN SATURATION: 97 % | HEART RATE: 65 BPM | WEIGHT: 138.5 LBS | BODY MASS INDEX: 25.49 KG/M2

## 2023-10-16 DIAGNOSIS — E78.2 MIXED HYPERLIPIDEMIA: ICD-10-CM

## 2023-10-16 DIAGNOSIS — I51.81 TAKOTSUBO CARDIOMYOPATHY: ICD-10-CM

## 2023-10-16 DIAGNOSIS — I10 BENIGN ESSENTIAL HYPERTENSION: ICD-10-CM

## 2023-10-16 DIAGNOSIS — I15.0 RENOVASCULAR HYPERTENSION: Primary | ICD-10-CM

## 2023-10-16 DIAGNOSIS — I25.10 CORONARY ARTERY DISEASE INVOLVING NATIVE CORONARY ARTERY OF NATIVE HEART WITHOUT ANGINA PECTORIS: ICD-10-CM

## 2023-10-16 PROCEDURE — 99214 OFFICE O/P EST MOD 30 MIN: CPT | Performed by: INTERNAL MEDICINE

## 2023-10-16 NOTE — LETTER
10/16/2023    Steven Fonseca MD  [address]  [address]  [facility] [address]    RE: Ginger Abreu       Dear Colleague,     I had the pleasure of seeing Ginger Abreu in the [facility].  HPI and Plan:   It is my pleasure to see your patient Ginger Abreu who is a very pleasant 83-year-old patient with a history of difficult to control hypertension who was found to have renal artery stenosis and underwent stenting of the left renal artery and the moderate stenosis on the right was felt to be best treated medically.  She also has a past history of pulmonary embolism and she has coronary artery disease based upon CT angiography in the past.  She has mixed hyperlipidemia and diabetes mellitus.  Most recently this year she developed Takotsubo cardiomyopathy.  Her ejection fraction was felt to be in the 45% range at that time.    With that background in mind the patient is feeling well from a cardiac point of view.  She has no chest pains or chest pressure or unusual shortness of breath.  Her blood pressure is well controlled at 136/66.  She was previously on candesartan but this was stopped around the time of her admission for her Takotsubo cardiomyopathy and she is now on Entresto.  Repeat echocardiography approximately 4 weeks ago shows that her ejection fraction has now returned to normal.  Her ejection fraction is 60 to 65%.  Her lipid profile apart from HDL deficiency was quite good with an LDL of 27 triglycerides of 111 and mild HDL deficiency of 38.  Total cholesterol was 87.  Liver function tests were normal with an ALT of 21.  This patient has a past history of renal insufficiency.  Her renal function a month ago shows a creatinine of 1.12 BUN of 23.7 GFR 49.  Sodium was 143 and potassium was 3.8.  This was similar to the findings of this time last year though less than what it was in July.    Impression:  1.  Takotsubo cardiomyopathy.  Ejection fraction is returned to  normal.  2.  Essential hypertension which is in part renovascular.  Blood pressures within normal limits.  3.  Mild renal insufficiency  4.  Apart from mild HDL deficiency the rest of the lipid profile is excellent with no hepatic toxicity.  5.  Coronary artery disease.  The patient is asymptomatic with respect to coronary artery disease.    Plan:  1.  We will continue the patient on her present medications.  2.  We will see the patient back again in 1 years time.  We will repeat her lipids, basic metabolic profile and echocardiogram at that time.    As always the patient been told to contact me if she is any questions or any concerns    Vaibhav Ram MD, FACC, FRCPI    Orders Placed This Encounter   Procedures    Basic metabolic panel    Lipid Profile    ALT    Follow-Up with Cardiology    Echocardiogram Complete       No orders of the defined types were placed in this encounter.      Medications Discontinued During This Encounter   Medication Reason    glimepiride (AMARYL) 4 MG tablet Discontinued by another Health Care Provider (No AVS)         Encounter Diagnoses   Name Primary?    Takotsubo cardiomyopathy     Renovascular hypertension Yes    Benign essential hypertension     Coronary artery disease involving native coronary artery of native heart without angina pectoris     Mixed hyperlipidemia        CURRENT MEDICATIONS:  Current Outpatient Medications   Medication Sig Dispense Refill    albuterol (PROAIR HFA/PROVENTIL HFA/VENTOLIN HFA) 108 (90 BASE) MCG/ACT Inhaler Inhale 2 puffs into the lungs every 6 hours INDICATION: RESCUE INHALER FOR SHORTNESS OF BREATH, CHEST TIGHTNESS AND COUGH (Patient taking differently: Inhale 2 puffs into the lungs every 6 hours as needed INDICATION: RESCUE INHALER FOR SHORTNESS OF BREATH, CHEST TIGHTNESS AND COUGH) 1 Inhaler 11    alpha-lipoic acid 600 MG CAPS Take 600 mg by mouth 3 times daily       APPLE CIDER VINEGAR PO Take 1 capsule by mouth daily      ascorbic acid  (VITAMIN C) 1000 MG TABS Take 1 tablet (1,000 mg) by mouth daily VITAMIN C REPLACEMENT 100 tablet 3    aspirin (ASA) 81 MG EC tablet Take 1 tablet (81 mg) by mouth daily      B-12 (methylcobalamin) 1000 MCG SUBL sublingual tablet Place 1,000 mcg under the tongue daily      clonazePAM (KLONOPIN) 0.5 MG tablet Take 0.5-1 mg by mouth 2 times daily as needed for anxiety      Cranberry 125 MG TABS Take 1 tablet by mouth daily      diphenhydrAMINE (BENADRYL) 25 MG tablet Take 25 mg by mouth every 6 hours as needed for itching or allergies      empagliflozin (JARDIANCE) 10 MG TABS tablet Take 10 mg by mouth daily      esomeprazole (NEXIUM) 40 MG capsule Take 1 capsule (40 mg) by mouth every morning (before breakfast) TAKE  30'-60' BEFORE 1ST MEAL 180 capsule 2    famotidine (PEPCID) 40 MG tablet Take 40 mg by mouth daily      Ferrous Sulfate (IRON) 325 (65 Fe) MG tablet Take 1 tablet by mouth daily      hydrochlorothiazide (HYDRODIURIL) 25 MG tablet Take 12.5 mg by mouth every other day      lactobacillus rhamnosus, GG, (CULTURELL) capsule Take 1 capsule by mouth 2 times daily      latanoprost (XALATAN) 0.005 % ophthalmic solution Place 1 drop into both eyes At Bedtime      levothyroxine (SYNTHROID/LEVOTHROID) 25 MCG tablet TAKE 1/2 (ONE-HALF) TABLET BY MOUTH FIRST THING IN THE MORNING TO STOMACH. NO FOOD FOR AN HOUR TO TREAT UNDERACTIVE THYROID.      Melatonin 10 MG TABS tablet Take 10 mg by mouth At Bedtime      methocarbamol (ROBAXIN) 750 MG tablet Take 750 mg by mouth 4 times daily as needed for muscle spasms      niacin 500 MG tablet Take 500 mg by mouth daily (with breakfast)      nitroglycerin (NITROSTAT) 0.4 MG SL tablet Place 1 tablet (0.4 mg) under the tongue every 5 minutes as needed for chest pain 25 tablet 0    oxybutynin ER (DITROPAN XL) 15 MG 24 hr tablet Take 15 mg by mouth daily (with lunch)      rosuvastatin (CRESTOR) 20 MG tablet Take 20 mg by mouth daily      sacubitril-valsartan (ENTRESTO) 24-26 MG per  tablet Take 1 tablet by mouth 2 times daily 60 tablet 11    SUMAtriptan (IMITREX) 25 MG tablet Take 25 mg by mouth at onset of headache for migraine      timolol maleate (TIMOPTIC) 0.5 % ophthalmic solution Place 1 drop into both eyes 2 times daily      tirzepatide (MOUNJARO) 2.5 MG/0.5ML pen Inject 2.5 mg Subcutaneous every 7 days Takes on Thursdays      UNABLE TO FIND Salt Iodine 1000mg - Take 1 (one) tablet Monday throught Saturday and 2 (two) tablets on Sunday.      vitamin D (ERGOCALCIFEROL) 06242 UNIT capsule Take 50,000 Units by mouth once a week Takes on the 10th, 20th, and 30th of each month      ZINC SULFATE PO Take 1 tablet by mouth daily      BETA BLOCKER NOT PRESCRIBED, INTENTIONAL, Beta Blocker not prescribed intentionally due to Intolerance (Patient not taking: Reported on 10/16/2023)  0       ALLERGIES     Allergies   Allergen Reactions    Diatrizoate Shortness Of Breath     Other reaction(s): Shortness Of Breath  rapid heart beat  rapid heart beat      Morphine      Other reaction(s): GI Upset  Patient says she will not take because causes GI Upset    Morphine And Related Difficulty breathing     Chest gets tight & difficulty breathing     Oxycodone-Acetaminophen      Other reaction(s): GI Upset  Patient says she will not take because causes GI Upset    Atenolol Other (See Comments)     Sob w/ exertion - asthma symptoms    Diltiazem Other (See Comments)     edema - fluid retention in legs    Other reaction(s): Edema  Other reaction(s): Edema      Labetalol Other (See Comments) and Itching     Sob w/ exertion - asthma symptoms  itching    Lipitor [Atorvastatin Calcium] Other (See Comments)     Muscle weakness    Nifedipine Other (See Comments)     Sob w/ exertion - asthma symptoms, HAs    Other reaction(s): Other (See Comments)  Sob w/ exertion - asthma symptoms, HAs    Sulfa Antibiotics Hives     hives    Zocor [Simvastatin] Other (See Comments)     Muscle weakness    Aleve [Naproxen] Other (See  Comments)     Raises BP    Amlodipine Fatigue and Diarrhea           Blood-Group Specific Substance Other (See Comments)     Patient has anti-Odalys. Blood product orders may be delayed.  Other reaction(s): Other - Describe In Comment Field  Patient has anti-Odalys. Blood product orders may be delayed.      Bystolic [Nebivolol Hcl] Other (See Comments)     headaches    Calcium Carbonate Diarrhea    Cefdinir Diarrhea    Crestor [Rosuvastatin Calcium] Muscle Pain (Myalgia)    Diovan [Valsartan] GI Disturbance     gas      Ezetimibe Muscle Pain (Myalgia)     Other reaction(s): Muscle Weakness  Other reaction(s): Myalgia      Fluticasone-Salmeterol Other (See Comments)     hoarseness with 500/50, not the 250/50    Hydralazine Other (See Comments) and Nausea     Increases pulse    Hydrochlorothiazide Other (See Comments)     increasing glucose    Imdur [Isosorbide Mononitrate] Other (See Comments)     headaches    Lisinopril Cough     Hoarse voice    Lovastatin Muscle Pain (Myalgia)    Metformin Diarrhea           Metoprolol Other (See Comments)     Sob w/ exertion - asthma symptoms      Niacin Other (See Comments)     hyperglycemia    No Clinical Screening - See Comments      PN: LW CM1: >>> NO CONTRAST ADVERSE REACTION <<<     Reaction :  KCL---gas sx    Potassium Chloride In Nacl GI Disturbance     gas     Pravachol [Statins] Muscle Pain (Myalgia)    Prilosec Otc [Omeprazole Magnesium] Diarrhea     diarrhea         PAST MEDICAL HISTORY:  Past Medical History:   Diagnosis Date    Acute peptic ulcer, unspecified site, without mention of hemorrhage, perforation, or obstruction     Allergic rhinitis, cause unspecified     Anxiety     Basal cell carcinoma     DVT (deep venous thrombosis) (H) 03/2012    following spinal surgery. Saw Oncology. 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 Saw Oncology. treated 6 months    Peripheral neuropathy 6/19/2015    Pneumonia, organism unspecified(486)     Spinal stenosis     Squamous cell carcinoma     Thyroid nodule     Type II or unspecified type diabetes mellitus without mention of complication, not stated as uncontrolled     Unspecified essential hypertension        PAST SURGICAL HISTORY:  Past Surgical History:   Procedure Laterality Date    CV CORONARY ANGIOGRAM N/A 7/6/2023    Procedure: Coronary Angiogram;  Surgeon: Papa Schilling MD;  Location:  [facility]ARD[facility] CATH LAB    CV LEFT HEART CATH N/A 7/6/2023    Procedure: Left Heart Catheterization;  Surgeon: Papa Schilling MD;  Location:  [facility]ARD[facility] CATH LAB    CV LEFT VENTRICULOGRAM N/A 7/6/2023    Procedure: Left Ventriculogram;  Surgeon: Papa Schilling MD;  Location:  HEART CARDIAC CATH LAB    NONSPECIFIC PROCEDURE  2011    Bilateral sphenoidotomy with removal of polypoid tissue.     Right frontal sinusotomy.   Bilateral total ethmoidectomy.  Bilateral maxillary antrostomy with removal of polypoid tissue.      ORTHOPEDIC SURGERY      lumbar fusion    ZZC NONSPECIFIC PROCEDURE  approx 1970's    hyst/bso    ZZC NONSPECIFIC PROCEDURE  approx 1970's    gb    [facility]C NONSPECIFIC PROCEDURE  approx 1980's    cystocele repair    ZZC NONSPECIFIC PROCEDURE  approx 1980's    endoscopic sinus     [facility]C NONSPECIFIC PROCEDURE      epidurals x 7    ZZC NONSPECIFIC PROCEDURE      nl colonoscopy 1/2006    [facility]C NONSPECIFIC PROCEDURE  2011     "L2-L3 foramenotomies    [facility] NONSPECIFIC PROCEDURE  2012    lumbar fusion       FAMILY HISTORY:  Family History   Problem Relation Age of Onset    Breast Cancer Mother     Heart Disease Father 25         in a fire     Heart Disease Paternal Grandfather     Breast Cancer Sister     Hyperlipidemia Sister        SOCIAL HISTORY:  Social History     Socioeconomic History    Marital status:      Spouse name: None    Number of children: None    Years of education: None    Highest education level: None   Tobacco Use    Smoking status: Never    Smokeless tobacco: Never   Substance and Sexual Activity    Alcohol use: Yes     Comment: very rare    Drug use: No    Sexual activity: Yes   Other Topics Concern    Parent/sibling w/ CABG, MI or angioplasty before 65F 55M? No    Caffeine Concern No     Comment: soda 1 time per day    Special Diet No    Exercise No     Comment: limited due to back pain        Review of Systems:  Skin:          Eyes:         ENT:         Respiratory:          Cardiovascular:         Gastroenterology:        Genitourinary:         Musculoskeletal:         Neurologic:         Psychiatric:         Heme/Lymph/Imm:         Endocrine:           Physical Exam:  Vitals: /66   Pulse 65   Ht 1.575 m (5' 2\")   Wt 62.8 kg (138 lb 8 oz)   SpO2 97%   BMI 25.33 kg/m      Constitutional:  cooperative, alert and oriented, well developed, well nourished, in no acute distress overweight      Skin:  warm and dry to the touch, no apparent skin lesions or masses noted          Head:  normocephalic, no masses or lesions        Eyes:  pupils equal and round        Lymph:      ENT:  no pallor or cyanosis        Neck:  carotid pulses are full and equal bilaterally, JVP normal, no carotid bruit        Respiratory:  clear to auscultation         Cardiac: regular rhythm;normal S1 and S2;no murmurs, gallops or rubs detected   S4            pulses full and equal, no bruits auscultated                             "       right femoral access site clean dry and intact with small lump at the site and mild ecchymosis    GI:  not assessed this visit        Extremities and Muscular Skeletal:  no edema;no deformities, clubbing, cyanosis, erythema observed              Neurological:  no gross motor deficits;affect appropriate  (Uses a walker)      Psych:  Alert and Oriented x 3        CC  Beena Newman NP  [address] [place] [address]          Thank you for allowing me to participate in the care of your patient.      Sincerely,     Vaibhav Maldonado MD, MD     [facility]

## 2023-10-16 NOTE — PROGRESS NOTES
HPI and Plan:   It is my pleasure to see your patient Ginger Abreu who is a very pleasant 83-year-old patient with a history of difficult to control hypertension who was found to have renal artery stenosis and underwent stenting of the left renal artery and the moderate stenosis on the right was felt to be best treated medically.  She also has a past history of pulmonary embolism and she has coronary artery disease based upon CT angiography in the past.  She has mixed hyperlipidemia and diabetes mellitus.  Most recently this year she developed Takotsubo cardiomyopathy.  Her ejection fraction was felt to be in the 45% range at that time.    With that background in mind the patient is feeling well from a cardiac point of view.  She has no chest pains or chest pressure or unusual shortness of breath.  Her blood pressure is well controlled at 136/66.  She was previously on candesartan but this was stopped around the time of her admission for her Takotsubo cardiomyopathy and she is now on Entresto.  Repeat echocardiography approximately 4 weeks ago shows that her ejection fraction has now returned to normal.  Her ejection fraction is 60 to 65%.  Her lipid profile apart from HDL deficiency was quite good with an LDL of 27 triglycerides of 111 and mild HDL deficiency of 38.  Total cholesterol was 87.  Liver function tests were normal with an ALT of 21.  This patient has a past history of renal insufficiency.  Her renal function a month ago shows a creatinine of 1.12 BUN of 23.7 GFR 49.  Sodium was 143 and potassium was 3.8.  This was similar to the findings of this time last year though less than what it was in July.    Impression:  1.  Takotsubo cardiomyopathy.  Ejection fraction is returned to normal.  2.  Essential hypertension which is in part renovascular.  Blood pressures within normal limits.  3.  Mild renal insufficiency  4.  Apart from mild HDL deficiency the rest of the lipid profile is excellent with no  hepatic toxicity.  5.  Coronary artery disease.  The patient is asymptomatic with respect to coronary artery disease.    Plan:  1.  We will continue the patient on her present medications.  2.  We will see the patient back again in 1 years time.  We will repeat her lipids, basic metabolic profile and echocardiogram at that time.    As always the patient been told to contact me if she is any questions or any concerns    Vaibhav Ram MD, FACC, FRCPI    Orders Placed This Encounter   Procedures    Basic metabolic panel    Lipid Profile    ALT    Follow-Up with Cardiology    Echocardiogram Complete       No orders of the defined types were placed in this encounter.      Medications Discontinued During This Encounter   Medication Reason    glimepiride (AMARYL) 4 MG tablet Discontinued by another Health Care Provider (No AVS)         Encounter Diagnoses   Name Primary?    Takotsubo cardiomyopathy     Renovascular hypertension Yes    Benign essential hypertension     Coronary artery disease involving native coronary artery of native heart without angina pectoris     Mixed hyperlipidemia        CURRENT MEDICATIONS:  Current Outpatient Medications   Medication Sig Dispense Refill    albuterol (PROAIR HFA/PROVENTIL HFA/VENTOLIN HFA) 108 (90 BASE) MCG/ACT Inhaler Inhale 2 puffs into the lungs every 6 hours INDICATION: RESCUE INHALER FOR SHORTNESS OF BREATH, CHEST TIGHTNESS AND COUGH (Patient taking differently: Inhale 2 puffs into the lungs every 6 hours as needed INDICATION: RESCUE INHALER FOR SHORTNESS OF BREATH, CHEST TIGHTNESS AND COUGH) 1 Inhaler 11    alpha-lipoic acid 600 MG CAPS Take 600 mg by mouth 3 times daily       APPLE CIDER VINEGAR PO Take 1 capsule by mouth daily      ascorbic acid (VITAMIN C) 1000 MG TABS Take 1 tablet (1,000 mg) by mouth daily VITAMIN C REPLACEMENT 100 tablet 3    aspirin (ASA) 81 MG EC tablet Take 1 tablet (81 mg) by mouth daily      B-12 (methylcobalamin) 1000 MCG SUBL sublingual  tablet Place 1,000 mcg under the tongue daily      clonazePAM (KLONOPIN) 0.5 MG tablet Take 0.5-1 mg by mouth 2 times daily as needed for anxiety      Cranberry 125 MG TABS Take 1 tablet by mouth daily      diphenhydrAMINE (BENADRYL) 25 MG tablet Take 25 mg by mouth every 6 hours as needed for itching or allergies      empagliflozin (JARDIANCE) 10 MG TABS tablet Take 10 mg by mouth daily      esomeprazole (NEXIUM) 40 MG capsule Take 1 capsule (40 mg) by mouth every morning (before breakfast) TAKE  30'-60' BEFORE 1ST MEAL 180 capsule 2    famotidine (PEPCID) 40 MG tablet Take 40 mg by mouth daily      Ferrous Sulfate (IRON) 325 (65 Fe) MG tablet Take 1 tablet by mouth daily      hydrochlorothiazide (HYDRODIURIL) 25 MG tablet Take 12.5 mg by mouth every other day      lactobacillus rhamnosus, GG, (CULTURELL) capsule Take 1 capsule by mouth 2 times daily      latanoprost (XALATAN) 0.005 % ophthalmic solution Place 1 drop into both eyes At Bedtime      levothyroxine (SYNTHROID/LEVOTHROID) 25 MCG tablet TAKE 1/2 (ONE-HALF) TABLET BY MOUTH FIRST THING IN THE MORNING TO STOMACH. NO FOOD FOR AN HOUR TO TREAT UNDERACTIVE THYROID.      Melatonin 10 MG TABS tablet Take 10 mg by mouth At Bedtime      methocarbamol (ROBAXIN) 750 MG tablet Take 750 mg by mouth 4 times daily as needed for muscle spasms      niacin 500 MG tablet Take 500 mg by mouth daily (with breakfast)      nitroglycerin (NITROSTAT) 0.4 MG SL tablet Place 1 tablet (0.4 mg) under the tongue every 5 minutes as needed for chest pain 25 tablet 0    oxybutynin ER (DITROPAN XL) 15 MG 24 hr tablet Take 15 mg by mouth daily (with lunch)      rosuvastatin (CRESTOR) 20 MG tablet Take 20 mg by mouth daily      sacubitril-valsartan (ENTRESTO) 24-26 MG per tablet Take 1 tablet by mouth 2 times daily 60 tablet 11    SUMAtriptan (IMITREX) 25 MG tablet Take 25 mg by mouth at onset of headache for migraine      timolol maleate (TIMOPTIC) 0.5 % ophthalmic solution Place 1 drop  into both eyes 2 times daily      tirzepatide (MOUNJARO) 2.5 MG/0.5ML pen Inject 2.5 mg Subcutaneous every 7 days Takes on Thursdays      UNABLE TO FIND Salt Iodine 1000mg - Take 1 (one) tablet Monday throught Saturday and 2 (two) tablets on Sunday.      vitamin D (ERGOCALCIFEROL) 52997 UNIT capsule Take 50,000 Units by mouth once a week Takes on the 10th, 20th, and 30th of each month      ZINC SULFATE PO Take 1 tablet by mouth daily      BETA BLOCKER NOT PRESCRIBED, INTENTIONAL, Beta Blocker not prescribed intentionally due to Intolerance (Patient not taking: Reported on 10/16/2023)  0       ALLERGIES     Allergies   Allergen Reactions    Diatrizoate Shortness Of Breath     Other reaction(s): Shortness Of Breath  rapid heart beat  rapid heart beat      Morphine      Other reaction(s): GI Upset  Patient says she will not take because causes GI Upset    Morphine And Related Difficulty breathing     Chest gets tight & difficulty breathing     Oxycodone-Acetaminophen      Other reaction(s): GI Upset  Patient says she will not take because causes GI Upset    Atenolol Other (See Comments)     Sob w/ exertion - asthma symptoms    Diltiazem Other (See Comments)     edema - fluid retention in legs    Other reaction(s): Edema  Other reaction(s): Edema      Labetalol Other (See Comments) and Itching     Sob w/ exertion - asthma symptoms  itching    Lipitor [Atorvastatin Calcium] Other (See Comments)     Muscle weakness    Nifedipine Other (See Comments)     Sob w/ exertion - asthma symptoms, HAs    Other reaction(s): Other (See Comments)  Sob w/ exertion - asthma symptoms, HAs    Sulfa Antibiotics Hives     hives    Zocor [Simvastatin] Other (See Comments)     Muscle weakness    Aleve [Naproxen] Other (See Comments)     Raises BP    Amlodipine Fatigue and Diarrhea           Blood-Group Specific Substance Other (See Comments)     Patient has anti-Odalys. Blood product orders may be delayed.  Other reaction(s): Other - Describe  In Comment Field  Patient has anti-Odalys. Blood product orders may be delayed.      Bystolic [Nebivolol Hcl] Other (See Comments)     headaches    Calcium Carbonate Diarrhea    Cefdinir Diarrhea    Crestor [Rosuvastatin Calcium] Muscle Pain (Myalgia)    Diovan [Valsartan] GI Disturbance     gas      Ezetimibe Muscle Pain (Myalgia)     Other reaction(s): Muscle Weakness  Other reaction(s): Myalgia      Fluticasone-Salmeterol Other (See Comments)     hoarseness with 500/50, not the 250/50    Hydralazine Other (See Comments) and Nausea     Increases pulse    Hydrochlorothiazide Other (See Comments)     increasing glucose    Imdur [Isosorbide Mononitrate] Other (See Comments)     headaches    Lisinopril Cough     Hoarse voice    Lovastatin Muscle Pain (Myalgia)    Metformin Diarrhea           Metoprolol Other (See Comments)     Sob w/ exertion - asthma symptoms      Niacin Other (See Comments)     hyperglycemia    No Clinical Screening - See Comments      PN: LW CM1: >>> NO CONTRAST ADVERSE REACTION <<<     Reaction :  KCL---gas sx    Potassium Chloride In Nacl GI Disturbance     gas     Pravachol [Statins] Muscle Pain (Myalgia)    Prilosec Otc [Omeprazole Magnesium] Diarrhea     diarrhea         PAST MEDICAL HISTORY:  Past Medical History:   Diagnosis Date    Acute peptic ulcer, unspecified site, without mention of hemorrhage, perforation, or obstruction     Allergic rhinitis, cause unspecified     Anxiety     Basal cell carcinoma     DVT (deep venous thrombosis) (H) 03/2012    following spinal surgery. Saw Oncology. 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 Saw Oncology. treated 6 months    Peripheral neuropathy 2015    Pneumonia, organism unspecified(486)     Spinal stenosis     Squamous cell carcinoma     Thyroid nodule     Type II or unspecified type diabetes mellitus without mention of complication, not stated as uncontrolled     Unspecified essential hypertension        PAST SURGICAL HISTORY:  Past Surgical History:   Procedure Laterality Date    CV CORONARY ANGIOGRAM N/A 2023    Procedure: Coronary Angiogram;  Surgeon: Papa Schilling MD;  [facility]    CV LEFT HEART CATH N/A 2023    Procedure: Left Heart Catheterization;  Surgeon: Papa Schilling MD;  Location:  [facility]    CV LEFT VENTRICULOGRAM N/A 2023    Procedure: Left Ventriculogram;  Surgeon: Papa Schilling MD;  Location:  [facility]    NONSPECIFIC PROCEDURE      Bilateral sphenoidotomy with removal of polypoid tissue.     Right frontal sinusotomy.   Bilateral total ethmoidectomy.  Bilateral maxillary antrostomy with removal of polypoid tissue.      ORTHOPEDIC SURGERY      lumbar fusion    ZZC NONSPECIFIC PROCEDURE  approx     hyst/bso    ZZC NONSPECIFIC PROCEDURE  approx     gb    ZZC NONSPECIFIC PROCEDURE  approx     cystocele repair    ZZC NONSPECIFIC PROCEDURE  approx     endoscopic sinus     ZZC NONSPECIFIC PROCEDURE      epidurals x 7    ZZC NONSPECIFIC PROCEDURE      nl colonoscopy 2006    ZZC NONSPECIFIC PROCEDURE  2011    L2-L3 foramenotomies    ZZC NONSPECIFIC PROCEDURE  2012    lumbar fusion       FAMILY HISTORY:  Family History   Problem Relation Age of Onset    Breast Cancer Mother     Heart Disease Father 25         in a fire      "Heart Disease Paternal Grandfather     Breast Cancer Sister     Hyperlipidemia Sister        SOCIAL HISTORY:  Social History     Socioeconomic History    Marital status:      Spouse name: None    Number of children: None    Years of education: None    Highest education level: None   Tobacco Use    Smoking status: Never    Smokeless tobacco: Never   Substance and Sexual Activity    Alcohol use: Yes     Comment: very rare    Drug use: No    Sexual activity: Yes   Other Topics Concern    Parent/sibling w/ CABG, MI or angioplasty before 65F 55M? No    Caffeine Concern No     Comment: soda 1 time per day    Special Diet No    Exercise No     Comment: limited due to back pain        Review of Systems:  Skin:          Eyes:         ENT:         Respiratory:          Cardiovascular:         Gastroenterology:        Genitourinary:         Musculoskeletal:         Neurologic:         Psychiatric:         Heme/Lymph/Imm:         Endocrine:           Physical Exam:  Vitals: /66   Pulse 65   Ht 1.575 m (5' 2\")   Wt 62.8 kg (138 lb 8 oz)   SpO2 97%   BMI 25.33 kg/m      Constitutional:  cooperative, alert and oriented, well developed, well nourished, in no acute distress overweight      Skin:  warm and dry to the touch, no apparent skin lesions or masses noted          Head:  normocephalic, no masses or lesions        Eyes:  pupils equal and round        Lymph:      ENT:  no pallor or cyanosis        Neck:  carotid pulses are full and equal bilaterally, JVP normal, no carotid bruit        Respiratory:  clear to auscultation         Cardiac: regular rhythm;normal S1 and S2;no murmurs, gallops or rubs detected   S4            pulses full and equal, no bruits auscultated                                   right femoral access site clean dry and intact with small lump at the site and mild ecchymosis    GI:  not assessed this visit        Extremities and Muscular Skeletal:  no edema;no deformities, clubbing, cyanosis, " erythema observed              Neurological:  no gross motor deficits;affect appropriate  (Uses a walker)      Psych:  Alert and Oriented x 3        CC  [place]ena [place], [place]  [id number] [id number]

## 2023-12-19 ENCOUNTER — OFFICE VISIT (OUTPATIENT)
Dept: URGENT CARE | Facility: URGENT CARE | Age: 83
End: 2023-12-19
Payer: COMMERCIAL

## 2023-12-19 ENCOUNTER — HOSPITAL ENCOUNTER (OUTPATIENT)
Dept: ULTRASOUND IMAGING | Facility: CLINIC | Age: 83
Discharge: HOME | End: 2023-12-19
Attending: FAMILY MEDICINE | Admitting: FAMILY MEDICINE
Payer: COMMERCIAL

## 2023-12-19 VITALS
HEART RATE: 66 BPM | RESPIRATION RATE: 18 BRPM | TEMPERATURE: 97.6 F | WEIGHT: 132 LBS | BODY MASS INDEX: 24.14 KG/M2 | OXYGEN SATURATION: 98 % | DIASTOLIC BLOOD PRESSURE: 77 MMHG | SYSTOLIC BLOOD PRESSURE: 163 MMHG

## 2023-12-19 DIAGNOSIS — S81.802A WOUND OF LEFT LOWER EXTREMITY, INITIAL ENCOUNTER: ICD-10-CM

## 2023-12-19 DIAGNOSIS — M79.662 PAIN OF LEFT LOWER LEG: Primary | ICD-10-CM

## 2023-12-19 DIAGNOSIS — M79.662 PAIN OF LEFT LOWER LEG: ICD-10-CM

## 2023-12-19 DIAGNOSIS — R79.89 POSITIVE D DIMER: ICD-10-CM

## 2023-12-19 DIAGNOSIS — L08.9 LOCAL INFECTION OF WOUND: ICD-10-CM

## 2023-12-19 DIAGNOSIS — M79.89 LEFT LEG SWELLING: ICD-10-CM

## 2023-12-19 DIAGNOSIS — T14.8XXA LOCAL INFECTION OF WOUND: ICD-10-CM

## 2023-12-19 LAB
D DIMER PPP FEU-MCNC: 1.68 UG/ML FEU (ref 0–0.5)
WBC # BLD AUTO: 6.9 10E3/UL (ref 4–11)

## 2023-12-19 PROCEDURE — 85379 FIBRIN DEGRADATION QUANT: CPT | Performed by: FAMILY MEDICINE

## 2023-12-19 PROCEDURE — 85048 AUTOMATED LEUKOCYTE COUNT: CPT | Performed by: FAMILY MEDICINE

## 2023-12-19 PROCEDURE — 99214 OFFICE O/P EST MOD 30 MIN: CPT | Performed by: FAMILY MEDICINE

## 2023-12-19 PROCEDURE — 36415 COLL VENOUS BLD VENIPUNCTURE: CPT | Performed by: FAMILY MEDICINE

## 2023-12-19 PROCEDURE — 93971 EXTREMITY STUDY: CPT | Mod: LT

## 2023-12-19 RX ORDER — CEPHALEXIN 500 MG/1
500 CAPSULE ORAL 2 TIMES DAILY
Qty: 20 CAPSULE | Refills: 0 | Status: SHIPPED | OUTPATIENT
Start: 2023-12-19 | End: 2023-12-29

## 2023-12-19 NOTE — PROGRESS NOTES
SUBJECTIVE: Ginger Abreu is a 83 year old female presenting with a chief complaint of let lower ext injury and now last couple of days left leg swelling/pain.    Past Medical History:   Diagnosis Date    Acute peptic ulcer, unspecified site, without mention of hemorrhage, perforation, or obstruction     Allergic rhinitis, cause unspecified     Anxiety     Basal cell carcinoma     DVT (deep venous thrombosis) (H) 03/2012    following spinal surgery. Saw Oncology. 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 Saw Oncology. treated 6 months    Peripheral neuropathy 6/19/2015    Pneumonia, organism unspecified(486)     Spinal stenosis     Squamous cell carcinoma     Thyroid nodule     Type II or unspecified type diabetes mellitus without mention of complication, not stated as uncontrolled     Unspecified essential hypertension      Allergies   Allergen Reactions    Diatrizoate Shortness Of Breath     Other reaction(s): Shortness Of Breath  rapid heart beat  rapid heart beat      Morphine      Other reaction(s): GI Upset  Patient says she will not take because causes GI Upset    Morphine And Related Difficulty breathing     Chest gets tight & difficulty breathing     Oxycodone-Acetaminophen      Other reaction(s): GI Upset  Patient says she will not take because causes GI Upset    Atenolol Other (See Comments)      Sob w/ exertion - asthma symptoms    Diltiazem Other (See Comments)     edema - fluid retention in legs    Other reaction(s): Edema  Other reaction(s): Edema      Labetalol Other (See Comments) and Itching     Sob w/ exertion - asthma symptoms  itching    Lipitor [Atorvastatin Calcium] Other (See Comments)     Muscle weakness    Nifedipine Other (See Comments)     Sob w/ exertion - asthma symptoms, HAs    Other reaction(s): Other (See Comments)  Sob w/ exertion - asthma symptoms, HAs    Sulfa Antibiotics Hives     hives    Zocor [Simvastatin] Other (See Comments)     Muscle weakness    Aleve [Naproxen] Other (See Comments)     Raises BP    Amlodipine Fatigue and Diarrhea           Blood-Group Specific Substance Other (See Comments)     Patient has anti-Madison. Blood product orders may be delayed.  Other reaction(s): Other - Describe In Comment Field  Patient has anti-Madison. Blood product orders may be delayed.      Bystolic [Nebivolol Hcl] Other (See Comments)     headaches    Calcium Carbonate Diarrhea    Cefdinir Diarrhea    Crestor [Rosuvastatin Calcium] Muscle Pain (Myalgia)    Diovan [Valsartan] GI Disturbance     gas      Ezetimibe Muscle Pain (Myalgia)     Other reaction(s): Muscle Weakness  Other reaction(s): Myalgia      Fluticasone-Salmeterol Other (See Comments)     hoarseness with 500/50, not the 250/50    Hydralazine Other (See Comments) and Nausea     Increases pulse    Hydrochlorothiazide Other (See Comments)     increasing glucose    Imdur [Isosorbide Mononitrate] Other (See Comments)     headaches    Lisinopril Cough     Hoarse voice    Lovastatin Muscle Pain (Myalgia)    Metformin Diarrhea           Metoprolol Other (See Comments)     Sob w/ exertion - asthma symptoms      Niacin Other (See Comments)     hyperglycemia    No Clinical Screening - See Comments      PN: LW CM1: >>> NO CONTRAST ADVERSE REACTION <<<     Reaction :  KCL---gas sx    Potassium Chloride In Nacl GI Disturbance     gas      Pravachol [Statins] Muscle Pain (Myalgia)    Prilosec Otc [Omeprazole Magnesium] Diarrhea     diarrhea       Social History     Tobacco Use    Smoking status: Never    Smokeless tobacco: Never   Substance Use Topics    Alcohol use: Yes     Comment: very rare       ROS:  SKIN: no rash  GI: no vomiting    OBJECTIVE:  BP (!) 163/77   Pulse 66   Temp 97.6  F (36.4  C) (Tympanic)   Resp 18   Wt 59.9 kg (132 lb)   SpO2 98%   Breastfeeding No   BMI 24.14 kg/m  GENERAL APPEARANCE: healthy, alert and no distress  SKIN: abrasion left lower ext with redness and tender, also calf swelling and pain    US lower ext  IMPRESSION: Negative for DVT throughout the left lower extremity.       ICD-10-CM    1. Pain of left lower leg  M79.662 WBC count     D dimer, quantitative     US Lower Extremity Venous Duplex Left      2. Left leg swelling  M79.89 WBC count     D dimer, quantitative     US Lower Extremity Venous Duplex Left      3. Wound of left lower extremity, initial encounter  S81.802A WBC count     D dimer, quantitative     US Lower Extremity Venous Duplex Left      4. Positive D dimer  R79.89 US Lower Extremity Venous Duplex Left      5. Local infection of wound  T14.8XXA cephALEXin (KEFLEX) 500 MG capsule    L08.9         No DVT by negative US  Fluids/Rest, f/u if worse/not any better

## 2023-12-27 ENCOUNTER — HOSPITAL ENCOUNTER (OUTPATIENT)
Dept: MRI IMAGING | Facility: CLINIC | Age: 83
Discharge: HOME | End: 2023-12-27
Attending: INTERNAL MEDICINE | Admitting: INTERNAL MEDICINE
Payer: COMMERCIAL

## 2023-12-27 DIAGNOSIS — M54.50 LOW BACK PAIN: ICD-10-CM

## 2023-12-27 DIAGNOSIS — M54.16 LUMBAR RADICULOPATHY: ICD-10-CM
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
## 2023-12-27 PROCEDURE — 72195 MRI PELVIS W/O DYE: CPT

## 2023-12-27 PROCEDURE — 72148 MRI LUMBAR SPINE W/O DYE: CPT

## 2024-01-18 ENCOUNTER — APPOINTMENT (OUTPATIENT)
Dept: ULTRASOUND IMAGING | Facility: CLINIC | Age: 84
End: 2024-01-18
Attending: EMERGENCY MEDICINE
Payer: COMMERCIAL

## 2024-01-18 ENCOUNTER — HOSPITAL ENCOUNTER (EMERGENCY)
Facility: CLINIC | Age: 84
Discharge: HOME | End: 2024-01-18
Attending: EMERGENCY MEDICINE | Admitting: EMERGENCY MEDICINE
Payer: COMMERCIAL

## 2024-01-18 VITALS
WEIGHT: 135 LBS | BODY MASS INDEX: 24.84 KG/M2 | SYSTOLIC BLOOD PRESSURE: 129 MMHG | HEIGHT: 62 IN | RESPIRATION RATE: 16 BRPM | OXYGEN SATURATION: 95 % | HEART RATE: 60 BPM | TEMPERATURE: 97.6 F | DIASTOLIC BLOOD PRESSURE: 45 MMHG

## 2024-01-18 DIAGNOSIS — R60.0 BILATERAL LOWER EXTREMITY EDEMA: ICD-10-CM

## 2024-01-18 PROCEDURE — 93971 EXTREMITY STUDY: CPT | Mod: LT

## 2024-01-18 PROCEDURE — 99284 EMERGENCY DEPT VISIT MOD MDM: CPT | Mod: 25

## 2024-01-18 NOTE — DISCHARGE INSTRUCTIONS
Follow up tomorrow as scheduled.  Consider compression socks and elevation.  Return with worsening symptoms or any new concerns.

## 2024-01-18 NOTE — ED PROVIDER NOTES
Rapid Assessment Note    History:   Ginger Abreu is an 83 year old female with a history of DVT (not anticoagulated) who presents with concern for blood clot in her left leg. She noticed swelling last night and is particularly concerned because it did not resolve overnight. She denies any associated leg pain, chest pain, or shortness of breath. She also denies injury /trauma.    Exam:   General:  Alert, interactive  Cardiovascular:  Well perfused  Lungs:  No respiratory distress, no accessory muscle use  Neuro:  Moving all 4 extremities  Extremities: bilateral lower extremity edema, left slightly > right; no calf tenderness; no obvious open wounds or skin changes/cellulitis  Skin:  Warm, dry  Psych:  Normal affect    Plan of Care:   I evaluated the patient and developed an initial plan of care. I discussed this plan and explained that I, or one of my partners, would be returning to complete the evaluation.     83-year-old female with concern for left lower extremity DVT as she noticed swelling last night.  Reports history of DVT and is not anticoagulated.  There is left lower extremity edema which is mildly worse than the right.  No obvious skin changes.  No chest symptoms.  Ultrasound ordered.    I, Meeta Obregon, am serving as a scribe to document services personally performed by Rhoda Gaytan MD based on my observations and the provider's statements to me.    1/18/2024  [employer]    Portions of this medical record were completed by a scribe. UPON MY REVIEW AND AUTHENTICATION BY ELECTRONIC SIGNATURE, this confirms (a) I performed the applicable clinical services, and (b) the record is accurate.        Rhoda Gaytan MD  01/18/24 1899

## 2024-01-18 NOTE — ED PROVIDER NOTES
History     Chief Complaint:  Leg Swelling       The history is provided by the patient.      Ginger Abreu is a 83 year old female with a history of DVT (not anticoagulated) who presents with concern for blood clot in her left leg. She noticed swelling last night and is particularly concerned because it did not resolve overnight. She denies any associated leg pain, chest pain, or shortness of breath. She also denies injury/trauma. She has spoken with her primary care provider about lower extremity edema in the past and actually has a follow up appointment tomorrow. She does not wear compression stockings.     Independent Historian:   As Above    Review of External Notes:   Reviewed urgent care note from December 2023 where she also complained of left lower leg swelling and pain and had negative DVT study.    Medications:    Proair  Alpha-lipoic acid  Vitamin C  Aspirin  B-12  Klonopin  Benadryl  Jardiance  Nexium  Pepcid  Iron  Hydrodiuril  Culturell  Synthroid  Robaxin  Niacin  Nitrostat  Ditropan XL  Crestor  Entresto  Imitrex  Mounjaro  Vitamin D  Zinc sulfate    Past Medical History:    Acute peptic ulcer  Allergic rhinitis  Anxiety  Basal cell carcinoma  DVT  Esophageal reflux  Generalized osteoarthritis  Herpes zoster  Hyperlipidemia  Immunoglobulin A deficiency  Immunoglobulin G subclass deifiency  Lumbago  Migraine with aura  Mild persistent asthma  MVP  Neuropathy  Osteopenia  Palpitations  PE  Peripheral neuropathy  Pneumonia  Spinal stenosis  Squamous cell carcinoma  Thyroid nodule  Type II diabetes  Hypertension  Hypotension due to hypovolemia  Chronic kidney disease, stage III  Type 2 diabetes  Chronic pain syndrome  Sleep disorder  Absorbic acid deficiency  Anti-cardiolipin antibody positive  GERD  Mitral valve prolapse  Radicular pain of lumbosacral region      Past Surgical History:    Coronary angiogram  Left heart catheterization  Left ventriculogram  Bilateral sphenoidotomy with removal of  "polypoid tissue.   Right frontal sinusotomy.   Bilateral total ethmoidectomy.    Bilateral maxillary antrostomy with removal of polypoid tissue.   Lumbar fusion x2  Cholecystectomy  DREW and BSO  Bladder repair  Septoplasty  Lumbar foramenotomies   Myelogram lumbar  Cystocele repair  Endoscopic sinus  Epidurals x7  Colonoscopy  Total hip arthroplasty    Physical Exam   Patient Vitals for the past 24 hrs:   BP Temp Temp src Pulse Resp SpO2 Height Weight   01/18/24 1629 129/45 -- -- 60 16 95 % -- --   01/18/24 1518 (!) 150/50 97.6  F (36.4  C) Temporal 64 16 97 % 1.575 m (5' 2\") 61.2 kg (135 lb)      Physical Exam  General: Well-developed and well-nourished. Well appearing elderly  woman sitting in wheelchair. Cooperative.  Head:  Atraumatic.  Eyes:  Conjunctivae, lids, and sclerae are normal.  Neck:  Supple. Normal range of motion.  CV:  Warm and well-perfused.  Resp:  No respiratory distress.   GI:  Non-distended.     MS:  Normal ROM.  Bilateral lower extremity pitting edema, left slightly greater than right. No calf tenderness.   Skin:  Warm. Non-diaphoretic. No pallor.  There are no inflammatory changes or open wounds of the left lower leg.  Neuro:  Awake. A&Ox3. Normal strength.  Psych: Normal mood and affect. Normal speech.  Vitals reviewed.    Emergency Department Course   Imaging:  US Lower Extremity Venous Duplex Left   Final Result   IMPRESSION: Negative for deep venous thrombosis in the visualized   veins of the left lower extremity.      JLUIS BALLARD MD            SYSTEM ID:  [id number]      Report per radiology.    Emergency Department Course & Assessments:    Independent Interpretation (X-rays, CTs, rhythm strip):  None    Assessments/Consultations/Discussion of Management or Tests:   ED Course as of 01/18/24 1714   Thu Jan 18, 2024   1315 I obtained history and examined the patient as noted above.     1630 I rechecked the patient.      Social Determinants of Health affecting care:   Supportive " family member  Establish PCP    Disposition:  The patient was discharged.     Impression & Plan    Medical Decision Making:  Ginger is an 83 year old woman who noticed swelling in her left lower extremity last night that did not improve overnight when her leg was more elevated and prompted her visit as she is concerned for DVT.  She has a follow-up appointment to see her primary care provider about this lower extremity edema tomorrow.  I can see she was also seen about a month ago in urgent care with the same complaint and had a negative DVT study at that time.  On examination she has bilateral lower extremity pitting edema.  It is very minimally greater on the left than the right.  There are no inflammatory changes of the leg to suggest a cellulitis.  She was sent for DVT study which, fortunately, is negative.  As such, she is appropriate for discharge.  She should certainly continue to work with her primary care provider on assessment and treatment of her ongoing bilateral lower extremity edema but there does not appear to be an emergent issue today.  Laboratory studies are unlikely to . I suggested she consider compression stockings and elevate as she is able.  Indications for return reviewed.  All questions answered.  Amenable to discharge.    Diagnosis:    ICD-10-CM    1. Bilateral lower extremity edema  R60.0          Discharge Medications:  Discharge Medication List as of 1/18/2024  4:32 PM         Scribe Disclosure:  I, Meeta Obregon, am serving as a scribe at 4:42 PM on 1/18/2024 to document services personally performed by Rhoda Gaytan MD based on my observations and the provider's statements to me.     1/18/2024   Rhoda Gaytan MD Dixson, Kylie S, MD  01/26/24 5748

## 2024-01-18 NOTE — ED TRIAGE NOTES
Pt noted leg slightly swollen yesterday and observed that it grew in size over the past 24 hours. States it feels warm to the touch denies pain.      Triage Assessment (Adult)       Row Name 01/18/24 1519          Triage Assessment    Airway WDL WDL        Respiratory WDL    Respiratory WDL WDL        Skin Circulation/Temperature WDL    Skin Circulation/Temperature WDL WDL        Cardiac WDL    Cardiac WDL WDL        Peripheral/Neurovascular WDL    Peripheral Neurovascular WDL WDL        Cognitive/Neuro/Behavioral WDL    Cognitive/Neuro/Behavioral WDL WDL

## 2024-04-18 ENCOUNTER — MYC MEDICAL ADVICE (OUTPATIENT)
Dept: PHARMACY | Facility: OTHER | Age: 84
End: 2024-04-18
Payer: COMMERCIAL

## 2024-05-08 ENCOUNTER — VIRTUAL VISIT (OUTPATIENT)
Dept: PHARMACY | Facility: CLINIC | Age: 84
End: 2024-05-08
Payer: COMMERCIAL

## 2024-05-08 DIAGNOSIS — I10 ESSENTIAL HYPERTENSION: ICD-10-CM

## 2024-05-08 DIAGNOSIS — E03.9 HYPOTHYROIDISM, UNSPECIFIED TYPE: ICD-10-CM

## 2024-05-08 DIAGNOSIS — K21.9 GASTROESOPHAGEAL REFLUX DISEASE, UNSPECIFIED WHETHER ESOPHAGITIS PRESENT: ICD-10-CM

## 2024-05-08 DIAGNOSIS — R35.0 URINARY FREQUENCY: ICD-10-CM

## 2024-05-08 DIAGNOSIS — E78.5 HYPERLIPIDEMIA LDL GOAL <100: ICD-10-CM

## 2024-05-08 DIAGNOSIS — E11.9 TYPE 2 DIABETES MELLITUS WITHOUT COMPLICATION, WITHOUT LONG-TERM CURRENT USE OF INSULIN (H): Primary | ICD-10-CM

## 2024-05-08 DIAGNOSIS — L29.9 ITCHING: ICD-10-CM

## 2024-05-08 DIAGNOSIS — Z78.9 TAKES DIETARY SUPPLEMENTS: ICD-10-CM

## 2024-05-08 PROCEDURE — 99607 MTMS BY PHARM ADDL 15 MIN: CPT | Mod: 93 | Performed by: PHARMACIST

## 2024-05-08 PROCEDURE — 99605 MTMS BY PHARM NP 15 MIN: CPT | Mod: 93 | Performed by: PHARMACIST

## 2024-05-08 RX ORDER — TRIAMTERENE/HYDROCHLOROTHIAZID 37.5-25 MG
0.5 TABLET ORAL DAILY
COMMUNITY
Start: 2024-03-04

## 2024-05-08 NOTE — PATIENT INSTRUCTIONS
"Recommendations from today's MTM visit:                                                    Can try Zyrtec (Cetirizine) 10mg before bed for itching in place of Benadryl. Benadryl may increase risk for memory issues.   Oxybutynin and benadryl can both worsen memory.      Follow-up: as needed     It was great speaking with you today.  I value your experience and would be very thankful for your time in providing feedback in our clinic survey. In the next few days, you may receive an email or text message from [employer] with a link to a survey related to your  clinical pharmacist.\"     To schedule another MTM appointment, please call the clinic directly or you may call the MTM scheduling line at [phone] or toll-free at [phone].     My Clinical Pharmacist's contact information:                                                      Please feel free to contact me with any questions or concerns you have.      Dwayne Gayle, PharmD  MTM Pharmacist    Phone: [phone]     "

## 2024-05-08 NOTE — PROGRESS NOTES
Medication Therapy Management (MTM) Encounter    ASSESSMENT:                            Medication Adherence/Access: No issues identified    Hyperlipidemia   Stable.     Diabetes   Stable.    HTN  Stable.    GERD:   Stable. Pt not interested in reducing esomeprazole.     Supplements:   Stable.     Pruritis:   Recommended she trial Zyrtec in place of Benadryl for her itching, due to Benadryl's long term memory issues in elderly patients.     Hypothyroidism:   Stable.    Urinary frequency:   Discussed that certain medications like oxybutynin and Benadryl increase risk for memory issues. She has tried Mybetriq in the past, but had side effects from it. No recommended changes today.     PLAN:                            Can try Zyrtec (Cetirizine) 10mg before bed for itching in place of Benadryl. Benadryl may increase risk for memory issues.   Oxybutynin and benadryl can both worsen memory.     Follow-up: as needed    SUBJECTIVE/OBJECTIVE:                          Ginger Abreu is a 83 year old female called for an initial visit. She was referred to me from [facility].      Reason for visit: general med review.    Allergies/ADRs: Reviewed in chart  Past Medical History: Reviewed in chart  Tobacco: She reports that she has never smoked. She has never used smokeless tobacco.  Alcohol: not currently using    Medication Adherence/Access: no issues reported. Misses a couple times monthly.     Hyperlipidemia   Rosvastatin 20mg daily  Patient reports no significant myalgias or other side effects.  The ASCVD Risk score (Rosy COLEMAN, et al., 2019) failed to calculate for the following reasons:    The 2019 ASCVD risk score is only valid for ages 40 to 79    The patient has a prior MI or stroke diagnosis   Recent Labs   Lab Test 23  0945 23  1734   CHOL 87 111   HDL 38* 46*   LDL 27 41   TRIG 111 118     Diabetes   Jardiance 10mg daily.   Patient is not experiencing side effects.  Blood sugar monitorin time(s) daily;  Ranges: (patient reported)    Morning   Night after meal 150-160  Current diabetes symptoms: none   Urine Albumin:   Lab Results   Component Value Date    UMALCR 13.83 04/05/2018      Lab Results   Component Value Date    A1C 6.0 (H) 07/06/2023     HTN  Entresto 24-26mg twice daily.  Jardiance 10mg daily  Aspirin 81mg daily. Denies gastrointestinal bleed  Triamterene/hydrochlorothiazide 37.5mg/ 25mg every morning. Weights around 125-130 lbs.   Patient reports no current medication side effects.     Patient reports these HF symptoms: none.    Patient is measuring daily weights  and reports stable.   Patient self-monitors blood pressure.  Home BP monitoring 120-130s..   Patient is following a low sodium diet and is avoiding alcohol.    GERD:   Esomeprazole 40 mg once daily   Famotidine 40mg daily.   Patient reports no current symptoms.   Patient feels that current regimen is effective.  Has had GERD sx at lower doses.     Supplements:   Alpha lipoic acid 600mg 3 times daily. Energy.  Apple cider vinegar daily  Vitamin C daily.   B12 daily.   Cranberry daily.   Ferrous sulfate 325mg daily.   Probiotic daily.   Niacin 500mg daily.   Zinc daily  Vitamin D3 daily    Pruritis:   Benadryl 25mg at bedtime.   Melatonin 10mg at bedtime.   Itching before bed. Helps her sleep.     Hypothyroidism:   Levothyroxine 12.5 mcg daily  Patient is having the following symptoms: none.     Urinary frequency:   Oxybutynin ER 15mg daily.   Denies side effects, reduces her frequency works well.     Today's Vitals: There were no vitals taken for this visit.  ----------------    I spent 24 minutes with this patient today.  A copy of the visit note was provided to the patient's provider(s).    A summary of these recommendations was sent via [employer].    Dwayne Gayle PharmD  MTM Pharmacist    Phone: [phone]     Telemedicine Visit Details  Type of service:  Telephone visit  Start Time: 1:42 PM  End Time: 2:06 PM     Medication Therapy  Recommendations  Itching    Current Medication: diphenhydrAMINE (BENADRYL) 25 MG tablet   Rationale: Undesirable effect - Adverse medication event - Safety   Recommendation: Change Medication - cetirizine 10 MG tablet   Status: Accepted - no CPA Needed

## 2024-05-08 NOTE — LETTER
May 8, 2024  Ginger Abreu  [address]   [place] [address]    Dear Ms. Abreu, [facility] MTM     Thank you for talking with me on May 8, 2024 about your health and medications. As a follow-up to our conversation, I have included two documents:      1. Your Recommended To-Do List has steps you should take to get the best results from your medications.  2. Your Medication List will help you keep track of your medications and how to take them.    If you want to talk about these documents, please call Dwayne Gayle RPH at phone: [phone], Monday-Friday 8-4:30pm.    I look forward to working with you and your doctors to make sure your medications work well for you.    Sincerely,  Dwayne Gayle RPH  [facility] Pharmacist, [facility]

## 2024-05-08 NOTE — LETTER
"Recommended To-Do List      Prepared on: 05/08/2024       You can get the best results from your medications by completing the items on this \"To-Do List.\"      Bring your To-Do List when you go to your doctor. And, share it with your family or caregivers.    My To-Do List:  What we talked about: What I should do:   An issue with your medication    Change the medication you are taking from diphenhydrAMINE (BENADRYL) to cetirizine (zyrTEC) 10mg at bedtime          What we talked about: What I should do:                     "

## 2024-05-08 NOTE — LETTER
_  Medication List        Prepared on: 05/08/2024     Bring your Medication List when you go to the doctor, hospital, or   emergency room. And, share it with your family or caregivers.     Note any changes to how you take your medications.  Cross out medications when you no longer use them.    Medication How I take it Why I use it Prescriber   albuterol (PROAIR HFA/PROVENTIL HFA/VENTOLIN HFA) 108 (90 BASE) MCG/ACT Inhaler Inhale 2 puffs into the lungs every 6 hours INDICATION: RESCUE INHALER FOR SHORTNESS OF BREATH, CHEST TIGHTNESS AND COUGH Mild persistent asthma without complication Jerry Iniguez MD   alpha-lipoic acid 600 MG CAPS Take 600 mg by mouth 3 times daily   supplement Patient Reported   APPLE CIDER VINEGAR PO Take 1 capsule by mouth daily  supplement Patient Reported   ascorbic acid (VITAMIN C) 1000 MG TABS Take 1 tablet (1,000 mg) by mouth daily VITAMIN C REPLACEMENT Scurvy Steven Fonseca MD   aspirin (ASA) 81 MG EC tablet Take 1 tablet (81 mg) by mouth daily Coronary artery disease involving native coronary artery of native heart without angina pectoris Vaibhav Maldonado MD   B-12 (methylcobalamin) 1000 MCG SUBL sublingual tablet Place 1,000 mcg under the tongue daily  supplement Patient Reported   BETA BLOCKER NOT PRESCRIBED, INTENTIONAL, Beta Blocker not prescribed intentionally due to Intolerance Type 2 Diabetes, HbA1c Goal < 7% (H); Hypertension Goal BP (Blood Pressure) < 130/80 Steven Fonseca MD   Cranberry 125 MG TABS Take 1 tablet by mouth daily  supplement Patient Reported   diphenhydrAMINE (BENADRYL) 25 MG tablet Take 25 mg by mouth every 6 hours as needed for itching or allergies  itching Patient Reported   empagliflozin (JARDIANCE) 10 MG TABS tablet Take 10 mg by mouth daily  diabetes Patient Reported   esomeprazole (NEXIUM) 40 MG capsule Take 1 capsule (40 mg) by mouth every morning (before breakfast) TAKE  30'-60' BEFORE 1ST MEAL Gastroesophageal Reflux Disease without  Esophagitis Steven Fonseca MD   famotidine (PEPCID) 40 MG tablet Take 40 mg by mouth daily  Gastroesophageal Reflux Disease without Esophagitis Patient Reported   Ferrous Sulfate (IRON) 325 (65 Fe) MG tablet Take 1 tablet by mouth daily  Supplement Patient Reported   lactobacillus rhamnosus, GG, (CULTURELL) capsule Take 1 capsule by mouth 2 times daily  Supplement Patient Reported   latanoprost (XALATAN) 0.005 % ophthalmic solution Place 1 drop into both eyes At Bedtime  glaucoma Entered By History Unknown   levothyroxine (SYNTHROID/LEVOTHROID) 25 MCG tablet TAKE 1/2 (ONE-HALF) TABLET BY MOUTH FIRST THING IN THE MORNING TO STOMACH. NO FOOD FOR AN HOUR TO TREAT UNDERACTIVE THYROID.  thryoid Patient Reported   Melatonin 10 MG TABS tablet Take 10 mg by mouth At Bedtime  Supplement Entered By History Unknown   methocarbamol (ROBAXIN) 750 MG tablet Take 750 mg by mouth 4 times daily as needed for muscle spasms  pain Patient Reported   niacin 500 MG tablet Take 500 mg by mouth daily (with breakfast)  Supplement Patient Reported   nitroglycerin (NITROSTAT) 0.4 MG SL tablet Place 1 tablet (0.4 mg) under the tongue every 5 minutes as needed for chest pain Exertional Dyspnea Steven Fonseca MD   oxybutynin ER (DITROPAN XL) 15 MG 24 hr tablet Take 15 mg by mouth daily (with lunch)  Urinary frequency Entered By History Unknown   rosuvastatin (CRESTOR) 20 MG tablet Take 20 mg by mouth daily  lipids Entered By History Unknown   sacubitril-valsartan (ENTRESTO) 24-26 MG per tablet Take 1 tablet by mouth 2 times daily Takotsubo Cardiomyopathy Beena Newman NP   SUMAtriptan (IMITREX) 25 MG tablet Take 25 mg by mouth at onset of headache for migraine  headache Patient Reported   timolol maleate (TIMOPTIC) 0.5 % ophthalmic solution Place 1 drop into both eyes 2 times daily  glaucoma Entered By History Unknown   triamterene-HCTZ (MAXZIDE-25) 37.5-25 MG tablet Take 0.5 tablets by mouth daily  HTN Patient Reported   UNABLE TO  FIND Salt Iodine 1000mg - once a month.  Supplement Patient Reported   vitamin D (ERGOCALCIFEROL) 29610 UNIT capsule Take 50,000 Units by mouth once a week Takes on the 10th, 20th, and 30th of each month  Supplement Patient Reported   ZINC SULFATE PO Take 1 tablet by mouth daily  Supplement Entered By History Unknown         Add new medications, over-the-counter drugs, herbals, vitamins, or  minerals in the blank rows below.    Medication How I take it Why I use it Prescriber                                      Allergies:      - Diatrizoate - Shortness Of Breath  - Morphine  - Morphine And Related - Difficulty breathing  - Oxycodone-acetaminophen  - Atenolol - Other (See Comments)  - Diltiazem - Other (See Comments)  - Labetalol - Other (See Comments), Itching  - Lipitor [atorvastatin Calcium] - Other (See Comments)  - Nifedipine - Other (See Comments)  - Sulfa Antibiotics - Hives  - Zocor [simvastatin] - Other (See Comments)  - Aleve [naproxen] - Other (See Comments)  - Amlodipine - Fatigue, Diarrhea  - Blood-group Specific Substance - Other (See Comments)  - Bystolic [nebivolol Hcl] - Other (See Comments)  - Calcium Carbonate - Diarrhea  - Cefdinir - Diarrhea  - Diovan [valsartan] - GI Disturbance  - Ezetimibe - Muscle Pain (Myalgia)  - Fluticasone-salmeterol - Other (See Comments)  - Hydralazine - Other (See Comments), Nausea  - Hydrochlorothiazide - Other (See Comments)  - Imdur [isosorbide Mononitrate] - Other (See Comments)  - Lisinopril - Cough  - Lovastatin - Muscle Pain (Myalgia)  - Metformin - Diarrhea  - Metoprolol - Other (See Comments)  - Niacin - Other (See Comments)  - No Clinical Screening - See Comments  - Potassium Chloride In Nacl - GI Disturbance  - Pravachol [statins] - Muscle Pain (Myalgia)  - Prilosec Otc [omeprazole Magnesium] - Diarrhea        Side effects I have had:      Not on File        Other Information:              My notes and questions:

## 2024-06-02 ENCOUNTER — HEALTH MAINTENANCE LETTER (OUTPATIENT)
Age: 84
End: 2024-06-02

## 2024-08-11 ENCOUNTER — HEALTH MAINTENANCE LETTER (OUTPATIENT)
Age: 84
End: 2024-08-11

## 2024-09-10 ENCOUNTER — HOSPITAL ENCOUNTER (EMERGENCY)
Facility: CLINIC | Age: 84
Discharge: HOME | End: 2024-09-10
Attending: EMERGENCY MEDICINE | Admitting: EMERGENCY MEDICINE
Payer: COMMERCIAL

## 2024-09-10 ENCOUNTER — APPOINTMENT (OUTPATIENT)
Dept: MRI IMAGING | Facility: CLINIC | Age: 84
End: 2024-09-10
Attending: EMERGENCY MEDICINE
Payer: COMMERCIAL

## 2024-09-10 VITALS
DIASTOLIC BLOOD PRESSURE: 58 MMHG | HEART RATE: 83 BPM | SYSTOLIC BLOOD PRESSURE: 157 MMHG | TEMPERATURE: 97.3 F | OXYGEN SATURATION: 100 % | RESPIRATION RATE: 16 BRPM

## 2024-09-10 DIAGNOSIS — H53.9 VISION CHANGES: ICD-10-CM

## 2024-09-10 LAB — GLUCOSE BLDC GLUCOMTR-MCNC: 113 MG/DL (ref 70–99)

## 2024-09-10 PROCEDURE — 99284 EMERGENCY DEPT VISIT MOD MDM: CPT | Mod: 25

## 2024-09-10 PROCEDURE — 70547 MR ANGIOGRAPHY NECK W/O DYE: CPT

## 2024-09-10 PROCEDURE — 70551 MRI BRAIN STEM W/O DYE: CPT

## 2024-09-10 PROCEDURE — 250N000013 HC RX MED GY IP 250 OP 250 PS 637: Performed by: EMERGENCY MEDICINE

## 2024-09-10 PROCEDURE — 82962 GLUCOSE BLOOD TEST: CPT

## 2024-09-10 RX ORDER — DIAZEPAM 5 MG
5 TABLET ORAL ONCE
Status: COMPLETED | OUTPATIENT
Start: 2024-09-10 | End: 2024-09-10

## 2024-09-10 RX ADMIN — DIAZEPAM 5 MG: 5 TABLET ORAL at 14:44

## 2024-09-10 ASSESSMENT — ACTIVITIES OF DAILY LIVING (ADL)
ADLS_ACUITY_SCORE: 40
ADLS_ACUITY_SCORE: 42
ADLS_ACUITY_SCORE: 42
ADLS_ACUITY_SCORE: 40

## 2024-09-10 ASSESSMENT — VISUAL ACUITY: OD: 20/40;WITHOUT CORRECTIVE LENSES

## 2024-09-10 NOTE — ED PROVIDER NOTES
"  Emergency Department Note      History of Present Illness     Chief Complaint  Eye Problem    HPI  Ginger Abreu is a 84 year old female with a history of migraine with aura who presents to the emergency room stating that 1 hour prior to arrival she noted that she had blurry vision in both of her eyes and also that there was a sensation of movement and that things began to swell and portal in the central of her vision.  She states that she does sometimes get scotomas but this does not seem to be like that.  She states that she has no other concerns, denies any chest pain or abdominal pain, no numbness or tingling or any other symptoms.  States that her eye symptoms have essentially improved but she still feels \"off\"      Independent Historian  No    Review of External Notes  He has have reviewed the patient's last visit on 8 May of this year with the patient was seen for urinary frequency, neuritis, diabetes and high blood pressure.      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    Lumbago     Migraine with aura, without mention of intractable migraine without mention of status migrainosus     Mild persistent asthma     MVP (mitral valve prolapse)     Myocarditis (H)     Neuropathy 12/21/2017    Osteoarthritis     OSTEOPENIA     Other specified disorders of bladder     Palpitations     PE (pulmonary embolism) 03/2012    Peripheral neuropathy 6/19/2015    Pneumonia, organism unspecified(486)     Spinal stenosis     Squamous cell " carcinoma     Thyroid nodule     Type II or unspecified type diabetes mellitus without mention of complication, not stated as uncontrolled     Unspecified essential hypertension        Medications  albuterol (PROAIR HFA/PROVENTIL HFA/VENTOLIN HFA) 108 (90 BASE) MCG/ACT Inhaler  alpha-lipoic acid 600 MG CAPS  APPLE CIDER VINEGAR PO  ascorbic acid (VITAMIN C) 1000 MG TABS  aspirin (ASA) 81 MG EC tablet  B-12 (methylcobalamin) 1000 MCG SUBL sublingual tablet  BETA BLOCKER NOT PRESCRIBED, INTENTIONAL,  Cranberry 125 MG TABS  diphenhydrAMINE (BENADRYL) 25 MG tablet  empagliflozin (JARDIANCE) 10 MG TABS tablet  esomeprazole (NEXIUM) 40 MG capsule  famotidine (PEPCID) 40 MG tablet  Ferrous Sulfate (IRON) 325 (65 Fe) MG tablet  lactobacillus rhamnosus, GG, (CULTURELL) capsule  latanoprost (XALATAN) 0.005 % ophthalmic solution  levothyroxine (SYNTHROID/LEVOTHROID) 25 MCG tablet  Melatonin 10 MG TABS tablet  methocarbamol (ROBAXIN) 750 MG tablet  niacin 500 MG tablet  nitroglycerin (NITROSTAT) 0.4 MG SL tablet  oxybutynin ER (DITROPAN XL) 15 MG 24 hr tablet  rosuvastatin (CRESTOR) 20 MG tablet  sacubitril-valsartan (ENTRESTO) 24-26 MG per tablet  SUMAtriptan (IMITREX) 25 MG tablet  timolol maleate (TIMOPTIC) 0.5 % ophthalmic solution  triamterene-HCTZ (MAXZIDE-25) 37.5-25 MG tablet  UNABLE TO FIND  vitamin D (ERGOCALCIFEROL) 34029 UNIT capsule  ZINC SULFATE PO        Surgical History   Past Surgical History:   Procedure Laterality Date    CV CORONARY ANGIOGRAM N/A 7/6/2023    Procedure: Coronary Angiogram;  Surgeon: Papa Schilling MD;  Location:  HEART CARDI[facility] CATH LAB    CV LEFT HEART CATH N/A 7/6/2023    Procedure: Left Heart Catheterization;  Surgeon: Papa Schilling MD;  Location: [facility] CARDIAC CATH LAB    CV LEFT VENTRICULOGRAM N/A 7/6/2023    Procedure: Left Ventriculogram;  Surgeon: Papa Schilling MD;  Location:  HEART CARDIAC CATH LAB    NONSPECIFIC PROCEDURE  2011    Bilateral sphenoidotomy with  removal of polypoid tissue.     Right frontal sinusotomy.   Bilateral total ethmoidectomy.  Bilateral maxillary antrostomy with removal of polypoid tissue.      ORTHOPEDIC SURGERY      lumbar fusion    ZZC NONSPECIFIC PROCEDURE  approx 1970's    hyst/bso    ZZC NONSPECIFIC PROCEDURE  approx 1970's    gb    ZZC NONSPECIFIC PROCEDURE  approx 1980's    cystocele repair    ZZC NONSPECIFIC PROCEDURE  approx 1980's    endoscopic sinus     ZZC NONSPECIFIC PROCEDURE      epidurals x 7    ZZC NONSPECIFIC PROCEDURE      nl colonoscopy 1/2006    ZZC NONSPECIFIC PROCEDURE  2011    L2-L3 foramenotomies    ZZC NONSPECIFIC PROCEDURE  2012    lumbar fusion         Physical Exam   Patient Vitals for the past 24 hrs:   BP Temp Temp src Pulse Resp SpO2   09/10/24 1108 (!) 157/58 97.3  F (36.3  C) Temporal 83 16 100 %       Physical Exam  Vitals: reviewed by me  General: Pt seen on \[facility]\"", pleasant, cooperative, and alert to conversation  Eyes: Tracking well, clear conjunctiva BL.  No evidence of trauma, no surrounding skin changes, extraocular movements are intact fully.  Pupils are round and reactive.  ENT: MMM, midline trachea.   Lungs: No tachypnea, no accessory muscle use. No respiratory distress.   CV: Rate as above  MSK: no joint effusion.  No evidence of trauma, moving all extremity spontaneously and following all commands  Skin: No rash  Neuro: Clear speech and no facial droop.  Psych: Not RIS, no e/o AH/VH      Diagnostics   Lab Results   Labs Ordered and Resulted from Time of ED Arrival to Time of ED Departure   GLUCOSE BY METER - Abnormal       Result Value    GLUCOSE BY METER POCT 113 (*)        Imaging  MRA Neck (Carotids) wo Contrast   Final Result   IMPRESSION: Normal MR angiogram of the neck.      RADHA MORALES MD            SYSTEM ID:  [id number]      MR Brain w/o Contrast   Final Result   IMPRESSION: Normal MR angiogram of the head.        RADHA MORALES MD            SYSTEM ID:  [id number]                  ED  Course      Medications Administered   Medications   diazepam (VALIUM) tablet 5 mg (5 mg Oral $Given 9/10/24 7665)          Optional/Additional Documentation  None      Medical Decision Making / Diagnosis         MDM  This is a very pleasant 84-year-old female who presents to the emergency room with what appears to be vision changes earlier today.  She has no headache, but it was concerning that this is not at all consistent with her previous migraines or scotomas.  She is particularly concerned about how she hit her head several days ago, and thankfully her MRI does not show any evidence of a stroke or traumatic finding.  I did think that an MRI was indicated since her symptoms are binocular.  She has been to the ER for about 6 hours and states that she feels comfortable going home and is reassured by her results today.  Visual acuity does show slight abnormalities, but the patient states she was recently told that she needs to wear bifocals and she did not bring them here today and she states that she feels like her visual acuity is at her baseline actually right now.  She feels comfortable going home, she is highly reliable appearing and I do think that she will come back to the ER if anything gets worse.      ICD-10 Codes:    ICD-10-CM    1. Vision changes  H53.9                             Sage Angel MD  09/10/24 1632

## 2024-09-10 NOTE — DISCHARGE INSTRUCTIONS
As we discussed, the MRI does not show any abnormalities related to your vision and since you are having vision concerns in both sides, this is reassuring against any kind of a stroke.  Please come back to the ER immediately with any other concerns you have, and do follow with your regular doctor in the next 1 week.  If you are able to you should also follow with an ophthalmologist in the next 2 to 3 days as well out of an abundance of caution.  No emergent cause was found today, though I would recommend taking Tylenol and Motrin if you do develop a headache.

## 2024-09-10 NOTE — ED TRIAGE NOTES
"Pt reports sudden vision changes earlier today, stating she could not focus, blurry vision and felt \"woozy\". Pt states vision is almost completely back to normal.   Pt also reports she hit the left temple a couple days ago, no LOC, not on thinners.     BS in triage 113  "

## 2024-09-26 DIAGNOSIS — I51.81 TAKOTSUBO CARDIOMYOPATHY: ICD-10-CM

## 2024-09-26 RX ORDER — SACUBITRIL AND VALSARTAN 24; 26 MG/1; MG/1
1 TABLET, FILM COATED ORAL 2 TIMES DAILY
Qty: 60 TABLET | Refills: 2 | Status: SHIPPED | OUTPATIENT
Start: 2024-09-26

## 2024-10-14 ENCOUNTER — OFFICE VISIT (OUTPATIENT)
Dept: URGENT CARE | Facility: URGENT CARE | Age: 84
End: 2024-10-14
Payer: COMMERCIAL

## 2024-10-14 VITALS
SYSTOLIC BLOOD PRESSURE: 123 MMHG | RESPIRATION RATE: 18 BRPM | DIASTOLIC BLOOD PRESSURE: 68 MMHG | HEART RATE: 79 BPM | OXYGEN SATURATION: 98 % | WEIGHT: 137 LBS | TEMPERATURE: 98.2 F | BODY MASS INDEX: 25.06 KG/M2

## 2024-10-14 DIAGNOSIS — R35.89 POLYURIA: Primary | ICD-10-CM

## 2024-10-14 DIAGNOSIS — R07.0 THROAT PAIN: ICD-10-CM

## 2024-10-14 LAB
ALBUMIN UR-MCNC: NEGATIVE MG/DL
APPEARANCE UR: CLEAR
BILIRUB UR QL STRIP: NEGATIVE
COLOR UR AUTO: YELLOW
DEPRECATED S PYO AG THROAT QL EIA: NEGATIVE
GLUCOSE UR STRIP-MCNC: >=1000 MG/DL
GROUP A STREP BY PCR: NOT DETECTED
HGB UR QL STRIP: NEGATIVE
KETONES UR STRIP-MCNC: NEGATIVE MG/DL
LEUKOCYTE ESTERASE UR QL STRIP: NEGATIVE
NITRATE UR QL: NEGATIVE
PH UR STRIP: 6 [PH] (ref 5–7)
SP GR UR STRIP: 1.01 (ref 1–1.03)
UROBILINOGEN UR STRIP-ACNC: 0.2 E.U./DL

## 2024-10-14 PROCEDURE — 87651 STREP A DNA AMP PROBE: CPT | Performed by: FAMILY MEDICINE

## 2024-10-14 PROCEDURE — 99213 OFFICE O/P EST LOW 20 MIN: CPT | Performed by: FAMILY MEDICINE

## 2024-10-14 PROCEDURE — 81003 URINALYSIS AUTO W/O SCOPE: CPT | Performed by: FAMILY MEDICINE

## 2024-10-14 NOTE — PROGRESS NOTES
SUBJECTIVE: Ginger Abreu is a 84 year old female presenting with a chief complaint of cough  and sore throat.  Onset of symptoms was 3 day(s) ago.  Predisposing factors include None.    Past Medical History:   Diagnosis Date    Acute peptic ulcer, unspecified site, without mention of hemorrhage, perforation, or obstruction     Allergic rhinitis, cause unspecified     Anxiety     Basal cell carcinoma     DVT (deep venous thrombosis) (H) 03/2012    following spinal surgery. Saw Oncology. 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 Saw Oncology. treated 6 months    Peripheral neuropathy 6/19/2015    Pneumonia, organism unspecified(486)     Spinal stenosis     Squamous cell carcinoma     Thyroid nodule     Type II or unspecified type diabetes mellitus without mention of complication, not stated as uncontrolled     Unspecified essential hypertension      Allergies   Allergen Reactions    Diatrizoate Shortness Of Breath     Other reaction(s): Shortness Of Breath  rapid heart beat  rapid heart beat      Morphine      Other reaction(s): GI Upset  Patient says she will not take because causes GI Upset    Morphine And Codeine Difficulty breathing     Chest gets tight & difficulty breathing     Oxycodone-Acetaminophen      Other reaction(s): GI Upset  Patient says she will not take because causes GI Upset    Atenolol  Other (See Comments)     Sob w/ exertion - asthma symptoms    Diltiazem Other (See Comments)     edema - fluid retention in legs    Other reaction(s): Edema  Other reaction(s): Edema      Labetalol Other (See Comments) and Itching     Sob w/ exertion - asthma symptoms  itching    Lipitor [Atorvastatin Calcium] Other (See Comments)     Muscle weakness    Nifedipine Other (See Comments)     Sob w/ exertion - asthma symptoms, HAs    Other reaction(s): Other (See Comments)  Sob w/ exertion - asthma symptoms, HAs    Sulfa Antibiotics Hives     hives    Zocor [Simvastatin] Other (See Comments)     Muscle weakness    Aleve [Naproxen] Other (See Comments)     Raises BP    Amlodipine Fatigue and Diarrhea           Blood-Group Specific Substance Other (See Comments)     Patient has anti-[place]. Blood product orders may be delayed.  Other reaction(s): Other - Describe In Comment Field  Patient has anti-[place]. Blood product orders may be delayed.      Bystolic [Nebivolol Hcl] Other (See Comments)     headaches    Calcium Carbonate Diarrhea    Cefdinir Diarrhea    Diovan [Valsartan] GI Disturbance     gas      Ezetimibe Muscle Pain (Myalgia)     Other reaction(s): Muscle Weakness  Other reaction(s): Myalgia      Fluticasone-Salmeterol Other (See Comments)     hoarseness with 500/50, not the 250/50    Hydralazine Other (See Comments) and Nausea     Increases pulse    Hydrochlorothiazide Other (See Comments)     increasing glucose    Imdur [Isosorbide Mononitrate] Other (See Comments)     headaches    Lisinopril Cough     Hoarse voice    Lovastatin Muscle Pain (Myalgia)    Metformin Diarrhea           Metoprolol Other (See Comments)     Sob w/ exertion - asthma symptoms      Niacin Other (See Comments)     hyperglycemia    No Clinical Screening - See Comments      PN: LW CM1: >>> NO CONTRAST ADVERSE REACTION <<<     Reaction :  KCL---gas sx    Potassium Chloride In Nacl GI Disturbance     gas     Pravachol [Statins] Muscle Pain  (Myalgia)    Prilosec Otc [Omeprazole Magnesium] Diarrhea     diarrhea       Social History     Tobacco Use    Smoking status: Never    Smokeless tobacco: Never   Substance Use Topics    Alcohol use: Yes     Comment: very rare       ROS:  SKIN: no rash  GI: no vomiting    OBJECTIVE:  /68   Pulse 79   Temp 98.2  F (36.8  C)   Resp 18   Wt 62.1 kg (137 lb)   SpO2 98%   BMI 25.06 kg/m    GENERAL APPEARANCE: healthy, alert and no distress  EYES: EOMI,  PERRL, conjunctiva clear  HENT: ear canals and TM's normal.  Nose and mouth without ulcers, erythema or lesions  RESP: lungs clear to auscultation - no rales, rhonchi or wheezes  SKIN: no suspicious lesions or rashes      ICD-10-CM    1. Polyuria  R35.89 UA Macroscopic with reflex to Microscopic and Culture - Lab Collect     UA Macroscopic with reflex to Microscopic and Culture - Lab Collect      2. Throat pain  R07.0 Streptococcus A Rapid Screen w/Reflex to PCR - Clinic Collect     Group A Streptococcus PCR Throat Swab          Fluids/Rest, f/u if worse/not any better

## 2024-11-07 DIAGNOSIS — I51.81 TAKOTSUBO CARDIOMYOPATHY: Primary | ICD-10-CM

## 2024-11-07 DIAGNOSIS — E78.5 HYPERLIPIDEMIA WITH TARGET LDL LESS THAN 70: ICD-10-CM

## 2024-11-07 DIAGNOSIS — I15.0 RENOVASCULAR HYPERTENSION: ICD-10-CM

## 2024-11-07 DIAGNOSIS — I25.10 CORONARY ARTERY DISEASE INVOLVING NATIVE CORONARY ARTERY OF NATIVE HEART WITHOUT ANGINA PECTORIS: ICD-10-CM

## 2024-11-07 DIAGNOSIS — I10 BENIGN ESSENTIAL HYPERTENSION: ICD-10-CM

## 2024-11-11 ENCOUNTER — LAB (OUTPATIENT)
Dept: LAB | Facility: CLINIC | Age: 84
End: 2024-11-11
Payer: COMMERCIAL

## 2024-11-11 ENCOUNTER — HOSPITAL ENCOUNTER (OUTPATIENT)
Dept: CARDIOLOGY | Facility: CLINIC | Age: 84
Discharge: HOME | End: 2024-11-11
Attending: INTERNAL MEDICINE | Admitting: INTERNAL MEDICINE
Payer: COMMERCIAL

## 2024-11-11 DIAGNOSIS — I15.0 RENOVASCULAR HYPERTENSION: ICD-10-CM

## 2024-11-11 DIAGNOSIS — I51.81 TAKOTSUBO CARDIOMYOPATHY: ICD-10-CM

## 2024-11-11 DIAGNOSIS — I10 BENIGN ESSENTIAL HYPERTENSION: ICD-10-CM

## 2024-11-11 DIAGNOSIS — E78.5 HYPERLIPIDEMIA WITH TARGET LDL LESS THAN 70: ICD-10-CM

## 2024-11-11 DIAGNOSIS — I25.10 CORONARY ARTERY DISEASE INVOLVING NATIVE CORONARY ARTERY OF NATIVE HEART WITHOUT ANGINA PECTORIS: ICD-10-CM

## 2024-11-11 LAB
ALT SERPL W P-5'-P-CCNC: 20 U/L (ref 0–50)
ANION GAP SERPL CALCULATED.3IONS-SCNC: 9 MMOL/L (ref 7–15)
BUN SERPL-MCNC: 26 MG/DL (ref 8–23)
CALCIUM SERPL-MCNC: 9.6 MG/DL (ref 8.8–10.4)
CHLORIDE SERPL-SCNC: 102 MMOL/L (ref 98–107)
CREAT SERPL-MCNC: 0.99 MG/DL (ref 0.51–0.95)
EGFRCR SERPLBLD CKD-EPI 2021: 56 ML/MIN/1.73M2
GLUCOSE SERPL-MCNC: 167 MG/DL (ref 70–99)
HCO3 SERPL-SCNC: 28 MMOL/L (ref 22–29)
LVEF ECHO: NORMAL
POTASSIUM SERPL-SCNC: 4 MMOL/L (ref 3.4–5.3)
SODIUM SERPL-SCNC: 139 MMOL/L (ref 135–145)

## 2024-11-11 PROCEDURE — 93306 TTE W/DOPPLER COMPLETE: CPT

## 2024-11-11 PROCEDURE — 84460 ALANINE AMINO (ALT) (SGPT): CPT | Performed by: INTERNAL MEDICINE

## 2024-11-11 PROCEDURE — 80061 LIPID PANEL: CPT | Performed by: INTERNAL MEDICINE

## 2024-11-11 PROCEDURE — 80048 BASIC METABOLIC PNL TOTAL CA: CPT | Performed by: INTERNAL MEDICINE

## 2024-11-11 PROCEDURE — 36415 COLL VENOUS BLD VENIPUNCTURE: CPT | Performed by: INTERNAL MEDICINE

## 2024-11-11 PROCEDURE — 93306 TTE W/DOPPLER COMPLETE: CPT | Mod: 26 | Performed by: INTERNAL MEDICINE

## 2024-11-12 LAB
CHOLEST SERPL-MCNC: 110 MG/DL
FASTING STATUS PATIENT QL REPORTED: NO
HDLC SERPL-MCNC: 47 MG/DL
LDLC SERPL CALC-MCNC: 44 MG/DL
NONHDLC SERPL-MCNC: 63 MG/DL
TRIGL SERPL-MCNC: 95 MG/DL

## 2024-11-26 ENCOUNTER — OFFICE VISIT (OUTPATIENT)
Dept: CARDIOLOGY | Facility: CLINIC | Age: 84
End: 2024-11-26
Attending: INTERNAL MEDICINE
Payer: COMMERCIAL

## 2024-11-26 VITALS
SYSTOLIC BLOOD PRESSURE: 97 MMHG | WEIGHT: 131 LBS | OXYGEN SATURATION: 98 % | DIASTOLIC BLOOD PRESSURE: 64 MMHG | HEART RATE: 66 BPM | BODY MASS INDEX: 23.96 KG/M2

## 2024-11-26 DIAGNOSIS — I10 BENIGN ESSENTIAL HYPERTENSION: ICD-10-CM

## 2024-11-26 DIAGNOSIS — I15.0 RENOVASCULAR HYPERTENSION: ICD-10-CM

## 2024-11-26 DIAGNOSIS — I25.10 CORONARY ARTERY DISEASE INVOLVING NATIVE CORONARY ARTERY OF NATIVE HEART WITHOUT ANGINA PECTORIS: ICD-10-CM

## 2024-11-26 DIAGNOSIS — E78.5 HYPERLIPIDEMIA WITH TARGET LDL LESS THAN 70: ICD-10-CM

## 2024-11-26 DIAGNOSIS — I51.81 TAKOTSUBO CARDIOMYOPATHY: ICD-10-CM

## 2024-11-26 RX ORDER — SACUBITRIL AND VALSARTAN 24; 26 MG/1; MG/1
1 TABLET, FILM COATED ORAL 2 TIMES DAILY
Qty: 90 TABLET | Refills: 4 | Status: SHIPPED | OUTPATIENT
Start: 2024-11-26

## 2024-11-26 RX ORDER — SUCRALFATE 1 G/1
TABLET ORAL 4 TIMES DAILY
COMMUNITY

## 2024-11-26 NOTE — PATIENT INSTRUCTIONS
Today's Plan:   1) stop maxzide.   2) Your labs and heart ultrasound (echo) looks normal.     If you have questions or concerns please call my nurse team at [phone]    Scheduling phone number: [phone]  Reminder: Please bring in all current medications, over the counter supplements and vitamin bottles to your next appointment.    It was a pleasure seeing you today!

## 2024-11-26 NOTE — LETTER
11/26/2024    Steven Fonseca MD  [address]  [address]  [facility] [address]    RE: Ginger Abreu       Dear Colleague,     I had the pleasure of seeing Ginger Abreu in the [facility].  Primary Cardiologist: Dr. Maldonado    Reason for Visit: Annual follow up with repeat labs and echocardiogram     History of Present Illness:   Ginger is a pleasant 84 year old female with past medical history notable for:    # History of resistant HTN in the setting of renal artery stenosis   -- s/p stent placement of left renal artery with moderate stenosis on the right side that was treated medically   -- now BP is on the lower side  -- reports significant intentional weight loss    # CAD per CT angiogram   -- on aspirin and statin    # Hx of PE     # Hx of takotsubo cardiomyopathy  -- LVEF has normalized  -- on Entresto and low-dose maxzide     # GERD  -- recent flare up requiring sucralfate     Ginger appears to be doing well with no significant symptoms of CP, SOB, orthopnea, PND, or peripheral edema. Chest burning sensation went away with sucralfate treatment. She denies any exertional symptoms. [facility] and she has been stressed about this. She has no new concerns or questions. She has lost weight and wonders if her BP is low due to this. Denies syncopal episodes. No bleeding issues.    Assessment and Plan:  Ginger is a pleasant 84 year old female with past medical history notable for:    # History of resistant HTN in the setting of renal artery stenosis (s/p left renal stent), now low BP in the setting of weight loss  -- we will have her discontinue maxzide  -- she will see me back in 2-3 mo, if any issues in the interim she will let us know; she has follow up with PCP in 1 week    # CAD per CT angiogram   -- on aspirin and statin    # Hx of PE     # Hx of takotsubo cardiomyopathy  -- LVEF has normalized  -- on Entresto and low-dose maxzide (we are  stopping this)    # GERD  -- recent flare up requiring sucralfate     Her recent labs and echo are unremarkable. Her BP is on the lower side. We will discontinue maxzide. She has lost weight and I think her antihypertensive regimen is a bit too aggressive. I have instructed her to reach out to her PCP if her weight loss continues. She will see me back in a few months.     50 minutes spent on the date of the encounter with chart review, patient visit, care coordination, and documentation.    The longitudinal plan of care for the diagnose(s)/condition(s) as documented were addressed during this visit. Due to the added complexity in care, I will continue to support Ginger PHUONG Abreu  in the subsequent management and with ongoing continuity of care.     This note was completed in part using Dragon voice recognition software. Although reviewed after completion, some word and grammatical errors may occur.    Orders this Visit:  Orders Placed This Encounter   Procedures     Follow-Up with Cardiology [facility]     Orders Placed This Encounter   Medications     sucralfate (CARAFATE) 1 GM tablet     Sig: Take by mouth 4 times daily.     sacubitril-valsartan (ENTRESTO) 24-26 MG per tablet     Sig: Take 1 tablet by mouth 2 times daily. Appointment needed for additional refills. Please call [phone] to schedule.     Dispense:  90 tablet     Refill:  4     Medications Discontinued During This Encounter   Medication Reason     famotidine (PEPCID) 40 MG tablet Stopped by Patient (No AVS)     triamterene-HCTZ (MAXZIDE-25) 37.5-25 MG tablet      sacubitril-valsartan (ENTRESTO) 24-26 MG per tablet Reorder (No AVS)         Encounter Diagnoses   Name Primary?     Takotsubo cardiomyopathy      Renovascular hypertension      Benign essential hypertension      Coronary artery disease involving native coronary artery of native heart without angina pectoris      Hyperlipidemia with target LDL less than 70        CURRENT MEDICATIONS:  Current  Outpatient Medications   Medication Sig Dispense Refill     albuterol (PROAIR HFA/PROVENTIL HFA/VENTOLIN HFA) 108 (90 BASE) MCG/ACT Inhaler Inhale 2 puffs into the lungs every 6 hours INDICATION: RESCUE INHALER FOR SHORTNESS OF BREATH, CHEST TIGHTNESS AND COUGH 1 Inhaler 11     alpha-lipoic acid 600 MG CAPS Take 600 mg by mouth 3 times daily        APPLE CIDER VINEGAR PO Take 1 capsule by mouth daily       ascorbic acid (VITAMIN C) 1000 MG TABS Take 1 tablet (1,000 mg) by mouth daily VITAMIN C REPLACEMENT 100 tablet 3     aspirin (ASA) 81 MG EC tablet Take 1 tablet (81 mg) by mouth daily       B-12 (methylcobalamin) 1000 MCG SUBL sublingual tablet Place 1,000 mcg under the tongue daily       Cranberry 125 MG TABS Take 1 tablet by mouth daily       diphenhydrAMINE (BENADRYL) 25 MG tablet Take 25 mg by mouth every 6 hours as needed for itching or allergies       empagliflozin (JARDIANCE) 10 MG TABS tablet Take 10 mg by mouth daily       esomeprazole (NEXIUM) 40 MG capsule Take 1 capsule (40 mg) by mouth every morning (before breakfast) TAKE  30'-60' BEFORE 1ST MEAL 180 capsule 2     Ferrous Sulfate (IRON) 325 (65 Fe) MG tablet Take 1 tablet by mouth daily       lactobacillus rhamnosus, GG, (CULTURELL) capsule Take 1 capsule by mouth 2 times daily       latanoprost (XALATAN) 0.005 % ophthalmic solution Place 1 drop into both eyes At Bedtime       levothyroxine (SYNTHROID/LEVOTHROID) 25 MCG tablet TAKE 1/2 (ONE-HALF) TABLET BY MOUTH FIRST THING IN THE MORNING TO STOMACH. NO FOOD FOR AN HOUR TO TREAT UNDERACTIVE THYROID.       Melatonin 10 MG TABS tablet Take 10 mg by mouth At Bedtime       methocarbamol (ROBAXIN) 750 MG tablet Take 750 mg by mouth 4 times daily as needed for muscle spasms.       niacin 500 MG tablet Take 500 mg by mouth daily (with breakfast)       nitroglycerin (NITROSTAT) 0.4 MG SL tablet Place 1 tablet (0.4 mg) under the tongue every 5 minutes as needed for chest pain 25 tablet 0     oxybutynin ER  (DITROPAN XL) 15 MG 24 hr tablet Take 15 mg by mouth daily (with lunch)       rosuvastatin (CRESTOR) 20 MG tablet Take 20 mg by mouth daily       sacubitril-valsartan (ENTRESTO) 24-26 MG per tablet Take 1 tablet by mouth 2 times daily. Appointment needed for additional refills. Please call [phone] to schedule. 90 tablet 4     sucralfate (CARAFATE) 1 GM tablet Take by mouth 4 times daily.       SUMAtriptan (IMITREX) 25 MG tablet Take 25 mg by mouth at onset of headache for migraine       timolol maleate (TIMOPTIC) 0.5 % ophthalmic solution Place 1 drop into both eyes 2 times daily       UNABLE TO FIND Salt Iodine 1000mg - once a month.       vitamin D (ERGOCALCIFEROL) 25638 UNIT capsule Take 50,000 Units by mouth once a week Takes on the 10th, 20th, and 30th of each month       ZINC SULFATE PO Take 1 tablet by mouth daily       BETA BLOCKER NOT PRESCRIBED, INTENTIONAL, Beta Blocker not prescribed intentionally due to Intolerance (Patient not taking: Reported on 11/26/2024)  0       ALLERGIES     Allergies   Allergen Reactions     Diatrizoate Shortness Of Breath     Other reaction(s): Shortness Of Breath  rapid heart beat  rapid heart beat       Morphine      Other reaction(s): GI Upset  Patient says she will not take because causes GI Upset     Morphine And Codeine Difficulty breathing     Chest gets tight & difficulty breathing      Oxycodone-Acetaminophen      Other reaction(s): GI Upset  Patient says she will not take because causes GI Upset     Atenolol Other (See Comments)     Sob w/ exertion - asthma symptoms     Diltiazem Other (See Comments)     edema - fluid retention in legs    Other reaction(s): Edema  Other reaction(s): Edema       Labetalol Other (See Comments) and Itching     Sob w/ exertion - asthma symptoms  itching     Lipitor [Atorvastatin Calcium] Other (See Comments)     Muscle weakness     Nifedipine Other (See Comments)     Sob w/ exertion - asthma symptoms, HAs    Other reaction(s): Other  (See Comments)  Sob w/ exertion - asthma symptoms, HAs     Sulfa Antibiotics Hives     hives     Zocor [Simvastatin] Other (See Comments)     Muscle weakness     Aleve [Naproxen] Other (See Comments)     Raises BP     Amlodipine Fatigue and Diarrhea            Blood-Group Specific Substance Other (See Comments)     Patient has anti-[place]dal[place]. Blood product orders may be delayed.  Other reaction(s): Other - Describe In Comment Field  Patient has anti-[place]. Blood product orders may be delayed.       Bystolic [Nebivolol Hcl] Other (See Comments)     headaches     Calcium Carbonate Diarrhea     Cefdinir Diarrhea     Diovan [Valsartan] GI Disturbance     gas       Ezetimibe Muscle Pain (Myalgia)     Other reaction(s): Muscle Weakness  Other reaction(s): Myalgia       Fluticasone-Salmeterol Other (See Comments)     hoarseness with 500/50, not the 250/50     Hydralazine Other (See Comments) and Nausea     Increases pulse     Hydrochlorothiazide Other (See Comments)     increasing glucose     Imdur [Isosorbide Mononitrate] Other (See Comments)     headaches     Lisinopril Cough     Hoarse voice     Lovastatin Muscle Pain (Myalgia)     Metformin Diarrhea            Metoprolol Other (See Comments)     Sob w/ exertion - asthma symptoms       Niacin Other (See Comments)     hyperglycemia     No Clinical Screening - See Comments      PN: LW CM1: >>> NO CONTRAST ADVERSE REACTION <<<     Reaction :  KCL---gas sx     Potassium Chloride In Nacl GI Disturbance     gas      Pravachol [Statins] Muscle Pain (Myalgia)     Prilosec Otc [Omeprazole Magnesium] Diarrhea     diarrhea         PAST MEDICAL HISTORY:  Past Medical History:   Diagnosis Date     Acute peptic ulcer, unspecified site, without mention of hemorrhage, perforation, or obstruction      Allergic rhinitis, cause unspecified      Anxiety      Basal cell carcinoma      DVT (deep venous thrombosis) (H) 03/2012    following spinal surgery. Saw Oncology. 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 Saw Oncology. treated 6 months     Peripheral neuropathy 6/19/2015     Pneumonia, organism unspecified(486)      Spinal stenosis      Squamous cell carcinoma      Thyroid nodule      Type II or unspecified type diabetes mellitus without mention of complication, not stated as uncontrolled      Unspecified essential hypertension        PAST SURGICAL HISTORY:  Past Surgical History:   Procedure Laterality Date     CV CORONARY ANGIOGRAM N/A 7/6/2023    Procedure: Coronary Angiogram;  Surgeon: Papa Schilling MD;  Location:  [facility]ARD[facility] C[facility] LAB     CV LEFT HEART CATH N/A 7/6/2023    Procedure: Left Heart Catheterization;  Surgeon: Papa Schilling MD;  Location:  [facility]     CV LEFT VENTRICULOGRAM N/A 7/6/2023    Procedure: Left Ventriculogram;  Surgeon: Papa Schilling MD;  Location:  HEART [facility]ARD[facility] C[facility] LAB     NONSPECIFIC PROCEDURE  2011    Bilateral sphenoidotomy with removal of polypoid tissue.     Right frontal sinusotomy.   Bilateral total ethmoidectomy.  Bilateral maxillary antrostomy with removal of polypoid tissue.       ORTHOPEDIC SURGERY      lumbar fusion     ZZC NONSPECIFIC PROCEDURE  approx 1970's    hyst/bso     ZZC NONSPECIFIC PROCEDURE  approx 1970's    gb     ZZC NONSPECIFIC PROCEDURE  approx 1980's    cystocele repair     ZZC  NONSPECIFIC PROCEDURE  approx     endoscopic sinus      ZZC NONSPECIFIC PROCEDURE      epidurals x 7     ZZC NONSPECIFIC PROCEDURE      nl colonoscopy 2006     ZZ NONSPECIFIC PROCEDURE  2011    L2-L3 foramenotomies     Z NONSPECIFIC PROCEDURE  2012    lumbar fusion       FAMILY HISTORY:  Family History   Problem Relation Age of Onset     Breast Cancer Mother      Heart Disease Father 25         in a fire      Heart Disease Paternal Grandfather      Breast Cancer Sister      Hyperlipidemia Sister        SOCIAL HISTORY:  Social History     Socioeconomic History     Marital status:      Spouse name: None     Number of children: None     Years of education: None     Highest education level: None   Tobacco Use     Smoking status: Never     Smokeless tobacco: Never   Vaping Use     Vaping status: Never Used   Substance and Sexual Activity     Alcohol use: Not Currently     Drug use: No     Sexual activity: Yes   Other Topics Concern     Parent/sibling w/ CABG, MI or angioplasty before 65F 55M? No     Caffeine Concern No     Comment: soda 1 time per day     Special Diet No     Exercise No     Comment: limited due to back pain      Social Drivers of Health      [place] [facility]    Social Connections       Review of Systems:  Skin:        Eyes:       ENT:       Respiratory:       Cardiovascular:       Gastroenterology:      Genitourinary:       Musculoskeletal:       Neurologic:       Psychiatric:       Heme/Lymph/Imm:       Endocrine:         Physical Exam:  Vitals: BP 97/64   Pulse 66   Wt 59.4 kg (131 lb)   SpO2 98%   BMI 23.96 kg/m       GEN:  NAD  NECK: No JVD  C/V:  Regular rate and rhythm, no murmur, rub or gallop.  RESP: Clear to auscultation bilaterally without wheezing, rales, or rhonchi.  GI: Abdomen soft, nontender, nondistended.   EXTREM: No pitting LE edema.   NEURO: Alert and oriented, cooperative.  No obvious focal deficits.   PSYCH: Normal affect.  SKIN: Warm and dry.       Recent Lab Results:  LIPID RESULTS:  Lab Results   Component Value Date    CHOL 110 11/11/2024    CHOL 123 06/22/2021    HDL 47 (L) 11/11/2024    HDL 48 (L) 06/22/2021    LDL 44 11/11/2024    LDL 40 06/22/2021    TRIG 95 11/11/2024    TRIG 177 (H) 06/22/2021    CHOLHDLRATIO 4.2 04/03/2015       LIVER ENZYME RESULTS:  Lab Results   Component Value Date    AST 19 07/06/2023    AST 22 04/05/2018    ALT 20 11/11/2024    ALT 25 06/22/2021       CBC RESULTS:  Lab Results   Component Value Date    WBC 6.9 12/19/2023    WBC 6.0 04/30/2021    RBC 4.24 07/07/2023    RBC 3.77 (L) 04/30/2021    HGB 13.1 07/07/2023    HGB 11.7 04/30/2021    HCT 40.1 07/07/2023    HCT 36.9 04/30/2021    MCV 95 07/07/2023    MCV 98 04/30/2021    MCH 30.9 07/07/2023    MCH 31.0 04/30/2021    MCHC 32.7 07/07/2023    MCHC 31.7 04/30/2021    RDW 13.2 07/07/2023    RDW 14.3 04/30/2021     (L) 07/07/2023     (L) 04/30/2021       BMP RESULTS:  Lab Results   Component Value Date     11/11/2024     07/02/2021    POTASSIUM 4.0 11/11/2024    POTASSIUM 4.0 09/29/2022    POTASSIUM 4.3 07/02/2021    CHLORIDE 102 11/11/2024    CHLORIDE 107 09/29/2022    CHLORIDE 110 (H) 07/02/2021    CO2 28 11/11/2024    CO2 25 09/29/2022    CO2 26 07/02/2021    ANIONGAP 9 11/11/2024    ANIONGAP 7 09/29/2022    ANIONGAP 4 07/02/2021     (H) 11/11/2024     (H) 09/10/2024     (H) 09/29/2022     (H) 07/02/2021    BUN 26.0 (H) 11/11/2024    BUN 41 (H) 09/29/2022    BUN 28 07/02/2021    CR 0.99 (H) 11/11/2024    CR 1.24 (H) 07/02/2021    GFRESTIMATED 56 (L) 11/11/2024    GFRESTIMATED 41 (L) 07/02/2021    GFRESTBLACK 47 (L) 07/02/2021    ROGE 9.6 11/11/2024    ROGE 9.5 07/02/2021        A1C RESULTS:  Lab Results   Component Value Date    A1C 6.0 (H) 07/06/2023    A1C 6.0 (H) 07/12/2019       INR RESULTS:  Lab Results   Component Value Date    INR 0.92  02/27/2017    INR 2.2 (A) 11/05/2012    INR 2.1 (A) 10/15/2012    INR 2.64 (H) 04/27/2012             Gregory Ambrose PA-C  November 26, 2024     Thank you for allowing me to participate in the care of your patient.      Sincerely,     Gregory Ambrose PA-C     [facility]  cc:   Vaibhav Maldonado MD  [address]  [place] [id number]

## 2024-11-26 NOTE — PROGRESS NOTES
Primary Cardiologist: Dr. Maldonado    Reason for Visit: Annual follow up with repeat labs and echocardiogram     History of Present Illness:   Ginger is a pleasant 84 year old female with past medical history notable for:    # History of resistant HTN in the setting of renal artery stenosis   -- s/p stent placement of left renal artery with moderate stenosis on the right side that was treated medically   -- now BP is on the lower side  -- reports significant intentional weight loss    # CAD per CT angiogram   -- on aspirin and statin    # Hx of PE     # Hx of takotsubo cardiomyopathy  -- LVEF has normalized  -- on Entresto and low-dose maxzide     # GERD  -- recent flare up requiring sucralfate     Ginger appears to be doing well with no significant symptoms of CP, SOB, orthopnea, PND, or peripheral edema. Chest burning sensation went away with sucralfate treatment. She denies any exertional symptoms. [facility] and she has been stressed about this. She has no new concerns or questions. She has lost weight and wonders if her BP is low due to this. Denies syncopal episodes. No bleeding issues.    Assessment and Plan:  Ginger is a pleasant 84 year old female with past medical history notable for:    # History of resistant HTN in the setting of renal artery stenosis (s/p left renal stent), now low BP in the setting of weight loss  -- we will have her discontinue maxzide  -- she will see me back in 2-3 mo, if any issues in the interim she will let us know; she has follow up with PCP in 1 week    # CAD per CT angiogram   -- on aspirin and statin    # Hx of PE     # Hx of takotsubo cardiomyopathy  -- LVEF has normalized  -- on Entresto and low-dose maxzide (we are stopping this)    # GERD  -- recent flare up requiring sucralfate     Her recent labs and echo are unremarkable. Her BP is on the lower side. We will discontinue maxzide. She has lost weight and I think her antihypertensive  regimen is a bit too aggressive. I have instructed her to reach out to her PCP if her weight loss continues. She will see me back in a few months.     50 minutes spent on the date of the encounter with chart review, patient visit, care coordination, and documentation.    The longitudinal plan of care for the diagnose(s)/condition(s) as documented were addressed during this visit. Due to the added complexity in care, I will continue to support Ginger PHUONG Abreu  in the subsequent management and with ongoing continuity of care.     This note was completed in part using Dragon voice recognition software. Although reviewed after completion, some word and grammatical errors may occur.    Orders this Visit:  Orders Placed This Encounter   Procedures    Follow-Up with Cardiology [facility]     Orders Placed This Encounter   Medications    sucralfate (CARAFATE) 1 GM tablet     Sig: Take by mouth 4 times daily.    sacubitril-valsartan (ENTRESTO) 24-26 MG per tablet     Sig: Take 1 tablet by mouth 2 times daily. Appointment needed for additional refills. Please call [phone] to schedule.     Dispense:  90 tablet     Refill:  4     Medications Discontinued During This Encounter   Medication Reason    famotidine (PEPCID) 40 MG tablet Stopped by Patient (No AVS)    triamterene-HCTZ (MAXZIDE-25) 37.5-25 MG tablet     sacubitril-valsartan (ENTRESTO) 24-26 MG per tablet Reorder (No AVS)         Encounter Diagnoses   Name Primary?    Takotsubo cardiomyopathy     Renovascular hypertension     Benign essential hypertension     Coronary artery disease involving native coronary artery of native heart without angina pectoris     Hyperlipidemia with target LDL less than 70        CURRENT MEDICATIONS:  Current Outpatient Medications   Medication Sig Dispense Refill    albuterol (PROAIR HFA/PROVENTIL HFA/VENTOLIN HFA) 108 (90 BASE) MCG/ACT Inhaler Inhale 2 puffs into the lungs every 6 hours INDICATION: RESCUE INHALER FOR SHORTNESS OF BREATH,  CHEST TIGHTNESS AND COUGH 1 Inhaler 11    alpha-lipoic acid 600 MG CAPS Take 600 mg by mouth 3 times daily       APPLE CIDER VINEGAR PO Take 1 capsule by mouth daily      ascorbic acid (VITAMIN C) 1000 MG TABS Take 1 tablet (1,000 mg) by mouth daily VITAMIN C REPLACEMENT 100 tablet 3    aspirin (ASA) 81 MG EC tablet Take 1 tablet (81 mg) by mouth daily      B-12 (methylcobalamin) 1000 MCG SUBL sublingual tablet Place 1,000 mcg under the tongue daily      Cranberry 125 MG TABS Take 1 tablet by mouth daily      diphenhydrAMINE (BENADRYL) 25 MG tablet Take 25 mg by mouth every 6 hours as needed for itching or allergies      empagliflozin (JARDIANCE) 10 MG TABS tablet Take 10 mg by mouth daily      esomeprazole (NEXIUM) 40 MG capsule Take 1 capsule (40 mg) by mouth every morning (before breakfast) TAKE  30'-60' BEFORE 1ST MEAL 180 capsule 2    Ferrous Sulfate (IRON) 325 (65 Fe) MG tablet Take 1 tablet by mouth daily      lactobacillus rhamnosus, GG, (CULTURELL) capsule Take 1 capsule by mouth 2 times daily      latanoprost (XALATAN) 0.005 % ophthalmic solution Place 1 drop into both eyes At Bedtime      levothyroxine (SYNTHROID/LEVOTHROID) 25 MCG tablet TAKE 1/2 (ONE-HALF) TABLET BY MOUTH FIRST THING IN THE MORNING TO STOMACH. NO FOOD FOR AN HOUR TO TREAT UNDERACTIVE THYROID.      Melatonin 10 MG TABS tablet Take 10 mg by mouth At Bedtime      methocarbamol (ROBAXIN) 750 MG tablet Take 750 mg by mouth 4 times daily as needed for muscle spasms.      niacin 500 MG tablet Take 500 mg by mouth daily (with breakfast)      nitroglycerin (NITROSTAT) 0.4 MG SL tablet Place 1 tablet (0.4 mg) under the tongue every 5 minutes as needed for chest pain 25 tablet 0    oxybutynin ER (DITROPAN XL) 15 MG 24 hr tablet Take 15 mg by mouth daily (with lunch)      rosuvastatin (CRESTOR) 20 MG tablet Take 20 mg by mouth daily      sacubitril-valsartan (ENTRESTO) 24-26 MG per tablet Take 1 tablet by mouth 2 times daily. Appointment needed for  additional refills. Please call [phone] to schedule. 90 tablet 4    sucralfate (CARAFATE) 1 GM tablet Take by mouth 4 times daily.      SUMAtriptan (IMITREX) 25 MG tablet Take 25 mg by mouth at onset of headache for migraine      timolol maleate (TIMOPTIC) 0.5 % ophthalmic solution Place 1 drop into both eyes 2 times daily      UNABLE TO FIND Salt Iodine 1000mg - once a month.      vitamin D (ERGOCALCIFEROL) 77134 UNIT capsule Take 50,000 Units by mouth once a week Takes on the 10th, 20th, and 30th of each month      ZINC SULFATE PO Take 1 tablet by mouth daily      BETA BLOCKER NOT PRESCRIBED, INTENTIONAL, Beta Blocker not prescribed intentionally due to Intolerance (Patient not taking: Reported on 11/26/2024)  0       ALLERGIES     Allergies   Allergen Reactions    Diatrizoate Shortness Of Breath     Other reaction(s): Shortness Of Breath  rapid heart beat  rapid heart beat      Morphine      Other reaction(s): GI Upset  Patient says she will not take because causes GI Upset    Morphine And Codeine Difficulty breathing     Chest gets tight & difficulty breathing     Oxycodone-Acetaminophen      Other reaction(s): GI Upset  Patient says she will not take because causes GI Upset    Atenolol Other (See Comments)     Sob w/ exertion - asthma symptoms    Diltiazem Other (See Comments)     edema - fluid retention in legs    Other reaction(s): Edema  Other reaction(s): Edema      Labetalol Other (See Comments) and Itching     Sob w/ exertion - asthma symptoms  itching    Lipitor [Atorvastatin Calcium] Other (See Comments)     Muscle weakness    Nifedipine Other (See Comments)     Sob w/ exertion - asthma symptoms, HAs    Other reaction(s): Other (See Comments)  Sob w/ exertion - asthma symptoms, HAs    Sulfa Antibiotics Hives     hives    Zocor [Simvastatin] Other (See Comments)     Muscle weakness    Aleve [Naproxen] Other (See Comments)     Raises BP    Amlodipine Fatigue and Diarrhea           Blood-Group  Specific Substance Other (See Comments)     Patient has anti-Odalys. Blood product orders may be delayed.  Other reaction(s): Other - Describe In Comment Field  Patient has anti-Odalys. Blood product orders may be delayed.      Bystolic [Nebivolol Hcl] Other (See Comments)     headaches    Calcium Carbonate Diarrhea    Cefdinir Diarrhea    Diovan [Valsartan] GI Disturbance     gas      Ezetimibe Muscle Pain (Myalgia)     Other reaction(s): Muscle Weakness  Other reaction(s): Myalgia      Fluticasone-Salmeterol Other (See Comments)     hoarseness with 500/50, not the 250/50    Hydralazine Other (See Comments) and Nausea     Increases pulse    Hydrochlorothiazide Other (See Comments)     increasing glucose    Imdur [Isosorbide Mononitrate] Other (See Comments)     headaches    Lisinopril Cough     Hoarse voice    Lovastatin Muscle Pain (Myalgia)    Metformin Diarrhea           Metoprolol Other (See Comments)     Sob w/ exertion - asthma symptoms      Niacin Other (See Comments)     hyperglycemia    No Clinical Screening - See Comments      PN: LW CM1: >>> NO CONTRAST ADVERSE REACTION <<<     Reaction :  KCL---gas sx    Potassium Chloride In Nacl GI Disturbance     gas     Pravachol [Statins] Muscle Pain (Myalgia)    Prilosec Otc [Omeprazole Magnesium] Diarrhea     diarrhea         PAST MEDICAL HISTORY:  Past Medical History:   Diagnosis Date    Acute peptic ulcer, unspecified site, without mention of hemorrhage, perforation, or obstruction     Allergic rhinitis, cause unspecified     Anxiety     Basal cell carcinoma     DVT (deep venous thrombosis) (H) 03/2012    following spinal surgery. Saw Oncology. 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 Saw Oncology. treated 6 months    Peripheral neuropathy 6/19/2015    Pneumonia, organism unspecified(486)     Spinal stenosis     Squamous cell carcinoma     Thyroid nodule     Type II or unspecified type diabetes mellitus without mention of complication, not stated as uncontrolled     Unspecified essential hypertension        PAST SURGICAL HISTORY:  Past Surgical History:   Procedure Laterality Date    CV CORONARY ANGIOGRAM N/A 7/6/2023    Procedure: Coronary Angiogram;  Surgeon: Papa Schilling MD;  [facility]    CV LEFT HEART CATH N/A 7/6/2023    Procedure: Left Heart Catheterization;  Surgeon: Papa Schilling MD;  Location:  [facility]    CV LEFT VENTRICULOGRAM N/A 7/6/2023    Procedure: Left Ventriculogram;  Surgeon: Papa Schilling MD;  Location:  [facility]    NONSPECIFIC PROCEDURE  2011    Bilateral sphenoidotomy with removal of polypoid tissue.     Right frontal sinusotomy.   Bilateral total ethmoidectomy.  Bilateral maxillary antrostomy with removal of polypoid tissue.      ORTHOPEDIC SURGERY      lumbar fusion    ZZC NONSPECIFIC PROCEDURE  approx 1970's    hyst/bso    ZZC NONSPECIFIC PROCEDURE  approx 1970's    gb    ZZC NONSPECIFIC PROCEDURE  approx 1980's    cystocele repair    ZZC NONSPECIFIC PROCEDURE  approx 1980's    endoscopic sinus     ZZC NONSPECIFIC PROCEDURE      epidurals x 7    ZZC NONSPECIFIC PROCEDURE      nl colonoscopy 1/2006    ZZC NONSPECIFIC PROCEDURE  2011    L2-L3 foramenotomies    ZZC NONSPECIFIC PROCEDURE  2012    lumbar fusion       FAMILY HISTORY:  Family History   Problem Relation Age  of Onset    Breast Cancer Mother     Heart Disease Father 25         in a fire     Heart Disease Paternal Grandfather     Breast Cancer Sister     Hyperlipidemia Sister        SOCIAL HISTORY:  Social History     Socioeconomic History    Marital status:      Spouse name: None    Number of children: None    Years of education: None    Highest education level: None   Tobacco Use    Smoking status: Never    Smokeless tobacco: Never   Vaping Use    Vaping status: Never Used   Substance and Sexual Activity    Alcohol use: Not Currently    Drug use: No    Sexual activity: Yes   Other Topics Concern    Parent/sibling w/ CABG, MI or angioplasty before 65F 55M? No    Caffeine Concern No     Comment: soda 1 time per day    Special Diet No    Exercise No     Comment: limited due to back pain      Social Drivers of Health      Received from [employer] [employer]    Social Connections       Review of Systems:  Skin:        Eyes:       ENT:       Respiratory:       Cardiovascular:       Gastroenterology:      Genitourinary:       Musculoskeletal:       Neurologic:       Psychiatric:       Heme/Lymph/Imm:       Endocrine:         Physical Exam:  Vitals: BP 97/64   Pulse 66   Wt 59.4 kg (131 lb)   SpO2 98%   BMI 23.96 kg/m       GEN:  NAD  NECK: No JVD  C/V:  Regular rate and rhythm, no murmur, rub or gallop.  RESP: Clear to auscultation bilaterally without wheezing, rales, or rhonchi.  GI: Abdomen soft, nontender, nondistended.   EXTREM: No pitting LE edema.   NEURO: Alert and oriented, cooperative. No obvious focal deficits.   PSYCH: Normal affect.  SKIN: Warm and dry.       Recent Lab Results:  LIPID RESULTS:  Lab Results   Component Value Date    CHOL 110 2024    CHOL 123 2021    HDL 47 (L) 2024    HDL 48 (L) 2021    LDL 44 2024    LDL 40 2021    TRIG 95 2024    TRIG 177 (H) 2021    CHOLHDLRATIO 4.2  04/03/2015       LIVER ENZYME RESULTS:  Lab Results   Component Value Date    AST 19 07/06/2023    AST 22 04/05/2018    ALT 20 11/11/2024    ALT 25 06/22/2021       CBC RESULTS:  Lab Results   Component Value Date    WBC 6.9 12/19/2023    WBC 6.0 04/30/2021    RBC 4.24 07/07/2023    RBC 3.77 (L) 04/30/2021    HGB 13.1 07/07/2023    HGB 11.7 04/30/2021    HCT 40.1 07/07/2023    HCT 36.9 04/30/2021    MCV 95 07/07/2023    MCV 98 04/30/2021    MCH 30.9 07/07/2023    MCH 31.0 04/30/2021    MCHC 32.7 07/07/2023    MCHC 31.7 04/30/2021    RDW 13.2 07/07/2023    RDW 14.3 04/30/2021     (L) 07/07/2023     (L) 04/30/2021       BMP RESULTS:  Lab Results   Component Value Date     11/11/2024     07/02/2021    POTASSIUM 4.0 11/11/2024    POTASSIUM 4.0 09/29/2022    POTASSIUM 4.3 07/02/2021    CHLORIDE 102 11/11/2024    CHLORIDE 107 09/29/2022    CHLORIDE 110 (H) 07/02/2021    CO2 28 11/11/2024    CO2 25 09/29/2022    CO2 26 07/02/2021    ANIONGAP 9 11/11/2024    ANIONGAP 7 09/29/2022    ANIONGAP 4 07/02/2021     (H) 11/11/2024     (H) 09/10/2024     (H) 09/29/2022     (H) 07/02/2021    BUN 26.0 (H) 11/11/2024    BUN 41 (H) 09/29/2022    BUN 28 07/02/2021    CR 0.99 (H) 11/11/2024    CR 1.24 (H) 07/02/2021    GFRESTIMATED 56 (L) 11/11/2024    GFRESTIMATED 41 (L) 07/02/2021    GFRESTBLACK 47 (L) 07/02/2021    ROGE 9.6 11/11/2024    ROGE 9.5 07/02/2021        A1C RESULTS:  Lab Results   Component Value Date    A1C 6.0 (H) 07/06/2023    A1C 6.0 (H) 07/12/2019       INR RESULTS:  Lab Results   Component Value Date    INR 0.92 02/27/2017    INR 2.2 (A) 11/05/2012    INR 2.1 (A) 10/15/2012    INR 2.64 (H) 04/27/2012             Gregory Ambrose PA-C  November 26, 2024

## 2025-06-14 ENCOUNTER — HEALTH MAINTENANCE LETTER (OUTPATIENT)
Age: 85
End: 2025-06-14

## 2025-07-26 ENCOUNTER — HEALTH MAINTENANCE LETTER (OUTPATIENT)
Age: 85
End: 2025-07-26

## 2025-08-16 ENCOUNTER — HEALTH MAINTENANCE LETTER (OUTPATIENT)
Age: 85
End: 2025-08-16

## (undated) DEVICE — DEFIB PRO-PADZ LVP LQD GEL ADULT 8900-2105-01

## (undated) DEVICE — CATH ANGIO INFINITI JR4 4FRX100CM 538421

## (undated) DEVICE — MANIFOLD KIT ANGIO AUTOMATED 014613

## (undated) DEVICE — LINE MONITOR NASAL SMART CAPNOLINE ADULT LONG 12463

## (undated) DEVICE — CATH ANGIO INFINITI 3DRC 6FRX100CM 534676T

## (undated) DEVICE — KIT HAND CONTROL ANGIOTOUCH ACIST 65CM AT-P65

## (undated) DEVICE — CATH DIAG 4FR JL 4.5 538417

## (undated) DEVICE — CATH ANGIO INFINITI AL1 4FRX100CM 538445

## (undated) DEVICE — CATH ANGIO INFINITI AR2 MOD 4FRX100CM 538443

## (undated) DEVICE — CATH ANGIO INFINITI MPA2 4FRX100CM 2 SH 538442

## (undated) DEVICE — INTRODUCER SHEATH GREEN 6.5FRX11CM .038IN PSI-6F-11-038ACT

## (undated) DEVICE — CATH DIAG 4FR ANG PIG 538453S

## (undated) DEVICE — INTRO SHEATH 4FRX10CM PINNACLE RSS402

## (undated) DEVICE — TOTE ANGIO CORP PC15AT SAN32CC83O

## (undated) DEVICE — NDL PERC ENTRY THINWALL 18GA 7.0" G00166

## (undated) RX ORDER — FENTANYL CITRATE 50 UG/ML
INJECTION, SOLUTION INTRAMUSCULAR; INTRAVENOUS
Status: DISPENSED
Start: 2017-02-27

## (undated) RX ORDER — CLOPIDOGREL BISULFATE 75 MG/1
TABLET ORAL
Status: DISPENSED
Start: 2017-02-27

## (undated) RX ORDER — HEPARIN SODIUM 1000 [USP'U]/ML
INJECTION, SOLUTION INTRAVENOUS; SUBCUTANEOUS
Status: DISPENSED
Start: 2017-02-27

## (undated) RX ORDER — LIDOCAINE HYDROCHLORIDE 10 MG/ML
INJECTION, SOLUTION EPIDURAL; INFILTRATION; INTRACAUDAL; PERINEURAL
Status: DISPENSED
Start: 2023-07-06

## (undated) RX ORDER — NALOXONE HYDROCHLORIDE 0.4 MG/ML
INJECTION, SOLUTION INTRAMUSCULAR; INTRAVENOUS; SUBCUTANEOUS
Status: DISPENSED
Start: 2017-02-27

## (undated) RX ORDER — HEPARIN SODIUM 1000 [USP'U]/ML
INJECTION, SOLUTION INTRAVENOUS; SUBCUTANEOUS
Status: DISPENSED
Start: 2023-07-06

## (undated) RX ORDER — FENTANYL CITRATE 50 UG/ML
INJECTION, SOLUTION INTRAMUSCULAR; INTRAVENOUS
Status: DISPENSED
Start: 2023-07-06

## (undated) RX ORDER — HEPARIN SODIUM 200 [USP'U]/100ML
INJECTION, SOLUTION INTRAVENOUS
Status: DISPENSED
Start: 2023-07-06

## (undated) RX ORDER — GLYCOPYRROLATE 0.2 MG/ML
INJECTION, SOLUTION INTRAMUSCULAR; INTRAVENOUS
Status: DISPENSED
Start: 2017-02-27

## (undated) RX ORDER — METOPROLOL TARTRATE 1 MG/ML
INJECTION, SOLUTION INTRAVENOUS
Status: DISPENSED
Start: 2023-07-06

## (undated) RX ORDER — NITROGLYCERIN 5 MG/ML
VIAL (ML) INTRAVENOUS
Status: DISPENSED
Start: 2023-07-06